# Patient Record
Sex: MALE | Race: WHITE | NOT HISPANIC OR LATINO | Employment: OTHER | URBAN - METROPOLITAN AREA
[De-identification: names, ages, dates, MRNs, and addresses within clinical notes are randomized per-mention and may not be internally consistent; named-entity substitution may affect disease eponyms.]

---

## 2017-01-03 ENCOUNTER — HOSPITAL ENCOUNTER (OUTPATIENT)
Facility: MEDICAL CENTER | Age: 67
End: 2017-01-03
Attending: INTERNAL MEDICINE | Admitting: INTERNAL MEDICINE
Payer: MEDICARE

## 2017-01-03 ENCOUNTER — APPOINTMENT (OUTPATIENT)
Dept: ADMISSIONS | Facility: MEDICAL CENTER | Age: 67
End: 2017-01-03
Attending: PAIN MEDICINE
Payer: MEDICARE

## 2017-01-06 ENCOUNTER — OFFICE VISIT (OUTPATIENT)
Dept: MEDICAL GROUP | Facility: PHYSICIAN GROUP | Age: 67
End: 2017-01-06
Payer: MEDICARE

## 2017-01-06 ENCOUNTER — TELEPHONE (OUTPATIENT)
Dept: OTHER | Facility: MEDICAL CENTER | Age: 67
End: 2017-01-06

## 2017-01-06 VITALS
RESPIRATION RATE: 16 BRPM | TEMPERATURE: 97.9 F | SYSTOLIC BLOOD PRESSURE: 132 MMHG | OXYGEN SATURATION: 96 % | HEART RATE: 72 BPM | BODY MASS INDEX: 28.47 KG/M2 | WEIGHT: 192.2 LBS | DIASTOLIC BLOOD PRESSURE: 72 MMHG | HEIGHT: 69 IN

## 2017-01-06 DIAGNOSIS — C34.90 MALIGNANT NEOPLASM OF LUNG, UNSPECIFIED LATERALITY, UNSPECIFIED PART OF LUNG (HCC): ICD-10-CM

## 2017-01-06 PROCEDURE — 99213 OFFICE O/P EST LOW 20 MIN: CPT | Performed by: NURSE PRACTITIONER

## 2017-01-06 NOTE — TELEPHONE ENCOUNTER
"Pt calling with concerns regarding reaching oncologist, Dr Moses.  Question regarding flu shot.  Reviewed with patient evidence based practise regarding flu and pneumonia vaccinations.  Pt states is not currently on treatment but has enrolled in clinical trial.  Discussed would need to touch base with clinical trials to verify if this would effect trial.  Pt also questioning if he wanted to do clinical trial r/t his experience with current physician office.  Stated he felt that he was being told \"there is nothing left\" for him and to \"not come back\".  Pt was the one who researched current possibility of clinical trial and reached out for enrollment. Encouraged him to talk to clinical trial nurse and what involvement in clinical trial would look like.  Discussed with patient if he had thought about second opinion.  He said he had called [facility] but was told \"she doesn't want any more patients\".  Provided information that navigator was not aware of any time that our oncology office was not taking patients.  Apologized for miscommunication and explained purpose statement for [facility].  Discussed if he felt this a better option he could have his PCP make a referral for a second opinion.  He stated would call his PCP today.      Discussed with clinical trials regarding question on flu vaccine.  Information provided that [facility] trial did have pretty stringent guidelines and it would be better, if appropriate, to receive flu vaccine after biopsy on the 10th.  Pt aware and grateful for help and information.  Available as needed.  "

## 2017-01-06 NOTE — MR AVS SNAPSHOT
"        Gabriele Jaimescinthya   2017 5:20 PM   Office Visit   MRN: [id number]    Department:  [facility]   Dept Phone:  [phone]    Description:  Male : 1950   Provider:  CHARMAINE Hutchins           Reason for Visit     Referral Needed Oncologist       Allergies as of 2017     No Known Allergies      You were diagnosed with     Malignant neoplasm of lung, unspecified laterality, unspecified part of lung (HCC)   [[id number]]         Vital Signs     Blood Pressure Pulse Temperature Respirations Height Weight    132/72 mmHg 72 36.6 °C (97.9 °F) 16 1.753 m (5' 9.02\") 87.181 kg (192 lb 3.2 oz)    Body Mass Index Oxygen Saturation Smoking Status             28.37 kg/m2 96% Former Smoker         Basic Information     Date Of Birth Sex Race Ethnicity Preferred Language    1950 Male White Non- English      Your appointments     Shayan 10, 2017 11:00 AM   CT NEEDLE BX MEDIASTINUM with [facility] 1, NURSE, IR IMAGING(CT/ULTRASOUND), RADIOLOGIST, CT/US INTERVENTIONAL [facility] - CT - [facility] ([facility])    [address] [place] [id number]   [phone]              Problem List              ICD-10-CM Priority Class Noted - Resolved    GERD (gastroesophageal reflux disease) K21.9 Low  2015 - Present    Pulmonary nodule seen on imaging study R91.1   2015 - Present    OA (osteoarthritis) M19.90   2015 - Present    Anxiety F41.9   2015 - Present    Shortness of breath R06.02   2015 - Present    Lung mass R91.8 High  2015 - Present    Hypercalcemia E83.52 Medium  2015 - Present    Lung cancer (HCC) C34.90   2015 - Present    Dependence on supplemental oxygen Z99.81   10/7/2015 - Present    Laryngitis, chronic J37.0   2015 - Present    Chronic low back pain with left-sided sciatica M54.42, G89.29   10/18/2016 - Present    Weakness of left lower extremity M62.81   11/15/2016 - Present    Lumbar spondylosis M47.816   " 12/8/2016 - Present      Health Maintenance        Date Due Completion Dates    IMM DTaP/Tdap/Td Vaccine (1 - Tdap) 8/10/1970 ---    IMM ZOSTER VACCINE 8/10/2011 ---    IMM PNEUMOCOCCAL 65+ (ADULT) LOW/MEDIUM RISK SERIES (1 of 2 - PCV13) 8/10/2016 10/7/2013    IMM INFLUENZA (1) 9/1/2016 10/7/2015            Current Immunizations     Influenza Vaccine Quad Inj (Preserved) 10/7/2015    Pneumococcal polysaccharide vaccine (PPSV-23) 10/7/2013      Below and/or attached are the medications your provider expects you to take. Review all of your home medications and newly ordered medications with your provider and/or pharmacist. Follow medication instructions as directed by your provider and/or pharmacist. Please keep your medication list with you and share with your provider. Update the information when medications are discontinued, doses are changed, or new medications (including over-the-counter products) are added; and carry medication information at all times in the event of emergency situations     Allergies:  No Known Allergies          Medications  Valid as of: January 06, 2017 -  6:03 PM    Generic Name Brand Name Tablet Size Instructions for use    ALPRAZolam (Tab) XANAX 1 MG Take 1 Tab by mouth at bedtime as needed for Sleep or Anxiety.        HYDROmorphone HCl (Tab) DILAUDID 2 MG Take 4 mg by mouth every four hours as needed for Severe Pain.        Meloxicam (Tab) MOBIC 7.5 MG Take 7.5 mg by mouth 2 Times a Day.        Omeprazole (CAPSULE DELAYED RELEASE) PRILOSEC 20 MG Take 20 mg by mouth every day.        Ondansetron HCl (Tab) ZOFRAN 8 MG Take 1 Tab by mouth 1 time daily as needed for Nausea/Vomiting.        .                 Medicines prescribed today were sent to:     [facility][id number] - [place], [place] - [address] [place] [address]    Phone: [phone] Fax: [phone]    Open 24 Hours?: No      Medication refill instructions:       If your  prescription bottle indicates you have medication refills left, it is not necessary to call your provider’s office. Please contact your pharmacy and they will refill your medication.    If your prescription bottle indicates you do not have any refills left, you may request refills at any time through one of the following ways: The online [employer] system (except Urgent Care), by calling your provider’s office, or by asking your pharmacy to contact your provider’s office with a refill request. Medication refills are processed only during regular business hours and may not be available until the next business day. Your provider may request additional information or to have a follow-up visit with you prior to refilling your medication.   *Please Note: Medication refills are assigned a new Rx number when refilled electronically. Your pharmacy may indicate that no refills were authorized even though a new prescription for the same medication is available at the pharmacy. Please request the medicine by name with the pharmacy before contacting your provider for a refill.        Referral     A referral request has been sent to our patient care coordination department. Please allow 3-5 business days for us to process this request and contact you either by phone or mail. If you do not hear from us by the 5th business day, please call us at [phone].           [employer] Status: Patient Declined

## 2017-01-07 NOTE — ASSESSMENT & PLAN NOTE
Patient has adenocarcinoma and has previously been seen by Dr. Moses. He is here today requesting a referral to Dr. Bennett at [facility] for a second opinion. He states that he has enrolled in a clinical trial, and needs to get a second opinion. He denies any chest pain, shortness of breath or dyspnea at this time.

## 2017-01-07 NOTE — PROGRESS NOTES
Subjective:     Chief Complaint   Patient presents with   • Referral Needed     Oncologist        HPI:  Gabriele Torres is a 66 y.o. male here today to discuss the following:    Lung cancer  Patient has adenocarcinoma and has previously been seen by Dr. Moses. He is here today requesting a referral to Dr. Bennett at [facility] for a second opinion. He states that he has enrolled in a clinical trial, and needs to get a second opinion. He denies any chest pain, shortness of breath or dyspnea at this time.         Current medicines (including changes today)  Current Outpatient Prescriptions   Medication Sig Dispense Refill   • omeprazole (PRILOSEC) 20 MG delayed-release capsule Take 20 mg by mouth every day.     • meloxicam (MOBIC) 7.5 MG Tab Take 7.5 mg by mouth 2 Times a Day.     • ondansetron (ZOFRAN) 8 MG Tab Take 1 Tab by mouth 1 time daily as needed for Nausea/Vomiting. 3 Tab 0   • HYDROmorphone (DILAUDID) 2 MG Tab Take 4 mg by mouth every four hours as needed for Severe Pain.     • alprazolam (XANAX) 1 MG TABS Take 1 Tab by mouth at bedtime as needed for Sleep or Anxiety. 30 Tab 0     No current facility-administered medications for this visit.       He  has a past medical history of Pain; Psychiatric problem; Heart burn; Indigestion; Hiatus hernia syndrome; Carcinoma in situ of respiratory system (2015); Arthritis; Cancer (HCC) (5/2015); and Breath shortness (2016).    ROS   Review of Systems   Constitutional: Negative for fever, chills, weight loss and malaise/fatigue.   HENT: Negative for ear pain, nosebleeds, congestion, sore throat and neck pain.    Respiratory: Negative for cough, sputum production, shortness of breath and wheezing.    Cardiovascular: Negative for chest pain, palpitations,  and leg swelling.   Gastrointestinal: Negative for heartburn, nausea, vomiting, diarrhea and abdominal pain.   Neurological: Negative for dizziness, tingling, tremors, sensory change, focal weakness and headaches.  "  Psychiatric/Behavioral: Negative for depression, anxiety, suicidal ideas, insomnia and memory loss.    All other systems reviewed and are negative except as in HPI.     Objective:   Physical Exam:  Blood pressure 132/72, pulse 72, temperature 36.6 °C (97.9 °F), resp. rate 16, height 1.753 m (5' 9.02\"), weight 87.181 kg (192 lb 3.2 oz), SpO2 96 %. Body mass index is 28.37 kg/(m^2).   General:  Well nourished, well developed in NAD  Head is grossly normal.  Neck: Supple without JVD   Pulmonary:  Normal effort.  Cardiovascular: Regular rate   Extremities: no clubbing, cyanosis, or edema.   Assessment and Plan:   The following treatment plan was discussed   1. Malignant neoplasm of lung, unspecified laterality, unspecified part of lung (HCC)  REFERRAL TO HEMATOLOGY ONCOLOGY Referral to?: [facility]       Followup: Return if symptoms worsen or fail to improve.   Please note that this dictation was created using voice recognition software. I have made every reasonable attempt to correct obvious errors, but I expect that there are errors of grammar and possibly content that I did not discover before finalizing the note.             "

## 2017-01-09 ENCOUNTER — HOSPITAL ENCOUNTER (OUTPATIENT)
Facility: MEDICAL CENTER | Age: 67
End: 2017-01-09
Attending: INTERNAL MEDICINE | Admitting: INTERNAL MEDICINE
Payer: MEDICARE

## 2017-01-09 PROBLEM — Z00.6 RESEARCH STUDY PATIENT: Status: ACTIVE | Noted: 2017-01-09

## 2017-01-10 ENCOUNTER — APPOINTMENT (OUTPATIENT)
Dept: RADIOLOGY | Facility: MEDICAL CENTER | Age: 67
End: 2017-01-10
Payer: MEDICARE

## 2017-01-10 ENCOUNTER — OFFICE VISIT (OUTPATIENT)
Dept: HEMATOLOGY ONCOLOGY | Facility: MEDICAL CENTER | Age: 67
End: 2017-01-10
Payer: MEDICARE

## 2017-01-10 ENCOUNTER — HOSPITAL ENCOUNTER (OUTPATIENT)
Dept: RADIOLOGY | Facility: MEDICAL CENTER | Age: 67
End: 2017-01-10
Payer: MEDICARE

## 2017-01-10 VITALS
DIASTOLIC BLOOD PRESSURE: 64 MMHG | BODY MASS INDEX: 28.73 KG/M2 | HEART RATE: 84 BPM | RESPIRATION RATE: 20 BRPM | SYSTOLIC BLOOD PRESSURE: 120 MMHG | HEIGHT: 69 IN | OXYGEN SATURATION: 92 % | TEMPERATURE: 98.6 F | WEIGHT: 194 LBS

## 2017-01-10 DIAGNOSIS — C34.91 METASTATIC LUNG CANCER (METASTASIS FROM LUNG TO OTHER SITE), RIGHT (HCC): ICD-10-CM

## 2017-01-10 PROCEDURE — 99205 OFFICE O/P NEW HI 60 MIN: CPT | Performed by: INTERNAL MEDICINE

## 2017-01-10 NOTE — Clinical Note
[facility]    [address], [address]  [facility] [address]      Date:1/10/2017                     Reference to Gabriele Torres    Consult:  Oncology      Date of Consultation:  1/10/2017  Time:  11 am       Referring Physician: Self referral    Primary Care:  Sasha Isidro M.D.  Oncology: Dr. Moses  Pain management: Dr. Sparks  Spinal surgery: Dr. Lopez       Diagnosis: Metastatic adenocarcinoma of lung      Chief Complaint:  He is here for a second opinion      History of Presenting Illness:  Gabriele Torres is a 66 y.o. male with metastatic adenocarcinoma of lung diagnosed in 5/2015.  He initially noticed decrease appetite and weight while he was living in [place] in 4/2015.  He was seen in the ER and CT imaging showed changes concerning for lung malignancy and was referred to pulmonary.  He was in the process of moving to [place] at the time.  On moving to [place], he presented to the ER. By this time he has developed cough and shortness of breath.  5/9/15 he is s/p of the right upper and lower lobe nodules which were both positive for adenocarcinoma, moderately differentiated with lymphovascular invasion. CT imaging showed multiple right pulmonary nodules in the right lung as well as left lower lobe nodule, left adrenal nodule and trace right pleural effusion.   Bone scan showed uptake in right scapula and right 3rd or 4th rib concerning.  Further testing of the tissue was positive for EGFR 20 mutation.  He was started on tarceva on 6/1/16.  PET shortly after again showed multiple pulmonary nodules with uptake with progression, no uptake in right scapula and ribs, uptake in left acetabular roof.  Repeat imaging in 9/2016 showed progression of disease.  9/24/15 he was started on carboplatin and alimta and only had one cycle.  This was changed as he was unable to tolerate it.  He was then on single agent alimta.  He continues to have issues, mostly fatigue even with dose  reduction.  3/2016 imaging showed response however he was changed as he was not able to tolerate alimta.  3/16/16 he was then started on Opdivo.  He was tested for PDL which was negative. 5/2016 imaging was concerning for progression.    He was continued on treatment and imaging in 7/2016 showed mixed responses.  He has MRI of L spine which showed spondylolysis and foraminal stenosis as well as degenerative disease.  He was continued on opdivo and his last dose was on 10/27/16. Imaging on 11/4/16 showed progression of disease.  11/15/16 CT hip showed destructive metastatic lesion within the superior left acetabulum/adjacent ilium.  11/23/16 he is s/p right middle lobe biopsy and further testing was negative for EGFR and T790M.  There was a discussion about clinical trial and repeat biopsy was ordered however patient cancelled the biopsy and is here for a second opinion.        He has been feeling better since he has been of chemotherapy.  He is having lower back pain and left hip pain.  His biggest issues at present is left hip pain which is affecting his quality of life.  He has noticed intermittent nausea but no vomiting.  He has occasional cough and shortness of breath on exertion.  He has history for anxiety and has anxiety attacks.  He recently started on oxygen at night.  He has good appetite and stable weight.  he is followed by orthopedics without much improvement in pain with intrarticular injection. He denies headaches, dizziness, abdominal pain, fevers, chills and night sweats.            Past Medical History:  1.  Metastatic lung cancer  2. 2005: Chronic lower back pain secondary to injury  3. Arthritis with multiple joint involved  4. GERD  5. Anxiety with intermittent attacks  6. O2 3l/min only at night x 3 weeks           Past Surgical History   Past Surgical History    Procedure  Laterality  Date    •  Athroplasty  Left  2014        left hip    •  Lumbar laminectomy diskectomy    2005        L3-L4       •  Other abdominal surgery    2007        umbilical hernia    •  Thoracoscopy  Right  5/7/2015        Procedure: THORACOSCOPY, with lung biopsy;  Surgeon: Mikki Rivera M.D.;  Location: [facility];  Service:     •  Pr inj dx/ther agnt paravert facet joint, luz maria*  Left  12/8/2016        Procedure: INJ PARA FACET L/S 1 LVL W/IG - L4, 5, S1;  Surgeon: Cricket Sparks M.D.;  Location: [facility];  Service: Pain Management    •  Pr inj dx/ther agnt paravert facet joint, luz maria*    12/8/2016        Procedure: INJ PARA FACET L/S 2D LVL W/IG;  Surgeon: Cricket Sparks M.D.;  Location: [facility];  Service: Pain Management    •  Other orthopedic surgery    2001        bilateral arthroscopy, knees    •  Other orthopedic surgery    2010        rt shoulder surgery    •  Other orthopedic surgery    2014         left hip arthroplasty    •  Pr inj dx/ther agnt paravert facet joint, luz maria*  Left  1/5/2017        Procedure: INJ PARA FACET L/S 1 LVL W/IG - L4, L5, S1;  Surgeon: Cricket Sparks M.D.;  Location: [facility];  Service: Pain Management    •  Pr inj dx/ther agnt paravert facet joint, luz maria*    1/5/2017        Procedure: INJ PARA FACET L/S 2D LVL W/IG;  Surgeon: Cricket Sparks M.D.;  Location: [facility];  Service: Pain Management             Social History:  Smoking: stopped 1/1983              H/o 1 pk/day x 13 years  ETOH: very rarely    Drugs: Denies  He lives in [place]  He lives alone  He is currently not working  He was a   No exposures  He has two sons      Family History:  1. Mother: Thyroid cancer, breat cancer, colon cancer and renal cell  2. Father: prostate cancer  3. Brother: lung cancer, h/o smoking              Allergies as of 01/10/2017    •  (No Known Allergies)          Current outpatient prescriptions:    •  omeprazole (PRILOSEC) 20 MG delayed-release capsule, Take 20 mg by mouth every day., Disp: , Rfl:    •  meloxicam  "(MOBIC) 7.5 MG Tab, Take 7.5 mg by mouth 2 Times a Day., Disp: , Rfl:    •  ondansetron (ZOFRAN) 8 MG Tab, Take 1 Tab by mouth 1 time daily as needed for Nausea/Vomiting., Disp: 3 Tab, Rfl: 0  •  HYDROmorphone (DILAUDID) 2 MG Tab, Take 4 mg by mouth every four hours as needed for Severe Pain., Disp: , Rfl:    •  alprazolam (XANAX) 1 MG TABS, Take 1 Tab by mouth at bedtime as needed for Sleep or Anxiety., Disp: 30 Tab, Rfl: 0      Review of Systems:  All other review of systems are negative except what was mentioned above in the HPI.  Constitutional: Negative for fever, chills, and weight loss. Positive for malaise/fatigue.    HENT: Negative for ear pain and nosebleeds.     Eyes: Negative for blurred vision.    Respiratory: Positive for cough. Positive for shortness of breath on exertion.     Cardiovascular: Negative for chest pain and leg swelling.    Gastrointestinal: Intermittent nausea. Negative for  vomiting and abdominal pain.    Genitourinary: Negative for dysuria.    Musculoskeletal: Negative for myalgias. Positive for back pain and joint pain.    Skin: Negative for rash.    Neurological: Negative for dizziness, sensory change, focal weakness and headaches.    Endo/Heme/Allergies: No bruise/bleed easily.    Psychiatric/Behavioral: Negative for depression and memory loss.  Positive for anxiety.        Physical Exam:   Vitals   Filed Vitals:      01/10/17 1104    BP:  120/64    Pulse:  84    Temp:  37 °C (98.6 °F)    Resp:  20    Height:  1.753 m (5' 9\")    Weight:  87.998 kg (194 lb)    SpO2:  92%         Performance status: I    General: Not in acute distress, alert and oriented x 3  HEENT: normalcephalic, atraumatic, pupils equally reactive to light bilaterally, extra ocular muscles intact, moist oral mucus membranes, and oral cavity without any lesions.  Neck: Supple neck and full range of motion  Lymph nodes: No palpable bilateral cervical and supraclavicular lymphadenopathy.     CVS: regular rate and " rhythm  RESP: decrease breath sounds in right lung    ABD: Soft, non tender, non distended, positive bowel sounds, and no palpable hepatomegaly    EXT: No edema  CNS: Alert and oriented x3, cranial nerves grossly intact, muscle strength grossly intact. Gait affected due to hip pain.       Labs:  No visits with results within 1 Day(s) from this visit.  Latest known visit with results is:      Hospital Outpatient Visit on 12/15/2016    Component  Date  Value  Ref Range  Status    •  Report  12/15/2016       Final                     Value:[facility]       Test Date:  2016-12-15  Pt Name:    ANAID HIGGINS               Department: EKG  MRN:        [id number]                      Room:  Gender:     M                            Technician: TRAVIS  :        1950                   Requested By:CHRISTI HERMAN  Order #:    [id number]                    Reading MD: Malina Fontana MD      Measurements  Intervals                                Axis  Rate:       75                           P:          47  CO:         176                          QRS:        62  QRSD:       96                           T:          59  QT:         376  QTc:        420      Interpretive Statements  SINUS RHYTHM  No previous ECG available for comparison      Electronically Signed On 12- 14:27:36 PST by Malina Fontana MD                Imagin/15/16 CT left extremity: No evidence of mid shaft or distal left femoral metastasis.  No soft tissue mass identified.  11/15/16 CT Hip: Destructive metastatic lesion within the superior left acetabulum/adjacent ilium. Left hip prosthesis appears intact. No prosthetic dislocation.  Bilateral L5 pars defects, grade 1 L5 anterolisthesis, and degenerative changes.  Diverticulosis of the sigmoid colon without evidence diverticulitis. No pelvic free fluid.  16 CT CAP: Overall, size and number of focal pulmonary nodules and irregular shaped soft tissue density lesions throughout the  right lung have increased compared to the prior examination. Volume loss in the right lung is again noted. Progression of metastases   is most likely. Lymphangitic carcinomatosis is a possibility as mentioned on the prior report. Overall, size and number of pulmonary nodules in the left lung is also increased compared to the prior exam. There are still a limited number of these lesions mainly in the lung periphery. Progression of metastases is most likely. No adenopathy or pleural effusion is developed since the prior exam.  No CT evidence of metastatic disease in the abdomen. Right renal cyst again identified.  10/4/16 MR L spine: There is grade 1 spondylolysis of L5 on S1 causing severe bilateral L5 neural foraminal stenosis. Bilateral L5 nerve roots are impinged at the neural foramina. Degenerative disease as described above.  7/14/16 CT CAP:  Mixed response with interval decrease in size of some subpleural pulmonary nodules, increase in size of some bilateral noncancer nodules and increase in size of dominant right middle lobe subpleural and right upper lobe subpleural masses.  Stable to slight interval worsening right bronchial thickening could indicate lymphangitic spread and/or edema.  No CT evidence of abdominal metastasis.  Right renal, left hepatic simple cysts, hiatal hernia.  5/6/16 CT CAP: Significant worsening of right lung multifocal nodular ill-defined airspace process or mass and development of groundglass densities within the left lung as well as a small cavitary left lower lobe nodule.  Primary differential diagnosis is of infection versus malignancy. Also within the differential diagnosis especially in the right lung are radiation therapy changes or drug reaction..  Hiatal hernia.  Simple cyst within the liver, spleen, right kidney.  Aortic and branch vessel atherosclerotic plaque.  3/8/16 CT: There has been some interval decrease in consolidation and alveolar opacification in the right lung  since the prior exam. This could represent a reduction in inflammation or edema.  Interstitial opacifications and volume loss in the right lung are again noted.  Small focal abnormalities including nodules and ill-defined opacifications are again noted in the left lung with no significant change.  Splenic lesions and small lesions in left lobe of liver are unchanged.  Right renal cyst again identified.  Hiatal hernia again noted.  1/27/16 CT:  Residual multiple ill-defined alveolar opacities in the right lung which may represent lymphangitic spread of carcinoma. Improved aeration in the right lung consistent with improving secondary right lung atelectasis or pneumonia.  Stable multiple small nodules in the left lung consistent with left lung pulmonary metastases. Multiple small areas of low attenuation in the spleen which may represent splenic metastases or cysts. No interval change.  No residual left adrenal gland enlargement.  No evidence of abdominal or pelvic metastasis.  11/10/15 MRI brain: Axial postcontrast T1-weighted sequences demonstrates a punctate area of abnormal contrast enhancement in the right cerebellar hemisphere. This lesion is not visualized in any other postcontrast or precontrast sequences. There is no evidence of   abnormal edema in this region. There are no other enhancing lesions in the brain parenchyma. Therefore this lesion likely represents an artifact. However in view of history of adenocarcinoma, followup MRI in 1.5 Janna MRI scanner is recommended in 6   weeks to rule out the remote possibility of metastasis. This finding is not seen on the previous MRI dated 5/11/2015. However the previous MRI is done in 1.5 Janna scanner.  Mild chronic microvascular ischemic disease.  Mild cerebral atrophy.  9/2/15 CT CAP: Interval progression of disease with increased masslike consolidation and innumerable nodules on the right. Progression of disease is also seen in the left lung with multiple small  pulmonary nodules. 2 indeterminate splenic lesions are unchanged. Stable left adrenal nodule. Tiny hypodense hepatic lesions are stable and most likely represent small cysts. Atherosclerotic plaque. Moderate amount of colonic stool. New indeterminate 6 mm calcification anterior to the left scapula.  6/11/15 PET/CT: Innumerable PET avid right pulmonary nodules with areas of masslike consolidation. Findings have increased since the prior CT and are suspicious for malignancy. No evidence of abnormal FDG uptake in the region of the right scapula and right 3rd or 4th ribs.  Abnormal FDG uptake in the region of the left acetabular roof. The patient is status post left bipolar hip arthroplasty. Finding could be seen in the setting of acute degenerative change/inflammation, infection or neoplasm/metastasis. Clinical correlation is recommended.  Diverticulosis.  Atherosclerosis.  5/10/15 bone scan: Solitary focus of abnormal radiotracer accumulation in the region of the RIGHT scapula and RIGHT third or fourth rib which could be metastatic disease.  5/9/16 CT CAP: Innumerable RIGHT pulmonary nodules with some areas of confluent masses. 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  7 x 5 mm LEFT lower lobe pulmonary nodule with additional tiny upper and lower lobe pulmonary nodule, likely metastatic disease.  8 x 11 mm LEFT adrenal nodule. Further evaluation could be performed with unenhanced CT of the abdomen or adrenal mass protocol MRI.  Trace RIGHT pleural effusion.  Colonic diverticulosis      Pathology:  11/23/16: Right middle lobe lung biopsy:         Several needle core biopsy of alveolar lung tissue demonstrating          within one small core biopsy, several foci of non-small cell          carcinoma within lymphactic spaces, consistent with          adenocarcinoma of lung origin.  Block A2 was sent to [facility] for EGFR molecular analysis; a report  was generated.  Results:  EGFR MUTATION NOT DETECTED.  Notes:   No  mutations were detected in EGFR exons 18-21.  There is no evidence of  EGFR T790M mutation using our high sensitivity assay.    5/7/15: A. Right upper lobe:         Adenocarcinoma, moderately differentiated with lymphovascular          invasion identified.  B. Right lower lobe:         Adenocarcinoma, moderately differentiated, with lymphovascular          invasion identified.    EGFR Exon 20 detected   This case is being addended to report the results of PDL-1 IHC testing  by [facility].  Results:  Negative.          Assessment & Plan:  Metastatic adenocarcinoma of lung: Patient with history of smoking diagnosed in 5/2015 due to cough, shortness of breath and weight loss.  He was found to have disease mostly confined to the right lung involving all lobs as well as some concern in regards to bone metastatic disease.  He was EGFR exon 20 mutation and had progression of disease on traceve.  He has some response to single agent alimta however was unable to tolerate it.  He was PDL negative and had progression of disease on Opdivo.  Most recent imaging showed progression of disease and CT hip also showed destructive metastatic lesion within the superior left acetabulum/adjacent ilium. He is currently off treatment and is interested in continuation of treatment.        I had a long discussion with the patient. I went over the all  the imaging as well as pathology in detail.  We had a discussion about metastatic adnocarciona including treatment goals, options and indications for chemotherapy and radiation.  As he has metastatic disease, goal of treatment is quality of life and progression free survival.  He has been on multiple lines of treatment with progression or poor tolerability.  He is fairly healthy and currently has a good performance status.  He is a candidate for MATCH trail for which he will need more tissues.  I recommend biopsy of the left hip mass as this is more accessible as his last lung biopsy caused a  penumothorax.  I will order bone scan prior to biopsy.  If there is not sufficient tissue or if the biopsy is non diagnostic then will need a lung biopsy. If he not a candidate for match trial then can consider further systemic chemotherapy with either single agent docetaxel vs. combination therapy.        Concern for bone metastatic disease: He has changes on CT scan in the past with no uptake on bone scan.  CT imaging in 11/2016 however showed destructive metastatic lesion within the superior left acetabulum/adjacent ilium.   I will order bone scan.  Biopsy of this mass was also ordered.  If positive, I recommend palliative radiation to help with pain and also zometa.    He is advised to follow up with Dr. Moses to discuss further options.  Patient however is stating that he wants to transfer care over.  Orders placed and he is to follow up again in 1-2 week post work to finalize treatment plan.           he agreed and verbalized his agreement and understanding with the current plan. I answered all questions and concerns he has at this time and advised him to call at any time in the interim with questions or concerns in regards to his care. Thank you for allowing me to participate in his care, I will continue to follow.      Please note that this dictation was created using voice recognition software. I have made every reasonable attempt to correct obvious errors, but I expect that there are errors of grammar and possibly content that I did not discover before finalizing the note.      Sincerely,  Shawanda Bennett  [address] [place], [place] [address]e [facility], [address]   [phone]

## 2017-01-10 NOTE — MR AVS SNAPSHOT
"        Gabriele Torres   1/10/2017 11:00 AM   Office Visit   MRN: [id number]    Department:  [facility]   Dept Phone:  [phone]    Description:  Male : 1950   Provider:  Shawanda Benntet M.D.           Reason for Visit     Other Malignant neoplasm of lung      Allergies as of 1/10/2017     No Known Allergies      You were diagnosed with     Metastatic lung cancer (metastasis from lung to other site), right (HCC)   [109115]         Vital Signs     Blood Pressure Pulse Temperature Respirations Height Weight    120/64 mmHg 84 37 °C (98.6 °F) 20 1.753 m (5' 9\") 87.998 kg (194 lb)    Body Mass Index Oxygen Saturation Smoking Status             28.64 kg/m2 92% Former Smoker         Basic Information     Date Of Birth Sex Race Ethnicity Preferred Language    1950 Male White Non- English      Your appointments     2017  1:00 PM   Nuclear Medicine Injection with [facility] NM INJ   [facility] ([facility])    [address] [facility] [address]   [phone]            2017  4:00 PM   NM BONE SCAN WHOLE BODY with [facility] NM FORTE 2   [facility] ([facility])    [address] [facility] [address] [facility] [address] [address]   [phone]            2017  1:20 PM   ONCOLOGY EST PATIENT 30 MIN with Shawanda Bennett M.D.   [facility] (--)    [address], [address]  [facility] [id number] [phone]              Problem List              ICD-10-CM Priority Class Noted - Resolved    GERD (gastroesophageal reflux disease) K21.9 Low  2015 - Present    Pulmonary nodule seen on imaging study R91.1   2015 - Present    OA (osteoarthritis) M19.90   2015 - Present    Anxiety F41.9   2015 - Present    Shortness of breath R06.02   2015 - Present    Lung mass R91.8 High  2015 - Present    Hypercalcemia E83.52 Medium  2015 - Present    Lung cancer (HCC) C34.90   2015 - Present   " Dependence on supplemental oxygen Z99.81   10/7/2015 - Present    Laryngitis, chronic J37.0   12/23/2015 - Present    Chronic low back pain with left-sided sciatica M54.42, G89.29   10/18/2016 - Present    Weakness of left lower extremity M62.81   11/15/2016 - Present    Lumbar spondylosis M47.816   12/8/2016 - Present    Research study patient Z00.6   1/9/2017 - Present    [facility]    1/9/2017 - Present      Health Maintenance        Date Due Completion Dates    IMM DTaP/Tdap/Td Vaccine (1 - Tdap) 8/10/1970 ---    IMM ZOSTER VACCINE 8/10/2011 ---    IMM PNEUMOCOCCAL 65+ (ADULT) LOW/MEDIUM RISK SERIES (1 of 2 - PCV13) 8/10/2016 10/7/2013    IMM INFLUENZA (1) 9/1/2016 10/7/2015            Current Immunizations     Influenza Vaccine Quad Inj (Preserved) 10/7/2015    Pneumococcal polysaccharide vaccine (PPSV-23) 10/7/2013      Below and/or attached are the medications your provider expects you to take. Review all of your home medications and newly ordered medications with your provider and/or pharmacist. Follow medication instructions as directed by your provider and/or pharmacist. Please keep your medication list with you and share with your provider. Update the information when medications are discontinued, doses are changed, or new medications (including over-the-counter products) are added; and carry medication information at all times in the event of emergency situations     Allergies:  No Known Allergies          Medications  Valid as of: January 10, 2017 - 12:58 PM    Generic Name Brand Name Tablet Size Instructions for use    ALPRAZolam (Tab) XANAX 1 MG Take 1 Tab by mouth at bedtime as needed for Sleep or Anxiety.        HYDROmorphone HCl (Tab) DILAUDID 2 MG Take 4 mg by mouth every four hours as needed for Severe Pain.        Meloxicam (Tab) MOBIC 7.5 MG Take 7.5 mg by mouth 2 Times a Day.        Omeprazole (CAPSULE DELAYED RELEASE) PRILOSEC 20 MG Take 20 mg by mouth every day.         Ondansetron HCl (Tab) ZOFRAN 8 MG Take 1 Tab by mouth 1 time daily as needed for Nausea/Vomiting.        .                 Medicines prescribed today were sent to:     [id number] - [place], [place] - [address] [facility] AT [address] [place] [address]    Phone: [phone] Fax: [phone]    Open 24 Hours?: No      Medication refill instructions:       If your prescription bottle indicates you have medication refills left, it is not necessary to call your provider’s office. Please contact your pharmacy and they will refill your medication.    If your prescription bottle indicates you do not have any refills left, you may request refills at any time through one of the following ways: The online [employer] system (except Urgent Care), by calling your provider’s office, or by asking your pharmacy to contact your provider’s office with a refill request. Medication refills are processed only during regular business hours and may not be available until the next business day. Your provider may request additional information or to have a follow-up visit with you prior to refilling your medication.   *Please Note: Medication refills are assigned a new Rx number when refilled electronically. Your pharmacy may indicate that no refills were authorized even though a new prescription for the same medication is available at the pharmacy. Please request the medicine by name with the pharmacy before contacting your provider for a refill.        Your To Do List     Future Labs/Procedures Complete By Expires    IR-BIOPSY-GENERIC  As directed 1/10/2018    NM-BONE SCAN WHOLE BODY  As directed 1/10/2018         [facility] Status: Patient Declined

## 2017-01-10 NOTE — PROGRESS NOTES
Consult:  Oncology    Date of Consultation:  1/10/2017  Time:  11 am     Referring Physician: Self referral   Primary Care:  Sasha Isidro M.D.  Oncology: Dr. Moses  Pain management: Dr. Sparks  Spinal surgery: Dr. Lopez     Diagnosis: Metastatic adenocarcinoma of lung    Chief Complaint:  He is here for a second opinion    History of Presenting Illness:  Gabriele Torres is a 66 y.o. male with metastatic adenocarcinoma of lung diagnosed in 5/2015.  He initially noticed decrease appetite and weight while he was living in [place] in 4/2015.  He was seen in the ER and CT imaging showed changes concerning for lung malignancy and was referred to pulmonary.  He was in the process of moving to [place] at the time.  On moving to [place], he presented to the ER. By this time he has developed cough and shortness of breath.  5/9/15 he is s/p of the right upper and lower lobe nodules which were both positive for adenocarcinoma, moderately differentiated with lymphovascular invasion. CT imaging showed multiple right pulmonary nodules in the right lung as well as left lower lobe nodule, left adrenal nodule and trace right pleural effusion.   Bone scan showed uptake in right scapula and right 3rd or 4th rib concerning.  Further testing of the tissue was positive for EGFR 20 mutation.  He was started on tarceva on 6/1/16.  PET shortly after again showed multiple pulmonary nodules with uptake with progression, no uptake in right scapula and ribs, uptake in left acetabular roof.  Repeat imaging in 9/2016 showed progression of disease.  9/24/15 he was started on carboplatin and alimta and only had one cycle.  This was changed as he was unable to tolerate it.  He was then on single agent alimta.  He continues to have issues, mostly fatigue even with dose reduction.  3/2016 imaging showed response however he was changed as he was not able to tolerate alimta.  3/16/16 he was then started on Opdivo.  He was tested for PDL which was  negative. 5/2016 imaging was concerning for progression.    He was continued on treatment and imaging in 7/2016 showed mixed responses.  He has MRI of L spine which showed spondylolysis and foraminal stenosis as well as degenerative disease.  He was continued on opdivo and his last dose was on 10/27/16. Imaging on 11/4/16 showed progression of disease.  11/15/16 CT hip showed destructive metastatic lesion within the superior left acetabulum/adjacent ilium.  11/23/16 he is s/p right middle lobe biopsy and further testing was negative for EGFR and T790M.  There was a discussion about clinical trial and repeat biopsy was ordered however patient cancelled the biopsy and is here for a second opinion.      He has been feeling better since he has been of chemotherapy.  He is having lower back pain and left hip pain.  His biggest issues at present is left hip pain which is affecting his quality of life.  He has noticed intermittent nausea but no vomiting.  He has occasional cough and shortness of breath on exertion.  He has history for anxiety and has anxiety attacks.  He recently started on oxygen at night.  He has good appetite and stable weight.  he is followed by orthopedics without much improvement in pain with intrarticular injection. He denies headaches, dizziness, abdominal pain, fevers, chills and night sweats.       Past Medical History:  1.  Metastatic lung cancer  2. 2005: Chronic lower back pain secondary to injury  3. Arthritis with multiple joint involved  4. GERD  5. Anxiety with intermittent attacks  6. O2 3l/min only at night x 3 weeks      Past Surgical History   Procedure Laterality Date   • Athroplasty Left 2014     left hip   • Lumbar laminectomy diskectomy  2005     L3-L4   • Other abdominal surgery  2007     umbilical hernia   • Thoracoscopy Right 5/7/2015     Procedure: THORACOSCOPY, with lung biopsy;  Surgeon: Mikki Rivera M.D.;  Location: SURGERY [facility];  Service:    • Pr inj dx/ther  agnt paravert facet joint, luz maria* Left 12/8/2016     Procedure: INJ PARA FACET L/S 1 LVL W/IG - L4, 5, S1;  Surgeon: Cricket Sparks M.D.;  Location: SURGERY [facility];  Service: Pain Management   • Pr inj dx/ther agnt paravert facet joint, luz maria*  12/8/2016     Procedure: INJ PARA FACET L/S 2D LVL W/IG;  Surgeon: Cricket Sparks M.D.;  Location: SURGERY [facility];  Service: Pain Management   • Other orthopedic surgery  2001     bilateral arthroscopy, knees   • Other orthopedic surgery  2010     rt shoulder surgery   • Other orthopedic surgery  2014      left hip arthroplasty   • Pr inj dx/ther agnt paravert facet joint, luz maria* Left 1/5/2017     Procedure: INJ PARA FACET L/S 1 LVL W/IG - L4, L5, S1;  Surgeon: Cricket Sparks M.D.;  Location: SURGERY [facility];  Service: Pain Management   • Pr inj dx/ther agnt paravert facet joint, luz maria*  1/5/2017     Procedure: INJ PARA FACET L/S 2D LVL W/IG;  Surgeon: Cricket Sparks M.D.;  Location: SURGERY [facility];  Service: Pain Management       Social History:  Smoking: stopped 1/1983   H/o 1 pk/day x 13 years  ETOH: very rarely   Drugs: Denies  He lives in [place]  He lives alone  He is currently not working  He was a   No exposures  He has two sons    Family History:  1. Mother: Thyroid cancer, breat cancer, colon cancer and renal cell  2. Father: prostate cancer  3. Brother: lung cancer, h/o smoking        Allergies as of 01/10/2017   • (No Known Allergies)         Current outpatient prescriptions:   •  omeprazole (PRILOSEC) 20 MG delayed-release capsule, Take 20 mg by mouth every day., Disp: , Rfl:   •  meloxicam (MOBIC) 7.5 MG Tab, Take 7.5 mg by mouth 2 Times a Day., Disp: , Rfl:   •  ondansetron (ZOFRAN) 8 MG Tab, Take 1 Tab by mouth 1 time daily as needed for Nausea/Vomiting., Disp: 3 Tab, Rfl: 0  •  HYDROmorphone (DILAUDID) 2 MG Tab, Take 4 mg by mouth every four hours as needed for Severe Pain., Disp: , Rfl:   •  alprazolam  "(XANAX) 1 MG TABS, Take 1 Tab by mouth at bedtime as needed for Sleep or Anxiety., Disp: 30 Tab, Rfl: 0    Review of Systems:  All other review of systems are negative except what was mentioned above in the HPI.  Constitutional: Negative for fever, chills, and weight loss. Positive for malaise/fatigue.    HENT: Negative for ear pain and nosebleeds.     Eyes: Negative for blurred vision.    Respiratory: Positive for cough. Positive for shortness of breath on exertion.    Cardiovascular: Negative for chest pain and leg swelling.    Gastrointestinal: Intermittent nausea. Negative for  vomiting and abdominal pain.    Genitourinary: Negative for dysuria.    Musculoskeletal: Negative for myalgias. Positive for back pain and joint pain.    Skin: Negative for rash.    Neurological: Negative for dizziness, sensory change, focal weakness and headaches.    Endo/Heme/Allergies: No bruise/bleed easily.    Psychiatric/Behavioral: Negative for depression and memory loss.  Positive for anxiety.      Physical Exam:  Filed Vitals:    01/10/17 1104   BP: 120/64   Pulse: 84   Temp: 37 °C (98.6 °F)   Resp: 20   Height: 1.753 m (5' 9\")   Weight: 87.998 kg (194 lb)   SpO2: 92%     Performance status: I   General: Not in acute distress, alert and oriented x 3  HEENT: normalcephalic, atraumatic, pupils equally reactive to light bilaterally, extra ocular muscles intact, moist oral mucus membranes, and oral cavity without any lesions.  Neck: Supple neck and full range of motion  Lymph nodes: No palpable bilateral cervical and supraclavicular lymphadenopathy.    CVS: regular rate and rhythm  RESP: decrease breath sounds in right lung   ABD: Soft, non tender, non distended, positive bowel sounds, and no palpable hepatomegaly   EXT: No edema  CNS: Alert and oriented x3, cranial nerves grossly intact, muscle strength grossly intact. Gait affected due to hip pain.     Labs:  No visits with results within 1 Day(s) from this visit.  Latest known " visit with results is:    Hospital Outpatient Visit on 12/15/2016   Component Date Value Ref Range Status   • Report 12/15/2016    Final                    Value:[facility]    Test Date:  2016-12-15  Pt Name:    ANAID HIGGINS               Department: EKG  MRN:        [id number]                      Room:  Gender:     M                            Technician: TRAVIS  :        1950                   Requested By:CHRISTI HERMAN  Order #:    [id number]                    Reading MD: Malina Fontana MD    Measurements  Intervals                                Axis  Rate:       75                           P:          47  CA:         176                          QRS:        62  QRSD:       96                           T:          59  QT:         376  QTc:        420    Interpretive Statements  SINUS RHYTHM  No previous ECG available for comparison    Electronically Signed On 12- 14:27:36 PST by Malina Fontana MD         Imagin. 11/15/16 CT left extremity: No evidence of mid shaft or distal left femoral metastasis.  No soft tissue mass identified.  2. 11/15/16 CT Hip: Destructive metastatic lesion within the superior left acetabulum/adjacent ilium. Left hip prosthesis appears intact. No prosthetic dislocation.  Bilateral L5 pars defects, grade 1 L5 anterolisthesis, and degenerative changes.  Diverticulosis of the sigmoid colon without evidence diverticulitis. No pelvic free fluid.  3. 16 CT CAP: Overall, size and number of focal pulmonary nodules and irregular shaped soft tissue density lesions throughout the right lung have increased compared to the prior examination. Volume loss in the right lung is again noted. Progression of metastases   is most likely. Lymphangitic carcinomatosis is a possibility as mentioned on the prior report. Overall, size and number of pulmonary nodules in the left lung is also increased compared to the prior exam. There are still a limited number of these lesions  mainly in the lung periphery. Progression of metastases is most likely. No adenopathy or pleural effusion is developed since the prior exam.  No CT evidence of metastatic disease in the abdomen. Right renal cyst again identified.  4. 10/4/16 MR L spine: There is grade 1 spondylolysis of L5 on S1 causing severe bilateral L5 neural foraminal stenosis. Bilateral L5 nerve roots are impinged at the neural foramina. Degenerative disease as described above.  5. 7/14/16 CT CAP:  Mixed response with interval decrease in size of some subpleural pulmonary nodules, increase in size of some bilateral noncancer nodules and increase in size of dominant right middle lobe subpleural and right upper lobe subpleural masses.  Stable to slight interval worsening right bronchial thickening could indicate lymphangitic spread and/or edema.  No CT evidence of abdominal metastasis.  Right renal, left hepatic simple cysts, hiatal hernia.  6. 5/6/16 CT CAP: Significant worsening of right lung multifocal nodular ill-defined airspace process or mass and development of groundglass densities within the left lung as well as a small cavitary left lower lobe nodule.  Primary differential diagnosis is of infection versus malignancy. Also within the differential diagnosis especially in the right lung are radiation therapy changes or drug reaction..  Hiatal hernia.  Simple cyst within the liver, spleen, right kidney.  Aortic and branch vessel atherosclerotic plaque.  7. 3/8/16 CT: There has been some interval decrease in consolidation and alveolar opacification in the right lung since the prior exam. This could represent a reduction in inflammation or edema.  Interstitial opacifications and volume loss in the right lung are again noted.  Small focal abnormalities including nodules and ill-defined opacifications are again noted in the left lung with no significant change.  Splenic lesions and small lesions in left lobe of liver are unchanged.  Right  renal cyst again identified.  Hiatal hernia again noted.  8. 1/27/16 CT:  Residual multiple ill-defined alveolar opacities in the right lung which may represent lymphangitic spread of carcinoma. Improved aeration in the right lung consistent with improving secondary right lung atelectasis or pneumonia.  Stable multiple small nodules in the left lung consistent with left lung pulmonary metastases. Multiple small areas of low attenuation in the spleen which may represent splenic metastases or cysts. No interval change.  No residual left adrenal gland enlargement.  No evidence of abdominal or pelvic metastasis.  9. 11/10/15 MRI brain: Axial postcontrast T1-weighted sequences demonstrates a punctate area of abnormal contrast enhancement in the right cerebellar hemisphere. This lesion is not visualized in any other postcontrast or precontrast sequences. There is no evidence of   abnormal edema in this region. There are no other enhancing lesions in the brain parenchyma. Therefore this lesion likely represents an artifact. However in view of history of adenocarcinoma, followup MRI in 1.5 Janna MRI scanner is recommended in 6   weeks to rule out the remote possibility of metastasis. This finding is not seen on the previous MRI dated 5/11/2015. However the previous MRI is done in 1.5 Janna scanner.  Mild chronic microvascular ischemic disease.  Mild cerebral atrophy.  10. 9/2/15 CT CAP: Interval progression of disease with increased masslike consolidation and innumerable nodules on the right. Progression of disease is also seen in the left lung with multiple small pulmonary nodules. 2 indeterminate splenic lesions are unchanged. Stable left adrenal nodule. Tiny hypodense hepatic lesions are stable and most likely represent small cysts. Atherosclerotic plaque. Moderate amount of colonic stool. New indeterminate 6 mm calcification anterior to the left scapula.  11. 6/11/15 PET/CT: Innumerable PET avid right pulmonary nodules  with areas of masslike consolidation. Findings have increased since the prior CT and are suspicious for malignancy. No evidence of abnormal FDG uptake in the region of the right scapula and right 3rd or 4th ribs.  Abnormal FDG uptake in the region of the left acetabular roof. The patient is status post left bipolar hip arthroplasty. Finding could be seen in the setting of acute degenerative change/inflammation, infection or neoplasm/metastasis. Clinical correlation is recommended.  Diverticulosis.  Atherosclerosis.  12. 5/10/15 bone scan: Solitary focus of abnormal radiotracer accumulation in the region of the RIGHT scapula and RIGHT third or fourth rib which could be metastatic disease.  13. 5/9/16 CT CAP: Innumerable RIGHT pulmonary nodules with some areas of confluent masses. 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  7 x 5 mm LEFT lower lobe pulmonary nodule with additional tiny upper and lower lobe pulmonary nodule, likely metastatic disease.  8 x 11 mm LEFT adrenal nodule. Further evaluation could be performed with unenhanced CT of the abdomen or adrenal mass protocol MRI.  Trace RIGHT pleural effusion.  Colonic diverticulosis    Pathology:  1. 11/23/16: Right middle lobe lung biopsy:         Several needle core biopsy of alveolar lung tissue demonstrating          within one small core biopsy, several foci of non-small cell          carcinoma within lymphactic spaces, consistent with          adenocarcinoma of lung origin.  Block A2 was sent to [facility] for EGFR molecular analysis; a report  was generated.  Results:  EGFR MUTATION NOT DETECTED.  Notes:  No  mutations were detected in EGFR exons 18-21.  There is no evidence of  EGFR T790M mutation using our high sensitivity assay.    2. 5/7/15: A. Right upper lobe:         Adenocarcinoma, moderately differentiated with lymphovascular          invasion identified.  B. Right lower lobe:         Adenocarcinoma, moderately differentiated, with lymphovascular           invasion identified.    EGFR Exon 20 detected   This case is being addended to report the results of PDL-1 IHC testing  by [facility].  Results:  Negative.      Assessment & Plan:  1. Metastatic adenocarcinoma of lung: Patient with history of smoking diagnosed in 5/2015 due to cough, shortness of breath and weight loss.  He was found to have disease mostly confined to the right lung involving all lobs as well as some concern in regards to bone metastatic disease.  He was EGFR exon 20 mutation and had progression of disease on traceve.  He has some response to single agent alimta however was unable to tolerate it.  He was PDL negative and had progression of disease on Opdivo.  Most recent imaging showed progression of disease and CT hip also showed destructive metastatic lesion within the superior left acetabulum/adjacent ilium. He is currently off treatment and is interested in continuation of treatment.      I had a long discussion with the patient. I went over the all  the imaging as well as pathology in detail.  We had a discussion about metastatic adnocarciona including treatment goals, options and indications for chemotherapy and radiation.  As he has metastatic disease, goal of treatment is quality of life and progression free survival.  He has been on multiple lines of treatment with progression or poor tolerability.  He is fairly healthy and currently has a good performance status.  He is a candidate for MATCH trail for which he will need more tissues.  I recommend biopsy of the left hip mass as this is more accessible as his last lung biopsy caused a penumothorax.  I will order bone scan prior to biopsy.  If there is not sufficient tissue or if the biopsy is non diagnostic then will need a lung biopsy. If he not a candidate for match trial then can consider further systemic chemotherapy with either single agent docetaxel vs. combination therapy.     2.  Concern for bone metastatic disease: He has changes on  CT scan in the past with no uptake on bone scan.  CT imaging in 11/2016 however showed destructive metastatic lesion within the superior left acetabulum/adjacent ilium.   I will order bone scan.  Biopsy of this mass was also ordered.  If positive, I recommend palliative radiation to help with pain and also zometa.   3. He is advised to follow up with Dr. Moses to discuss further options.  Patient however is stating that he wants to transfer care over.  Orders placed and he is to follow up again in 1-2 week post work to finalize treatment plan.       he agreed and verbalized his agreement and understanding with the current plan. I answered all questions and concerns he has at this time and advised him to call at any time in the interim with questions or concerns in regards to his care. Thank you for allowing me to participate in his care, I will continue to follow.    Please note that this dictation was created using voice recognition software. I have made every reasonable attempt to correct obvious errors, but I expect that there are errors of grammar and possibly content that I did not discover before finalizing the note.

## 2017-01-12 ENCOUNTER — HOSPITAL ENCOUNTER (OUTPATIENT)
Dept: RADIOLOGY | Facility: MEDICAL CENTER | Age: 67
End: 2017-01-12
Attending: INTERNAL MEDICINE
Payer: MEDICARE

## 2017-01-12 ENCOUNTER — HOSPITAL ENCOUNTER (OUTPATIENT)
Dept: RADIOLOGY | Facility: MEDICAL CENTER | Age: 67
End: 2017-01-12
Payer: MEDICARE

## 2017-01-12 DIAGNOSIS — C34.91 METASTATIC LUNG CANCER (METASTASIS FROM LUNG TO OTHER SITE), RIGHT (HCC): ICD-10-CM

## 2017-01-12 PROCEDURE — A9503 TC99M MEDRONATE: HCPCS

## 2017-01-16 ENCOUNTER — TELEPHONE (OUTPATIENT)
Dept: HEMATOLOGY ONCOLOGY | Facility: MEDICAL CENTER | Age: 67
End: 2017-01-16

## 2017-01-17 ENCOUNTER — APPOINTMENT (OUTPATIENT)
Dept: RADIOLOGY | Facility: MEDICAL CENTER | Age: 67
End: 2017-01-17
Attending: INTERNAL MEDICINE
Payer: MEDICARE

## 2017-01-17 ENCOUNTER — TELEPHONE (OUTPATIENT)
Dept: HEMATOLOGY ONCOLOGY | Facility: MEDICAL CENTER | Age: 67
End: 2017-01-17

## 2017-01-17 ENCOUNTER — HOSPITAL ENCOUNTER (OUTPATIENT)
Facility: MEDICAL CENTER | Age: 67
End: 2017-01-17
Attending: INTERNAL MEDICINE | Admitting: INTERNAL MEDICINE
Payer: MEDICARE

## 2017-01-17 VITALS
DIASTOLIC BLOOD PRESSURE: 92 MMHG | BODY MASS INDEX: 28.88 KG/M2 | HEART RATE: 71 BPM | OXYGEN SATURATION: 91 % | TEMPERATURE: 97.5 F | HEIGHT: 69 IN | WEIGHT: 195 LBS | SYSTOLIC BLOOD PRESSURE: 141 MMHG | RESPIRATION RATE: 16 BRPM

## 2017-01-17 DIAGNOSIS — C34.91 METASTATIC LUNG CANCER (METASTASIS FROM LUNG TO OTHER SITE), RIGHT (HCC): ICD-10-CM

## 2017-01-17 LAB
INR PPP: 0.87 (ref 0.87–1.13)
PLATELET # BLD AUTO: 207 K/UL (ref 164–446)
PROTHROMBIN TIME: 12.1 SEC (ref 12–14.6)

## 2017-01-17 PROCEDURE — 20225 BONE BIOPSY TROCAR/NDL DEEP: CPT

## 2017-01-17 PROCEDURE — 88305 TISSUE EXAM BY PATHOLOGIST: CPT

## 2017-01-17 PROCEDURE — 85610 PROTHROMBIN TIME: CPT

## 2017-01-17 PROCEDURE — 88112 CYTOPATH CELL ENHANCE TECH: CPT

## 2017-01-17 PROCEDURE — 85049 AUTOMATED PLATELET COUNT: CPT

## 2017-01-17 PROCEDURE — 700111 HCHG RX REV CODE 636 W/ 250 OVERRIDE (IP): Performed by: RADIOLOGY

## 2017-01-17 PROCEDURE — 88311 DECALCIFY TISSUE: CPT

## 2017-01-17 PROCEDURE — 88307 TISSUE EXAM BY PATHOLOGIST: CPT

## 2017-01-17 PROCEDURE — 700101 HCHG RX REV CODE 250

## 2017-01-17 PROCEDURE — 700111 HCHG RX REV CODE 636 W/ 250 OVERRIDE (IP)

## 2017-01-17 RX ORDER — FLUMAZENIL 0.1 MG/ML
INJECTION INTRAVENOUS
Status: COMPLETED
Start: 2017-01-17 | End: 2017-01-17

## 2017-01-17 RX ORDER — CALCIUM CARBONATE 500 MG/1
1000 TABLET, CHEWABLE ORAL DAILY
Status: DISCONTINUED | OUTPATIENT
Start: 2017-01-17 | End: 2017-01-17 | Stop reason: HOSPADM

## 2017-01-17 RX ORDER — ONDANSETRON 2 MG/ML
4 INJECTION INTRAMUSCULAR; INTRAVENOUS EVERY 8 HOURS PRN
Status: DISCONTINUED | OUTPATIENT
Start: 2017-01-17 | End: 2017-01-17 | Stop reason: HOSPADM

## 2017-01-17 RX ORDER — SODIUM CHLORIDE 9 MG/ML
500 INJECTION, SOLUTION INTRAVENOUS PRN
Status: DISCONTINUED | OUTPATIENT
Start: 2017-01-17 | End: 2017-01-17 | Stop reason: HOSPADM

## 2017-01-17 RX ORDER — OXYCODONE HYDROCHLORIDE 5 MG/1
10 TABLET ORAL
Status: DISCONTINUED | OUTPATIENT
Start: 2017-01-17 | End: 2017-01-17 | Stop reason: HOSPADM

## 2017-01-17 RX ORDER — NALOXONE HYDROCHLORIDE 0.4 MG/ML
INJECTION, SOLUTION INTRAMUSCULAR; INTRAVENOUS; SUBCUTANEOUS
Status: COMPLETED
Start: 2017-01-17 | End: 2017-01-17

## 2017-01-17 RX ORDER — MIDAZOLAM HYDROCHLORIDE 1 MG/ML
.5-2 INJECTION INTRAMUSCULAR; INTRAVENOUS PRN
Status: ACTIVE | OUTPATIENT
Start: 2017-01-17 | End: 2017-01-17

## 2017-01-17 RX ORDER — ONDANSETRON 2 MG/ML
4 INJECTION INTRAMUSCULAR; INTRAVENOUS PRN
Status: ACTIVE | OUTPATIENT
Start: 2017-01-17 | End: 2017-01-17

## 2017-01-17 RX ORDER — MIDAZOLAM HYDROCHLORIDE 1 MG/ML
INJECTION INTRAMUSCULAR; INTRAVENOUS
Status: COMPLETED
Start: 2017-01-17 | End: 2017-01-17

## 2017-01-17 RX ORDER — ONDANSETRON 2 MG/ML
INJECTION INTRAMUSCULAR; INTRAVENOUS
Status: COMPLETED
Start: 2017-01-17 | End: 2017-01-17

## 2017-01-17 RX ADMIN — FENTANYL CITRATE 50 MCG: 50 INJECTION, SOLUTION INTRAMUSCULAR; INTRAVENOUS at 08:25

## 2017-01-17 RX ADMIN — MIDAZOLAM HYDROCHLORIDE 1 MG: 1 INJECTION, SOLUTION INTRAMUSCULAR; INTRAVENOUS at 08:42

## 2017-01-17 RX ADMIN — MIDAZOLAM HYDROCHLORIDE 2 MG: 1 INJECTION, SOLUTION INTRAMUSCULAR; INTRAVENOUS at 08:25

## 2017-01-17 RX ADMIN — MIDAZOLAM 2 MG: 1 INJECTION INTRAMUSCULAR; INTRAVENOUS at 08:25

## 2017-01-17 RX ADMIN — MIDAZOLAM HYDROCHLORIDE 1 MG: 1 INJECTION, SOLUTION INTRAMUSCULAR; INTRAVENOUS at 08:45

## 2017-01-17 RX ADMIN — ONDANSETRON 4 MG: 2 INJECTION, SOLUTION INTRAMUSCULAR; INTRAVENOUS at 08:20

## 2017-01-17 RX ADMIN — MIDAZOLAM HYDROCHLORIDE 2 MG: 1 INJECTION, SOLUTION INTRAMUSCULAR; INTRAVENOUS at 08:37

## 2017-01-17 RX ADMIN — FENTANYL CITRATE 50 MCG: 50 INJECTION, SOLUTION INTRAMUSCULAR; INTRAVENOUS at 08:41

## 2017-01-17 RX ADMIN — FENTANYL CITRATE 50 MCG: 50 INJECTION, SOLUTION INTRAMUSCULAR; INTRAVENOUS at 08:37

## 2017-01-17 RX ADMIN — ONDANSETRON 4 MG: 2 INJECTION INTRAMUSCULAR; INTRAVENOUS at 08:20

## 2017-01-17 ASSESSMENT — PAIN SCALES - GENERAL
PAINLEVEL_OUTOF10: 0
PAINLEVEL_OUTOF10: 2
PAINLEVEL_OUTOF10: 0

## 2017-01-17 NOTE — DISCHARGE INSTRUCTIONS
ACTIVITY: Rest and take it easy for the first 24 hours.  A responsible adult is recommended to remain with you during that time.  It is normal to feel sleepy.  We encourage you to not do anything that requires balance, judgment or coordination.    MILD FLU-LIKE SYMPTOMS ARE NORMAL. YOU MAY EXPERIENCE GENERALIZED MUSCLE ACHES, THROAT IRRITATION, HEADACHE AND/OR SOME NAUSEA.    FOR 24 HOURS DO NOT:  Drive, operate machinery or run household appliances.  Drink beer or alcoholic beverages.   Make important decisions or sign legal documents.    SPECIAL INSTRUCTIONS: *KEEP INCISION DRY FOR 24 HRS**    DIET: To avoid nausea, slowly advance diet as tolerated, avoiding spicy or greasy foods for the first day.  Add more substantial food to your diet according to your physician's instructions.  Babies can be fed formula or breast milk as soon as they are hungry.  INCREASE FLUIDS AND FIBER TO AVOID CONSTIPATION.    SURGICAL DRESSING/BATHING: *MAY SHOWER TOMORROW AFTER 11AM THEN REMOVE BAND-AID.**    FOLLOW-UP APPOINTMENT:  A follow-up appointment should be arranged with your doctor in *DR EDUARDO  [phone] **; call to schedule.    You should CALL YOUR PHYSICIAN if you develop:  Fever greater than 101 degrees F.  Pain not relieved by medication, or persistent nausea or vomiting.  Excessive bleeding (blood soaking through dressing) or unexpected drainage from the wound.  Extreme redness or swelling around the incision site, drainage of pus or foul smelling drainage.  Inability to urinate or empty your bladder within 8 hours.  Problems with breathing or chest pain.    You should call 911 if you develop problems with breathing or chest pain.  If you are unable to contact your doctor or surgical center, you should go to the nearest emergency room or urgent care center.  Physician's telephone #: *[phone]**    If any questions arise, call your doctor.  If your doctor is not available, please feel free to call the Surgical Center  at [phone].  The Center is open Monday through Friday from 7AM to 7PM.  You can also call the HEALTH HOTLINE open 24 hours/day, 7 days/week and speak to a nurse at [phone], or toll free at [phone].    A registered nurse may call you a few days after your surgery to see how you are doing after your procedure.    MEDICATIONS: Resume taking daily medication.  Take prescribed pain medication with food.  If no medication is prescribed, you may take non-aspirin pain medication if needed.  PAIN MEDICATION CAN BE VERY CONSTIPATING.  Take a stool softener or laxative such as senokot, pericolace, or milk of magnesia if needed.      If your physician has prescribed pain medication that includes Acetaminophen (Tylenol), do not take additional Acetaminophen (Tylenol) while taking the prescribed medication.    Depression / Suicide Risk    As you are discharged from this [facility] facility, it is important to learn how to keep safe from harming yourself.    Recognize the warning signs:  · Abrupt changes in personality, positive or negative- including increase in energy   · Giving away possessions  · Change in eating patterns- significant weight changes-  positive or negative  · Change in sleeping patterns- unable to sleep or sleeping all the time   · Unwillingness or inability to communicate  · Depression  · Unusual sadness, discouragement and loneliness  · Talk of wanting to die  · Neglect of personal appearance   · Rebelliousness- reckless behavior  · Withdrawal from people/activities they love  · Confusion- inability to concentrate     If you or a loved one observes any of these behaviors or has concerns about self-harm, here's what you can do:  · Talk about it- your feelings and reasons for harming yourself  · Remove any means that you might use to hurt yourself (examples: pills, rope, extension cords, firearm)  · Get professional help from the community (Mental Health, Substance Abuse, psychological  counseling)  · Do not be alone:Call your Safe Contact- someone whom you trust who will be there for you.  · Call your local CRISIS HOTLINE 195-8297 or 964-247-6879  · Call your local Children's Mobile Crisis Response Team [place] (089) 986-2652 or www.[employer]  · Call the toll free National Suicide Prevention Hotlines   · National Suicide Prevention Lifeline 313-373-NIPR (5014)  · National Hope Line Network 800-SUICIDE (318-8292)

## 2017-01-17 NOTE — IP AVS SNAPSHOT
" After Visit Summary                                                                                                                Name:Gabriele Torres  Medical Record Number:[id number]  CSN: [id number]    YOB: 1950   Age: 66 y.o.  Sex: male  HT:1.753 m (5' 9.02\") WT: 88.451 kg (195 lb)          Admit Date: 1/17/2017     Discharge Date:   Today's Date: 1/17/2017  Attending Doctor:  Shawanda Eduardo M.D.                  Allergies:  Review of patient's allergies indicates no known allergies.                Discharge Instructions         ACTIVITY: Rest and take it easy for the first 24 hours.  A responsible adult is recommended to remain with you during that time.  It is normal to feel sleepy.  We encourage you to not do anything that requires balance, judgment or coordination.    MILD FLU-LIKE SYMPTOMS ARE NORMAL. YOU MAY EXPERIENCE GENERALIZED MUSCLE ACHES, THROAT IRRITATION, HEADACHE AND/OR SOME NAUSEA.    FOR 24 HOURS DO NOT:  Drive, operate machinery or run household appliances.  Drink beer or alcoholic beverages.   Make important decisions or sign legal documents.    SPECIAL INSTRUCTIONS: *KEEP INCISION DRY FOR 24 HRS**    DIET: To avoid nausea, slowly advance diet as tolerated, avoiding spicy or greasy foods for the first day.  Add more substantial food to your diet according to your physician's instructions.  Babies can be fed formula or breast milk as soon as they are hungry.  INCREASE FLUIDS AND FIBER TO AVOID CONSTIPATION.    SURGICAL DRESSING/BATHING: *MAY SHOWER TOMORROW AFTER 11AM THEN REMOVE BAND-AID.**    FOLLOW-UP APPOINTMENT:  A follow-up appointment should be arranged with your doctor in *DR EDUARDO  [phone] **; call to schedule.    You should CALL YOUR PHYSICIAN if you develop:  Fever greater than 101 degrees F.  Pain not relieved by medication, or persistent nausea or vomiting.  Excessive bleeding (blood soaking through dressing) or unexpected drainage from the wound.  Extreme " redness or swelling around the incision site, drainage of pus or foul smelling drainage.  Inability to urinate or empty your bladder within 8 hours.  Problems with breathing or chest pain.    You should call 911 if you develop problems with breathing or chest pain.  If you are unable to contact your doctor or surgical center, you should go to the nearest emergency room or urgent care center.  Physician's telephone #: *[phone]**    If any questions arise, call your doctor.  If your doctor is not available, please feel free to call the Surgical Center at [phone].  The Center is open Monday through Friday from 7AM to 7PM.  You can also call the HEALTH HOTLINE open 24 hours/day, 7 days/week and speak to a nurse at [phone], or toll free at [phone].    A registered nurse may call you a few days after your surgery to see how you are doing after your procedure.    MEDICATIONS: Resume taking daily medication.  Take prescribed pain medication with food.  If no medication is prescribed, you may take non-aspirin pain medication if needed.  PAIN MEDICATION CAN BE VERY CONSTIPATING.  Take a stool softener or laxative such as senokot, pericolace, or milk of magnesia if needed.      If your physician has prescribed pain medication that includes Acetaminophen (Tylenol), do not take additional Acetaminophen (Tylenol) while taking the prescribed medication.    Depression / Suicide Risk    As you are discharged from this [facility] facility, it is important to learn how to keep safe from harming yourself.    Recognize the warning signs:  · Abrupt changes in personality, positive or negative- including increase in energy   · Giving away possessions  · Change in eating patterns- significant weight changes-  positive or negative  · Change in sleeping patterns- unable to sleep or sleeping all the time   · Unwillingness or inability to communicate  · Depression  · Unusual sadness, discouragement and  loneliness  · Talk of wanting to die  · Neglect of personal appearance   · Rebelliousness- reckless behavior  · Withdrawal from people/activities they love  · Confusion- inability to concentrate     If you or a loved one observes any of these behaviors or has concerns about self-harm, here's what you can do:  · Talk about it- your feelings and reasons for harming yourself  · Remove any means that you might use to hurt yourself (examples: pills, rope, extension cords, firearm)  · Get professional help from the community (Mental Health, Substance Abuse, psychological counseling)  · Do not be alone:Call your Safe Contact- someone whom you trust who will be there for you.  · Call your local CRISIS HOTLINE 621-5451 or 065-361-9634  · Call your local Children's Mobile Crisis Response Team [place] (039) 155-2227 or www.Revegy  · Call the toll free National Suicide Prevention Hotlines   · National Suicide Prevention Lifeline 726-940-ZXOI (6202)  · [facility] Line Network 800-SUICIDE (359-0726)       Medication List      CONTINUE taking these medications        Instructions    alprazolam 1 MG Tabs   Commonly known as:  XANAX    Take 1 Tab by mouth at bedtime as needed for Sleep or Anxiety.   Dose:  1 mg       HYDROmorphone 2 MG Tabs   Commonly known as:  DILAUDID    Take 4 mg by mouth every four hours as needed for Severe Pain.   Dose:  4 mg       meloxicam 7.5 MG Tabs   Commonly known as:  MOBIC    Take 7.5 mg by mouth 2 Times a Day.   Dose:  7.5 mg       omeprazole 20 MG delayed-release capsule   Commonly known as:  PRILOSEC    Take 20 mg by mouth every day.   Dose:  20 mg       ondansetron 8 MG Tabs   Commonly known as:  ZOFRAN    Take 1 Tab by mouth 1 time daily as needed for Nausea/Vomiting.   Dose:  8 mg               Medication Information     Above and/or attached are the medications your physician expects you to take upon discharge. Review all of your home medications and newly ordered medications  with your doctor and/or pharmacist. Follow medication instructions as directed by your doctor and/or pharmacist. Please keep your medication list with you and share with your physician. Update the information when medications are discontinued, doses are changed, or new medications (including over-the-counter products) are added; and carry medication information at all times in the event of emergency situations.        Resources     Quit Smoking / Tobacco Use:    I understand the use of any tobacco products increases my chance of suffering from future heart disease or stroke and could cause other illnesses which may shorten my life. Quitting the use of tobacco products is the single most important thing I can do to improve my health. For further information on smoking / tobacco cessation call a Toll Free Quit Line at 1-591.164.2881 (*National [employer] [place]) or 1-767.645.7418 (American Lung Association) or you can access the web based program at www.lungusa.org.    [place] Tobacco Users Help Line:  (601) 300-5657       Toll Free: 1-703.209.4890  Quit Tobacco Program [facility] (924)037-7396    Crisis Hotline:    [place] Crisis Hotline:  7-806-NZHNOZP or 1-850.117.8755    [place] Crisis Hotline:    1-432.483.4852 or 548-416-1509    Discharge Survey:   Thank you for choosing [facility]. We hope we did everything we could to make your hospital stay a pleasant one. You may be receiving a survey and we would appreciate your time and participation in answering the questions. Your input is very valuable to us in our efforts to improve our service to our patients and their families.            Signatures     My signature on this form indicates that:    1. I acknowledge receipt and understanding of these Home Care Instruction.  2. My questions regarding this information have been answered to my satisfaction.  3. I have formulated a plan with my discharge nurse to obtain my prescribed medications for  home.    __________________________________      __________________________________                   Patient Signature                                 Guardian/Responsible Adult Signature      __________________________________                 __________       ________                       Nurse Signature                                               Date                 Time

## 2017-01-17 NOTE — TELEPHONE ENCOUNTER
RD attempted to call patient via telephone to set up nutrition consult.  Patient is currently unavailable.  RD left voicemail with contact information.  Please have patient call dietitian at [phone].    -noted patient's appointment on 1/25 at 1:20  PM in infusion - RD will follow-up at that time.

## 2017-01-17 NOTE — OR NURSING
0968  PATIENT RECEIVED FROM IR,  S/P L HIP ACETABULUM BX.  BX SITE WITH BAND-AID DRY CLEAN AND INTACT.  DENIES ANY PAIN.  NO FAMILY IS AT BEDSIDE.      1000   PATIENT TAKING PO FLUID WITHOUT DIFFICULTY.  BX SITE IS CLEAR.    1110   PATIENT OFF BEDREST.  D/C INSTRUCTIONS GIVEN TO PATIENT.  VERBALIZED UNDERSTANDING OF ALL.   NO FAMILY IS HERE.  HIS RIDE WILL  PATIENT AT CURB SITE.   DC.  PATIENT D/C TO HOME,  VIA WHEELCHAIR,  ESCORTED OUT BY CNA.

## 2017-01-17 NOTE — PROGRESS NOTES
IR Progress Note:    CT Guided needle biopsy of Left acetabular mass by Dr. Miller with 6 cores in formalin and 1 core with aspirate in thin prep taken. Pt tolerated procedure well and remained hemodynamically stable throughout. Report called to GENE Sharif. Pt transported to PPU.

## 2017-01-17 NOTE — OR SURGEON
Immediate Post- Operative Note        PostOp Diagnosis: LUNG CA, LYTIC LESIONS LEFT ACETABULUM, SUSPECTED METS      Procedure(s): CT GUIDED LEFT ACETABULAR DEEP BONE BX    14G CORE X1, 18G CORES X5 IN FORMALIN, 13G ASPIRATE X 1 IN CYTOLYTE      Estimated Blood Loss: Less than 5 ml        Complications: None            1/17/2017     9:06 AM     Pk Miller

## 2017-01-17 NOTE — IP AVS SNAPSHOT
1/17/2017          Gabriele Meyersn Theodorecinthya  [facility] [place] [id number]    Dear Gabriele:    [facility] wants to ensure your discharge home is safe and you or your loved ones have had all your questions answered regarding your care after you leave the hospital.    You may receive a telephone call within two days of your discharge.  This call is to make certain you understand your discharge instructions as well as ensure we provided you with the best care possible during your stay with us.     The call will only last approximately 3-5 minutes and will be done by a nurse.    Once again, we want to ensure your discharge home is safe and that you have a clear understanding of any next steps in your care.  If you have any questions or concerns, please do not hesitate to contact us, we are here for you.  Thank you for choosing [facility] for your healthcare needs.    Sincerely,    Viktor Dougherty    [facility]

## 2017-01-25 ENCOUNTER — OFFICE VISIT (OUTPATIENT)
Dept: HEMATOLOGY ONCOLOGY | Facility: MEDICAL CENTER | Age: 67
End: 2017-01-25
Payer: MEDICARE

## 2017-01-25 ENCOUNTER — TELEPHONE (OUTPATIENT)
Dept: HEMATOLOGY ONCOLOGY | Facility: MEDICAL CENTER | Age: 67
End: 2017-01-25

## 2017-01-25 VITALS
WEIGHT: 193.56 LBS | HEART RATE: 89 BPM | RESPIRATION RATE: 18 BRPM | OXYGEN SATURATION: 94 % | BODY MASS INDEX: 28.67 KG/M2 | TEMPERATURE: 98.6 F | SYSTOLIC BLOOD PRESSURE: 114 MMHG | DIASTOLIC BLOOD PRESSURE: 86 MMHG | HEIGHT: 69 IN

## 2017-01-25 DIAGNOSIS — C34.2 MALIGNANT NEOPLASM OF MIDDLE LOBE OF RIGHT LUNG (HCC): ICD-10-CM

## 2017-01-25 PROCEDURE — 4040F PNEUMOC VAC/ADMIN/RCVD: CPT | Performed by: INTERNAL MEDICINE

## 2017-01-25 PROCEDURE — 1036F TOBACCO NON-USER: CPT | Performed by: INTERNAL MEDICINE

## 2017-01-25 PROCEDURE — G8484 FLU IMMUNIZE NO ADMIN: HCPCS | Performed by: INTERNAL MEDICINE

## 2017-01-25 PROCEDURE — 3017F COLORECTAL CA SCREEN DOC REV: CPT | Mod: 8P | Performed by: INTERNAL MEDICINE

## 2017-01-25 PROCEDURE — 99214 OFFICE O/P EST MOD 30 MIN: CPT | Performed by: INTERNAL MEDICINE

## 2017-01-25 PROCEDURE — G8432 DEP SCR NOT DOC, RNG: HCPCS | Performed by: INTERNAL MEDICINE

## 2017-01-25 PROCEDURE — 1101F PT FALLS ASSESS-DOCD LE1/YR: CPT | Performed by: INTERNAL MEDICINE

## 2017-01-25 PROCEDURE — G8420 CALC BMI NORM PARAMETERS: HCPCS | Performed by: INTERNAL MEDICINE

## 2017-01-25 NOTE — MR AVS SNAPSHOT
"        Gabriele Torres   2017 1:20 PM   Office Visit   MRN: [id number]    Department:  [facility]   Dept Phone:  [phone]    Description:  Male : 1950   Provider:  Shawanda Bennett M.D.           Reason for Visit     Follow-Up           Allergies as of 2017     No Known Allergies      You were diagnosed with     Malignant neoplasm of middle lobe of right lung (CMS-HCC)   [7904630]         Vital Signs     Blood Pressure Pulse Temperature Respirations Height Weight    114/86 mmHg 89 37 °C (98.6 °F) 18 1.753 m (5' 9\") 87.8 kg (193 lb 9 oz)    Body Mass Index Oxygen Saturation Smoking Status             28.57 kg/m2 94% Former Smoker         Basic Information     Date Of Birth Sex Race Ethnicity Preferred Language    1950 Male White Non- English      Your appointments     2017  3:15 PM   Telephone Appointment with [facility]   [facility] (--)    [address]  [place] [address] [phone]            2017  9:20 AM   ONCOLOGY EST PATIENT 30 MIN with Shawanda Bennett M.D.   [facility] (--)    [address], [address]  [place] [address] [phone]              Problem List              ICD-10-CM Priority Class Noted - Resolved    GERD (gastroesophageal reflux disease) K21.9 Low  2015 - Present    Pulmonary nodule seen on imaging study R91.1   2015 - Present    OA (osteoarthritis) M19.90   2015 - Present    Anxiety F41.9   2015 - Present    Shortness of breath R06.02   2015 - Present    Lung mass R91.8 High  2015 - Present    Hypercalcemia E83.52 Medium  2015 - Present    Lung cancer (CMS-HCC) C34.90   2015 - Present    Dependence on supplemental oxygen Z99.81   10/7/2015 - Present    Laryngitis, chronic J37.0   2015 - Present    Chronic low back pain with left-sided sciatica M54.42, G89.29   10/18/2016 - Present    Weakness of left lower extremity M62.81   11/15/2016 - Present    Lumbar " spondylosis M47.816   12/8/2016 - Present    Research study patient Z00.6   1/9/2017 - Present    [facility]    1/9/2017 - Present      Health Maintenance        Date Due Completion Dates    IMM DTaP/Tdap/Td Vaccine (1 - Tdap) 8/10/1970 ---    IMM ZOSTER VACCINE 8/10/2011 ---    IMM PNEUMOCOCCAL 65+ (ADULT) LOW/MEDIUM RISK SERIES (1 of 2 - PCV13) 8/10/2016 10/7/2013    IMM INFLUENZA (1) 9/1/2016 10/7/2015            Current Immunizations     Influenza Vaccine Quad Inj (Preserved) 10/7/2015    Pneumococcal polysaccharide vaccine (PPSV-23) 10/7/2013      Below and/or attached are the medications your provider expects you to take. Review all of your home medications and newly ordered medications with your provider and/or pharmacist. Follow medication instructions as directed by your provider and/or pharmacist. Please keep your medication list with you and share with your provider. Update the information when medications are discontinued, doses are changed, or new medications (including over-the-counter products) are added; and carry medication information at all times in the event of emergency situations     Allergies:  No Known Allergies          Medications  Valid as of: January 25, 2017 -  1:47 PM    Generic Name Brand Name Tablet Size Instructions for use    ALPRAZolam (Tab) XANAX 1 MG Take 1 Tab by mouth at bedtime as needed for Sleep or Anxiety.        HYDROmorphone HCl (Tab) DILAUDID 2 MG Take 4 mg by mouth every four hours as needed for Severe Pain.        Meloxicam (Tab) MOBIC 7.5 MG Take 7.5 mg by mouth 2 Times a Day.        Omeprazole (CAPSULE DELAYED RELEASE) PRILOSEC 20 MG Take 20 mg by mouth every day.        Ondansetron HCl (Tab) ZOFRAN 8 MG Take 1 Tab by mouth 1 time daily as needed for Nausea/Vomiting.        .                 Medicines prescribed today were sent to:     [id number] - [place], [place] - [address] [place] [address] AT [facility] [address]    [address] [place], [place] [address]  [place] [address]    Phone: [phone] Fax: [phone]    Open 24 Hours?: No      Medication refill instructions:       If your prescription bottle indicates you have medication refills left, it is not necessary to call your provider’s office. Please contact your pharmacy and they will refill your medication.    If your prescription bottle indicates you do not have any refills left, you may request refills at any time through one of the following ways: The online Euclid system (except Urgent Care), by calling your provider’s office, or by asking your pharmacy to contact your provider’s office with a refill request. Medication refills are processed only during regular business hours and may not be available until the next business day. Your provider may request additional information or to have a follow-up visit with you prior to refilling your medication.   *Please Note: Medication refills are assigned a new Rx number when refilled electronically. Your pharmacy may indicate that no refills were authorized even though a new prescription for the same medication is available at the pharmacy. Please request the medicine by name with the pharmacy before contacting your provider for a refill.        Referral     A referral request has been sent to our patient care coordination department. Please allow 3-5 business days for us to process this request and contact you either by phone or mail. If you do not hear from us by the 5th business day, please call us at [phone].           Euclid Access Code: [email or web address]  Expires: 2/24/2017  1:47 PM    Euclid  A secure, online tool to manage your health information     [employer] Euclid® is a secure, online tool that connects you to your personalized health information from the privacy of your home -- day or night - making it very easy for you to manage your healthcare. Once the activation process is completed, you can even access your medical information using the  [employer]ics balaji, which is available for free in the Apple Balaji store or Google Play store.     Bin[employer]ics provides the following levels of access (as shown below):   My Chart Features   [facility] Doctor [facility]  Specialists [facility]  Urgent  Care Non-[facility]  Primary Care  Doctor   Email your healthcare team securely and privately 24/7 X X X    Manage appointments: schedule your next appointment; view details of past/upcoming appointments X      Request prescription refills. X      View recent personal medical records, including lab and immunizations X X X X   View health record, including health history, allergies, medications X X X X   Read reports about your outpatient visits, procedures, consult and ER notes X X X X   See your discharge summary, which is a recap of your hospital and/or ER visit that includes your diagnosis, lab results, and care plan. X X       How to register for BinOptics:  1. Go to  [email or web address].  2. Click on the Sign Up Now box, which takes you to the New Member Sign Up page. You will need to provide the following information:  a. Enter your BinOptics Access Code exactly as it appears at the top of this page. (You will not need to use this code after you’ve completed the sign-up process. If you do not sign up before the expiration date, you must request a new code.)   b. Enter your date of birth.   c. Enter your home email address.   d. Click Submit, and follow the next screen’s instructions.  3. Create a BinOptics ID. This will be your BinOptics login ID and cannot be changed, so think of one that is secure and easy to remember.  4. Create a BinOptics password. You can change your password at any time.  5. Enter your Password Reset Question and Answer. This can be used at a later time if you forget your password.   6. Enter your e-mail address. This allows you to receive e-mail notifications when new information is available in Bin[employer]ics.  7. Click Sign Up. You can now view your health  information.    For assistance activating your [email or web address] account, call [phone]

## 2017-01-25 NOTE — PROGRESS NOTES
Follow Up Note:  Oncology    Date: 1/25/17  Time:  1:20 pm      Referring Physician: Self referral   Primary Care:  Sasha Isidro M.D.  Oncology: Dr. Moses  Pain management: Dr. Sparks  Spinal surgery: Dr. Lopez     Diagnosis: Metastatic adenocarcinoma of lung    Chief Complaint:  He is here for a second opinion    History of Presenting Illness:  Gabriele Torres is a 66 y.o. Male diagnosed in 5/2015 with metastatic adenocarcinoma. He was diagnosed secondary to fatigue and weight loss. On imaging he was found to have multiple pulmonary nodules. Biopsy of the right upper lobe and lower lobe nodules were positive for moderately differentiated adenocarcinoma with lymphovascular invasion. Further imaging showed bilateral pulmonary nodules as well as left adrenal nodule and trace pleural effusion. Bone scan also showed uptake in the right scapula and third/fourth rib. Other testing you was found to EGFR 20 mutation. He was initially treated with Tarceva and was on it from 6/2015-9/2015. Repeat imaging showed progression of disease. He was then treated with carboplatinum and Alimta and after one cycle of chemotherapy had significant toxicity hence chemotherapy was held. He was then started on single agent Alimta which she was unable to tolerate. 3/2016 he was started on Opdivo.  He was tested for PDL and was negative. Interval imaging showed concerns for possible progression. He was continued on treatment and on imaging on 7/2016 showed mixed responses. He had MRI of his spine done which was consistent with formal stenosis and degenerative changes. Repeat imaging in 11/2016 showed progression of disease hence his chemotherapy was discontinued. Secondary to pain he also had a CT which showed destructive metastatic lesion in the superior left acetabulum, adjacent ilium. 11/2016 he underwent biopsy of the right middle lobe for further testing and patient was found to be negative for T790.  Patient presented for a second  opinion and opted to transition his care over.  Scan showed increased uptake in the right scapula as well as extensive uptake in the left ileum/acetabular area. He was also found to have uptake in the right knee which was felt to be degenerative. On 1/17/17 he underwent biopsy of the bony lesion more tissue as he was a possible candidate for match trial.  Biopsy was positive for metastatic adenocarcinoma.    Interval history:  History of her follow-up visit and has been fairly stable since his last visit. His main issue is pain which has been worsening. He has significant pain in the right scapular area as well as the left hip area. He was seen by pain management and is due to get testing for a possible pump for his pain. He is continued on oxygen 3 L/m. His shortness of breath at this fairly stable. He had noticed improvement in his intermittent nausea since his been off chemotherapy. He also has noticed improvement in his cough. He has stable appetite and weight. He denies any fevers, chills or night sweats.      Past Medical History:  1.  Metastatic lung cancer  2. 2005: Chronic lower back pain secondary to injury  3. Arthritis with multiple joint involved  4. GERD  5. Anxiety with intermittent attacks  6. O2 3l/min only at night x 3 weeks      Allergies as of 01/25/2017   • (No Known Allergies)         Current outpatient prescriptions:   •  omeprazole (PRILOSEC) 20 MG delayed-release capsule, Take 20 mg by mouth every day., Disp: , Rfl:   •  HYDROmorphone (DILAUDID) 2 MG Tab, Take 4 mg by mouth every four hours as needed for Severe Pain., Disp: , Rfl:   •  alprazolam (XANAX) 1 MG TABS, Take 1 Tab by mouth at bedtime as needed for Sleep or Anxiety., Disp: 30 Tab, Rfl: 0  •  meloxicam (MOBIC) 7.5 MG Tab, Take 7.5 mg by mouth 2 Times a Day., Disp: , Rfl:   •  ondansetron (ZOFRAN) 8 MG Tab, Take 1 Tab by mouth 1 time daily as needed for Nausea/Vomiting., Disp: 3 Tab, Rfl: 0    Review of Systems:  All other review of  "systems are negative except what was mentioned above in the HPI.  Constitutional: Negative for fever, chills, and weight loss. Positive for malaise/fatigue.    HENT: Negative for ear pain and nosebleeds.     Eyes: Negative for blurred vision.    Respiratory: Positive for cough improivng. Positive for shortness of breath on exertion stable.    Cardiovascular: Negative for chest pain and leg swelling.    Gastrointestinal: Intermittent nausea improivng. Negative for  vomiting and abdominal pain.    Genitourinary: Negative for dysuria.    Musculoskeletal: Negative for myalgias. Positive for right shoulder pain and left hip pain increasing.   Skin: Negative for rash.    Neurological: Negative for dizziness, sensory change, focal weakness and headaches.    Endo/Heme/Allergies: No bruise/bleed easily.    Psychiatric/Behavioral: Negative for depression and memory loss.      Physical Exam:  Filed Vitals:    01/25/17 1317   BP: 114/86   Pulse: 89   Temp: 37 °C (98.6 °F)   Resp: 18   Height: 1.753 m (5' 9\")   Weight: 87.8 kg (193 lb 9 oz)   SpO2: 94%     Performance status: I   General: Not in acute distress, alert and oriented x 3  HEENT: normalcephalic, atraumatic, extra ocular muscles intact, moist oral mucus membranes, and oral cavity without any lesions.  Neck: Supple neck and full range of motion  Lymph nodes: No palpable bilateral cervical and supraclavicular lymphadenopathy.    CVS: regular rate and rhythm  RESP: decrease breath sounds in right lung   ABD: Soft, non tender, non distended, positive bowel sounds, and no palpable hepatomegaly   EXT: No edema  CNS: Alert and oriented x3, cranial nerves grossly intact, muscle strength grossly intact. Altered gait due to hip pain.     Labs:  No visits with results within 1 Day(s) from this visit.  Latest known visit with results is:    Admission on 01/17/2017, Discharged on 01/17/2017   Component Date Value Ref Range Status   • PT 01/17/2017 12.1  12.0 - 14.6 sec Final   • " INR 2017 0.87  0.87 - 1.13 Final    Comment: INR - Non-therapeutic Reference Range: 0.87-1.13  INR - Therapeutic Reference Range: 2.0-4.0     • Platelet Count 2017 207  164 - 446 K/uL Final   ]    Imagin. 17 bone scan: There is increase in the amount of uptake in the medial border of the left scapula consistent with progression of metastases. There is extensive uptake in the left ilium and acetabular region consistent with progression of metastases.  New increased uptake in the medial compartment right knee which is probably degenerative. This could be metastatic but would be considered less likely at this location.    Pathology:  1. 17:   A. Left acetabulum cores:         Metastatic adenocarcinoma.  B. Left acetabulum aspiration in CytoLyt:         Metastatic adenocarcinoma.      Assessment & Plan:  1. Metastatic adenocarcinoma of lung, EGFR exon 20: He was diagnosed in  and has undergone a lines of treatment. He had progression on Tarceva. He was then on carboplatinum and Alimta followed by single agent Alimta which she was unable to tolerate. He was on Opdivo recently and had progression of disease. Patient repeat bone scan showed uptake in the scapula in the hip area in the areas of his pain. Patient is a potential candidate for MATCH trial. Tissue will be sent out for further testing to see if he is eligible to get targeted therapy. He is unable to enroll in trial then he will need further systemic therapy can consider single agent docetaxel versus combination therapy.  2. Plan: Patient is currently having significant pain in the right scapula and the left hip area. These are the areas of uptake on bone scan. Patient is seen by pain management and there has been discussion about a pain pump. He is a good candidate for palliative radiation for improvement in pain. I will discuss the case further with Dr. Sparks.  I have discussed the case with Dr. Sims and patient has been  referred in an expedited manner.  3. Bony metastatic disease: Patient has bone lesions hence post completion of radiation he is a candidate for Zometa are extremely but to be given every 4 weeks.  4. He is to follow-up again in 2 weeks or sooner as needed.    he agreed and verbalized his agreement and understanding with the current plan. I answered all questions and concerns he has at this time and advised him to call at any time in the interim with questions or concerns in regards to his care. Thank you for allowing me to participate in his care, I will continue to follow.    Please note that this dictation was created using voice recognition software. I have made every reasonable attempt to correct obvious errors, but I expect that there are errors of grammar and possibly content that I did not discover before finalizing the note.

## 2017-01-25 NOTE — TELEPHONE ENCOUNTER
Nutrition Services: Update  Patient was in and out of appointment before RD could visit with him this afternoon.  Discussed patient with RN who reports that patient was in a lot of pain today related to biopsy.  No treatment plan is scheduled yet.   RD called patient to discussed nutrition appointment however patient did not answer - left a voice message with RD contact information.    Please have patient contact RD: [phone].  Will attempt to follow-up for nutrition appointment.

## 2017-01-25 NOTE — Clinical Note
[facility]    [address], [address]  [place] [address]      Date:1/25/2017                     Reference to Gabriele Torres    Follow Up Note:  Oncology      Date: 1/25/17  Time:  1:20 pm        Primary Care:  Sasha Isidro M.D.  Oncology: Dr. Moses  Pain management: Dr. Sparks  Spinal surgery: Dr. Lopez       Diagnosis: Metastatic adenocarcinoma of lung      Chief Complaint:  He is here for a second opinion      History of Presenting Illness:  Gabriele Torres is a 66 y.o. Male diagnosed in 5/2015 with metastatic adenocarcinoma. He was diagnosed secondary to fatigue and weight loss. On imaging he was found to have multiple pulmonary nodules. Biopsy of the right upper lobe and lower lobe nodules were positive for moderately differentiated adenocarcinoma with lymphovascular invasion. Further imaging showed bilateral pulmonary nodules as well as left adrenal nodule and trace pleural effusion. Bone scan also showed uptake in the right scapula and third/fourth rib. Other testing you was found to EGFR 20 mutation. He was initially treated with Tarceva and was on it from 6/2015-9/2015. Repeat imaging showed progression of disease. He was then treated with carboplatinum and Alimta and after one cycle of chemotherapy had significant toxicity hence chemotherapy was held. He was then started on single agent Alimta which she was unable to tolerate. 3/2016 he was started on Opdivo.  He was tested for PDL and was negative. Interval imaging showed concerns for possible progression. He was continued on treatment and on imaging on 7/2016 showed mixed responses. He had MRI of his spine done which was consistent with formal stenosis and degenerative changes. Repeat imaging in 11/2016 showed progression of disease hence his chemotherapy was discontinued. Secondary to pain he also had a CT which showed destructive metastatic lesion in the superior left acetabulum, adjacent ilium. 11/2016 he  underwent biopsy of the right middle lobe for further testing and patient was found to be negative for T790.  Patient presented for a second opinion and opted to transition his care over.  Scan showed increased uptake in the right scapula as well as extensive uptake in the left ileum/acetabular area. He was also found to have uptake in the right knee which was felt to be degenerative. On 1/17/17 he underwent biopsy of the bony lesion more tissue as he was a possible candidate for match trial.  Biopsy was positive for metastatic adenocarcinoma.      Interval history:  History of her follow-up visit and has been fairly stable since his last visit. His main issue is pain which has been worsening. He has significant pain in the right scapular area as well as the left hip area. He was seen by pain management and is due to get testing for a possible pump for his pain. He is continued on oxygen 3 L/m. His shortness of breath at this fairly stable. He had noticed improvement in his intermittent nausea since his been off chemotherapy. He also has noticed improvement in his cough. He has stable appetite and weight. He denies any fevers, chills or night sweats.          Past Medical History:  1.  Metastatic lung cancer  2. 2005: Chronic lower back pain secondary to injury  3. Arthritis with multiple joint involved  4. GERD  5. Anxiety with intermittent attacks  6. O2 3l/min only at night x 3 weeks          Allergies as of 01/25/2017    •  (No Known Allergies)          Current outpatient prescriptions:    •  omeprazole (PRILOSEC) 20 MG delayed-release capsule, Take 20 mg by mouth every day., Disp: , Rfl:    •  HYDROmorphone (DILAUDID) 2 MG Tab, Take 4 mg by mouth every four hours as needed for Severe Pain., Disp: , Rfl:    •  alprazolam (XANAX) 1 MG TABS, Take 1 Tab by mouth at bedtime as needed for Sleep or Anxiety., Disp: 30 Tab, Rfl: 0  •  meloxicam (MOBIC) 7.5 MG Tab, Take 7.5 mg by mouth 2 Times a Day., Disp: , Rfl:    •   "ondansetron (ZOFRAN) 8 MG Tab, Take 1 Tab by mouth 1 time daily as needed for Nausea/Vomiting., Disp: 3 Tab, Rfl: 0      Review of Systems:  All other review of systems are negative except what was mentioned above in the HPI.  Constitutional: Negative for fever, chills, and weight loss. Positive for malaise/fatigue.    HENT: Negative for ear pain and nosebleeds.     Eyes: Negative for blurred vision.    Respiratory: Positive for cough improivng. Positive for shortness of breath on exertion stable.     Cardiovascular: Negative for chest pain and leg swelling.    Gastrointestinal: Intermittent nausea improivng. Negative for  vomiting and abdominal pain.    Genitourinary: Negative for dysuria.    Musculoskeletal: Negative for myalgias. Positive for right shoulder pain and left hip pain increasing.   Skin: Negative for rash.    Neurological: Negative for dizziness, sensory change, focal weakness and headaches.    Endo/Heme/Allergies: No bruise/bleed easily.    Psychiatric/Behavioral: Negative for depression and memory loss.        Physical Exam:   Vitals   Filed Vitals:      01/25/17 1317    BP:  114/86    Pulse:  89    Temp:  37 °C (98.6 °F)    Resp:  18    Height:  1.753 m (5' 9\")    Weight:  87.8 kg (193 lb 9 oz)    SpO2:  94%         Performance status: I    General: Not in acute distress, alert and oriented x 3  HEENT: normalcephalic, atraumatic, extra ocular muscles intact, moist oral mucus membranes, and oral cavity without any lesions.  Neck: Supple neck and full range of motion  Lymph nodes: No palpable bilateral cervical and supraclavicular lymphadenopathy.     CVS: regular rate and rhythm  RESP: decrease breath sounds in right lung    ABD: Soft, non tender, non distended, positive bowel sounds, and no palpable hepatomegaly    EXT: No edema  CNS: Alert and oriented x3, cranial nerves grossly intact, muscle strength grossly intact. Altered gait due to hip pain.       Labs:  No visits with results within 1 " Day(s) from this visit.  Latest known visit with results is:      Admission on 2017, Discharged on 2017    Component  Date  Value  Ref Range  Status    •  PT  2017  12.1   12.0 - 14.6 sec  Final    •  INR  2017  0.87   0.87 - 1.13  Final      Comment: INR - Non-therapeutic Reference Range: 0.87-1.13   INR - Therapeutic Reference Range: 2.0-4.0      •  Platelet Count  2017  207   164 - 446 K/uL  Final      ]      Imagin. 17 bone scan: There is increase in the amount of uptake in the medial border of the left scapula consistent with progression of metastases. There is extensive uptake in the left ilium and acetabular region consistent with progression of metastases.  New increased uptake in the medial compartment right knee which is probably degenerative. This could be metastatic but would be considered less likely at this location.      Pathology:  1. 17:    A. Left acetabulum cores:         Metastatic adenocarcinoma.  B. Left acetabulum aspiration in CytoLyt:         Metastatic adenocarcinoma.          Assessment & Plan:  Metastatic adenocarcinoma of lung, EGFR exon 20: He was diagnosed in  and has undergone a lines of treatment. He had progression on Tarceva. He was then on carboplatinum and Alimta followed by single agent Alimta which she was unable to tolerate. He was on Opdivo recently and had progression of disease. Patient repeat bone scan showed uptake in the scapula in the hip area in the areas of his pain. Patient is a potential candidate for MATCH trial. Tissue will be sent out for further testing to see if he is eligible to get targeted therapy. He is unable to enroll in trial then he will need further systemic therapy can consider single agent docetaxel versus combination therapy.  Plan: Patient is currently having significant pain in the right scapula and the left hip area. These are the areas of uptake on bone scan. Patient is seen by pain management  and there has been discussion about a pain pump. He is a good candidate for palliative radiation for improvement in pain. I will discuss the case further with Dr. Sparks.  I have discussed the case with Dr. Sims and patient has been referred in an expedited manner.  Bony metastatic disease: Patient has bone lesions hence post completion of radiation he is a candidate for Zometa are extremely but to be given every 4 weeks.  He is to follow-up again in 2 weeks or sooner as needed.      he agreed and verbalized his agreement and understanding with the current plan. I answered all questions and concerns he has at this time and advised him to call at any time in the interim with questions or concerns in regards to his care. Thank you for allowing me to participate in his care, I will continue to follow.      Please note that this dictation was created using voice recognition software. I have made every reasonable attempt to correct obvious errors, but I expect that there are errors of grammar and possibly content that I did not discover before finalizing the note.      Sincerely,  Shawanda Bennett  [address] [place] [address], [address]   [phone]

## 2017-01-26 ENCOUNTER — HOSPITAL ENCOUNTER (OUTPATIENT)
Facility: MEDICAL CENTER | Age: 67
End: 2017-01-26
Attending: PAIN MEDICINE | Admitting: PAIN MEDICINE
Payer: SUBSIDIZED

## 2017-01-26 ENCOUNTER — APPOINTMENT (OUTPATIENT)
Dept: RADIOLOGY | Facility: MEDICAL CENTER | Age: 67
End: 2017-01-26
Attending: PAIN MEDICINE
Payer: SUBSIDIZED

## 2017-01-26 VITALS
OXYGEN SATURATION: 94 % | RESPIRATION RATE: 16 BRPM | BODY MASS INDEX: 27.49 KG/M2 | HEART RATE: 77 BPM | DIASTOLIC BLOOD PRESSURE: 69 MMHG | SYSTOLIC BLOOD PRESSURE: 118 MMHG | TEMPERATURE: 98.2 F | HEIGHT: 69 IN | WEIGHT: 185.63 LBS

## 2017-01-26 PROBLEM — C7B.03: Status: ACTIVE | Noted: 2017-01-26

## 2017-01-26 PROCEDURE — 160048 HCHG OR STATISTICAL LEVEL 1-5: Performed by: PAIN MEDICINE

## 2017-01-26 PROCEDURE — 160025 RECOVERY II MINUTES (STATS): Performed by: PAIN MEDICINE

## 2017-01-26 PROCEDURE — 700117 HCHG RX CONTRAST REV CODE 255

## 2017-01-26 PROCEDURE — 160047 HCHG PACU  - EA ADDL 30 MINS PHASE II: Performed by: PAIN MEDICINE

## 2017-01-26 PROCEDURE — 62323 NJX INTERLAMINAR LMBR/SAC: CPT

## 2017-01-26 PROCEDURE — 700111 HCHG RX REV CODE 636 W/ 250 OVERRIDE (IP)

## 2017-01-26 PROCEDURE — 160002 HCHG RECOVERY MINUTES (STAT): Performed by: PAIN MEDICINE

## 2017-01-26 PROCEDURE — 160046 HCHG PACU - 1ST 60 MINS PHASE II: Performed by: PAIN MEDICINE

## 2017-01-26 PROCEDURE — A4606 OXYGEN PROBE USED W OXIMETER: HCPCS | Performed by: PAIN MEDICINE

## 2017-01-26 PROCEDURE — 160035 HCHG PACU - 1ST 60 MINS PHASE I: Performed by: PAIN MEDICINE

## 2017-01-26 RX ORDER — SODIUM CHLORIDE, SODIUM LACTATE, POTASSIUM CHLORIDE, CALCIUM CHLORIDE 600; 310; 30; 20 MG/100ML; MG/100ML; MG/100ML; MG/100ML
1000 INJECTION, SOLUTION INTRAVENOUS
Status: COMPLETED | OUTPATIENT
Start: 2017-01-26 | End: 2017-01-26

## 2017-01-26 RX ADMIN — SODIUM CHLORIDE, SODIUM LACTATE, POTASSIUM CHLORIDE, CALCIUM CHLORIDE 1000 ML: 600; 310; 30; 20 INJECTION, SOLUTION INTRAVENOUS at 07:45

## 2017-01-26 ASSESSMENT — PAIN SCALES - GENERAL
PAINLEVEL_OUTOF10: 0
PAINLEVEL_OUTOF10: 0
PAINLEVEL_OUTOF10: 7
PAINLEVEL_OUTOF10: 0
PAINLEVEL_OUTOF10: 0

## 2017-01-26 NOTE — OR NURSING
"0905: Report rec'd from fernando Guevara. Pt resting flat in [facility], reports \"very comfortable\". Denies needs  1000: Cont resting. Cont to deny pain.  1046: Roused to get dressed  1055: Up to chair, VSS  1125: Meets criteria for d/c. Wheeled out to home with family.  "

## 2017-01-26 NOTE — IP AVS SNAPSHOT
" After Visit Summary                                                                                                                Name:Gabriele Torres  Medical Record Number:[id number]  CSN: [id number]    YOB: 1950   Age: 66 y.o.  Sex: male  HT:1.753 m (5' 9\") WT: 84.2 kg (185 lb 10 oz)          Admit Date: 1/26/2017     Discharge Date:   Today's Date: 1/26/2017  Attending Doctor:  Cricket Sparks M.D.                  Allergies:  Review of patient's allergies indicates no known allergies.                Discharge Instructions         ACTIVITY: Rest and take it easy for the first 24 hours.  A responsible adult is recommended to remain with you during that time.  It is normal to feel sleepy.  We encourage you to not do anything that requires balance, judgment or coordination.    MILD FLU-LIKE SYMPTOMS ARE NORMAL. YOU MAY EXPERIENCE GENERALIZED MUSCLE ACHES, THROAT IRRITATION, HEADACHE AND/OR SOME NAUSEA.    FOR 24 HOURS DO NOT:  Drive, operate machinery or run household appliances.  Drink beer or alcoholic beverages.   Make important decisions or sign legal documents.    DIET: To avoid nausea, slowly advance diet as tolerated, avoiding spicy or greasy foods for the first day.  Add more substantial food to your diet according to your physician's instructions.  INCREASE FLUIDS AND FIBER TO AVOID CONSTIPATION.    FOLLOW-UP APPOINTMENT:  A follow-up appointment should be arranged with your doctor; call to schedule.    You should CALL YOUR PHYSICIAN if you develop:  Fever greater than 101 degrees F.  Pain not relieved by medication, or persistent nausea or vomiting.  Excessive bleeding (blood soaking through dressing) or unexpected drainage from the wound.  Extreme redness or swelling around the incision site, drainage of pus or foul smelling drainage.  Inability to urinate or empty your bladder within 8 hours.      You should call 911 if you develop problems with breathing or chest pain.  If you are unable " to contact your doctor or surgical center, you should go to the nearest emergency room or urgent care center.  Physician's telephone #: [phone]    If any questions arise, call your doctor.  If your doctor is not available, please feel free to call the Surgical Center at [phone].  The Center is open Monday through Friday from 7AM to 7PM.  You can also call the HEALTH HOTLINE open 24 hours/day, 7 days/week and speak to a nurse at [phone], or toll free at [phone].    A registered nurse may call you a few days after your surgery to see how you are doing after your procedure.    MEDICATIONS: Resume taking daily medication.  Take prescribed pain medication with food.  If no medication is prescribed, you may take non-aspirin pain medication if needed.  PAIN MEDICATION CAN BE VERY CONSTIPATING.  Take a stool softener or laxative such as senokot, pericolace, or milk of magnesia if needed.    Prescription given for N/A.  Last pain medication given at n/a.    If your physician has prescribed pain medication that includes Acetaminophen (Tylenol), do not take additional Acetaminophen (Tylenol) while taking the prescribed medication.    Depression / Suicide Risk    As you are discharged from this [facility] facility, it is important to learn how to keep safe from harming yourself.    Recognize the warning signs:  · Abrupt changes in personality, positive or negative- including increase in energy   · Giving away possessions  · Change in eating patterns- significant weight changes-  positive or negative  · Change in sleeping patterns- unable to sleep or sleeping all the time   · Unwillingness or inability to communicate  · Depression  · Unusual sadness, discouragement and loneliness  · Talk of wanting to die  · Neglect of personal appearance   · Rebelliousness- reckless behavior  · Withdrawal from people/activities they love  · Confusion- inability to concentrate     If you or a loved one observes any of  these behaviors or has concerns about self-harm, here's what you can do:  · Talk about it- your feelings and reasons for harming yourself  · Remove any means that you might use to hurt yourself (examples: pills, rope, extension cords, firearm)  · Get professional help from the community (Mental Health, Substance Abuse, psychological counseling)  · Do not be alone:Call your Safe Contact- someone whom you trust who will be there for you.  · Call your local CRISIS HOTLINE 663-0484 or 131-075-2810  · Call your local Children's Mobile Crisis Response Team [place] (633) 626-3567 or www.Panna  · Call the toll free National Suicide Prevention Hotlines   · National Suicide Prevention Lifeline 690-580-JDLM (9997)  · [facility] Line Network 800-SUICIDE (125-7372)       Medication List      ASK your doctor about these medications        Instructions    alprazolam 1 MG Tabs   Commonly known as:  XANAX    Take 1 Tab by mouth at bedtime as needed for Sleep or Anxiety.   Dose:  1 mg       HYDROmorphone 2 MG Tabs   Commonly known as:  DILAUDID    Take 4 mg by mouth every four hours as needed for Severe Pain.   Dose:  4 mg       meloxicam 7.5 MG Tabs   Commonly known as:  MOBIC    Take 7.5 mg by mouth 2 Times a Day.   Dose:  7.5 mg       omeprazole 20 MG delayed-release capsule   Commonly known as:  PRILOSEC    Take 20 mg by mouth every day.   Dose:  20 mg       ondansetron 8 MG Tabs   Commonly known as:  ZOFRAN    Take 1 Tab by mouth 1 time daily as needed for Nausea/Vomiting.   Dose:  8 mg               Medication Information     Above and/or attached are the medications your physician expects you to take upon discharge. Review all of your home medications and newly ordered medications with your doctor and/or pharmacist. Follow medication instructions as directed by your doctor and/or pharmacist. Please keep your medication list with you and share with your physician. Update the information when medications are  discontinued, doses are changed, or new medications (including over-the-counter products) are added; and carry medication information at all times in the event of emergency situations.        Resources     Quit Smoking / Tobacco Use:    I understand the use of any tobacco products increases my chance of suffering from future heart disease or stroke and could cause other illnesses which may shorten my life. Quitting the use of tobacco products is the single most important thing I can do to improve my health. For further information on smoking / tobacco cessation call a Toll Free Quit Line at 1-944.139.5981 (*National [employer] [place]) or 1-807.415.8950 (American Lung Association) or you can access the web based program at www.lungusa.org.    [place] Tobacco Users Help Line:  (820) 553-8416       Toll Free: 1-404.916.1061  Quit Tobacco Program [facility] (417)047-3969    Crisis Hotline:    [place] Crisis Hotline:  1-886-BLKXAFD or 1-375.663.4071    [place] Crisis Hotline:    1-309.193.1783 or 772-188-1137    Discharge Survey:   Thank you for choosing [facility]. We hope we did everything we could to make your hospital stay a pleasant one. You may be receiving a survey and we would appreciate your time and participation in answering the questions. Your input is very valuable to us in our efforts to improve our service to our patients and their families.            Signatures     My signature on this form indicates that:    1. I acknowledge receipt and understanding of these Home Care Instruction.  2. My questions regarding this information have been answered to my satisfaction.  3. I have formulated a plan with my discharge nurse to obtain my prescribed medications for home.    __________________________________      __________________________________                   Patient Signature                                 Guardian/Responsible Adult Signature      __________________________________                  __________       ________                       Nurse Signature                                               Date                 Time

## 2017-01-26 NOTE — OR NURSING
0811: To PACU from OR via gurney, awake/alert, PACU 2 level of care provided in PACU 1, respirations spontaneous and non-labored, pt denies pain, c/o some decreased sensation to bilat legs. Lumbar bandaid c/d/i.  0825: Dozing intermittently, states he is comfortable lying flat on guerney.   0832: To Stage 2 for continuation of care

## 2017-01-26 NOTE — OR NURSING
0832: Patient arrives to stage 2, remains in recliner for comfort per patient's request. Bandaid to back CDI. Denies pain/nausea. Denies numbness or tingling in extremities. Left message for cafeteria for breakfast tray.  0905: Report to GENE Howard.

## 2017-01-26 NOTE — OR SURGEON
Immediate Post-Operative Note      PreOp Diagnosis: metastatic lung cancer    PostOp Diagnosis: as above    Procedure(s) (LRB):  BLOCK EPIDURAL STEROID INJECTION - SINGLE SHOT INJECTION PAIN PUMP (N/A)    Surgeon(s):  Cricket Sparks M.D.    Anesthesiologist/Type of Anesthesia:  No anesthesia staff entered./None    Surgical Staff:  Circulator: Quiana Hogan R.N.  Monitoring Nurse: Luz Grijalva R.N.    Specimen: no    Estimated Blood Loss: none    Findings: none    Complications: none        1/26/2017 8:17 AM Cricket Sparks

## 2017-01-26 NOTE — IP AVS SNAPSHOT
1/26/2017          Gabriele Meyersn Theodorecinthya  [facility] [place] [id number]    Dear Gabriele:    [facility] wants to ensure your discharge home is safe and you or your loved ones have had all your questions answered regarding your care after you leave the hospital.    You may receive a telephone call within two days of your discharge.  This call is to make certain you understand your discharge instructions as well as ensure we provided you with the best care possible during your stay with us.     The call will only last approximately 3-5 minutes and will be done by a nurse.    Once again, we want to ensure your discharge home is safe and that you have a clear understanding of any next steps in your care.  If you have any questions or concerns, please do not hesitate to contact us, we are here for you.  Thank you for choosing [facility] [place] [facility] for your healthcare needs.    Sincerely,    Viktor Dougherty    [facility]

## 2017-01-26 NOTE — PROCEDURES
DATE OF SERVICE:  01/26/2017    NAME OF PROCEDURE:  Spinal narcotic trial.    PREPROCEDURAL DIAGNOSES:  Patient with metastatic lung cancer to the shoulder   and hip with intractable pain, currently uncontrolled on increasing doses of   opiates for consideration of Medtronic intrathecal narcotic infusion trial.    POSTPROCEDURAL DIAGNOSES:  Patient with metastatic lung cancer to the shoulder   and hip with intractable pain, currently uncontrolled on increasing doses of   opiates for consideration of Medtronic intrathecal narcotic infusion trial.    PROCEDURE PERFORMED BY:  Cricket Sparks MD.    ANESTHESIA:  Local.    PROCEDURE NOTE:  The patient was brought into the procedure suite, placed in   prone position.  The back was sterilely prepped using a surgical prep set.    Lidocaine skin wheal was raised over the L1-L2 interspace.  Using a needle   through a needle technique, a 25-gauge Jarek type needle was then advanced   into the intrathecal space.  The stylet was removed.  CSF was noted to be   coming to the top of the needle.  An 8 mL of radiographic contrast instilled   confirmed intrathecal spread with no evidence of obstruction or scarring.    This was followed by 5 mL containing 75 mcg of fentanyl and preservative-free   normal saline.  Prior to removal of the needle, another mL of radiograph   contrast was injected under fluoroscopic imaging again confirming that there   has been no migration of the needle during the injection.  The needles were   removed.  The patient tolerated the procedure quite well.  He will be observed   for the next 3 hours to determine his response to this and will be considered   for intrathecal infusion pump if dramatic improvement in his pain is noted.       ____________________________________     MD JOSEPH MEDRANO / CELSO    DD:  01/26/2017 08:14:16  DT:  01/26/2017 08:46:14    D#:  [id number]  Job#:  [id number]    cc: MANJEET MARIA MD

## 2017-01-26 NOTE — OR NURSING
0730  PT TO PRE OP TO ASSUME CARE. 0800 Patient allergies and NPO status verified, home medication reconciliation completed and belongings secured. Patient verbalizes understanding of pain scale, expected course of stay and plan of care. Surgical site verified with patient. IV access established.

## 2017-01-26 NOTE — DISCHARGE INSTRUCTIONS
ACTIVITY: Rest and take it easy for the first 24 hours.  A responsible adult is recommended to remain with you during that time.  It is normal to feel sleepy.  We encourage you to not do anything that requires balance, judgment or coordination.    MILD FLU-LIKE SYMPTOMS ARE NORMAL. YOU MAY EXPERIENCE GENERALIZED MUSCLE ACHES, THROAT IRRITATION, HEADACHE AND/OR SOME NAUSEA.    FOR 24 HOURS DO NOT:  Drive, operate machinery or run household appliances.  Drink beer or alcoholic beverages.   Make important decisions or sign legal documents.    DIET: To avoid nausea, slowly advance diet as tolerated, avoiding spicy or greasy foods for the first day.  Add more substantial food to your diet according to your physician's instructions.  INCREASE FLUIDS AND FIBER TO AVOID CONSTIPATION.    FOLLOW-UP APPOINTMENT:  A follow-up appointment should be arranged with your doctor; call to schedule.    You should CALL YOUR PHYSICIAN if you develop:  Fever greater than 101 degrees F.  Pain not relieved by medication, or persistent nausea or vomiting.  Excessive bleeding (blood soaking through dressing) or unexpected drainage from the wound.  Extreme redness or swelling around the incision site, drainage of pus or foul smelling drainage.  Inability to urinate or empty your bladder within 8 hours.      You should call 911 if you develop problems with breathing or chest pain.  If you are unable to contact your doctor or surgical center, you should go to the nearest emergency room or urgent care center.  Physician's telephone #: [phone]    If any questions arise, call your doctor.  If your doctor is not available, please feel free to call the Surgical Center at [phone].  The Center is open Monday through Friday from 7AM to 7PM.  You can also call the [employer] HOTLINE open 24 hours/day, 7 days/week and speak to a nurse at [phone], or toll free at [phone].    A registered nurse may call you a few days after your surgery  to see how you are doing after your procedure.    MEDICATIONS: Resume taking daily medication.  Take prescribed pain medication with food.  If no medication is prescribed, you may take non-aspirin pain medication if needed.  PAIN MEDICATION CAN BE VERY CONSTIPATING.  Take a stool softener or laxative such as senokot, pericolace, or milk of magnesia if needed.    Prescription given for N/A.  Last pain medication given at n/a.    If your physician has prescribed pain medication that includes Acetaminophen (Tylenol), do not take additional Acetaminophen (Tylenol) while taking the prescribed medication.    Depression / Suicide Risk    As you are discharged from this [facility] facility, it is important to learn how to keep safe from harming yourself.    Recognize the warning signs:  · Abrupt changes in personality, positive or negative- including increase in energy   · Giving away possessions  · Change in eating patterns- significant weight changes-  positive or negative  · Change in sleeping patterns- unable to sleep or sleeping all the time   · Unwillingness or inability to communicate  · Depression  · Unusual sadness, discouragement and loneliness  · Talk of wanting to die  · Neglect of personal appearance   · Rebelliousness- reckless behavior  · Withdrawal from people/activities they love  · Confusion- inability to concentrate     If you or a loved one observes any of these behaviors or has concerns about self-harm, here's what you can do:  · Talk about it- your feelings and reasons for harming yourself  · Remove any means that you might use to hurt yourself (examples: pills, rope, extension cords, firearm)  · Get professional help from the community (Mental Health, Substance Abuse, psychological counseling)  · Do not be alone:Call your Safe Contact- someone whom you trust who will be there for you.  · Call your local CRISIS HOTLINE 099-9186 or 652-914-7502  · Call your local Children's Mobile Crisis Response  Team [facility] (403) 318-2058 or www.nScaled.Big Super Search  · Call the toll free National Suicide Prevention Hotlines   · National Suicide Prevention Lifeline 838-748-ULBH (4294)  · National Hope Line Network 800-SUICIDE (711-2914)

## 2017-01-27 ENCOUNTER — TELEPHONE (OUTPATIENT)
Dept: HEMATOLOGY ONCOLOGY | Facility: MEDICAL CENTER | Age: 67
End: 2017-01-27

## 2017-01-27 NOTE — TELEPHONE ENCOUNTER
"Nutrition Services  Received call back from patient regarding nutrition.  Pt states that he is \"not doing anything to prolong life\".  He reports he is in a lot of pain, MD aware, he lives alone and doesn't cook.  He eats what he can but does not want to receive nutrition education.  Tried to encouraged patient with benefits of nutrition however his outlook is very poor and he doesn't think he is going to live much longer.    Provided RD contact information and asked patient to call us should he change his mind.     RD is available PRN.  "

## 2017-01-27 NOTE — TELEPHONE ENCOUNTER
Nutrition Services: Follow-up attempt  Called patient to discuss nutrition and/or set up appointment.  He answered at first but then our connection was lost.  He did not answer on second attempted call so I left a voice message with RD contact information.     RD will continue to try to contact patient.   Please have him contact RD: [phone] to mac

## 2017-02-03 ENCOUNTER — HOSPITAL ENCOUNTER (OUTPATIENT)
Dept: RADIATION ONCOLOGY | Facility: MEDICAL CENTER | Age: 67
End: 2017-02-28
Attending: RADIOLOGY
Payer: MEDICARE

## 2017-02-03 VITALS
SYSTOLIC BLOOD PRESSURE: 119 MMHG | WEIGHT: 193 LBS | OXYGEN SATURATION: 94 % | HEART RATE: 94 BPM | DIASTOLIC BLOOD PRESSURE: 66 MMHG | HEIGHT: 69 IN | BODY MASS INDEX: 28.58 KG/M2 | RESPIRATION RATE: 16 BRPM | TEMPERATURE: 97.5 F

## 2017-02-03 DIAGNOSIS — C34.2 MALIGNANT NEOPLASM OF MIDDLE LOBE OF RIGHT LUNG (HCC): ICD-10-CM

## 2017-02-03 PROCEDURE — 99205 OFFICE O/P NEW HI 60 MIN: CPT | Performed by: RADIOLOGY

## 2017-02-03 PROCEDURE — 99214 OFFICE O/P EST MOD 30 MIN: CPT | Performed by: RADIOLOGY

## 2017-02-03 RX ORDER — MORPHINE SULFATE 30 MG/1
30 TABLET, FILM COATED, EXTENDED RELEASE ORAL EVERY 12 HOURS
Qty: 60 TAB | Refills: 0 | Status: ON HOLD | OUTPATIENT
Start: 2017-02-03 | End: 2017-02-15

## 2017-02-03 NOTE — PROGRESS NOTES
"RADIATION ONCOLOGY CONSULT    DATE OF SERVICE: 2/3/2017    IDENTIFICATION: A 66 y.o. male with stage IV adenocarcinoma of the lung with EGFR mutation exon 20 he's failed Tarceva, carboplatin and Alimta, Opdivo. He is presently off all systemic therapy. He is here at the kind request of  Dr. Bennett for palliative radiotherapy for painful metastasis involving the right scapula and left hip.  .      HISTORY OF PRESENT ILLNESS: Patient gives a 4-6 month history of pain involving the left hip. Pains been progressively getting worse. He is having difficulty ambulating secondary to pain and uses  a cane. He states he spends most of his time in bed lying flat which does help relieve some discomfort. Pain is described as being sharp with radiation to the left groin and left buttock. He is currently on hydromorphone which decreases the pain to 7/10. Bone scan January 12, 2017 confirms uptake in the left hip region as well as right scapula. Bone biopsy of the left acetabulum was positive for adenocarcinoma.    PAST MEDICAL HISTORY:   Past Medical History   Diagnosis Date   • Pain      chronic back pain   • Psychiatric problem      anxiety   • Heart burn    • Indigestion    • Arthritis      osteoarthritis in all joints   • Breath shortness 2016     \"Needed oxygen in the past, because of my lung ca, haven't used it for 6 monthe\"   • Snoring    • Carcinoma in situ of respiratory system 2015     adenocarcinoma of lungs, 5/2015 dx   • Hiatus hernia syndrome    • Cancer (CMS-HCC) 5/2015     Lung cancer, chemo only, last dose Oct 27, 2016; 1/17 bone cancer   • Cancer, metastatic to bone (CMS-HCC)    • GERD (gastroesophageal reflux disease)    • Anxiety      on xanax   • Chronic low back pain        PAST SURGICAL HISTORY:  Past Surgical History   Procedure Laterality Date   • Other abdominal surgery  2007     umbilical hernia   • Thoracoscopy Right 5/7/2015     Procedure: THORACOSCOPY, with lung biopsy;  Surgeon: Mikki Rivera, " M.D.;  Location: [facility];  Service:    • Pr inj dx/ther agnt paravert facet joint, luz maria* Left 12/8/2016     Procedure: INJ PARA FACET L/S 1 LVL W/IG - L4, 5, S1;  Surgeon: Cricket Sparks M.D.;  Location: [facility];  Service: Pain Management   • Pr inj dx/ther agnt paravert facet joint, luz maria*  12/8/2016     Procedure: INJ PARA FACET L/S 2D LVL W/IG;  Surgeon: Cricket Sparks M.D.;  Location: [facility];  Service: Pain Management   • Other orthopedic surgery  2001     bilateral arthroscopy, knees   • Other orthopedic surgery  2010     rt shoulder surgery   • Other orthopedic surgery  2014      left hip arthroplasty   • Pr inj dx/ther agnt paravert facet joint, luz maria* Left 1/5/2017     Procedure: INJ PARA FACET L/S 1 LVL W/IG - L4, L5, S1;  Surgeon: Cricket Sparks M.D.;  Location: [facility];  Service: Pain Management   • Pr inj dx/ther agnt paravert facet joint, luz maria*  1/5/2017     Procedure: INJ PARA FACET L/S 2D LVL W/IG;  Surgeon: Cricket Sparks M.D.;  Location: [facility];  Service: Pain Management   • Block epidural steroid injection N/A 1/26/2017     Procedure: BLOCK EPIDURAL STEROID INJECTION - SINGLE SHOT INJECTION PAIN PUMP;  Surgeon: Cricket Sparks M.D.;  Location: [facility];  Service:    • Athroplasty Left 2014     left hip   • Lumbar laminectomy diskectomy  2005     L3-L4   • Bone biopsy  1/17/17     left acetabulum   • Lung needle biopsy  Nov 2016       CURRENT MEDICATIONS:  Current Outpatient Prescriptions   Medication Sig Dispense Refill   • omeprazole (PRILOSEC) 20 MG delayed-release capsule Take 20 mg by mouth every day.     • meloxicam (MOBIC) 7.5 MG Tab Take 7.5 mg by mouth 2 Times a Day.     • ondansetron (ZOFRAN) 8 MG Tab Take 1 Tab by mouth 1 time daily as needed for Nausea/Vomiting. 3 Tab 0   • HYDROmorphone (DILAUDID) 2 MG Tab Take 4 mg by mouth every four hours as needed for Severe Pain.     •  "alprazolam (XANAX) 1 MG TABS Take 1 Tab by mouth at bedtime as needed for Sleep or Anxiety. 30 Tab 0     No current facility-administered medications for this encounter.       ALLERGIES:    Review of patient's allergies indicates no known allergies.    FAMILY HISTORY:    family history includes Cancer in his brother, father, and mother; Stroke in his mother.    SOCIAL HISTORY:     reports that he quit smoking about 34 years ago. He has never used smokeless tobacco. He reports that he drinks alcohol. He reports that he does not use illicit drugs.    PAIN SCALE: 0-10  Pain Assessement: Initial  Pain Location, Orientation and Scale: Back: Lower : Chronic  : 4 and Hip: Left : Acute : 7  What makes the pain better: sitting/lying  What makes the pain worse: ambulation      REVIEW OF SYSTEMS:  Review of systems for today's date of service was reviewed in the electronic medical record.  Positives as follow:   Constitutional ROS: Positive for fatigue , weight loss   Ear ROS: Positive for vertigo   Pulmonary ROS: Positive for dry cough , dyspnea on exertion   Psychiatric ROS: Positive for anxiety , depression    PHYSICAL EXAM:    ECOG PERFORMANCE STATUS:  3 = Capable of only limited self care, confined to bed or chair more than 50% of waking hours  /66 mmHg  Pulse 94  Temp(Src) 36.4 °C (97.5 °F)  Resp 16  Ht 1.753 m (5' 9\")  Wt 87.544 kg (193 lb)  BMI 28.49 kg/m2  SpO2 94%  GENERAL: Alert oriented male in no acute distress.  HEENT:  Pupils are equal, round, and reactive to light.  Extraocular muscles   are intact. Sclerae nonicteric.  Conjunctivae pink.  Oral cavity, tongue   protrudes midline.   NECK:  Supple without evidence of thyromegaly.  NODES:  No peripheral adenopathy of the neck, supraclavicular fossa or axillae   bilaterally.  LUNGS:  Clear to ascultation and resonant to percussion.  HEART:  Regular rate and rhythm.  No murmur appreciated  ABDOMEN:  Soft. No evidence of hepatosplenomegaly.  Positive " bowel sounds.  MUSCULOSKELETAL: Tenderness to palpation of the medial border right scapula. Pain with internal and external rotation of the left hip.  NEUROLOGIC:  Cranial nerves II through XII were intact.   There was no focal   sensory deficit appreciated.    LABORATORY DATA:   Lab Results   Component Value Date/Time    SODIUM 138 12/14/2016 11:18 AM    POTASSIUM 3.7 12/14/2016 11:18 AM    CHLORIDE 105 12/14/2016 11:18 AM    CO2 25 12/14/2016 11:18 AM    GLUCOSE 82 12/14/2016 11:18 AM    BUN 17 12/14/2016 11:18 AM    CREATININE 0.92 12/14/2016 11:18 AM     Lab Results   Component Value Date/Time    ALKALINE PHOSPHATASE 125* 12/14/2016 11:18 AM    AST(SGOT) 16 12/14/2016 11:18 AM    ALT(SGPT) 19 12/14/2016 11:18 AM    TOTAL BILIRUBIN 0.7 12/14/2016 11:18 AM      Lab Results   Component Value Date/Time    WBC 7.4 12/14/2016 11:18 AM    RBC 4.63* 12/14/2016 11:18 AM    HEMOGLOBIN 14.6 12/14/2016 11:18 AM    HEMATOCRIT 45.5 12/14/2016 11:18 AM    MCV 98.3* 12/14/2016 11:18 AM    MCH 31.5 12/14/2016 11:18 AM    MCHC 32.1* 12/14/2016 11:18 AM    MPV 9.7 12/14/2016 11:18 AM    NEUTROPHILS-POLYS 61.80 12/14/2016 11:18 AM    LYMPHOCYTES 24.30 12/14/2016 11:18 AM    MONOCYTES 10.40 12/14/2016 11:18 AM    EOSINOPHILS 1.90 12/14/2016 11:18 AM    BASOPHILS 1.30 12/14/2016 11:18 AM    ANISOCYTOSIS 2+ 12/21/2015 12:05 AM        RADIOLOGY DATA:  CT-NEEDLE BX DEEP BONE  1/17/2017  1.  CT GUIDED LEFT ACETABULUM (PELVIS) DEEP BONE BIOPSY.    NM BONE SCAN WHOLE BODY  1/12/2017  1.  There is increase in the amount of uptake in the medial border of the left scapula consistent with progression of metastases. 2.  There is extensive uptake in the left ilium and acetabular region consistent with progression of metastases. 3.  New increased uptake in the medial compartment right knee which is probably degenerative. This could be metastatic but would be considered less likely at this location.    IMPRESSION:    A 66 y.o. with adenocarcinoma  of the lung EGFR mutated with exon 20 alteration. Failed systemic therapy. Now with significant left hip pain.    RECOMMENDATIONS:   Reviewed bone scan imaging with patient. Did recommend radiotherapy both to the left hip and right scapula region. Radiotherapy would involve delivering 35 sandrine in 5 fractions. The technical aspects benefits risks associated with 3-D conformal radiotherapy were reviewed with patient. In these locations, I don't expect any significant adverse effects. After our discussion patient would like to proceed. In addition, I've given a prescription for MS Contin 30 mg every 12 hours to improve his pain control. He'll return for simulation on February 8 with treatment anticipate to start by February 15.    One hour was spent face-to-face with patient in the office and more than half of that time was spent counseling patient or coordinating care as described above.    Thank you for the opportunity to participate in his care.  If any questions or comments, please do not hesitate in calling.    Laure DOMINGO M.D.  Electronically signed by: Laure Sims V, 2/3/2017 11:12 AM  [phone]

## 2017-02-06 ENCOUNTER — TELEPHONE (OUTPATIENT)
Dept: HEMATOLOGY ONCOLOGY | Facility: MEDICAL CENTER | Age: 67
End: 2017-02-06

## 2017-02-06 ENCOUNTER — HOSPITAL ENCOUNTER (EMERGENCY)
Facility: MEDICAL CENTER | Age: 67
DRG: 560 | End: 2017-02-06
Attending: EMERGENCY MEDICINE
Payer: MEDICARE

## 2017-02-06 VITALS
TEMPERATURE: 97.9 F | BODY MASS INDEX: 28.88 KG/M2 | WEIGHT: 195 LBS | RESPIRATION RATE: 16 BRPM | HEART RATE: 86 BPM | OXYGEN SATURATION: 96 % | DIASTOLIC BLOOD PRESSURE: 72 MMHG | SYSTOLIC BLOOD PRESSURE: 101 MMHG | HEIGHT: 69 IN

## 2017-02-06 DIAGNOSIS — C79.51 BONY METASTASIS: ICD-10-CM

## 2017-02-06 DIAGNOSIS — G89.3 CHRONIC PAIN DUE TO NEOPLASM: ICD-10-CM

## 2017-02-06 RX ORDER — FAMOTIDINE 20 MG/1
20 TABLET, FILM COATED ORAL ONCE
Status: COMPLETED | OUTPATIENT
Start: 2017-02-06 | End: 2017-02-06

## 2017-02-06 RX ORDER — KETOROLAC TROMETHAMINE 30 MG/ML
30 INJECTION, SOLUTION INTRAMUSCULAR; INTRAVENOUS ONCE
Status: COMPLETED | OUTPATIENT
Start: 2017-02-06 | End: 2017-02-06

## 2017-02-06 RX ORDER — ONDANSETRON 2 MG/ML
4 INJECTION INTRAMUSCULAR; INTRAVENOUS
Status: DISCONTINUED | OUTPATIENT
Start: 2017-02-06 | End: 2017-02-06 | Stop reason: HOSPADM

## 2017-02-06 RX ADMIN — KETOROLAC TROMETHAMINE 30 MG: 30 INJECTION, SOLUTION INTRAMUSCULAR; INTRAVENOUS at 17:35

## 2017-02-06 RX ADMIN — FAMOTIDINE 20 MG: 20 TABLET, FILM COATED ORAL at 17:34

## 2017-02-06 RX ADMIN — LIDOCAINE HYDROCHLORIDE 30 ML: 20 SOLUTION OROPHARYNGEAL at 17:34

## 2017-02-06 ASSESSMENT — PAIN SCALES - GENERAL: PAINLEVEL_OUTOF10: 5

## 2017-02-06 NOTE — TELEPHONE ENCOUNTER
Received a phone call from patient this afternoon. Patient recently established with Dr. Bennett for metastatic adenocarcinoma of the lung. Patient with extensive uptake in the left ileum/acetabular area suggestive metastatic disease. Patient has been established with Dr. Sparks, pain management since October. He was recently seen last Friday, February 3 by Dr. Sims for radiation therapy to the left hip. Patient is scheduled to have his simulation this coming Wednesday the 8th and likely to start treatment one week after. Patient was out doing his laundry this morning and the pain in his left hip became so unbearable he could not walk anymore. It does not sound like the patient fell but he had to crawl himself into his house. Patient stated once he got into the house he was unable to move and called our office. Patient was in audible significant pain with shortness of breath as the pain was extremely severe. He stated that he does not have any friends that are available to come help him and bring him into the emergency department and he is extremely scared that he cannot cover an ambulance ride. I will office did contact his insurance company and confirmed that if this is an emergency then they would cover his ride. I did assure the patient that he was in significant pain crisis and he was not able to get up and move anymore that he needed to get someone to come help him so we can get his pain better controlled. Patient stated that he was recently started on MS Contin 30 mg twice a day and he has been taking Dilaudid for breakthrough pain medicine. He stated the Dilaudid works for a little while but it has not been very beneficial for his pain at all. He is not able to move at this time and needs to seek emergency care for significant pain crisis due to extensive metastatic disease in the left hip/acetabulum. I did recommend that patient call 911, and he has agreed to do so. I did contact the patient  approximately 5 minutes after we hung up the phone and confirmed that he did make the phone call and the ambulance was coming to pick him up shortly. Patient stated he was still just on the inside of his home laying on the ground as he could not move anymore. He did state that his front door was open so that the paramedics would be able to get in to get him. He also had his cell phone on him and would be able to call if need be until help had arrived.

## 2017-02-06 NOTE — ED NOTES
"Pt presented via [facility] c/o increased weakness and pain secondary to lung cancer. Pt reports \"trying to do laundry today, and had difficulty getting up\" Pt has hx of lung cancer with mets to his left shoulder and L hip, has MD appt on Wed. Pt changed into gown and placed on monitor.   "

## 2017-02-06 NOTE — ED AVS SNAPSHOT
2/6/2017          Gabriele Meyersn Theodorecinthya  [facility] [place] [id number]    Dear Gabriele:    [facility] wants to ensure your discharge home is safe and you or your loved ones have had all your questions answered regarding your care after you leave the hospital.    You may receive a telephone call within two days of your discharge.  This call is to make certain you understand your discharge instructions as well as ensure we provided you with the best care possible during your stay with us.     The call will only last approximately 3-5 minutes and will be done by a nurse.    Once again, we want to ensure your discharge home is safe and that you have a clear understanding of any next steps in your care.  If you have any questions or concerns, please do not hesitate to contact us, we are here for you.  Thank you for choosing [facility] for your healthcare needs.    Sincerely,    Viktor Dougherty    [facility]

## 2017-02-06 NOTE — ED AVS SNAPSHOT
Home Care Instructions                                                                                                                Gabriele Torres   MRN: [id number]    Department:  [facility], Emergency Dept   Date of Visit:  2/6/2017            [facility], Emergency Dept    [address]    [facility] [address]    Phone:  [phone]      You were seen by     Owen Bhakta M.D.      Your Diagnosis Was     Bony metastasis (CMS-HCC)     C79.51       These are the medications you received during your hospitalization from 02/06/2017 1449 to 02/06/2017 1827     Date/Time Order Dose Route Action    02/06/2017 1734 hyoscyamine-maalox plus-lidocaine viscous (GI COCKTAIL) oral susp 30 mL 30 mL Oral Given    02/06/2017 1734 famotidine (PEPCID) tablet 20 mg 20 mg Oral Given    02/06/2017 1735 ketorolac (TORADOL) injection 30 mg 30 mg Intravenous Given      Follow-up Information     1. Follow up with Cricket Sparks M.D..    Specialty:  Anesthesia    Contact information    [address]  [facility] [address] [phone]        Medication Information     Review all of your home medications and newly ordered medications with your primary doctor and/or pharmacist as soon as possible. Follow medication instructions as directed by your doctor and/or pharmacist.     Please keep your complete medication list with you and share with your physician. Update the information when medications are discontinued, doses are changed, or new medications (including over-the-counter products) are added; and carry medication information at all times in the event of emergency situations.               Medication List      ASK your doctor about these medications        Instructions    alprazolam 1 MG Tabs   Commonly known as:  XANAX    Take 1 Tab by mouth at bedtime as needed for Sleep or Anxiety.   Dose:  1 mg       HYDROmorphone 2 MG Tabs   Commonly known as:  DILAUDID    Take 4 mg by  mouth every four hours as needed for Severe Pain.   Dose:  4 mg       meloxicam 7.5 MG Tabs   Commonly known as:  MOBIC    Take 7.5 mg by mouth 2 Times a Day.   Dose:  7.5 mg       morphine ER 30 MG Tbcr tablet   Commonly known as:  MS CONTIN    Take 1 Tab by mouth every 12 hours for 30 days.   Dose:  30 mg       omeprazole 20 MG delayed-release capsule   Commonly known as:  PRILOSEC    Take 20 mg by mouth every day.   Dose:  20 mg       ondansetron 8 MG Tabs   Commonly known as:  ZOFRAN    Take 1 Tab by mouth 1 time daily as needed for Nausea/Vomiting.   Dose:  8 mg                 Discharge Instructions       Bone Metastasis  Bone metastasis is cancer that spreads to the bones from another part of the body. A person may have bone metastasis in one bone or in more than one bone. Cancer that spreads to the bones is different from cancer that starts in the bones (primary bone cancer). Bone metastasis is more common than primary bone cancer.  The spine is the most common area for bone metastasis. Other common areas include:  · Hip bone (pelvis).  · Ribs.  · Skull.  · Long bones of the arm or leg.  Bone metastasis is painful, and it damages the bones. Bone metastasis damages and weakens bones in two ways. A person may have bone destruction (osteolytic damage) or abnormal bone growth (osteoblastic destruction). Both of these conditions can make bones so weak that they break (pathologic fracture) even from a minor injury.  CAUSES  This condition is caused by cancer cells that spread to bone. These cells can get into your bloodstream and spread through your body. They can also get into the vessels that are part of your lymphatic system (lymph vessels) and spread that way.  RISK FACTORS  This condition is more likely to develop in people who have an advanced type of cancer that is known to spread to bone. Cancers that often spread to bone include:  · Breast cancer.  · Prostate cancer.  · Lung cancer.  · Thyroid  cancer.  · Kidney cancer.  SYMPTOMS  The most common symptom of this condition is bone pain, especially while you are resting. Other symptoms include:  · A broken bone (fracture) that happens with little or no trauma.  · Low number of red blood cells (anemia). Bone destruction may damage the spongy tissue (bone marrow) in the center of some bones where red blood cells are produced. Anemia can cause:  ¨ Weakness.  ¨ Shortness of breath.  ¨ Headache.  ¨ Dizziness.  · Back or neck pain with numbness or weakness, especially if you have bone metastasis in your spine.  · High levels of calcium in your blood (hypercalcemia). When bone is destroyed, calcium is released into your blood. Symptoms of hypercalcemia include:  ¨ Constipation.  ¨ Thirst.  ¨ Nausea.  ¨ Sleepiness.  DIAGNOSIS  This condition may be diagnosed based on:  · Your symptoms and medical history. Your health care provider may suspect this condition if you are being treated for cancer or have had cancer treatment in the past.  · A physical exam.  · Imaging studies, such as:  ¨ Bone X-rays, especially in the area where you have pain.  ¨ CT scan.  ¨ Bone scan.  ¨ MRI.  · Blood tests to check for anemia or hypercalcemia.  · A procedure to remove a piece of bone so it can be examined under a microscope (biopsy).  TREATMENT  Treatment for this condition depends on your overall health, the type of cancer you have, and how much the cancer has spread. You will work with a team of health care providers to determine which treatment is best for you. Treatment will focus on managing pain, preventing bone weakness, and slowing the spread of the cancer. Treatment may include:  · Radiation therapy. This treatment uses X-rays to kill cancer cells. It is most effective for reducing pain, stopping tumor growth, and lowering the risk of fractures.  · Radioisotope therapy. This treatment uses a radioactive medicine that is injected into your blood. The medicine travels to areas  where cancer cells are active and kills them.  · Chemotherapy. For this treatment, you are given cancer-killing drugs. You may have chemotherapy in cycles, with rest periods in between.  · Medicines to block cells that destroy bone (bisphosphonates and denosumab). These medicines are used to control bone pain. They may help to reduce hypercalcemia.  · Medicines to reduce pain (opiates).  · Endocrine therapies. These therapies slow cancer growth by blocking specific chemical messengers (hormones). Some types of cancer, including breast and prostate cancers, depend on hormones.  · Targeted therapies. These therapies involve the use of drugs that block the growth and spread of cancer. This treatment is different from standard chemotherapy.  · Immunotherapies. These therapies use the body's defense system (immune system) to fight cancer cells.  · Surgery. You may have surgery to remove bone cancer or to prevent or repair a fracture.  HOME CARE INSTRUCTIONS  · Take medicines only as directed by your health care provider.  · Do not drive or operate heavy machinery while taking pain medicine.  · Take the following steps to help prevent constipation, which can result from some pain medicines.  ¨ Include plenty of fruits and whole grains in your diet.  ¨ Drink enough fluid to keep your urine clear or pale yellow.  ¨ Ask your health care provider about taking a laxative if needed.  · Keep all follow-up visits as directed by your health care provider. This is important.  SEEK MEDICAL CARE IF:  · Your pain medicine is not helping.  · You are too weak to care for yourself at home.  SEEK IMMEDIATE MEDICAL CARE IF:  · You fall and have pain or an injury.  · You have trouble walking.  · You have numbness or tingling in your legs.  · Your pain suddenly gets worse.  · You lose control of your bowels or your bladder.  · You are very sleepy or confused.     This information is not intended to replace advice given to you by your health  care provider. Make sure you discuss any questions you have with your health care provider.     Document Released: 12/08/2003 Document Revised: 05/03/2016 Document Reviewed: 10/21/2015  Elsevier Interactive Patient Education ©2016 [employer]            Patient Information     Patient Information    Following emergency treatment: all patient requiring follow-up care must return either to a private physician or a clinic if your condition worsens before you are able to obtain further medical attention, please return to the emergency room.     Billing Information    At [facility], we work to make the billing process streamlined for our patients.  Our Representatives are here to answer any questions you may have regarding your hospital bill.  If you have insurance coverage and have supplied your insurance information to us, we will submit a claim to your insurer on your behalf.  Should you have any questions regarding your bill, we can be reached online or by phone as follows:  Online: You are able pay your bills online or live chat with our representatives about any billing questions you may have. We are here to help Monday - Friday from 8:00am to 7:30pm and 9:00am - 12:00pm on Saturdays.  Please visit [email or web address]  for more information.   Phone:  [phone] or [phone]    Please note that your emergency physician, surgeon, pathologist, radiologist, anesthesiologist, and other specialists are not employed by [facility] and will therefore bill separately for their services.  Please contact them directly for any questions concerning their bills at the numbers below:     Emergency Physician Services:  [phone]  [facility]:  [phone]  [facility]:  [phone]  [facility]:  [phone]    1. Your final bill may vary from the amount quoted upon discharge if all procedures are not complete at that time, or if your  doctor has additional procedures of which we are not aware. You will receive an additional bill if you return to the Emergency Department at [facility] for suture removal regardless of the facility of which the sutures were placed.     2. Please arrange for settlement of this account at the emergency registration.    3. All self-pay accounts are due in full at the time of treatment.  If you are unable to meet this obligation then payment is expected within 4-5 days.     4. If you have had radiology studies (CT, X-ray, Ultrasound, MRI), you have received a preliminary result during your emergency department visit. Please contact the radiology department [phone] to receive a copy of your final result. Please discuss the Final result with your primary physician or with the follow up physician provided.     Crisis Hotline:  [place] Crisis Hotline:  6-068-QCQJWGF or 1-482.567.8210  [place] Crisis Hotline:    1-904.943.2017 or 343-381-6296         ED Discharge Follow Up Questions    1. In order to provide you with very good care, we would like to follow up with a phone call in the next few days.  May we have your permission to contact you?     YES /  NO    2. What is the best phone number to call you? (       )_____-__________    3. What is the best time to call you?      Morning  /  Afternoon  /  Evening                   Patient Signature:  ____________________________________________________________    Date:  ____________________________________________________________      Your appointments     Feb 08, 2017  8:20 AM   ONCOLOGY EST PATIENT 30 MIN with Shawanda Bennett M.D.   [facility] (--)    [address], [address]  [facility] [id number] [phone]

## 2017-02-06 NOTE — ED AVS SNAPSHOT
wywy Access Code: [email or web address]  Expires: 2/24/2017  1:47 PM    wywy  A secure, online tool to manage your health information     [employer] wywy® is a secure, online tool that connects you to your personalized health information from the privacy of your home -- day or night - making it very easy for you to manage your healthcare. Once the activation process is completed, you can even access your medical information using the wywy balaji, which is available for free in the Apple Balaji store or Google Play store.     wywy provides the following levels of access (as shown below):   My Chart Features   [facility] Primary Care Doctor [facility]  Specialists [facility]  Urgent  Care Non-[facility]  Primary Care  Doctor   Email your healthcare team securely and privately 24/7 X X X X   Manage appointments: schedule your next appointment; view details of past/upcoming appointments X      Request prescription refills. X      View recent personal medical records, including lab and immunizations X X X X   View health record, including health history, allergies, medications X X X X   Read reports about your outpatient visits, procedures, consult and ER notes X X X X   See your discharge summary, which is a recap of your hospital and/or ER visit that includes your diagnosis, lab results, and care plan. X X       How to register for wywy:  1. Go to  [email or web address].  2. Click on the Sign Up Now box, which takes you to the New Member Sign Up page. You will need to provide the following information:  a. Enter your wywy Access Code exactly as it appears at the top of this page. (You will not need to use this code after you’ve completed the sign-up process. If you do not sign up before the expiration date, you must request a new code.)   b. Enter your date of birth.   c. Enter your home email address.   d. Click Submit, and follow the next screen’s instructions.  3. Create a wywy ID. This will be your wywy  login ID and cannot be changed, so think of one that is secure and easy to remember.  4. Create a [employer] password. You can change your password at any time.  5. Enter your Password Reset Question and Answer. This can be used at a later time if you forget your password.   6. Enter your e-mail address. This allows you to receive e-mail notifications when new information is available in [employer].  7. Click Sign Up. You can now view your health information.    For assistance activating your [employer] account, call [phone]

## 2017-02-07 ENCOUNTER — PATIENT OUTREACH (OUTPATIENT)
Dept: HEALTH INFORMATION MANAGEMENT | Facility: OTHER | Age: 67
End: 2017-02-07

## 2017-02-07 ENCOUNTER — TELEPHONE (OUTPATIENT)
Dept: HEMATOLOGY ONCOLOGY | Facility: MEDICAL CENTER | Age: 67
End: 2017-02-07

## 2017-02-07 ENCOUNTER — APPOINTMENT (OUTPATIENT)
Dept: RADIOLOGY | Facility: MEDICAL CENTER | Age: 67
DRG: 560 | End: 2017-02-07
Attending: ORTHOPAEDIC SURGERY
Payer: MEDICARE

## 2017-02-07 ENCOUNTER — HOSPITAL ENCOUNTER (INPATIENT)
Facility: MEDICAL CENTER | Age: 67
LOS: 6 days | DRG: 560 | End: 2017-02-15
Attending: EMERGENCY MEDICINE | Admitting: HOSPITALIST
Payer: MEDICARE

## 2017-02-07 ENCOUNTER — APPOINTMENT (OUTPATIENT)
Dept: RADIOLOGY | Facility: MEDICAL CENTER | Age: 67
DRG: 560 | End: 2017-02-07
Attending: EMERGENCY MEDICINE
Payer: MEDICARE

## 2017-02-07 ENCOUNTER — RESOLUTE PROFESSIONAL BILLING HOSPITAL PROF FEE (OUTPATIENT)
Dept: HOSPITALIST | Facility: MEDICAL CENTER | Age: 67
End: 2017-02-07
Payer: MEDICARE

## 2017-02-07 DIAGNOSIS — M25.552 PAIN OF LEFT HIP JOINT: ICD-10-CM

## 2017-02-07 PROBLEM — C79.51 PAIN FROM BONE METASTASES (HCC): Status: ACTIVE | Noted: 2017-02-07

## 2017-02-07 PROBLEM — G89.3 PAIN FROM BONE METASTASES (HCC): Status: ACTIVE | Noted: 2017-02-07

## 2017-02-07 LAB
ERYTHROCYTE [DISTWIDTH] IN BLOOD BY AUTOMATED COUNT: 47.2 FL (ref 35.9–50)
HCT VFR BLD AUTO: 34.8 % (ref 42–52)
HGB BLD-MCNC: 12 G/DL (ref 14–18)
MCH RBC QN AUTO: 33.1 PG (ref 27–33)
MCHC RBC AUTO-ENTMCNC: 34.5 G/DL (ref 33.7–35.3)
MCV RBC AUTO: 95.9 FL (ref 81.4–97.8)
PLATELET # BLD AUTO: 225 K/UL (ref 164–446)
PMV BLD AUTO: 9 FL (ref 9–12.9)
RBC # BLD AUTO: 3.63 M/UL (ref 4.7–6.1)
WBC # BLD AUTO: 9.8 K/UL (ref 4.8–10.8)

## 2017-02-07 PROCEDURE — A9270 NON-COVERED ITEM OR SERVICE: HCPCS | Performed by: INTERNAL MEDICINE

## 2017-02-07 PROCEDURE — 85027 COMPLETE CBC AUTOMATED: CPT

## 2017-02-07 PROCEDURE — 304562 HCHG STAT O2 MASK/CANNULA

## 2017-02-07 PROCEDURE — 99285 EMERGENCY DEPT VISIT HI MDM: CPT

## 2017-02-07 PROCEDURE — 700102 HCHG RX REV CODE 250 W/ 637 OVERRIDE(OP): Performed by: HOSPITALIST

## 2017-02-07 PROCEDURE — 51798 US URINE CAPACITY MEASURE: CPT

## 2017-02-07 PROCEDURE — 99220 PR INITIAL OBSERVATION CARE,LEVL III: CPT | Performed by: HOSPITALIST

## 2017-02-07 PROCEDURE — 96361 HYDRATE IV INFUSION ADD-ON: CPT

## 2017-02-07 PROCEDURE — 700111 HCHG RX REV CODE 636 W/ 250 OVERRIDE (IP): Performed by: EMERGENCY MEDICINE

## 2017-02-07 PROCEDURE — 96374 THER/PROPH/DIAG INJ IV PUSH: CPT

## 2017-02-07 PROCEDURE — 73700 CT LOWER EXTREMITY W/O DYE: CPT | Mod: LT

## 2017-02-07 PROCEDURE — 73502 X-RAY EXAM HIP UNI 2-3 VIEWS: CPT | Mod: LT

## 2017-02-07 PROCEDURE — 96376 TX/PRO/DX INJ SAME DRUG ADON: CPT

## 2017-02-07 PROCEDURE — G0378 HOSPITAL OBSERVATION PER HR: HCPCS

## 2017-02-07 PROCEDURE — 700111 HCHG RX REV CODE 636 W/ 250 OVERRIDE (IP): Performed by: HOSPITALIST

## 2017-02-07 PROCEDURE — 96375 TX/PRO/DX INJ NEW DRUG ADDON: CPT

## 2017-02-07 PROCEDURE — 80048 BASIC METABOLIC PNL TOTAL CA: CPT

## 2017-02-07 PROCEDURE — A9270 NON-COVERED ITEM OR SERVICE: HCPCS | Performed by: HOSPITALIST

## 2017-02-07 PROCEDURE — 304561 HCHG STAT O2

## 2017-02-07 PROCEDURE — 700105 HCHG RX REV CODE 258: Performed by: HOSPITALIST

## 2017-02-07 PROCEDURE — 36415 COLL VENOUS BLD VENIPUNCTURE: CPT

## 2017-02-07 PROCEDURE — 700102 HCHG RX REV CODE 250 W/ 637 OVERRIDE(OP): Performed by: INTERNAL MEDICINE

## 2017-02-07 PROCEDURE — 72190 X-RAY EXAM OF PELVIS: CPT

## 2017-02-07 RX ORDER — ALPRAZOLAM 0.25 MG/1
1 TABLET ORAL 3 TIMES DAILY PRN
Status: DISCONTINUED | OUTPATIENT
Start: 2017-02-07 | End: 2017-02-07

## 2017-02-07 RX ORDER — AMOXICILLIN 250 MG
1 CAPSULE ORAL NIGHTLY
Status: DISCONTINUED | OUTPATIENT
Start: 2017-02-08 | End: 2017-02-15 | Stop reason: HOSPADM

## 2017-02-07 RX ORDER — DEXAMETHASONE 4 MG/1
4 TABLET ORAL EVERY 8 HOURS
Status: DISCONTINUED | OUTPATIENT
Start: 2017-02-07 | End: 2017-02-15

## 2017-02-07 RX ORDER — DEXAMETHASONE SODIUM PHOSPHATE 4 MG/ML
4 INJECTION, SOLUTION INTRA-ARTICULAR; INTRALESIONAL; INTRAMUSCULAR; INTRAVENOUS; SOFT TISSUE EVERY 6 HOURS
Status: DISCONTINUED | OUTPATIENT
Start: 2017-02-07 | End: 2017-02-07

## 2017-02-07 RX ORDER — SODIUM CHLORIDE 9 MG/ML
1000 INJECTION, SOLUTION INTRAVENOUS ONCE
Status: COMPLETED | OUTPATIENT
Start: 2017-02-07 | End: 2017-02-07

## 2017-02-07 RX ORDER — MORPHINE SULFATE 30 MG/1
30 TABLET, FILM COATED, EXTENDED RELEASE ORAL EVERY 12 HOURS
Status: DISCONTINUED | OUTPATIENT
Start: 2017-02-07 | End: 2017-02-09

## 2017-02-07 RX ORDER — DOCUSATE SODIUM 100 MG/1
100 CAPSULE, LIQUID FILLED ORAL EVERY MORNING
Status: DISCONTINUED | OUTPATIENT
Start: 2017-02-08 | End: 2017-02-15 | Stop reason: HOSPADM

## 2017-02-07 RX ORDER — DIAZEPAM 2 MG/1
2 TABLET ORAL EVERY 8 HOURS PRN
Status: DISCONTINUED | OUTPATIENT
Start: 2017-02-07 | End: 2017-02-15 | Stop reason: HOSPADM

## 2017-02-07 RX ORDER — DEXAMETHASONE SODIUM PHOSPHATE 4 MG/ML
4 INJECTION, SOLUTION INTRA-ARTICULAR; INTRALESIONAL; INTRAMUSCULAR; INTRAVENOUS; SOFT TISSUE ONCE
Status: COMPLETED | OUTPATIENT
Start: 2017-02-07 | End: 2017-02-07

## 2017-02-07 RX ORDER — LACTULOSE 20 G/30ML
30 SOLUTION ORAL
Status: DISCONTINUED | OUTPATIENT
Start: 2017-02-08 | End: 2017-02-15 | Stop reason: HOSPADM

## 2017-02-07 RX ORDER — ONDANSETRON 4 MG/1
4 TABLET, ORALLY DISINTEGRATING ORAL EVERY 4 HOURS PRN
Status: DISCONTINUED | OUTPATIENT
Start: 2017-02-07 | End: 2017-02-15 | Stop reason: HOSPADM

## 2017-02-07 RX ORDER — ENEMA 19; 7 G/133ML; G/133ML
1 ENEMA RECTAL
Status: DISCONTINUED | OUTPATIENT
Start: 2017-02-08 | End: 2017-02-15 | Stop reason: HOSPADM

## 2017-02-07 RX ORDER — ONDANSETRON 2 MG/ML
4 INJECTION INTRAMUSCULAR; INTRAVENOUS EVERY 4 HOURS PRN
Status: DISCONTINUED | OUTPATIENT
Start: 2017-02-07 | End: 2017-02-15 | Stop reason: HOSPADM

## 2017-02-07 RX ORDER — AMOXICILLIN 250 MG
1 CAPSULE ORAL
Status: DISCONTINUED | OUTPATIENT
Start: 2017-02-08 | End: 2017-02-15 | Stop reason: HOSPADM

## 2017-02-07 RX ORDER — ALPRAZOLAM 1 MG/1
1 TABLET ORAL 3 TIMES DAILY PRN
Status: ON HOLD | COMMUNITY
End: 2017-06-26

## 2017-02-07 RX ORDER — TAMSULOSIN HYDROCHLORIDE 0.4 MG/1
0.4 CAPSULE ORAL
Status: DISCONTINUED | OUTPATIENT
Start: 2017-02-08 | End: 2017-02-15 | Stop reason: HOSPADM

## 2017-02-07 RX ORDER — BISACODYL 10 MG
10 SUPPOSITORY, RECTAL RECTAL
Status: DISCONTINUED | OUTPATIENT
Start: 2017-02-08 | End: 2017-02-15 | Stop reason: HOSPADM

## 2017-02-07 RX ORDER — OMEPRAZOLE 20 MG/1
20 CAPSULE, DELAYED RELEASE ORAL DAILY
Status: DISCONTINUED | OUTPATIENT
Start: 2017-02-07 | End: 2017-02-15 | Stop reason: HOSPADM

## 2017-02-07 RX ADMIN — SODIUM CHLORIDE 1000 ML: 9 INJECTION, SOLUTION INTRAVENOUS at 10:01

## 2017-02-07 RX ADMIN — HYDROMORPHONE HYDROCHLORIDE 2 MG: 1 INJECTION, SOLUTION INTRAMUSCULAR; INTRAVENOUS; SUBCUTANEOUS at 16:38

## 2017-02-07 RX ADMIN — HYDROMORPHONE HYDROCHLORIDE 2 MG: 1 INJECTION, SOLUTION INTRAMUSCULAR; INTRAVENOUS; SUBCUTANEOUS at 12:56

## 2017-02-07 RX ADMIN — DEXAMETHASONE 4 MG: 4 TABLET ORAL at 16:26

## 2017-02-07 RX ADMIN — OMEPRAZOLE 20 MG: 20 CAPSULE, DELAYED RELEASE ORAL at 16:26

## 2017-02-07 RX ADMIN — DEXAMETHASONE 4 MG: 4 TABLET ORAL at 21:04

## 2017-02-07 RX ADMIN — HYDROMORPHONE HYDROCHLORIDE 1 MG: 1 INJECTION, SOLUTION INTRAMUSCULAR; INTRAVENOUS; SUBCUTANEOUS at 06:15

## 2017-02-07 RX ADMIN — DEXAMETHASONE SODIUM PHOSPHATE 4 MG: 4 INJECTION, SOLUTION INTRAMUSCULAR; INTRAVENOUS at 10:00

## 2017-02-07 RX ADMIN — MORPHINE SULFATE 30 MG: 30 TABLET, EXTENDED RELEASE ORAL at 21:04

## 2017-02-07 RX ADMIN — SODIUM CHLORIDE 1000 ML: 9 INJECTION, SOLUTION INTRAVENOUS at 13:02

## 2017-02-07 RX ADMIN — MORPHINE SULFATE 30 MG: 30 TABLET, EXTENDED RELEASE ORAL at 10:02

## 2017-02-07 RX ADMIN — HYDROMORPHONE HYDROCHLORIDE 1 MG: 1 INJECTION, SOLUTION INTRAMUSCULAR; INTRAVENOUS; SUBCUTANEOUS at 10:02

## 2017-02-07 ASSESSMENT — PAIN SCALES - GENERAL
PAINLEVEL_OUTOF10: 9
PAINLEVEL_OUTOF10: 10
PAINLEVEL_OUTOF10: 9

## 2017-02-07 NOTE — H&P
CHIEF COMPLAINT:  Left hip pain.    PRIMARY MEDICAL PHYSICIAN:  Dr. Sasha Isidro.    ONCOLOGIST CARDIOLOGIST:  Dr. Shawanda Bennett.    HISTORY OF PRESENT ILLNESS:  This is a 66-year-old gentleman who very   unfortunately carries a history of metastatic lung cancer.  The patient is   followed by Dr. Bennett for this and was to start radiation therapy, but has   not yet begun secondary to his pain.  He was initially diagnosed with lung   cancer in 2015 and has failed courses of Tarceva, platinum based chemotherapy,   and Opdivo.  He was then diagnosed with metastatic disease to the bone in   December on a bone scan.  As noted above, he has been followed recently by Dr. Bennett and was to start radiation therapy, but has not yet begun.  He   states at this point that his pain is intractable despite his long acting pain   medications at home including short-acting medications for breakthrough.  He   previously used a front wheel walker, but he can no longer ambulate   independently because of the pain.  Otherwise, he denies any fevers, chills,   nausea, vomiting.  He has had no chest pain or shortness of breath.    REVIEW OF SYSTEMS:  A full review of systems was completed and all pertinent   positives and negatives are included in the HPI above.    PAST MEDICAL HISTORY:  1.  Adenocarcinoma of the lung with metastases to the bone.  2.  Chronic pain associated with malignancy.  3.  Anxiety.    PAST SURGICAL HISTORY:  1.  Umbilical hernia repair.  2.  Fluoroscopy.  3.  Bilateral knee arthroscopy.  4.  Right shoulder surgery.  5.  Left hip arthroplasty.  6.  Lumbar laminectomy and diskectomy.  7.  Fine needle aspiration of the lung.    SOCIAL HISTORY:  Patient quit smoking in 1983, prior to this, he carries a 14   pack year history, he has a very rare alcoholic drink.  He denies any illicit   drugs.    FAMILY HISTORY:  Mother with CVA and renal cell carcinoma.  Father with   prostate cancer.    ALLERGIES:  No known drug  allergies.    HOME MEDICATIONS:  1.  Xanax 1 mg p.o. at bedtime p.r.n. anxiety.  2.  MS Contin 30 mg p.o. q. 12 hours.  3.  Omeprazole 20 mg p.o. daily.  4.  Mobic 7.5 mg p.o. b.i.d.  5.  Zofran 8 mg p.o. daily.  6.  Hydromorphone 4 mg p.o. q. 4 hours p.r.n. severe pain.    PHYSICAL EXAMINATION:  VITAL SIGNS:  Temperature 98.4, heart rate 78, respirations 14, blood pressure   91/59, patient is saturating at 92% on 2 L of oxygen.  GENERAL:  This is an older white male who is in distress secondary to pain.  HEENT:  Normocephalic, atraumatic, dry mucous membranes.  NECK:  Supple, there is no JVD.  There is no supraclavicular adenopathy.  CARDIOVASCULAR:  Positive S1, S2, regular rate and rhythm.  No murmurs, rubs,   or gallops appreciated.  PULMONARY:  Clear to auscultation bilaterally.  No wheezes, rubs, or rhonchi   heard.  GASTROINTESTINAL:  Soft, nontender, nondistended.  Positive bowel sounds.  No   hepatosplenomegaly.  EXTREMITIES:  Warm, well perfused.  The patient has limited range of motion   secondary to exquisite tenderness to his left hip; therefore, a full range of   motion was not completed.  NEUROLOGIC:  A and O x3.  Cranial nerves II-XII grossly intact.    IMAGING:  CT of the hip without contrast:  Interval increase in the size of   expansile lytic lesion in the left ileum, which involves the acetabular roof   and quadrilateral plate with subsequent superior migration of the left hip   prosthesis.  Please see full report for details.    ASSESSMENT AND PLAN:  This is a 66-year-old gentleman who carries a history of   metastatic lung cancer to the bones and comes in with intractable left hip   pain.  1.  Intractable bone pain from malignancy:  CT evidence shows that there has   been migration of his left hip prosthesis secondary to worsening lytic lesions   of the bone.  Dr. Cochran of orthopedic surgery has been consulted.  Unsure if   there is anything that can be done from a surgical standpoint, but I    appreciate the consult.  Dr. Bennett of oncology has also been consulted.  I   will continue the patient on his MS Contin long acting pain medications and I   will transition him to IV Dilaudid for breakthrough pain.  I will start him on   IV Decadron.  Dr. Bennett has indicated that she will arrange for inpatient   radiation therapy starting tomorrow.  If this regimen does not control his   pain, then we will consider a palliative care consultation.  2.  Anxiety:  I will discontinue the patient's home Ativan and I will   transition him to Valium t.i.d. for now as they act as both an anxiolytic and   muscle relaxer.    DISPOSITION:  Oncology.    PROPHYLAXIS:  The patient will be on sequential compression devices for DVT   prophylaxis, home PPI has been continued, stool softeners have been continued   in light of his chronic narcotic use.    CODE:  DNR, this has been confirmed with the patient at bedside.       ____________________________________     MD KRISTAN SIMENTAL / CELSO    DD:  02/07/2017 10:38:54  DT:  02/07/2017 14:17:43    D#:  [id number]  Job#:  [id number]

## 2017-02-07 NOTE — TELEPHONE ENCOUNTER
Gabriele called stating he would like to talk with Dr. Bennett because he wants to get his radiation and hes in a lot of pain and cant handle this anymore. He also states that they put in a catheter in and he cant urinate. Hes told them several times he has to urinate or he will explode and they still havent done anything to help. Please advise thank you

## 2017-02-07 NOTE — PROGRESS NOTES
· Chart reviewed.  Patient was discharged from [facility] ER 2/6/17, and readmitted to ER today 2/7/17.  Will not place discharge outreach phone call today to patient d/t being readmitted to ER.

## 2017-02-07 NOTE — ED PROVIDER NOTES
"ED Provider Note    CHIEF COMPLAINT  Hip pain    HPI  Gabriele Torres is a 66 y.o. male who presents complaining of hip pain. Contrary to the nursing note, he does not have weakness. He has pain. He has had pain in this hip for sometime, and has been on chronic pain medicine for years. The Dilaudid that he has been taking was not really helping; yesterday, his radiation oncologist put him on MS Contin 30 mg b.i.d. He took the morning dose, and was supposed to take Dilaudid for breakthrough pain. However, after going out and doing the laundry, and trying to come home, he had increasingly bad pain in that hip. He points out that it is not new, he did not injure it, it simply feels like the pain that he usually is when it is out of control. He cannot make it to get to his Dilaudid, and called EMS. They gave him medicine prior to arrival, presently is feeling better. He denies any fever or chills. No chest pain or shortness of breath. No abdominal pain. He has no other complaint. He had a bone scan last month which shows uptake in the scapula, as well as: 2.  There is extensive uptake in the left ilium and acetabular region consistent with progression of metastases.    PAST MEDICAL HISTORY  Past Medical History   Diagnosis Date   • Pain      chronic back pain   • Psychiatric problem      anxiety   • Heart burn    • Indigestion    • Arthritis      osteoarthritis in all joints   • Breath shortness 2016     \"Needed oxygen in the past, because of my lung ca, haven't used it for 6 monthe\"   • Snoring    • Carcinoma in situ of respiratory system 2015     adenocarcinoma of lungs, 5/2015 dx   • Hiatus hernia syndrome    • Cancer (CMS-HCC) 5/2015     Lung cancer, chemo only, last dose Oct 27, 2016; 1/17 bone cancer   • Cancer, metastatic to bone (CMS-HCC)    • GERD (gastroesophageal reflux disease)    • Anxiety      on xanax   • Chronic low back pain        FAMILY HISTORY  Family History   Problem Relation Age of Onset   • " Stroke Mother    • Cancer Mother      colon ca x2, thyroid ca, renal ca   • Cancer Brother      lung ca   • Cancer Father      prostate cancer       SOCIAL HISTORY  Social History   Substance Use Topics   • Smoking status: Former Smoker -- 1.00 packs/day for 14 years     Quit date: 01/01/1983   • Smokeless tobacco: Never Used   • Alcohol Use: 0.0 oz/week     0 Standard drinks or equivalent per week      Comment: rare drink         SURGICAL HISTORY  Past Surgical History   Procedure Laterality Date   • Other abdominal surgery  2007     umbilical hernia   • Thoracoscopy Right 5/7/2015     Procedure: THORACOSCOPY, with lung biopsy;  Surgeon: Mikki Rivera M.D.;  Location: [facility];  Service:    • Pr inj dx/ther agnt paravert facet joint, luz maria* Left 12/8/2016     Procedure: INJ PARA FACET L/S 1 LVL W/IG - L4, 5, S1;  Surgeon: Cricket Sparks M.D.;  Location: [facility];  Service: Pain Management   • Pr inj dx/ther agnt paravert facet joint, luz maria*  12/8/2016     Procedure: INJ PARA FACET L/S 2D LVL W/IG;  Surgeon: Cricekt Sparks M.D.;  Location: [facility];  Service: Pain Management   • Other orthopedic surgery  2001     bilateral arthroscopy, knees   • Other orthopedic surgery  2010     rt shoulder surgery   • Other orthopedic surgery  2014      left hip arthroplasty   • Pr inj dx/ther agnt paravert facet joint, luz maria* Left 1/5/2017     Procedure: INJ PARA FACET L/S 1 LVL W/IG - L4, L5, S1;  Surgeon: Cricket Sparks M.D.;  Location: [facility];  Service: Pain Management   • Pr inj dx/ther agnt paravert facet joint, luz maria*  1/5/2017     Procedure: INJ PARA FACET L/S 2D LVL W/IG;  Surgeon: Cricket Sparks M.D.;  Location: [facility];  Service: Pain Management   • Block epidural steroid injection N/A 1/26/2017     Procedure: BLOCK EPIDURAL STEROID INJECTION - SINGLE SHOT INJECTION PAIN PUMP;  Surgeon: Cricket Sparks M.D.;  Location: [facility]  "[facility];  Service:    • Athroplasty Left 2014     left hip   • Lumbar laminectomy diskectomy  2005     L3-L4   • Bone biopsy  1/17/17     left acetabulum   • Lung needle biopsy  Nov 2016       CURRENT MEDICATIONS  Home Medications     Reviewed by Radha Martinez R.N. (Registered Nurse) on 02/06/17 at 1505  Med List Status: Partial    Medication Last Dose Status    alprazolam (XANAX) 1 MG TABS  Active    HYDROmorphone (DILAUDID) 2 MG Tab  Active    meloxicam (MOBIC) 7.5 MG Tab  Active    morphine ER (MS CONTIN) 30 MG Tab CR tablet  Active    omeprazole (PRILOSEC) 20 MG delayed-release capsule  Active    ondansetron (ZOFRAN) 8 MG Tab prn Active                I have reviewed the nurses notes and/or the list brought with the patient.    ALLERGIES  No Known Allergies    REVIEW OF SYSTEMS  See HPI for further details. Review of systems as above, otherwise all other systems are negative.     PHYSICAL EXAM  VITAL SIGNS: /75 mmHg  Pulse 101  Temp(Src) 36.6 °C (97.9 °F)  Resp 18  Ht 1.753 m (5' 9\")  Wt 88.451 kg (195 lb)  BMI 28.78 kg/m2  SpO2 96%  Constitutional: Well appearing patient in no acute distress.  Not toxic, nor ill in appearance.  HENT: Mucus membranes moist.  Oropharynx is clear.  Eyes: Pupils equally round.  No scleral icterus.   Neck: Full nontender range of motion.  Lymphatic: No cervical lymphadenopathy noted.   Cardiovascular: Regular heart rate and rhythm.  No murmurs, rubs, nor gallop appreciated.   Thorax & Lungs: Chest is nontender.  Lungs are clear to auscultation with good air movement bilaterally.  No wheeze, rhonchi, nor rales.   Abdomen: Soft, with no tenderness, rebound nor guarding.  No mass, pulsatile mass, nor hepatosplenomegaly appreciated.  Skin: No purpura nor petechia noted.  Extremities/Musculoskeletal: No sign of trauma.  However tenderness range of motion of that left hip.  Neurologic: Alert & oriented.  Strength and sensation is intact all around.  Psychiatric: " Normal affect appropriate for the clinical situation.    MEDICAL RECORD  I have reviewed patient's medical record and pertinent results are listed above.    COURSE & MEDICAL DECISION MAKING  I have reviewed any medical record information, laboratory studies and radiographic results as noted above.  Patient presents with acute exacerbation of chronic pain. He has known bony metastases to his left hip and pelvis. He has a new change in his medications. I suspect that MS Contin will serve him better for longer acting, with the lightest breakthrough, he does have this at home but was unable to get to it therefore he called EMS. There is no new trauma at all, he does not do that he has injured anything. Therefore, further diagnostic workup was not taken today. I did discuss the patient's case with Dr De La Rosa, on call for Dr. Sparks, who agrees with MS Contin with Dilaudid for breakthrough. Here, we'll give the patient dose of Dilaudid, Toradol, Pepcid and a GI cocktail. Zofran as well. Close follow-up with pain medicine. Anticipate discharge home pending response to medications. Instructions of bony metastases.    FINAL IMPRESSION  1. Bony metastasis (CMS-HCC)    2. Chronic pain due to neoplasm           This dictation was created using voice recognition software.    Electronically signed by: Owen Bhakta, 2/6/2017 5:18 PM

## 2017-02-07 NOTE — ED NOTES
Pt BIB EMS from home, pt called stating he could not walk  Pt states he has cancer in his L hip bone, rib 3 & 4, and scapula  And his pain medications were changed Friday to 30 mg MS Contin Q12, and Dilaudid 4 mg PRN  EMS medication pt with 100 Fentanyl IV PTA  Pt was able to walk upon arrival to ER from [facility] to sink to drink water  Pt states his pain medication are not effective, pt reports he is not receiving treatment for his CA

## 2017-02-07 NOTE — IP AVS SNAPSHOT
" Home Care Instructions                                                                                                                  Name:Gabriele Torres  Medical Record Number:[id number]  CSN: [id number]    YOB: 1950   Age: 66 y.o.  Sex: male  HT:1.753 m (5' 9\") WT: 86.6 kg (190 lb 14.7 oz)          Admit Date: 2/7/2017     Discharge Date:   Today's Date: 2/15/2017  Attending Doctor:  Chante Paiz D.O.                  Allergies:  Review of patient's allergies indicates no known allergies.            Discharge Instructions       Discharge Instructions    Discharged to group home by medical transportation with self. Discharged via wheelchair, hospital escort: Refused.  Special equipment needed: Walker    Be sure to schedule a follow-up appointment with your primary care doctor or any specialists as instructed.     Discharge Plan:   Diet Plan: Discussed  Activity Level: Discussed  Confirmed Follow up Appointment: Appointment Scheduled  Confirmed Symptoms Management: Discussed  Medication Reconciliation Updated: Yes  Influenza Vaccine Indication:  (Not indicated due to condition)    I understand that a diet low in cholesterol, fat, and sodium is recommended for good health. Unless I have been given specific instructions below for another diet, I accept this instruction as my diet prescription.   Other diet: Regular     Special Instructions: None    · Is patient discharged on Warfarin / Coumadin?   No     · Is patient Post Blood Transfusion?  No    Depression / Suicide Risk    As you are discharged from this [facility] facility, it is important to learn how to keep safe from harming yourself.    Recognize the warning signs:  · Abrupt changes in personality, positive or negative- including increase in energy   · Giving away possessions  · Change in eating patterns- significant weight changes-  positive or negative  · Change in sleeping patterns- unable to sleep or sleeping all the " time   · Unwillingness or inability to communicate  · Depression  · Unusual sadness, discouragement and loneliness  · Talk of wanting to die  · Neglect of personal appearance   · Rebelliousness- reckless behavior  · Withdrawal from people/activities they love  · Confusion- inability to concentrate     If you or a loved one observes any of these behaviors or has concerns about self-harm, here's what you can do:  · Talk about it- your feelings and reasons for harming yourself  · Remove any means that you might use to hurt yourself (examples: pills, rope, extension cords, firearm)  · Get professional help from the community (Mental Health, Substance Abuse, psychological counseling)  · Do not be alone:Call your Safe Contact- someone whom you trust who will be there for you.  · Call your local CRISIS HOTLINE 010-0576 or 690-698-4720  · Call your local Children's Mobile Crisis Response Team [place] (230) 345-8902 or www.[facility]  · Call the toll free National Suicide Prevention Hotlines   · National Suicide Prevention Lifeline 728-612-FJGG (5066)  · [place] [place] Hope Line Network 800-SUICIDE (144-0263)        Your appointments     Feb 22, 2017  9:00 AM   ONCOLOGY EST PATIENT 30 MIN with Shawanda Bennett M.D.   [facility] (--)    [address], [address]  [facility] [address] [phone]            May 02, 2017  2:30 PM   Follow Up with Laure DOMINGO M.D.   [facility] (--)    [address] [facility] [address] [phone]              Follow-up Information     1. Follow up with Sasha Isidro M.D..    Specialty:  Family Medicine    Contact information    [address]  [facility] [address] [phone]          2. Follow up with Shawanda Bennett M.D. In 2 weeks.    Specialty:  Oncology    Contact information    [address]  [facility] [address] [phone]          3. Follow up with [facility] ([facility]) .    Specialty:  Skilled Nursing  Facility    Contact information    [address] [facility]  [place] [address]  [phone]         Discharge Medication Instructions:    Below are the medications your physician expects you to take upon discharge:    Review all your home medications and newly ordered medications with your doctor and/or pharmacist. Follow medication instructions as directed by your doctor and/or pharmacist.    Please keep your medication list with you and share with your physician.               Medication List      START taking these medications        Instructions    Cholecalciferol 2000 UNIT Tabs   Last time this was given:  2,000 Units on 2/15/2017  8:23 AM    Take 1 Tab by mouth every day.   Dose:  2000 Units       dexamethasone 2 MG tablet   Last time this was given:  4 mg on 2/15/2017  5:30 AM   Commonly known as:  DECADRON    Take 1 Tab by mouth every 8 hours.   Dose:  2 mg       diazepam 2 MG Tabs   Last time this was given:  2 mg on 2/13/2017  4:49 AM   Commonly known as:  VALIUM    Take 1 Tab by mouth every 8 hours as needed.   Dose:  2 mg        MG Caps   Last time this was given:  100 mg on 2/15/2017  8:23 AM    Take 100 mg by mouth every morning.   Dose:  100 mg       gabapentin 100 MG Caps   Last time this was given:  100 mg on 2/15/2017  8:23 AM   Commonly known as:  NEURONTIN    Take 2 Caps by mouth 3 times a day.   Dose:  200 mg       tamsulosin 0.4 MG capsule   Last time this was given:  0.4 mg on 2/15/2017  8:23 AM   Commonly known as:  FLOMAX    Take 1 Cap by mouth ONE-HALF HOUR AFTER BREAKFAST.   Dose:  0.4 mg         CONTINUE taking these medications        Instructions    alprazolam 1 MG Tabs   Commonly known as:  XANAX    Take 1 mg by mouth every bedtime. Indications: Feeling Anxious   Dose:  1 mg       HYDROmorphone 2 MG Tabs   Last time this was given:  2 mg on 2/15/2017 12:58 PM   Commonly known as:  DILAUDID    Take 4 mg by mouth every four hours as needed for Severe Pain.   Dose:  4 mg       morphine  ER 30 MG Tbcr tablet   Last time this was given:  30 mg on 2/15/2017  8:23 AM   Commonly known as:  MS CONTIN    Take 1 Tab by mouth every 12 hours.   Dose:  30 mg       omeprazole 20 MG delayed-release capsule   Last time this was given:  20 mg on 2/15/2017  8:23 AM   Commonly known as:  PRILOSEC    Take 20 mg by mouth every day.   Dose:  20 mg         ASK your doctor about these medications        Instructions    ondansetron 8 MG Tabs   Commonly known as:  ZOFRAN    Take 1 Tab by mouth 1 time daily as needed for Nausea/Vomiting.   Dose:  8 mg               Instructions           Diet / Nutrition:    Follow any diet instructions given to you by your doctor or the dietician, including how much salt (sodium) you are allowed each day.    If you are overweight, talk to your doctor about a weight reduction plan.    Activity:    Remain physically active following your doctor's instructions about exercise and activity.    Rest often.     Any time you become even a little tired or short of breath, SIT DOWN and rest.    Worsening Symptoms:    Report any of the following signs and symptoms to the doctor's office immediately:    *Pain of jaw, arm, or neck  *Chest pain not relieved by medication                               *Dizziness or loss of consciousness  *Difficulty breathing even when at rest   *More tired than usual                                       *Bleeding drainage or swelling of surgical site  *Swelling of feet, ankles, legs or stomach                 *Fever (>100ºF)  *Pink or blood tinged sputum  *Weight gain (3lbs/day or 5lbs /week)           *Shock from internal defibrillator (if applicable)  *Palpitations or irregular heartbeats                *Cool and/or numb extremities    Stroke Awareness    Common Risk Factors for Stroke include:    Age  Atrial Fibrillation  Carotid Artery Stenosis  Diabetes Mellitus  Excessive alcohol consumption  High blood pressure  Overweight   Physical  inactivity  Smoking    Warning signs and symptoms of a stroke include:    *Sudden numbness or weakness of the face, arm or leg (especially on one side of the body).  *Sudden confusion, trouble speaking or understanding.  *Sudden trouble seeing in one or both eyes.  *Sudden trouble walking, dizziness, loss of balance or coordination.Sudden severe headache with no known cause.    It is very important to get treatment quickly when a stroke occurs. If you experience any of the above warning signs, call 911 immediately.                   Disclaimer         Quit Smoking / Tobacco Use:    I understand the use of any tobacco products increases my chance of suffering from future heart disease or stroke and could cause other illnesses which may shorten my life. Quitting the use of tobacco products is the single most important thing I can do to improve my health. For further information on smoking / tobacco cessation call a Toll Free Quit Line at 1-520.536.2267 (*[employer]) or 1-203.556.9311 (American Lung Association) or you can access the web based program at www.lungUrvew.org.    [place] Tobacco Users Help Line:  (761) 947-8410       Toll Free: 1-611.289.9895  Quit Tobacco Program [facility] (164)758-3149    Crisis Hotline:    [place] Crisis Hotline:  5-811-MGOZYRQ or 1-427.908.2537    [place] Crisis Hotline:    1-990.816.9886 or 049-473-0064    Discharge Survey:   Thank you for choosing [facility]. We hope we did everything we could to make your hospital stay a pleasant one. You may be receiving a phone survey and we would appreciate your time and participation in answering the questions. Your input is very valuable to us in our efforts to improve our service to our patients and their families.        My signature on this form indicates that:    1. I have reviewed and understand the above information.  2. My questions regarding this information have been answered to my  satisfaction.  3. I have formulated a plan with my discharge nurse to obtain my prescribed medications for home.                  Disclaimer         __________________________________                     __________       ________                       Patient Signature                                                 Date                    Time

## 2017-02-07 NOTE — IP AVS SNAPSHOT
" <p align=\"LEFT\"><IMG SRC=\"[email or web address]\" alt=\"Image\" WIDTH=\"50%\" HEIGHT=\"200\" BORDER=\"\"></p>                   Name:Gabriele Torres  Medical Record Number:[id number]  CSN: [id number]    YOB: 1950   Age: 66 y.o.  Sex: male  HT:1.753 m (5' 9\") WT: 86.6 kg (190 lb 14.7 oz)          Admit Date: 2/7/2017     Discharge Date:   Today's Date: 2/15/2017  Attending Doctor:  Chante Paiz D.O.                  Allergies:  Review of patient's allergies indicates no known allergies.          Your appointments     Feb 22, 2017  9:00 AM   ONCOLOGY EST PATIENT 30 MIN with Shawanda Bennett M.D.   [facility] (--)    [address] [place] [address], [address]  [facility] [address] [phone]            May 02, 2017  2:30 PM   Follow Up with Laure DOMINGO M.D.   [facility] (--)    [address] [facility] [address] [phone]              Follow-up Information     1. Follow up with Sasha Isidro M.D..    Specialty:  Family Medicine    Contact information    [address]  [facility] [address] [phone]          2. Follow up with Shawanda Bennett M.D. In 2 weeks.    Specialty:  Oncology    Contact information    [address]  [facility] [address] [phone]          3. Follow up with [facility] ([facility]) .    Specialty:  Skilled Nursing Facility    Contact information    [address] [facility] [address] [phone]         Medication List      Take these Medications        Instructions    alprazolam 1 MG Tabs   Commonly known as:  XANAX    Take 1 mg by mouth every bedtime. Indications: Feeling Anxious   Dose:  1 mg       Cholecalciferol 2000 UNIT Tabs    Take 1 Tab by mouth every day.   Dose:  2000 Units       dexamethasone 2 MG tablet   Commonly known as:  DECADRON    Take 1 Tab by mouth every 8 hours.   Dose:  2 mg       diazepam 2 MG Tabs   Commonly known as:  VALIUM    Take 1 Tab by mouth every 8 hours as needed.   Dose:  2 mg   "     MG Caps    Take 100 mg by mouth every morning.   Dose:  100 mg       gabapentin 100 MG Caps   Commonly known as:  NEURONTIN    Take 2 Caps by mouth 3 times a day.   Dose:  200 mg       HYDROmorphone 2 MG Tabs   Commonly known as:  DILAUDID    Take 4 mg by mouth every four hours as needed for Severe Pain.   Dose:  4 mg       morphine ER 30 MG Tbcr tablet   Commonly known as:  MS CONTIN    Take 1 Tab by mouth every 12 hours.   Dose:  30 mg       omeprazole 20 MG delayed-release capsule   Commonly known as:  PRILOSEC    Take 20 mg by mouth every day.   Dose:  20 mg       tamsulosin 0.4 MG capsule   Commonly known as:  FLOMAX    Take 1 Cap by mouth ONE-HALF HOUR AFTER BREAKFAST.   Dose:  0.4 mg         Ask your Physician about these medications        Instructions    ondansetron 8 MG Tabs   Commonly known as:  ZOFRAN    Take 1 Tab by mouth 1 time daily as needed for Nausea/Vomiting.   Dose:  8 mg

## 2017-02-07 NOTE — ED NOTES
Patient appropriate for discharge per ERP. Discussed discharge instructions, home care, and follow up with patient. Patient verbalized understanding. No distress noted. Son will be providing ride home.

## 2017-02-07 NOTE — IP AVS SNAPSHOT
2/15/2017          Gabriele Meyesrn Theodorecinthya  [facility] [place] [id number]    Dear Gabriele:    [facility] wants to ensure your discharge home is safe and you or your loved ones have had all your questions answered regarding your care after you leave the hospital.    You may receive a telephone call within two days of your discharge.  This call is to make certain you understand your discharge instructions as well as ensure we provided you with the best care possible during your stay with us.     The call will only last approximately 3-5 minutes and will be done by a nurse.    Once again, we want to ensure your discharge home is safe and that you have a clear understanding of any next steps in your care.  If you have any questions or concerns, please do not hesitate to contact us, we are here for you.  Thank you for choosing [facility] for your healthcare needs.    Sincerely,    Viktor Dougherty    [facility]

## 2017-02-07 NOTE — DISCHARGE INSTRUCTIONS
Bone Metastasis  Bone metastasis is cancer that spreads to the bones from another part of the body. A person may have bone metastasis in one bone or in more than one bone. Cancer that spreads to the bones is different from cancer that starts in the bones (primary bone cancer). Bone metastasis is more common than primary bone cancer.  The spine is the most common area for bone metastasis. Other common areas include:  · Hip bone (pelvis).  · Ribs.  · Skull.  · Long bones of the arm or leg.  Bone metastasis is painful, and it damages the bones. Bone metastasis damages and weakens bones in two ways. A person may have bone destruction (osteolytic damage) or abnormal bone growth (osteoblastic destruction). Both of these conditions can make bones so weak that they break (pathologic fracture) even from a minor injury.  CAUSES  This condition is caused by cancer cells that spread to bone. These cells can get into your bloodstream and spread through your body. They can also get into the vessels that are part of your lymphatic system (lymph vessels) and spread that way.  RISK FACTORS  This condition is more likely to develop in people who have an advanced type of cancer that is known to spread to bone. Cancers that often spread to bone include:  · Breast cancer.  · Prostate cancer.  · Lung cancer.  · Thyroid cancer.  · Kidney cancer.  SYMPTOMS  The most common symptom of this condition is bone pain, especially while you are resting. Other symptoms include:  · A broken bone (fracture) that happens with little or no trauma.  · Low number of red blood cells (anemia). Bone destruction may damage the spongy tissue (bone marrow) in the center of some bones where red blood cells are produced. Anemia can cause:  ¨ Weakness.  ¨ Shortness of breath.  ¨ Headache.  ¨ Dizziness.  · Back or neck pain with numbness or weakness, especially if you have bone metastasis in your spine.  · High levels of calcium in your blood (hypercalcemia). When  bone is destroyed, calcium is released into your blood. Symptoms of hypercalcemia include:  ¨ Constipation.  ¨ Thirst.  ¨ Nausea.  ¨ Sleepiness.  DIAGNOSIS  This condition may be diagnosed based on:  · Your symptoms and medical history. Your health care provider may suspect this condition if you are being treated for cancer or have had cancer treatment in the past.  · A physical exam.  · Imaging studies, such as:  ¨ Bone X-rays, especially in the area where you have pain.  ¨ CT scan.  ¨ Bone scan.  ¨ MRI.  · Blood tests to check for anemia or hypercalcemia.  · A procedure to remove a piece of bone so it can be examined under a microscope (biopsy).  TREATMENT  Treatment for this condition depends on your overall health, the type of cancer you have, and how much the cancer has spread. You will work with a team of health care providers to determine which treatment is best for you. Treatment will focus on managing pain, preventing bone weakness, and slowing the spread of the cancer. Treatment may include:  · Radiation therapy. This treatment uses X-rays to kill cancer cells. It is most effective for reducing pain, stopping tumor growth, and lowering the risk of fractures.  · Radioisotope therapy. This treatment uses a radioactive medicine that is injected into your blood. The medicine travels to areas where cancer cells are active and kills them.  · Chemotherapy. For this treatment, you are given cancer-killing drugs. You may have chemotherapy in cycles, with rest periods in between.  · Medicines to block cells that destroy bone (bisphosphonates and denosumab). These medicines are used to control bone pain. They may help to reduce hypercalcemia.  · Medicines to reduce pain (opiates).  · Endocrine therapies. These therapies slow cancer growth by blocking specific chemical messengers (hormones). Some types of cancer, including breast and prostate cancers, depend on hormones.  · Targeted therapies. These therapies involve  the use of drugs that block the growth and spread of cancer. This treatment is different from standard chemotherapy.  · Immunotherapies. These therapies use the body's defense system (immune system) to fight cancer cells.  · Surgery. You may have surgery to remove bone cancer or to prevent or repair a fracture.  HOME CARE INSTRUCTIONS  · Take medicines only as directed by your health care provider.  · Do not drive or operate heavy machinery while taking pain medicine.  · Take the following steps to help prevent constipation, which can result from some pain medicines.  ¨ Include plenty of fruits and whole grains in your diet.  ¨ Drink enough fluid to keep your urine clear or pale yellow.  ¨ Ask your health care provider about taking a laxative if needed.  · Keep all follow-up visits as directed by your health care provider. This is important.  SEEK MEDICAL CARE IF:  · Your pain medicine is not helping.  · You are too weak to care for yourself at home.  SEEK IMMEDIATE MEDICAL CARE IF:  · You fall and have pain or an injury.  · You have trouble walking.  · You have numbness or tingling in your legs.  · Your pain suddenly gets worse.  · You lose control of your bowels or your bladder.  · You are very sleepy or confused.     This information is not intended to replace advice given to you by your health care provider. Make sure you discuss any questions you have with your health care provider.     Document Released: 12/08/2003 Document Revised: 05/03/2016 Document Reviewed: 10/21/2015  [employer] Patient Education ©2016 [employer]

## 2017-02-07 NOTE — ED NOTES
"Med rec updated and complete  Allergies reviewed  Pt states \"No antibiotics in the last 30 days\".  Pt states \"No vitamins or OTC'S\".    "

## 2017-02-07 NOTE — ED PROVIDER NOTES
"ED Provider Note  CHIEF COMPLAINT  Chief Complaint   Patient presents with   • Pain       HPI  Gabriele Torres is a 66 y.o. male who presents with history of left hip pain, for the last 24 hours. He's had chronic left hip pain for 5 months, however worse the last 24 hours, worse with any motion. No trauma no falls. Evidently has a history of adenocarcinoma lung with metastases to his left hip.. No shortness of breath no nausea no vomiting no diarrhea no fever no chills  Daily he was here last night, sent home with increasing analgesics however is having too much pain to remain at home.  REVIEW OF SYSTEMS  See HPI for further details. History of chronic back pain and depression adenocarcinoma of the lung with metastatic disease to the left hip, GERD,Denies other G.I., G.U.. endrocine, cardiovascular, respriatory or neurological problems.  All other systems are negative.     PAST MEDICAL HISTORY  Past Medical History   Diagnosis Date   • Pain      chronic back pain   • Psychiatric problem      anxiety   • Heart burn    • Indigestion    • Arthritis      osteoarthritis in all joints   • Breath shortness 2016     \"Needed oxygen in the past, because of my lung ca, haven't used it for 6 monthe\"   • Snoring    • Carcinoma in situ of respiratory system 2015     adenocarcinoma of lungs, 5/2015 dx   • Hiatus hernia syndrome    • Cancer (CMS-HCC) 5/2015     Lung cancer, chemo only, last dose Oct 27, 2016; 1/17 bone cancer   • Cancer, metastatic to bone (CMS-HCC)    • GERD (gastroesophageal reflux disease)    • Anxiety      on xanax   • Chronic low back pain        FAMILY HISTORY  Family History   Problem Relation Age of Onset   • Stroke Mother    • Cancer Mother      colon ca x2, thyroid ca, renal ca   • Cancer Brother      lung ca   • Cancer Father      prostate cancer       SOCIAL HISTORY  Social History     Social History   • Marital Status: Single     Spouse Name: N/A   • Number of Children: N/A   • Years of Education: " N/A     Occupational History   • disabled      Social History Main Topics   • Smoking status: Former Smoker -- 1.00 packs/day for 14 years     Quit date: 01/01/1983   • Smokeless tobacco: Never Used   • Alcohol Use: 0.0 oz/week     0 Standard drinks or equivalent per week      Comment: rare drink   • Drug Use: No   • Sexual Activity: No     Other Topics Concern   • Not on file     Social History Narrative       SURGICAL HISTORY  Past Surgical History   Procedure Laterality Date   • Other abdominal surgery  2007     umbilical hernia   • Thoracoscopy Right 5/7/2015     Procedure: THORACOSCOPY, with lung biopsy;  Surgeon: Mikki Rivera M.D.;  Location: [facility];  Service:    • Pr inj dx/ther agnt paravert facet joint, luz maria* Left 12/8/2016     Procedure: INJ PARA FACET L/S 1 LVL W/IG - L4, 5, S1;  Surgeon: Cricket Sparks M.D.;  Location: [facility];  Service: Pain Management   • Pr inj dx/ther agnt paravert facet joint, luz maria*  12/8/2016     Procedure: INJ PARA FACET L/S 2D LVL W/IG;  Surgeon: Cricket Sparks M.D.;  Location: [facility];  Service: Pain Management   • Other orthopedic surgery  2001     bilateral arthroscopy, knees   • Other orthopedic surgery  2010     rt shoulder surgery   • Other orthopedic surgery  2014      left hip arthroplasty   • Pr inj dx/ther agnt paravert facet joint, luz maria* Left 1/5/2017     Procedure: INJ PARA FACET L/S 1 LVL W/IG - L4, L5, S1;  Surgeon: Cricket Sparks M.D.;  Location: [facility];  Service: Pain Management   • Pr inj dx/ther agnt paravert facet joint, luz maria*  1/5/2017     Procedure: INJ PARA FACET L/S 2D LVL W/IG;  Surgeon: Cricket Sparks M.D.;  Location: [facility];  Service: Pain Management   • Block epidural steroid injection N/A 1/26/2017     Procedure: BLOCK EPIDURAL STEROID INJECTION - SINGLE SHOT INJECTION PAIN PUMP;  Surgeon: Cricket Sparks M.D.;  Location: [facility];   "Service:    • Athroplasty Left 2014     left hip   • Lumbar laminectomy diskectomy  2005     L3-L4   • Bone biopsy  1/17/17     left acetabulum   • Lung needle biopsy  Nov 2016       CURRENT MEDICATIONS  Home Medications     **Home medications have not yet been reviewed for this encounter**          ALLERGIES  No Known Allergies    PHYSICAL EXAM  VITAL SIGNS: BP 91/59 mmHg  Pulse 85  Temp(Src) 36.9 °C (98.4 °F)  Resp 14  Ht 1.753 m (5' 9.02\")  Wt 88.451 kg (195 lb)  BMI 28.78 kg/m2  SpO2 95%  Constitutional: Well developed, Well nourished, No acute distress, Non-toxic appearance.   HENT: Normocephalic, Atraumatic, Bilateral external ears normal, Oropharynx moist, No oral exudates, Nose normal.   Eyes: PERRL, EOMI, Conjunctiva normal, No discharge.   Neck: Normal range of motion, No tenderness, Supple, No stridor.   Lymphatic: No lymphadenopathy noted.   Cardiovascular: Normal heart rate, Normal rhythm, No murmurs, No rubs, No gallops.   Thorax & Lungs: Normal breath sounds, No respiratory distress, No wheezing, No chest tenderness.   Abdomen:  No tenderness, no guarding no rigidity and the abdomen is soft.  No masses, No pulsatile masses.  Skin: Warm, Dry, No erythema, No rash.   Back: No tenderness, No CVA tenderness.   Extremities: Intact distal pulses, No edema, No tenderness, No cyanosis, No clubbing. His ambulation of left hip, no tenderness but pain with any range of motion  Musculoskeletal: Good range of motion in all major joints except left hip as above     Neurologic: Alert & oriented x 3, Normal motor function, Normal sensory function, No focal deficits noted.   Psychiatric: Affect normal, Judgment normal, Mood normal. Anxiety about his pain      RADIOLOGY/PROCEDURES  CT-HIP W/O PLUS RECONS LEFT   Final Result      1.  Interval increase in size of expansile lytic lesion in the LEFT ilium which involves the acetabular roof and quadrilateral plate, with subsequent superior migration of LEFT hip " prosthesis.   2.  Chronic L5 pars defects with associated degenerative disc disease and grade 1 anterolisthesis at L5-S1.            COURSE & MEDICAL DECISION MAKING  Pertinent Labs & Imaging studies reviewed. (See chart for details)    . This gentleman has metastatic adenocarcinoma of the lung, metastatic to the left pelvis, area of pelvis, is been weak and around the prosthesis, total hip prosthesis. He'll hip prosthesis is migrated somewhat sick early causing a great deal of pain. I have talked with Dr. Sanchez, oncology recommends dexamethasone 4 mg 3 times a day. Radiation therapy is hopefully schedule for tomorrow. Also talked with the on-call orthopedic surgeon Dr. Cochran who feels there is nothing that he will be doing however he will consult.     FINAL IMPRESSION  1.   1. Pain of left hip joint        2. Metastatic disease, left pelvis,  3.     Disposition patient admitted to hospitalist, radiation therapy consult thing Dr. Marquez , oncologist consulting, orthopedic surgeon, Dr. Cochran consulting, admitted to oncology    Electronically signed by: Tony Workman, 2/7/2017 6:26 AM

## 2017-02-07 NOTE — ED NOTES
Pt medicated for 10/10 left hip pain per MAR.  Ice pack/bag refilled with fresh ice and applied to left hip.  Pt provided with ice water.

## 2017-02-07 NOTE — PROGRESS NOTES
Metastatic lung CA with large left periacetabular lesion and probably loose EBONY  Worsening hip pain since August  Recommend admission for pain control  TTWB LLE  Check baseline xrays - will review to determine whether surgery indicated prior to XRT

## 2017-02-08 ENCOUNTER — APPOINTMENT (OUTPATIENT)
Dept: HEMATOLOGY ONCOLOGY | Facility: MEDICAL CENTER | Age: 67
End: 2017-02-08
Payer: MEDICARE

## 2017-02-08 PROBLEM — C34.90 METASTATIC LUNG CANCER (METASTASIS FROM LUNG TO OTHER SITE) (HCC): Status: ACTIVE | Noted: 2017-02-08

## 2017-02-08 PROBLEM — R52 INTRACTABLE PAIN: Status: ACTIVE | Noted: 2017-02-08

## 2017-02-08 PROBLEM — T84.038A LOOSENING OF PROSTHETIC HIP (HCC): Status: ACTIVE | Noted: 2017-02-08

## 2017-02-08 PROBLEM — R33.9 URINARY RETENTION: Status: ACTIVE | Noted: 2017-02-08

## 2017-02-08 PROBLEM — D64.9 ANEMIA: Status: ACTIVE | Noted: 2017-02-08

## 2017-02-08 PROBLEM — R31.9 HEMATURIA: Status: ACTIVE | Noted: 2017-02-08

## 2017-02-08 PROBLEM — Z96.649 LOOSENING OF PROSTHETIC HIP (HCC): Status: ACTIVE | Noted: 2017-02-08

## 2017-02-08 LAB
ANION GAP SERPL CALC-SCNC: 8 MMOL/L (ref 0–11.9)
BUN SERPL-MCNC: 16 MG/DL (ref 8–22)
CALCIUM SERPL-MCNC: 8.7 MG/DL (ref 8.5–10.5)
CHLORIDE SERPL-SCNC: 108 MMOL/L (ref 96–112)
CO2 SERPL-SCNC: 21 MMOL/L (ref 20–33)
CREAT SERPL-MCNC: 0.89 MG/DL (ref 0.5–1.4)
ERYTHROCYTE [DISTWIDTH] IN BLOOD BY AUTOMATED COUNT: 48.3 FL (ref 35.9–50)
GFR SERPL CREATININE-BSD FRML MDRD: >60 ML/MIN/1.73 M 2
GLUCOSE SERPL-MCNC: 161 MG/DL (ref 65–99)
HCT VFR BLD AUTO: 31.8 % (ref 42–52)
HGB BLD-MCNC: 10.8 G/DL (ref 14–18)
MCH RBC QN AUTO: 32.8 PG (ref 27–33)
MCHC RBC AUTO-ENTMCNC: 34 G/DL (ref 33.7–35.3)
MCV RBC AUTO: 96.7 FL (ref 81.4–97.8)
PLATELET # BLD AUTO: 233 K/UL (ref 164–446)
PMV BLD AUTO: 9.6 FL (ref 9–12.9)
POTASSIUM SERPL-SCNC: 4 MMOL/L (ref 3.6–5.5)
RBC # BLD AUTO: 3.29 M/UL (ref 4.7–6.1)
SODIUM SERPL-SCNC: 137 MMOL/L (ref 135–145)
WBC # BLD AUTO: 11.3 K/UL (ref 4.8–10.8)

## 2017-02-08 PROCEDURE — 51798 US URINE CAPACITY MEASURE: CPT

## 2017-02-08 PROCEDURE — 700102 HCHG RX REV CODE 250 W/ 637 OVERRIDE(OP): Performed by: HOSPITALIST

## 2017-02-08 PROCEDURE — 700112 HCHG RX REV CODE 229: Performed by: HOSPITALIST

## 2017-02-08 PROCEDURE — 80048 BASIC METABOLIC PNL TOTAL CA: CPT

## 2017-02-08 PROCEDURE — 700111 HCHG RX REV CODE 636 W/ 250 OVERRIDE (IP): Performed by: HOSPITALIST

## 2017-02-08 PROCEDURE — G0378 HOSPITAL OBSERVATION PER HR: HCPCS

## 2017-02-08 PROCEDURE — 82306 VITAMIN D 25 HYDROXY: CPT

## 2017-02-08 PROCEDURE — A9270 NON-COVERED ITEM OR SERVICE: HCPCS | Performed by: HOSPITALIST

## 2017-02-08 PROCEDURE — 77334 RADIATION TREATMENT AID(S): CPT | Mod: 26 | Performed by: RADIOLOGY

## 2017-02-08 PROCEDURE — A9270 NON-COVERED ITEM OR SERVICE: HCPCS | Performed by: FAMILY MEDICINE

## 2017-02-08 PROCEDURE — 36415 COLL VENOUS BLD VENIPUNCTURE: CPT

## 2017-02-08 PROCEDURE — 77290 THER RAD SIMULAJ FIELD CPLX: CPT | Mod: 26 | Performed by: RADIOLOGY

## 2017-02-08 PROCEDURE — 700102 HCHG RX REV CODE 250 W/ 637 OVERRIDE(OP): Performed by: INTERNAL MEDICINE

## 2017-02-08 PROCEDURE — 99226 PR SUBSEQUENT OBSERVATION CARE,LEVEL III: CPT | Performed by: FAMILY MEDICINE

## 2017-02-08 PROCEDURE — 83036 HEMOGLOBIN GLYCOSYLATED A1C: CPT

## 2017-02-08 PROCEDURE — 77290 THER RAD SIMULAJ FIELD CPLX: CPT | Performed by: RADIOLOGY

## 2017-02-08 PROCEDURE — 85025 COMPLETE CBC W/AUTO DIFF WBC: CPT

## 2017-02-08 PROCEDURE — 77263 THER RADIOLOGY TX PLNG CPLX: CPT | Performed by: RADIOLOGY

## 2017-02-08 PROCEDURE — 700102 HCHG RX REV CODE 250 W/ 637 OVERRIDE(OP): Performed by: FAMILY MEDICINE

## 2017-02-08 PROCEDURE — A9270 NON-COVERED ITEM OR SERVICE: HCPCS | Performed by: INTERNAL MEDICINE

## 2017-02-08 PROCEDURE — 77334 RADIATION TREATMENT AID(S): CPT | Performed by: RADIOLOGY

## 2017-02-08 RX ADMIN — HYDROMORPHONE HYDROCHLORIDE 1 MG: 1 INJECTION, SOLUTION INTRAMUSCULAR; INTRAVENOUS; SUBCUTANEOUS at 22:42

## 2017-02-08 RX ADMIN — HYDROMORPHONE HYDROCHLORIDE 1 MG: 1 INJECTION, SOLUTION INTRAMUSCULAR; INTRAVENOUS; SUBCUTANEOUS at 08:48

## 2017-02-08 RX ADMIN — MORPHINE SULFATE 30 MG: 30 TABLET, EXTENDED RELEASE ORAL at 20:40

## 2017-02-08 RX ADMIN — DOCUSATE SODIUM 100 MG: 100 CAPSULE ORAL at 08:49

## 2017-02-08 RX ADMIN — STANDARDIZED SENNA CONCENTRATE AND DOCUSATE SODIUM 1 TABLET: 8.6; 5 TABLET, FILM COATED ORAL at 20:40

## 2017-02-08 RX ADMIN — HYDROMORPHONE HYDROCHLORIDE 1 MG: 1 INJECTION, SOLUTION INTRAMUSCULAR; INTRAVENOUS; SUBCUTANEOUS at 13:26

## 2017-02-08 RX ADMIN — TAMSULOSIN HYDROCHLORIDE 0.4 MG: 0.4 CAPSULE ORAL at 08:48

## 2017-02-08 RX ADMIN — HYDROMORPHONE HYDROCHLORIDE 1 MG: 1 INJECTION, SOLUTION INTRAMUSCULAR; INTRAVENOUS; SUBCUTANEOUS at 04:02

## 2017-02-08 RX ADMIN — HYDROMORPHONE HYDROCHLORIDE 1 MG: 1 INJECTION, SOLUTION INTRAMUSCULAR; INTRAVENOUS; SUBCUTANEOUS at 18:29

## 2017-02-08 RX ADMIN — DEXAMETHASONE 4 MG: 4 TABLET ORAL at 20:40

## 2017-02-08 RX ADMIN — DEXAMETHASONE 4 MG: 4 TABLET ORAL at 13:26

## 2017-02-08 RX ADMIN — OMEPRAZOLE 20 MG: 20 CAPSULE, DELAYED RELEASE ORAL at 08:49

## 2017-02-08 RX ADMIN — DEXAMETHASONE 4 MG: 4 TABLET ORAL at 05:26

## 2017-02-08 RX ADMIN — MORPHINE SULFATE 30 MG: 30 TABLET, EXTENDED RELEASE ORAL at 08:48

## 2017-02-08 ASSESSMENT — PAIN SCALES - GENERAL
PAINLEVEL_OUTOF10: 7
PAINLEVEL_OUTOF10: 6
PAINLEVEL_OUTOF10: 5
PAINLEVEL_OUTOF10: 7
PAINLEVEL_OUTOF10: 6

## 2017-02-08 ASSESSMENT — ENCOUNTER SYMPTOMS
FEVER: 0
ABDOMINAL PAIN: 0
BACK PAIN: 1
NAUSEA: 0
BLURRED VISION: 0
COUGH: 0
HEARTBURN: 0
SHORTNESS OF BREATH: 0
WHEEZING: 0
CHILLS: 0
FLANK PAIN: 0
DIZZINESS: 0
VOMITING: 0
SORE THROAT: 0
HEADACHES: 0
DIARRHEA: 0

## 2017-02-08 ASSESSMENT — LIFESTYLE VARIABLES
HOW MANY TIMES IN THE PAST YEAR HAVE YOU HAD 5 OR MORE DRINKS IN A DAY: 0
HAVE PEOPLE ANNOYED YOU BY CRITICIZING YOUR DRINKING: NO
AVERAGE NUMBER OF DAYS PER WEEK YOU HAVE A DRINK CONTAINING ALCOHOL: 0
ALCOHOL_USE: YES
CONSUMPTION TOTAL: NEGATIVE
HAVE YOU EVER FELT YOU SHOULD CUT DOWN ON YOUR DRINKING: NO
EVER_SMOKED: YES
EVER HAD A DRINK FIRST THING IN THE MORNING TO STEADY YOUR NERVES TO GET RID OF A HANGOVER: NO
EVER FELT BAD OR GUILTY ABOUT YOUR DRINKING: NO
TOTAL SCORE: 0
TOTAL SCORE: 0
ON A TYPICAL DAY WHEN YOU DRINK ALCOHOL HOW MANY DRINKS DO YOU HAVE: 1
TOTAL SCORE: 0

## 2017-02-08 NOTE — RESPIRATORY CARE
COPD EDUCATION by COPD CLINICAL EDUCATOR  2/8/2017 at 7:05 AM by Shelley Bermudez     Patient reviewed by COPD education team. Patient does not qualify for COPD program.

## 2017-02-08 NOTE — PROGRESS NOTES
Oncology/Hematology Progress Note               Author: Shawanda Bennett Date & Time created: 2/7/2017  4:49 PM     Diagnosis: Metastatic adenocarcinoma of lung    Chief Complaint:  Increase in left hip pain    History of Presenting Illness:  Gabriele Torres is a 66 y.o. Male with metastatic adenocarcinoma of the lung diagnosed in 5/2015. He was found to have multiple pulmonary nodules and biopsy of the right upper lobe and left lower lobe were positive for moderately differentiated adenocarcinoma with lymphovascular invasion. He was found to have bilateral pulmonary nodules, left adjuvant a general as well as trace pleural effusions. As also found to have uptake in the right scapula, third/fourth rib. He was EGFR 20 mutation. He was initially treated with Tarceva but had progression of disease. He had been on multiple lines of treatment including carboplatinum/Alimta, single agent Alimta, and opdivo. He underwent biopsy of the right middle lobe in 11/2016 for further testing. He was T790M negative. He presented for a second opinion with me and transferred care over. On repeat imaging he was found to have uptake in the left ilium/tablet or area with a concerns for metastatic lesion. He also has been having significant amount of pain. 1/17/17 he underwent biopsy of the bony lesion which was again positive for metastatic adenocarcinoma. He was being evaluated for match trial. In the interim he had significant pain and has been to refer to dictation oncology and is scheduled for simulation tomorrow for palliative radiation.    Interval History:  She presented to the emergency room secondary to intractable pain in the hip area. Patient states that this has been progressively worsening to the point where he was unable to function secondary to pain. On further imaging in the emergency room T scan showed increasing expansile lytic lesion in the left ileum with involvement of the left acetabular roof and quadrilateral  plate. There is migration of the left hip prosthesis. Patient was seen by orthopedic surgery who did not recommend any acute interventions. He is been started on pain regimen as well as given dexamethasone in the emergency room. He states that this has helped with his pain however he continues to have this pain which is starting to come back again. In the hospital he was found to have urinary retention and was attempted a Yañez catheter. He however and up having urethral bleeding and the Yañez catheter has been removed. He is currently on condom catheter without any urine in the bag present. He otherwise states that he has been fairly stable without any significant worsening of cough or shortness of breath.      Past Medical History:  1.  Metastatic lung cancer  2. 2005: Chronic lower back pain secondary to injury  3. Arthritis with multiple joint involved  4. GERD  5. Anxiety with intermittent attacks  6. O2 3l/min only at night x 3 weeks    Review of Systems:  ROS   All other review of systems are negative except what was mentioned above in the HPI.  Constitutional: Negative for fever and chills. Positive for malaise/fatigue.    HENT: Negative for ear pain and nosebleeds.     Eyes: Negative for blurred vision.    Respiratory: Positive for cough. Positive for shortness of breath on exertion stable.     Cardiovascular: Negative for chest pain and leg swelling.    Gastrointestinal: Negative for nausea and vomiting. Negative abdominal pain.    Genitourinary: Negative for dysuria, rectal bleeding secondary to Yañez  Musculoskeletal: Negative for myalgias. Positive for right shoulder pain. Increase in left hip pain.   Skin: Negative for rash.    Neurological: Negative for dizziness and headaches.    Endo/Heme/Allergies: No bruise/bleed easily.    Psychiatric/Behavioral: Negative for depression and memory loss.      Physical Exam:  Physical Exam   General: Patient in mild distress, alert and oriented x 3  HEENT:  normalcephalic, atraumatic, extra ocular muscles intact  Neck: Supple neck and full range of motion  CVS: regular rate and rhythm  RESP: decreased breath sounds bilateral bases   ABD: Soft, non tender, non distended  EXT: No edema  CNS: Alert and oriented x3. Gait affected secondary to pain    Labs:        Invalid input(s): [id number]      No results for input(s): SODIUM, POTASSIUM, CHLORIDE, CO2, BUN, CREATININE, MAGNESIUM, PHOSPHORUS, CALCIUM in the last 72 hours.  No results for input(s): ALTSGPT, ASTSGOT, ALKPHOSPHAT, TBILIRUBIN, DBILIRUBIN, GAMMAGT, AMYLASE, LIPASE, ALB, PREALBUMIN, GLUCOSE in the last 72 hours.  Recent Labs      17   1539   RBC  3.63*   HEMOGLOBIN  12.0*   HEMATOCRIT  34.8*   PLATELETCT  225     Recent Labs      17   1539   WBC  9.8         Hemodynamics:  Temp (24hrs), Av °C (98.6 °F), Min:36.9 °C (98.4 °F), Max:37.1 °C (98.8 °F)  Temperature: 37.1 °C (98.8 °F)  Pulse  Av.4  Min: 57  Max: 90   Blood Pressure : 112/62 mmHg, NIBP: (!) 99/69 mmHg     Respiratory:    Respiration: 18, Pulse Oximetry: 94 %           Fluids:  No intake or output data in the 24 hours ending 17 1649  Weight: 88.451 kg (195 lb)  GI/Nutrition:  Orders Placed This Encounter   Procedures   • Diet Order     Standing Status: Standing      Number of Occurrences: 1      Standing Expiration Date:      Order Specific Question:  Diet:     Answer:  Regular [1]     Medical Decision Making, by Problem:  Active Hospital Problems    Diagnosis   • Pain from bone metastases (CMS-HCC) [G89.3, C79.51]     Imagin. 17 CT hip: Interval increase in size of expansile lytic lesion in the LEFT ilium which involves the acetabular roof and quadrilateral plate, with subsequent superior migration of LEFT hip prosthesis.  Chronic L5 pars defects with associated degenerative disc disease and grade 1 anterolisthesis at L5-S1.  2. 17 X ray pelvis: Lytic lesion involving the left ilium extending to the  acetabular roof, as seen on same-day CT.Post surgical changes status post left hip arthroplasty.Degenerative changes in the visualized lower lumbar spine.  3. 2/7/17 X ray hip: Lytic lesion involving the left ilium extending to the acetabular roof with superior migration of the left hip arthroplasty.    Assessment and Plan:  1. Metastatic adenocarcinoma of lung, EGFR exon 20: Has been on multiple lines of treatment and diagnosed in 2015. Most recently he was on Opdivo with progression of disease. Patient has been evaluated for a matched trial. Involvement of the trial is pending. Patient will need improvement in his performance status which is likely anticipated with improvement in his pain. Plan is to start him on systemic therapy once his pain is better controlled.    2. Intractable pain: Patient has left hip involvement with a large lytic lesion which has progressed since last imaging. Patient admitted with intractable pain and has been started on pain regimen. I also will start him on dexamethasone 4 mg 3 times a day. This will help with bone pain. Continue aggressive pain regiment. Discussed the case with Dr. Foster. He is scheduled for simulation tomorrow. Requested that we start radiation sooner than later to help with the pain. Sitter aggressive bowel regimen to regiment.    3. Urethral bleeding: Patient had bleeding secondary after Yañez removal. On admission he was found to have some urinary retention on bladder scan. Patient currently on a condom catheter. Repeat labs are pending. Recommend strict ins and outs and repeat bladder scan.    4. Bony metastatic disease: After completion of radiation he will need to be started on bisphosphonates for bony metastatic disease.      Thank you for allowing me to participate in his care. Please feel free to contact me with questions or concerns in regards to his care.    Please note that this dictation was created using voice recognition software. I have made every  reasonable attempt to correct obvious errors, but I expect that there are errors of grammar and possibly content that I did not discover before finalizing the note.      Core Measures

## 2017-02-08 NOTE — PROGRESS NOTES
Notified on call MD about patient having 1 liter flank blood urine out in condom catheter. Patient blood pressure 92/66, sinus tach, patient denies dizziness, shortness of breath or weakness. Patient appears pale. Patient told to use call light if getting out of bed. Patient agreeable and understanding. MD stated to check CBC in morning and continue to monitor. No new orders taken.

## 2017-02-08 NOTE — PROGRESS NOTES
Hospital Medicine Progress Note, Adult, Complex               Author: Mir Burleson Date & Time created: 2/8/2017  12:09 PM     Interval History:  Admitted for Intractable pain from Metastatic cancer, Urinary Retention, hematuria after trial of placement of Yañez catheter.    Intractable pain - better controlled  Urinary retention - bladder scans better  Hematuria - resolved    Review of Systems:  Review of Systems   Constitutional: Negative for fever and chills.   HENT: Negative for sore throat.    Eyes: Negative for blurred vision.   Respiratory: Negative for cough, shortness of breath and wheezing.    Cardiovascular: Negative for chest pain and leg swelling.   Gastrointestinal: Negative for heartburn, nausea, vomiting, abdominal pain and diarrhea.   Genitourinary: Negative for dysuria, hematuria and flank pain.   Musculoskeletal: Positive for back pain and joint pain.   Neurological: Negative for dizziness and headaches.       Physical Exam:  Physical Exam   Constitutional: He is oriented to person, place, and time. He appears well-developed and well-nourished.   HENT:   Head: Normocephalic and atraumatic.   Eyes: Conjunctivae are normal. Pupils are equal, round, and reactive to light.   Neck: No tracheal deviation present. No thyromegaly present.   Cardiovascular: Normal rate and regular rhythm.    Pulmonary/Chest: Effort normal and breath sounds normal.   Abdominal: Soft. Bowel sounds are normal. He exhibits no distension. There is no tenderness.   Lymphadenopathy:     He has no cervical adenopathy.   Neurological: He is alert and oriented to person, place, and time.   MMT 5/5 except for LLE 2/5 likely secondary to pain   Skin: Skin is warm and dry.   Nursing note and vitals reviewed.      Labs:        Invalid input(s): [id number]      Recent Labs      02/07/17   2350   SODIUM  137   POTASSIUM  4.0   CHLORIDE  108   CO2  21   BUN  16   CREATININE  0.89   CALCIUM  8.7     Recent Labs      02/07/17    2350   GLUCOSE  161*     Recent Labs      17   1539  17   2350   RBC  3.63*  3.29*   HEMOGLOBIN  12.0*  10.8*   HEMATOCRIT  34.8*  31.8*   PLATELETCT  225  233     Recent Labs      17   1539  17   2350   WBC  9.8  11.3*           Hemodynamics:  Temp (24hrs), Av.9 °C (98.5 °F), Min:36.7 °C (98 °F), Max:37.1 °C (98.8 °F)  Temperature: 36.8 °C (98.2 °F)  Pulse  Av.1  Min: 57  Max: 109   Blood Pressure : 117/75 mmHg     Respiratory:    Respiration: 18, Pulse Oximetry: 92 %           Fluids:    Intake/Output Summary (Last 24 hours) at 17 1209  Last data filed at 17 0800   Gross per 24 hour   Intake      0 ml   Output   3200 ml   Net  -3200 ml     Weight: 86.6 kg (190 lb 14.7 oz)  GI/Nutrition:  Orders Placed This Encounter   Procedures   • Diet Order     Standing Status: Standing      Number of Occurrences: 1      Standing Expiration Date:      Order Specific Question:  Diet:     Answer:  Regular [1]     Medical Decision Making, by Problem:  Active Hospital Problems    Diagnosis   • *Intractable pain [R52]       MS Contin, IV Dilaudid, Decadron, for Radiation treatments   • Metastatic lung cancer (metastasis from lung to other site) (CMS-HCC) [C34.90]     Oncology following   • Loosening of prosthetic hip (CMS-HCC) [T84.038A, Z96.649]    nonsurgical per Ortho   • Urinary retention [R33.9]      Flomax, Bladder scans   • Hematuria [R31.9]      resolved   • Anemia [D64.9]     Follow hgb        Hyperglycemia.    Likely secondary to steroids, check hgba1c      Medications reviewed, Labs reviewed and Radiology images reviewed  Yañez catheter: No Yañez      DVT Prophylaxis: Contraindicated - High bleeding risk  DVT prophylaxis - mechanical: SCDs  Ulcer prophylaxis: Yes

## 2017-02-08 NOTE — PROGRESS NOTES
Oncology/Hematology Progress Note               Author: Shawanda CAMARILLO Sabrina Date & Time created: 2/8/2017  9:38 AM     Diagnosis: Metastatic adenocarcinoma of lung    Chief Complaint:  Increase in left hip pain    Interval History:  He continues to have significant hip pain   He has not noticed as much of a difference with dexamethasone.  He has been getting Dilaudid which has been helping but not sufficiently  Patient had episodes of immature area yesterday after the trauma with Yañez  This morning he has been urinating more clear urine  He is anxious about getting cleaned.  He was seen by Dr. Sims who is planning to do simulation and palliative radiation to be started.  Patient continues to have some bladder retention however is refusing any further catheters at present    Review of Systems:  ROS   All other review of systems are negative except what was mentioned above in the HPI.  Constitutional: Negative for fever and chills. Positive for malaise/fatigue.    HENT: Negative for ear pain and nosebleeds.     Eyes: Negative for blurred vision.    Respiratory: Positive for cough stable. Positive for shortness of breath on exertion     Cardiovascular: Negative for chest pain and leg swelling.    Gastrointestinal: Negative for nausea, vomiting and abdominal pain.    Genitourinary: Negative for dysuria.   Musculoskeletal: Negative for myalgias. Positive for right shoulder pain. Positive for left hip pain.   Skin: Negative for rash.    Neurological: Negative for dizziness and headaches.    Endo/Heme/Allergies: No bruise/bleed easily.    Psychiatric/Behavioral: Negative for depression and memory loss.      Physical Exam:  Physical Exam   General: Patient in mild distress, alert and oriented x 3  HEENT: normalcephalic, atraumatic, and extra ocular muscles intact  Neck: Supple neck and full range of motion  CVS: regular rate and rhythm  RESP: Decreased breath sounds bilateral bases   ABD: Soft, non tender, non  distended  EXT: No edema  CNS: Alert and oriented x3. Muscle strength grossly intact however his gait is affected secondary to pain    Labs:        Invalid input(s): [id number]      Recent Labs      17   2350   SODIUM  137   POTASSIUM  4.0   CHLORIDE  108   CO2  21   BUN  16   CREATININE  0.89   CALCIUM  8.7     Recent Labs      17   2350   GLUCOSE  161*     Recent Labs      17   1539  17   2350   RBC  3.63*  3.29*   HEMOGLOBIN  12.0*  10.8*   HEMATOCRIT  34.8*  31.8*   PLATELETCT  225  233     Recent Labs      17   1539  17   2350   WBC  9.8  11.3*     Recent Labs      17   2350   SODIUM  137   POTASSIUM  4.0   CHLORIDE  108   CO2  21   GLUCOSE  161*   BUN  16   CREATININE  0.89   CALCIUM  8.7     Hemodynamics:  Temp (24hrs), Av.9 °C (98.5 °F), Min:36.7 °C (98 °F), Max:37.1 °C (98.8 °F)  Temperature: 36.8 °C (98.2 °F)  Pulse  Av.1  Min: 57  Max: 109   Blood Pressure : 117/75 mmHg, NIBP: (!) 99/69 mmHg     Respiratory:    Respiration: 18, Pulse Oximetry: 92 %           Fluids:  No intake or output data in the 24 hours ending 17 1649  Weight: 86.6 kg (190 lb 14.7 oz)  GI/Nutrition:  Orders Placed This Encounter   Procedures   • Diet Order     Standing Status: Standing      Number of Occurrences: 1      Standing Expiration Date:      Order Specific Question:  Diet:     Answer:  Regular [1]     Medical Decision Making, by Problem:  Active Hospital Problems    Diagnosis   • Pain from bone metastases (CMS-HCC) [G89.3, C79.51]       Assessment and Plan:  1. Metastatic adenocarcinoma of lung, EGFR exon 20: Patient was diagnosed in  and has been on multiple lines of treatment. More recently he had progression of disease on Opdivo and has been on treatment since 2017. Patient on establishing care with me was felt to be a candidate for a clinical trial. He is undergoing process for Match trial and plan is to start him on clinical trial or further systemic  therapy once his pain is better controlled.    2. Intractable pain: He is found to have lytic lesion in the left hip which has been progressively increasing and causing more significant pain. Age and has been started on dexamethasone 4 mg 3 times a day without a significant improvement. He is also on long-acting morphine and Dilaudid as needed. He has not had a significant improvement in pain. Patient was seen by Dr. Sims today and is due to get involuted and to be started on treatment today for pain management. Recommend escalation of his pain regiment. Continue dexamethasone for now. If he continues to have no improvement then continue per dexamethasone in the next 1-2 days.    3. Urethral bleeding: Decreased secondary to trauma from Yañez: Patient had condom catheter which was removed. He did pass some blood clots overnight. Urine this morning appears to be more clear. Bladder scan shows continued bladder retention. Patient is using any further catheterization at present.    4. Bony metastatic disease: After completion of radiation he will need to be started on bisphosphonates.     5. Normocytic anemia: Patient had a drop in his hemoglobin overnight. He also had episodes of bleeding secondary to likely Yañez trauma. Continue to monitor with periodic CBCs.    Case was discussed with Dr. Burleson     Thank you for allowing me to participate in his care. Please feel free to contact me with questions or concerns in regards to his care.    Please note that this dictation was created using voice recognition software. I have made every reasonable attempt to correct obvious errors, but I expect that there are errors of grammar and possibly content that I did not discover before finalizing the note.      Core Measures

## 2017-02-08 NOTE — PROGRESS NOTES
Pt. Had to urinate pt. Unable to void in urinal call out to Dr. Wilkerson new order for molina placement. Molina placed unable to get urine return inflated balloon. Pt. Has small amount of bleed at catheter site. Still no urine out put bladder scan at 999. Ask charge nurse to check molina and possible place new on. When balloon deflated pt. Had large bloody out put.Call out to Dr. Wilkerson she was off at this time. Report to Dr. Burleson condom catheter place bleeding has stop. New orders received. Bladder scan every 6 hours call MD if greater than 600 ml's. Call light within reach hourly rounding in practice.

## 2017-02-08 NOTE — PROGRESS NOTES
Late Entry:    Patient refused to have CBI molina catheter placed after bladder scan showed greater than 999 ml. Patient was originally agreeable to having catheter placed. Patient explained his fears regarding what happened the day before. Patient requested to speak to doctor before any further procedure be implemented. Purpose of intervention was explained and reiterated, but patient still refused. Will continue to monitor urine output and note any clots.

## 2017-02-08 NOTE — CARE PLAN
Problem: Knowledge Deficit  Goal: Knowledge of disease process/condition, treatment plan, diagnostic tests, and medications will improve  Outcome: PROGRESSING AS EXPECTED  Continued education regarding plan of care.    Problem: Pain Management  Goal: Pain level will decrease to patient’s comfort goal  Outcome: PROGRESSING AS EXPECTED  Pain medication given for pain reported in left hip.

## 2017-02-08 NOTE — PROGRESS NOTES
Assumed care of pt @ 0700.  POC discussed w/ night nurse and pt, pain control, XRT mapping for left hip and right scapula, shower, q6h bladder scans for retention, call for assistance; pt in agreement w/ goals.  Pt AAOx4, VSS, c/o 7/10 pain from left hip, pt given IV dilaudid and ms contin for pain.  Pt's skin assessed CDI.  Pt educated re: increased fall risk, use of call light, hourly rounding, and pain scale; pt verbalizes his understanding.  Personal possessions and call light w/n reach, bed in lowest position.  Bed alarm off, pt up SBA w/ FWW, calls appropriately.

## 2017-02-09 LAB
25(OH)D3 SERPL-MCNC: 27 NG/ML (ref 30–100)
ANION GAP SERPL CALC-SCNC: 4 MMOL/L (ref 0–11.9)
APPEARANCE UR: CLEAR
BASOPHILS # BLD AUTO: 0.1 % (ref 0–1.8)
BASOPHILS # BLD AUTO: 0.2 % (ref 0–1.8)
BASOPHILS # BLD: 0.02 K/UL (ref 0–0.12)
BASOPHILS # BLD: 0.02 K/UL (ref 0–0.12)
BILIRUB UR QL STRIP.AUTO: NEGATIVE
BUN SERPL-MCNC: 13 MG/DL (ref 8–22)
CALCIUM SERPL-MCNC: 9.2 MG/DL (ref 8.5–10.5)
CHLORIDE SERPL-SCNC: 109 MMOL/L (ref 96–112)
CO2 SERPL-SCNC: 25 MMOL/L (ref 20–33)
COLOR UR: YELLOW
CREAT SERPL-MCNC: 0.8 MG/DL (ref 0.5–1.4)
EOSINOPHIL # BLD AUTO: 0 K/UL (ref 0–0.51)
EOSINOPHIL # BLD AUTO: 0 K/UL (ref 0–0.51)
EOSINOPHIL NFR BLD: 0 % (ref 0–6.9)
EOSINOPHIL NFR BLD: 0 % (ref 0–6.9)
EPI CELLS #/AREA URNS HPF: ABNORMAL /HPF
ERYTHROCYTE [DISTWIDTH] IN BLOOD BY AUTOMATED COUNT: 48.6 FL (ref 35.9–50)
ERYTHROCYTE [DISTWIDTH] IN BLOOD BY AUTOMATED COUNT: 48.9 FL (ref 35.9–50)
EST. AVERAGE GLUCOSE BLD GHB EST-MCNC: 97 MG/DL
GFR SERPL CREATININE-BSD FRML MDRD: >60 ML/MIN/1.73 M 2
GLUCOSE SERPL-MCNC: 139 MG/DL (ref 65–99)
GLUCOSE UR STRIP.AUTO-MCNC: NEGATIVE MG/DL
HBA1C MFR BLD: 5 % (ref 0–5.6)
HCT VFR BLD AUTO: 29.5 % (ref 42–52)
HCT VFR BLD AUTO: 30.5 % (ref 42–52)
HGB BLD-MCNC: 10 G/DL (ref 14–18)
HGB BLD-MCNC: 9.8 G/DL (ref 14–18)
IMM GRANULOCYTES # BLD AUTO: 0.07 K/UL (ref 0–0.11)
IMM GRANULOCYTES # BLD AUTO: 0.11 K/UL (ref 0–0.11)
IMM GRANULOCYTES NFR BLD AUTO: 0.5 % (ref 0–0.9)
IMM GRANULOCYTES NFR BLD AUTO: 0.8 % (ref 0–0.9)
KETONES UR STRIP.AUTO-MCNC: NEGATIVE MG/DL
LEUKOCYTE ESTERASE UR QL STRIP.AUTO: NEGATIVE
LYMPHOCYTES # BLD AUTO: 0.73 K/UL (ref 1–4.8)
LYMPHOCYTES # BLD AUTO: 0.76 K/UL (ref 1–4.8)
LYMPHOCYTES NFR BLD: 5.4 % (ref 22–41)
LYMPHOCYTES NFR BLD: 5.6 % (ref 22–41)
MCH RBC QN AUTO: 31.7 PG (ref 27–33)
MCH RBC QN AUTO: 32.1 PG (ref 27–33)
MCHC RBC AUTO-ENTMCNC: 32.8 G/DL (ref 33.7–35.3)
MCHC RBC AUTO-ENTMCNC: 33.2 G/DL (ref 33.7–35.3)
MCV RBC AUTO: 96.7 FL (ref 81.4–97.8)
MCV RBC AUTO: 96.8 FL (ref 81.4–97.8)
MICRO URNS: ABNORMAL
MONOCYTES # BLD AUTO: 0.66 K/UL (ref 0–0.85)
MONOCYTES # BLD AUTO: 0.8 K/UL (ref 0–0.85)
MONOCYTES NFR BLD AUTO: 5 % (ref 0–13.4)
MONOCYTES NFR BLD AUTO: 5.7 % (ref 0–13.4)
NEUTROPHILS # BLD AUTO: 11.6 K/UL (ref 1.82–7.42)
NEUTROPHILS # BLD AUTO: 12.43 K/UL (ref 1.82–7.42)
NEUTROPHILS NFR BLD: 88 % (ref 44–72)
NEUTROPHILS NFR BLD: 88.7 % (ref 44–72)
NITRITE UR QL STRIP.AUTO: NEGATIVE
NRBC # BLD AUTO: 0 K/UL
NRBC # BLD AUTO: 0 K/UL
NRBC BLD AUTO-RTO: 0 /100 WBC
NRBC BLD AUTO-RTO: 0 /100 WBC
PH UR STRIP.AUTO: 6.5 [PH]
PLATELET # BLD AUTO: 236 K/UL (ref 164–446)
PLATELET # BLD AUTO: 255 K/UL (ref 164–446)
PMV BLD AUTO: 9.2 FL (ref 9–12.9)
PMV BLD AUTO: 9.4 FL (ref 9–12.9)
POTASSIUM SERPL-SCNC: 4.4 MMOL/L (ref 3.6–5.5)
PROT UR QL STRIP: NEGATIVE MG/DL
RBC # BLD AUTO: 3.05 M/UL (ref 4.7–6.1)
RBC # BLD AUTO: 3.15 M/UL (ref 4.7–6.1)
RBC # URNS HPF: >150 /HPF
RBC UR QL AUTO: ABNORMAL
SODIUM SERPL-SCNC: 138 MMOL/L (ref 135–145)
SP GR UR STRIP.AUTO: 1.02
WBC # BLD AUTO: 13.1 K/UL (ref 4.8–10.8)
WBC # BLD AUTO: 14.1 K/UL (ref 4.8–10.8)

## 2017-02-09 PROCEDURE — A9270 NON-COVERED ITEM OR SERVICE: HCPCS | Performed by: HOSPITALIST

## 2017-02-09 PROCEDURE — G0378 HOSPITAL OBSERVATION PER HR: HCPCS

## 2017-02-09 PROCEDURE — G8978 MOBILITY CURRENT STATUS: HCPCS | Mod: CJ

## 2017-02-09 PROCEDURE — 80048 BASIC METABOLIC PNL TOTAL CA: CPT

## 2017-02-09 PROCEDURE — 85025 COMPLETE CBC W/AUTO DIFF WBC: CPT

## 2017-02-09 PROCEDURE — G8979 MOBILITY GOAL STATUS: HCPCS | Mod: CI

## 2017-02-09 PROCEDURE — 36415 COLL VENOUS BLD VENIPUNCTURE: CPT

## 2017-02-09 PROCEDURE — 77300 RADIATION THERAPY DOSE PLAN: CPT | Performed by: RADIOLOGY

## 2017-02-09 PROCEDURE — 700102 HCHG RX REV CODE 250 W/ 637 OVERRIDE(OP): Performed by: HOSPITALIST

## 2017-02-09 PROCEDURE — G8987 SELF CARE CURRENT STATUS: HCPCS | Mod: CJ

## 2017-02-09 PROCEDURE — 97165 OT EVAL LOW COMPLEX 30 MIN: CPT

## 2017-02-09 PROCEDURE — 77295 3-D RADIOTHERAPY PLAN: CPT | Performed by: RADIOLOGY

## 2017-02-09 PROCEDURE — 700102 HCHG RX REV CODE 250 W/ 637 OVERRIDE(OP): Performed by: INTERNAL MEDICINE

## 2017-02-09 PROCEDURE — G8988 SELF CARE GOAL STATUS: HCPCS | Mod: CI

## 2017-02-09 PROCEDURE — 77280 THER RAD SIMULAJ FIELD SMPL: CPT | Mod: 26 | Performed by: RADIOLOGY

## 2017-02-09 PROCEDURE — 77280 THER RAD SIMULAJ FIELD SMPL: CPT | Performed by: RADIOLOGY

## 2017-02-09 PROCEDURE — 770009 HCHG ROOM/CARE - ONCOLOGY SEMI PRI*

## 2017-02-09 PROCEDURE — 700112 HCHG RX REV CODE 229: Performed by: HOSPITALIST

## 2017-02-09 PROCEDURE — 81001 URINALYSIS AUTO W/SCOPE: CPT

## 2017-02-09 PROCEDURE — 99223 1ST HOSP IP/OBS HIGH 75: CPT | Performed by: FAMILY MEDICINE

## 2017-02-09 PROCEDURE — 77295 3-D RADIOTHERAPY PLAN: CPT | Mod: 26 | Performed by: RADIOLOGY

## 2017-02-09 PROCEDURE — 77334 RADIATION TREATMENT AID(S): CPT | Mod: 26 | Performed by: RADIOLOGY

## 2017-02-09 PROCEDURE — 77412 RADIATION TX DELIVERY LVL 3: CPT | Performed by: RADIOLOGY

## 2017-02-09 PROCEDURE — A9270 NON-COVERED ITEM OR SERVICE: HCPCS | Performed by: INTERNAL MEDICINE

## 2017-02-09 PROCEDURE — 51798 US URINE CAPACITY MEASURE: CPT

## 2017-02-09 PROCEDURE — 97162 PT EVAL MOD COMPLEX 30 MIN: CPT

## 2017-02-09 PROCEDURE — 77334 RADIATION TREATMENT AID(S): CPT | Performed by: RADIOLOGY

## 2017-02-09 PROCEDURE — 700102 HCHG RX REV CODE 250 W/ 637 OVERRIDE(OP): Performed by: FAMILY MEDICINE

## 2017-02-09 PROCEDURE — 700111 HCHG RX REV CODE 636 W/ 250 OVERRIDE (IP): Performed by: HOSPITALIST

## 2017-02-09 PROCEDURE — A9270 NON-COVERED ITEM OR SERVICE: HCPCS | Performed by: FAMILY MEDICINE

## 2017-02-09 PROCEDURE — 77300 RADIATION THERAPY DOSE PLAN: CPT | Mod: 26 | Performed by: RADIOLOGY

## 2017-02-09 RX ORDER — MORPHINE SULFATE 15 MG/1
15 TABLET, FILM COATED, EXTENDED RELEASE ORAL EVERY 12 HOURS
Status: DISCONTINUED | OUTPATIENT
Start: 2017-02-09 | End: 2017-02-12

## 2017-02-09 RX ADMIN — HYDROMORPHONE HYDROCHLORIDE 1 MG: 1 INJECTION, SOLUTION INTRAMUSCULAR; INTRAVENOUS; SUBCUTANEOUS at 10:04

## 2017-02-09 RX ADMIN — MORPHINE SULFATE 15 MG: 15 TABLET, EXTENDED RELEASE ORAL at 20:34

## 2017-02-09 RX ADMIN — TAMSULOSIN HYDROCHLORIDE 0.4 MG: 0.4 CAPSULE ORAL at 09:55

## 2017-02-09 RX ADMIN — DEXAMETHASONE 4 MG: 4 TABLET ORAL at 15:21

## 2017-02-09 RX ADMIN — OMEPRAZOLE 20 MG: 20 CAPSULE, DELAYED RELEASE ORAL at 09:55

## 2017-02-09 RX ADMIN — HYDROMORPHONE HYDROCHLORIDE 1 MG: 1 INJECTION, SOLUTION INTRAMUSCULAR; INTRAVENOUS; SUBCUTANEOUS at 15:21

## 2017-02-09 RX ADMIN — BISACODYL 10 MG: 10 SUPPOSITORY RECTAL at 20:39

## 2017-02-09 RX ADMIN — DEXAMETHASONE 4 MG: 4 TABLET ORAL at 05:52

## 2017-02-09 RX ADMIN — DEXAMETHASONE 4 MG: 4 TABLET ORAL at 20:34

## 2017-02-09 RX ADMIN — HYDROMORPHONE HYDROCHLORIDE 1 MG: 1 INJECTION, SOLUTION INTRAMUSCULAR; INTRAVENOUS; SUBCUTANEOUS at 03:24

## 2017-02-09 RX ADMIN — CHOLECALCIFEROL TAB 25 MCG (1000 UNIT) 2000 UNITS: 25 TAB at 18:12

## 2017-02-09 RX ADMIN — HYDROMORPHONE HYDROCHLORIDE 1 MG: 1 INJECTION, SOLUTION INTRAMUSCULAR; INTRAVENOUS; SUBCUTANEOUS at 22:05

## 2017-02-09 RX ADMIN — LACTULOSE 30 ML: 10 SOLUTION ORAL at 05:56

## 2017-02-09 RX ADMIN — STANDARDIZED SENNA CONCENTRATE AND DOCUSATE SODIUM 1 TABLET: 8.6; 5 TABLET, FILM COATED ORAL at 20:34

## 2017-02-09 RX ADMIN — DOCUSATE SODIUM 100 MG: 100 CAPSULE ORAL at 09:55

## 2017-02-09 RX ADMIN — MORPHINE SULFATE 30 MG: 30 TABLET, EXTENDED RELEASE ORAL at 09:55

## 2017-02-09 ASSESSMENT — GAIT ASSESSMENTS
DEVIATION: STEP TO;DECREASED BASE OF SUPPORT
ASSISTIVE DEVICE: FRONT WHEEL WALKER
GAIT LEVEL OF ASSIST: STAND BY ASSIST
DISTANCE (FEET): 100

## 2017-02-09 ASSESSMENT — ENCOUNTER SYMPTOMS
BLURRED VISION: 0
FLANK PAIN: 0
HEARTBURN: 0
FEVER: 0
HEADACHES: 0
SORE THROAT: 0
SHORTNESS OF BREATH: 0
NAUSEA: 0
BACK PAIN: 1
CHILLS: 0
DIARRHEA: 0
ABDOMINAL PAIN: 0
VOMITING: 0
COUGH: 0
DIZZINESS: 0
WHEEZING: 0

## 2017-02-09 ASSESSMENT — PAIN SCALES - GENERAL
PAINLEVEL_OUTOF10: 7
PAINLEVEL_OUTOF10: 7
PAINLEVEL_OUTOF10: 6
PAINLEVEL_OUTOF10: 6
PAINLEVEL_OUTOF10: 5
PAINLEVEL_OUTOF10: 8

## 2017-02-09 ASSESSMENT — ACTIVITIES OF DAILY LIVING (ADL): TOILETING: INDEPENDENT

## 2017-02-09 NOTE — PROGRESS NOTES
Hospital Medicine Progress Note, Adult, Complex               Author: Mir Burleson Date & Time created: 2/9/2017  3:46 PM     Interval History:  Admitted for Intractable pain from Metastatic cancer, Urinary Retention, hematuria after trial of placement of Yañez catheter.    Intractable pain - better controlled  Urinary retention - bladder scans better  Hematuria - resolved  Delirium - period on confusion this AM  Low Vit D    Review of Systems:  Review of Systems   Constitutional: Negative for fever and chills.   HENT: Negative for sore throat.    Eyes: Negative for blurred vision.   Respiratory: Negative for cough, shortness of breath and wheezing.    Cardiovascular: Negative for chest pain and leg swelling.   Gastrointestinal: Negative for heartburn, nausea, vomiting, abdominal pain and diarrhea.   Genitourinary: Negative for dysuria, hematuria and flank pain.   Musculoskeletal: Positive for back pain and joint pain.   Neurological: Negative for dizziness and headaches.       Physical Exam:  Physical Exam   Constitutional: He is oriented to person, place, and time. He appears well-developed and well-nourished.   HENT:   Head: Normocephalic and atraumatic.   Eyes: Conjunctivae are normal. Pupils are equal, round, and reactive to light.   Neck: No tracheal deviation present. No thyromegaly present.   Cardiovascular: Normal rate and regular rhythm.    Pulmonary/Chest: Effort normal and breath sounds normal.   Abdominal: Soft. Bowel sounds are normal. He exhibits no distension. There is no tenderness.   Lymphadenopathy:     He has no cervical adenopathy.   Neurological: He is alert and oriented to person, place, and time.   MMT 5/5 except for LLE 2/5 likely secondary to pain   Skin: Skin is warm and dry.   Nursing note and vitals reviewed.      Labs:        Invalid input(s): [id number]      Recent Labs      02/07/17   2350  02/08/17   2357   SODIUM  137  138   POTASSIUM  4.0  4.4   CHLORIDE  108  109   CO2   21  25   BUN  16  13   CREATININE  0.89  0.80   CALCIUM  8.7  9.2     Recent Labs      17   2350  17   2357   GLUCOSE  161*  139*     Recent Labs      17   1539  170  17   2357   RBC  3.63*  3.29*  3.15*   HEMOGLOBIN  12.0*  10.8*  10.0*   HEMATOCRIT  34.8*  31.8*  30.5*   PLATELETCT  225  233  236     Recent Labs      17   15317   2357   WBC  9.8  11.3*  13.1*   NEUTSPOLYS   --    --   88.70*   LYMPHOCYTES   --    --   5.60*   MONOCYTES   --    --   5.00   EOSINOPHILS   --    --   0.00   BASOPHILS   --    --   0.20           Hemodynamics:  Temp (24hrs), Av.8 °C (98.3 °F), Min:36.6 °C (97.9 °F), Max:37.1 °C (98.8 °F)  Temperature: 36.8 °C (98.2 °F)  Pulse  Av.4  Min: 57  Max: 109   Blood Pressure : 132/73 mmHg     Respiratory:    Respiration: 18, Pulse Oximetry: 95 %           Fluids:    Intake/Output Summary (Last 24 hours) at 17 1546  Last data filed at 17 2258   Gross per 24 hour   Intake      0 ml   Output   1700 ml   Net  -1700 ml        GI/Nutrition:  Orders Placed This Encounter   Procedures   • Diet Order     Standing Status: Standing      Number of Occurrences: 1      Standing Expiration Date:      Order Specific Question:  Diet:     Answer:  Regular [1]     Medical Decision Making, by Problem:  Active Hospital Problems    Diagnosis   • *Intractable pain [R52]      decrease MS Contin, continue IV Dilaudid, Decadron, for Radiation treatments   • Metastatic lung cancer (metastasis from lung to other site) (CMS-HCC) [C34.90]     Oncology following   • Loosening of prosthetic hip (CMS-HCC) [T84.038A, Z96.649]    TTWB LLE   • Urinary retention [R33.9]      Flomax, Bladder scans   • Hematuria [R31.9]      resolved   • Anemia [D64.9]     Follow hgb        Hyperglycemia.    Likely secondary to steroids, hgba1c pending       Vitamin D deficiency.     start Vit D      Medications reviewed, Labs reviewed and Radiology images  reviewed  Yañez catheter: No Yañez      DVT Prophylaxis: Contraindicated - High bleeding risk  DVT prophylaxis - mechanical: SCDs  Ulcer prophylaxis: Yes

## 2017-02-09 NOTE — THERAPY
"Physical Therapy Evaluation completed.   Bed Mobility:  Supine to Sit: Stand by Assist (HOB elevated)  Transfers: Sit to Stand: Contact Guard Assist  Gait: Level Of Assist: Stand by Assist with Front-Wheel Walker       Plan of Care: Will benefit from Physical Therapy 2 times per week and Plan to complete next treatment by Monday 2/13  Discharge Recommendations: Equipment: Will Continue to Assess for Equipment Needs. Post-acute therapy recommended before discharged home.    See \"Rehab Therapy-Acute\" Patient Summary Report for complete documentation.     "

## 2017-02-09 NOTE — PROGRESS NOTES
Starting XRT today  No changes  Pain in left hip, also bilateral knees due to DJD  No fevers  + DF/PF  AVSS  XR - loose acetabular component with large periacetabular lytic lesion, femoral stem looks stable    Plan:    TTWB LLE  Cancer treatment per oncologists  May need revision EBONY after oncologic treatment depending on health status and overall prognosis

## 2017-02-09 NOTE — THERAPY
"Occupational Therapy Evaluation completed.   Functional Status:   L LE NWB, necessitating Min A ADLs and Mod A transfers. One LOB in bathroom with therapist assist to recover.   Plan of Care: Will benefit from Occupational Therapy 3 times per week  Discharge Recommendations:  Equipment: Shower Chair, Raised Toilet Seat, Grab Bars and Will Continue to Assess for Equipment Needs. Post-acute therapy recommended after discharged home.    Patient presents with pain from bone mets 2/2 lung CA residing in L hip complicated by reported back pain from prior injury. Patient reports decline in ambulation and restful sleep 2/2 pain and conflict of chair vs bed as one position relieves one (back or hip) and increases pain in the other (back/hip). PTA, patient reports I with ADLs and IADLs with dtr in law nearby with preference to not rely on her assistance. Upon eval, present as L LE NWB, decreased strength, balance, endurance, and ADL participation necessitating Min A ADLs and Mod A transfers. Patient would benefit from skilled OT in this setting and home services upon DC. Patient begins treatment on the 15th and is aware of possible side effects related to poor appetite, energy, and motivation to participate in ADLs. Patient verbalize desire to return home upon dc with home modifications such as grab bars, hand rails, and SC. x3 week Letha    See \"Rehab Therapy-Acute\" Patient Summary Report for complete documentation.    "

## 2017-02-09 NOTE — CONSULTS
DATE OF SERVICE:  02/07/2017    REQUESTING PHYSICIAN:  Dr. Workman.    CHIEF COMPLAINT:  Left hip pain.    HISTORY OF PRESENT ILLNESS:  The patient is a 66-year-old man who had a left   total hip arthroplasty 3 or 4 years ago.  He was doing fine, but started to   have some pain in his left hip in August.  Around November, he was diagnosed   with metastatic lung cancer.  His pain has been worsening.  He presents to the   emergency room today.  He was apparently supposed to see a radiation   oncologist tomorrow for planning for his radiation treatment.  CT scan shows   large periacetabular lesion and orthopedic consultation was requested.    ALLERGIES:  None.    MEDICATIONS:  Xanax, MS Contin, omeprazole, Mobic, Zofran, hydromorphone.    PAST MEDICAL HISTORY:  1.  Metastatic lung cancer, chronic pain syndrome associated with malignancy.  2.  Opioid dependence.  3.  Anxiety.  4.  DJD, bilateral knees.    PAST SURGICAL HISTORY:  Umbilical hernia repair, bilateral knee arthroscopy,   right shoulder surgery, left total hip arthroplasty, lumbar laminectomy and   discectomy.    SOCIAL HISTORY:  The patient quit smoking in 1983.  Drinks alcohol rarely,   lives in [place].    FAMILY HISTORY:  Positive for renal cell carcinoma and stroke in mother and   prostate cancer in father.    REVIEW OF SYSTEMS:  No loss of consciousness, nausea, vomiting, diarrhea,   constipation, polyuria, dysuria, fevers, chills, weight loss, weight gain,   abdominal pain, chest pain or shortness of breath.    PHYSICAL EXAMINATION:  GENERAL:  The patient is in mild distress from pain.  VITAL SIGNS:  His blood pressure is 91/59, heart rate 78, respirations 14,   temperature 98.4.  HEENT:  Normocephalic, atraumatic.  NECK:  Supple, nontender.  CHEST AND ABDOMEN:  Nontender.  No labored breathing.  EXTREMITIES:  The right lower and bilateral upper extremities without   tenderness or deformity.  I did not check range of motion of the left hip.    There are  no wounds on his left hip.  He is able to dorsiflex and plantarflex   his toes.    LABORATORY DATA:  No labs are done.    CT scan of the pelvis shows a large lytic lesion in the left periacetabular   region and the cup looks like it is loose.    ASSESSMENT:  1.  Failed left total hip arthroplasty.  2.  Metastatic lung cancer with large periacetabular lesion.  3.  Chronic pain syndrome associated with malignancy and opioid dependence.    PLAN:  We will get baseline radiographs of the pelvis and hip to evaluate the   prosthesis in its entirety.  At this point, I think his oncologic treatments   needs to take precedence.  We cannot really revise the hip while he has active   tumor and is supporting bone.  He will be admitted to the hospital for pain   control.  We will defer to the radiation oncology doctors for major treatment.    He should be toe touch weightbearing left lower extremity.       ____________________________________     MD JUAN WILLIS / CELSO    DD:  02/09/2017 10:38:21  DT:  02/09/2017 14:59:16    D#:  [id number]  Job#:  [id number]

## 2017-02-09 NOTE — PROGRESS NOTES
Assumed care of pt @ 0700.  POC discussed w/ night nurse and pt, pain control, XRT 1/1 for right scapula and 1/5 for left hip, shower, q6h bladder scans for retention, call for assistance; pt in agreement w/ goals.  Pt AAOx4, VSS, c/o 7/10 pain from left hip, pt given IV dilaudid and ms contin for pain.  Pt's skin assessed CDI.  Pt educated re: increased fall risk, use of call light, hourly rounding, and pain scale; pt verbalizes his understanding.  Personal possessions and call light w/n reach, bed in lowest position.  Bed alarm off, pt up SBA w/ FWW, calls appropriately.

## 2017-02-09 NOTE — PROGRESS NOTES
Oncology/Hematology Progress Note               Author: Shawanda R Sabrina Date & Time created: 2/9/2017  9:50 AM     Diagnosis: Metastatic adenocarcinoma of lung    Chief Complaint:  Increase in left hip pain    Interval History:  He has been stable overnight.  He has been able to urinate through the night  He has been getting frequent bladder scans with some urinary retention  This morning he was able to urinate with clear urine however to us and if urination he had blood  He also had some burning in the tip of his penis during urination.  His pain persistent   He has a good appetite and has been eating well next line patients is anxious to get some physical therapy  Age underwent simulation yesterday  He is scheduled to start radiation next week    Review of Systems:  ROS   All other review of systems are negative except what was mentioned above in the HPI.  Constitutional: Negative for fever and chills. Positive for malaise/fatigue.    HENT: Negative for ear pain and nosebleeds.     Eyes: Negative for blurred vision.    Respiratory: Positive for cough. Positive for mild shortness of breath on exertion     Cardiovascular: Negative for chest pain and leg swelling.    Gastrointestinal: Negative for nausea, vomiting and abdominal pain.    Genitourinary: Negative for dysuria. Intermittent urinary retention. Mild bleeding after urination this morning.  Musculoskeletal: Negative for myalgias. Positive for right shoulder pain. Positive for left hip pain.   Skin: Negative for rash.    Neurological: Negative for dizziness and headaches.    Endo/Heme/Allergies: No bruise/bleed easily.    Psychiatric/Behavioral: Negative for depression and memory loss.      Physical Exam:  Physical Exam   General: Patient in mild distress, alert and oriented x 3  HEENT: normalcephalic, atraumatic, and extra ocular muscles intact  Neck: Supple neck and full range of motion  CVS: regular rate and rhythm  RESP: Clear breath sounds   ABD: Soft,  non tender, non distended  EXT: No edema  CNS: Alert and oriented x3. Altered gait secondary to pain    Labs:        Invalid input(s): [id number]      Recent Labs      17   SODIUM  137  138   POTASSIUM  4.0  4.4   CHLORIDE  108  109   CO2  21  25   BUN  16  13   CREATININE  0.89  0.80   CALCIUM  8.7  9.2     Recent Labs      17   23517   235   GLUCOSE  161*  139*     Recent Labs      17   RBC  3.63*  3.29*  3.15*   HEMOGLOBIN  12.0*  10.8*  10.0*   HEMATOCRIT  34.8*  31.8*  30.5*   PLATELETCT  225  233  236     Recent Labs      17   WBC  9.8  11.3*  13.1*   NEUTSPOLYS   --    --   88.70*   LYMPHOCYTES   --    --   5.60*   MONOCYTES   --    --   5.00   EOSINOPHILS   --    --   0.00   BASOPHILS   --    --   0.20     Recent Labs      17   SODIUM  137  138   POTASSIUM  4.0  4.4   CHLORIDE  108  109   CO2  21  25   GLUCOSE  161*  139*   BUN  16  13   CREATININE  0.89  0.80   CALCIUM  8.7  9.2     Hemodynamics:  Temp (24hrs), Av.8 °C (98.3 °F), Min:36.6 °C (97.9 °F), Max:37.1 °C (98.8 °F)  Temperature: 36.8 °C (98.2 °F)  Pulse  Av.4  Min: 57  Max: 109   Blood Pressure : 132/73 mmHg     Respiratory:    Respiration: 18, Pulse Oximetry: 95 %           Fluids:  No intake or output data in the 24 hours ending 17 1649     GI/Nutrition:  Orders Placed This Encounter   Procedures   • Diet Order     Standing Status: Standing      Number of Occurrences: 1      Standing Expiration Date:      Order Specific Question:  Diet:     Answer:  Regular [1]     Medical Decision Making, by Problem:  Active Hospital Problems    Diagnosis   • Pain from bone metastases (CMS-HCC) [G89.3, C79.51]       Assessment and Plan:  1. Metastatic adenocarcinoma of lung, EGFR exon 20 diagnosed in 2015: He has been on lines of treatment with progression of disease.  Patient's last chemotherapy was in 11/27 which time Opdivo was held secondary to progression. Patient has been evaluated for a clinical trial. Plan to start him back on systemic therapy once his pain improves.     2. Intractable pain: Patient with lytic lesion in the left hip with progression. He has been having significant pain. He has been started on dexamethasone 4 mg 3 times a day. He is also on long-acting and short-acting pain medication. He was seen by Dr. Sims and was simulated yesterday. He is tentatively scheduled to start radiation early next week. We'll try to expedite start of radiation if possible.    3. Urethral bleeding: Echo secondary to trauma with Yañez. Patient did have clear urine yesterday. This morning he did have some burning urination and on completion of urination was found to have some bright red blood. He has been getting periodic bladder scans and has been having and minute retention. He wants to avoid placement of catheter as much as possible.    4. Bony metastatic disease: He will need to be started on bisphosphonates post radiation.    5. Normocytic anemia: His hemoglobin has trended down slightly since admission. He does have minimal bleeding this morning from the urethra. Continue to monitor CBCs.    6. Leukocytosis: Likely secondary to dexamethasone. Continue to monitor.    7. Patient is requesting physical therapy     Thank you for allowing me to participate in his care. Please feel free to contact me with questions or concerns in regards to his care.    Please note that this dictation was created using voice recognition software. I have made every reasonable attempt to correct obvious errors, but I expect that there are errors of grammar and possibly content that I did not discover before finalizing the note.      Core Measures

## 2017-02-09 NOTE — CARE PLAN
Problem: Safety  Goal: Will remain free from falls  Outcome: PROGRESSING AS EXPECTED  Fall precautions in place including pt wearing treaded slipper socks, personal possessions and call light w/n reach, bed in lowest position, ambulation assessed and assistance posted.  Pt educated on increased risk of falls in the hospital, pt states his/her understanding.        Problem: Pain Management  Goal: Pain level will decrease to patient’s comfort goal  Outcome: PROGRESSING AS EXPECTED  Pt pain assessed and meds given as ordered on the pt MAR.  Pt pain reassessed w/n 2 hours of intervention.  If pain meds unavailable, repositioning, emotional support, and rest are used to help manage pain.  Pt started XRT today for tx of left hip and right scapular bone metastasis for pain control.

## 2017-02-09 NOTE — CARE PLAN
Problem: Knowledge Deficit  Goal: Knowledge of disease process/condition, treatment plan, diagnostic tests, and medications will improve  Outcome: PROGRESSING AS EXPECTED  Continued education regarding plan of care.    Problem: Pain Management  Goal: Pain level will decrease to patient’s comfort goal  Outcome: PROGRESSING AS EXPECTED  Patient given PRN medication per MAR for reports of pain in right hip.

## 2017-02-10 LAB
ANION GAP SERPL CALC-SCNC: 4 MMOL/L (ref 0–11.9)
BUN SERPL-MCNC: 17 MG/DL (ref 8–22)
CALCIUM SERPL-MCNC: 9 MG/DL (ref 8.5–10.5)
CHLORIDE SERPL-SCNC: 108 MMOL/L (ref 96–112)
CO2 SERPL-SCNC: 26 MMOL/L (ref 20–33)
CREAT SERPL-MCNC: 0.84 MG/DL (ref 0.5–1.4)
GFR SERPL CREATININE-BSD FRML MDRD: >60 ML/MIN/1.73 M 2
GLUCOSE SERPL-MCNC: 130 MG/DL (ref 65–99)
POTASSIUM SERPL-SCNC: 4.8 MMOL/L (ref 3.6–5.5)
SODIUM SERPL-SCNC: 138 MMOL/L (ref 135–145)

## 2017-02-10 PROCEDURE — A9270 NON-COVERED ITEM OR SERVICE: HCPCS | Performed by: FAMILY MEDICINE

## 2017-02-10 PROCEDURE — A9270 NON-COVERED ITEM OR SERVICE: HCPCS | Performed by: HOSPITALIST

## 2017-02-10 PROCEDURE — 700102 HCHG RX REV CODE 250 W/ 637 OVERRIDE(OP): Performed by: INTERNAL MEDICINE

## 2017-02-10 PROCEDURE — 770009 HCHG ROOM/CARE - ONCOLOGY SEMI PRI*

## 2017-02-10 PROCEDURE — 51798 US URINE CAPACITY MEASURE: CPT

## 2017-02-10 PROCEDURE — 700102 HCHG RX REV CODE 250 W/ 637 OVERRIDE(OP): Performed by: HOSPITALIST

## 2017-02-10 PROCEDURE — 85025 COMPLETE CBC W/AUTO DIFF WBC: CPT

## 2017-02-10 PROCEDURE — 36415 COLL VENOUS BLD VENIPUNCTURE: CPT

## 2017-02-10 PROCEDURE — 80048 BASIC METABOLIC PNL TOTAL CA: CPT

## 2017-02-10 PROCEDURE — 700102 HCHG RX REV CODE 250 W/ 637 OVERRIDE(OP): Performed by: FAMILY MEDICINE

## 2017-02-10 PROCEDURE — 77014 PR CT GUIDANCE PLACEMENT RAD THERAPY FIELDS: CPT | Mod: 26 | Performed by: RADIOLOGY

## 2017-02-10 PROCEDURE — 700111 HCHG RX REV CODE 636 W/ 250 OVERRIDE (IP): Performed by: HOSPITALIST

## 2017-02-10 PROCEDURE — 77412 RADIATION TX DELIVERY LVL 3: CPT | Performed by: RADIOLOGY

## 2017-02-10 PROCEDURE — A9270 NON-COVERED ITEM OR SERVICE: HCPCS | Performed by: INTERNAL MEDICINE

## 2017-02-10 PROCEDURE — 700112 HCHG RX REV CODE 229: Performed by: HOSPITALIST

## 2017-02-10 PROCEDURE — 99232 SBSQ HOSP IP/OBS MODERATE 35: CPT | Performed by: FAMILY MEDICINE

## 2017-02-10 RX ADMIN — MORPHINE SULFATE 15 MG: 15 TABLET, EXTENDED RELEASE ORAL at 08:49

## 2017-02-10 RX ADMIN — HYDROMORPHONE HYDROCHLORIDE 1 MG: 1 INJECTION, SOLUTION INTRAMUSCULAR; INTRAVENOUS; SUBCUTANEOUS at 14:32

## 2017-02-10 RX ADMIN — DEXAMETHASONE 4 MG: 4 TABLET ORAL at 22:33

## 2017-02-10 RX ADMIN — DEXAMETHASONE 4 MG: 4 TABLET ORAL at 05:52

## 2017-02-10 RX ADMIN — CHOLECALCIFEROL TAB 25 MCG (1000 UNIT) 2000 UNITS: 25 TAB at 08:48

## 2017-02-10 RX ADMIN — DOCUSATE SODIUM 100 MG: 100 CAPSULE ORAL at 08:48

## 2017-02-10 RX ADMIN — OMEPRAZOLE 20 MG: 20 CAPSULE, DELAYED RELEASE ORAL at 08:49

## 2017-02-10 RX ADMIN — MORPHINE SULFATE 15 MG: 15 TABLET, EXTENDED RELEASE ORAL at 22:33

## 2017-02-10 RX ADMIN — HYDROMORPHONE HYDROCHLORIDE 1 MG: 1 INJECTION, SOLUTION INTRAMUSCULAR; INTRAVENOUS; SUBCUTANEOUS at 20:53

## 2017-02-10 RX ADMIN — STANDARDIZED SENNA CONCENTRATE AND DOCUSATE SODIUM 1 TABLET: 8.6; 5 TABLET, FILM COATED ORAL at 22:33

## 2017-02-10 RX ADMIN — DEXAMETHASONE 4 MG: 4 TABLET ORAL at 14:32

## 2017-02-10 RX ADMIN — TAMSULOSIN HYDROCHLORIDE 0.4 MG: 0.4 CAPSULE ORAL at 08:48

## 2017-02-10 RX ADMIN — HYDROMORPHONE HYDROCHLORIDE 1 MG: 1 INJECTION, SOLUTION INTRAMUSCULAR; INTRAVENOUS; SUBCUTANEOUS at 03:47

## 2017-02-10 ASSESSMENT — ENCOUNTER SYMPTOMS
SORE THROAT: 0
VOMITING: 0
CHILLS: 0
BACK PAIN: 1
COUGH: 0
HEADACHES: 0
FEVER: 0
BLURRED VISION: 0
DIZZINESS: 0
SHORTNESS OF BREATH: 0
DIARRHEA: 0
HEARTBURN: 0
WHEEZING: 0
FLANK PAIN: 0
NAUSEA: 0
ABDOMINAL PAIN: 0

## 2017-02-10 ASSESSMENT — PAIN SCALES - GENERAL
PAINLEVEL_OUTOF10: 8
PAINLEVEL_OUTOF10: 7
PAINLEVEL_OUTOF10: 4
PAINLEVEL_OUTOF10: 4

## 2017-02-10 NOTE — DISCHARGE PLANNING
Medical Social Work    Discussed in IDT rounds this morning.  PT/OT evals have been ordered for d/c recommendations.  Pt is likely to require SNF placement.  Pt will need a three midnight stay prior to SNF transfer.    Plan:  SS will remain available to provide support and assist with d/c planning as needed.

## 2017-02-10 NOTE — H&P
PRIMARY CARE PHYSICIAN:  Sasha Isidro MD    CHIEF COMPLAINT:  Left hip pain.    HISTORY OF PRESENT ILLNESS:  Patient is a 66-year-old white male with a known   history of metastatic lung cancer, apparently metastatic to the bone or to the   hip.  He has history of a hip hemiarthroplasty.  It appears that he started   having increasing pain.  He has been unable to ambulate or stand or walk.  He   was seen in the emergency room where imaging showed some loosening at the hip   joint.  Orthopedic surgery was consulted.  He is not currently a surgical   candidate, but he has had intractable pain.  Also, started having some urinary   retention likely secondary to pain medication.  A Yañez catheter was being   attempted to be placed, however, he started having hematuria and now has a   condom catheter.  Bladder scans q. shifts have been done.  Patient will need   radiation treatments and is also needing IV Dilaudid for pain medication.    Patient will be admitted for further evaluation and management.    PAST MEDICAL HISTORY:  1.  Metastatic lung cancer to bone.  2.  Urinary retention.  3.  Hematuria.  4.  Anemia.  5.  Hyperglycemia.  6.  Vitamin D deficiency.    PAST SURGICAL HISTORY:  1.  Umbilical hernia repair.  2.  Bilateral knee arthroscopic surgery.  3.  Right shoulder surgery.  4.  Left hip hemiarthroplasty.  5.  _____Laminectomy and diskectomy.  6.  Lung biopsy.    FAMILY HISTORY:  Mother with known history of stroke and renal cell carcinoma.    Father with known history of prostate cancer.    SOCIAL HISTORY:  Patient is a previous smoker, quit in 1983, 14 pack years,   denies any alcohol use, denies illicit drug use.    ALLERGIES:  None known.    HOME MEDICATIONS:  1.  Xanax 1 mg at bedtime as needed.  2.  MS Contin 30 mg every 12 hours.  3.  Omeprazole 20 mg per day.  4.  Mobic 7.5 mg twice a day.  5.  Zofran 8 mg as needed.  6.  Dilaudid 4 mg every 4 hours as needed for severe pain.    REVIEW OF SYSTEMS:   Negative except for those stated above reviewed per AMA   criteria.    PHYSICAL EXAMINATION:  VITAL SIGNS:  Blood pressure of 132/73, pulse rate 90, respiratory rate 18,   temperature 98.2 degrees Fahrenheit, O2 sat 95% on room air.  GENERAL:  Elderly white male lying in bed in no acute distress, alert and   oriented x3.  HEENT:  Normocephalic, atraumatic.  Pupils equally reactive, responds to   light.  EOMs are intact.  Oropharynx moist and clear.  NECK:  Shows no thyromegaly, lymphadenopathy or carotid bruits.  CHEST:  Symmetrical chest expansion.  Clear to auscultation bilaterally.  CARDIOVASCULAR:  Regular rate and rhythm.  S1, S2 distinct.  There is no S3,   no murmurs appreciated.  ABDOMEN:  Remarkable bowel sounds, soft, nontender, nondistended.  No rebound   or rigidity.  EXTREMITIES:  Show no clubbing, cyanosis, edema.  Pulses +2.  NEUROLOGIC:  Cranial nerves II-XII intact.  Bilateral muscle testing is 5/5   except for the left lower extremity which is about 2/5 likely secondary to   difficulty moving secondary to pain.    LABORATORY DATA:  WBC 13.1, hemoglobin 10.0, hematocrit 30.5, platelet count   is 236.  Sodium 138, potassium 4.4, glucose 139.  Urinalysis shows wbc's more   than 130, moderate occult blood.    IMAGING:  Shows CT scan of the hip showing interval increase in size,   thickness, expansile lytic lesion in the left ilium, which involves the   acetabular roof and quadrilateral plate with subsequent superior migration of   left hip prosthesis.    ASSESSMENT AND PLAN:  1.  Intractable pain.  Continue MS Contin.  Continue IV Dilaudid as well as   Decadron for pain control.  Radiation oncology has been consulted for   palliative radiation treatments.  2.  Metastatic lung cancer.  Oncology is following.  3.  Loosening of prosthetic hip.  Orthopedic surgery has recommended toe touch   weightbearing on the left lower extremity or left leg.  4.  Urinary retention.  Continue bladder scans plus we have  started him on   Flomax.  Continue condom catheter for now.  5.  Hematuria, appears to be resolving.  6.  Anemia.  Follow hemoglobin levels.  7.  Hyperglycemia, likely secondary to steroids.  His hemoglobin A1c is   pending.  8.  Vitamin D deficiency.  We have started Vitamin D.  9.  Prophylaxis.  Place sequential compression devices.  10.  Code status is DNR.       ____________________________________     MD OVI ORTIZ / CELSO    DD:  02/09/2017 17:10:27  DT:  02/09/2017 18:40:33    D#:  [id number]  Job#:  [id number]

## 2017-02-10 NOTE — PROGRESS NOTES
Patient came down to Radiation Therapy Department.  Received 1 of 5 XRT.  Will continue as planned.

## 2017-02-10 NOTE — PROGRESS NOTES
Oncology/Hematology Progress Note               Author: Shawanda R Sabrina Date & Time created: 2/10/2017  10:18 AM     Diagnosis: Metastatic adenocarcinoma of lung    Chief Complaint:  Increase in left hip pain    Interval History:  He is resting comfortably in bed  He was started on radiation yesterday.  He has not noticed any significant difference in pain as of yet  He has been urinating fairly well through the night  He denies any further episodes of hemorrhage area or urethral bleeding  He has a good appetite X line he had a good bowel movement yesterday  He denies any nausea or vomiting    Review of Systems:  ROS   All other review of systems are negative except what was mentioned above in the HPI.  Constitutional: Negative for fever and chills. Positive for malaise/fatigue stable.    HENT: Negative for ear pain and nosebleeds.     Eyes: Negative for blurred vision.     Respiratory: Positive for intermittent cough. Positive for mild shortness of breath on exertion     Cardiovascular: Negative for chest pain and leg swelling.    Gastrointestinal: Negative for nausea, vomiting and abdominal pain.    Genitourinary: Negative for dysuria. Intermittent urinary retention. Mild bleeding after urination this morning.  Musculoskeletal: Negative for myalgias. Positive for right shoulder pain. Positive for left hip pain.   Skin: Negative for rash.    Neurological: Negative for dizziness and headaches.     Endo/Heme/Allergies: No bruise/bleed easily.    Psychiatric/Behavioral: Negative for depression and memory loss.      Physical Exam:  Physical Exam   General: Patient in mild distress, alert and oriented x 3  HEENT: normalcephalic, atraumatic, and extra ocular muscles intact  Neck: Supple neck and full range of motion  CVS: regular rate and rhythm  RESP: Clear breath sounds   ABD: Soft, non tender, non distended  EXT: No edema  CNS: Alert and oriented x3. Muscle strength grossly intact. Gait affected secondary to  pain.    Labs:        Invalid input(s): [id number]      Recent Labs      17   2336   SODIUM  137  138  138   POTASSIUM  4.0  4.4  4.8   CHLORIDE  108  109  108   CO2  21  25  26   BUN  16  13  17   CREATININE  0.89  0.80  0.84   CALCIUM  8.7  9.2  9.0     Recent Labs      17   2336   GLUCOSE  161*  139*  130*     Recent Labs      17   2336   RBC  3.29*  3.15*  3.05*   HEMOGLOBIN  10.8*  10.0*  9.8*   HEMATOCRIT  31.8*  30.5*  29.5*   PLATELETCT  233  236  255     Recent Labs      17   2336   WBC  11.3*  13.1*  14.1*   NEUTSPOLYS   --   88.70*  88.00*   LYMPHOCYTES   --   5.60*  5.40*   MONOCYTES   --   5.00  5.70   EOSINOPHILS   --   0.00  0.00   BASOPHILS   --   0.20  0.10     Recent Labs      17   2336   SODIUM  137  138  138   POTASSIUM  4.0  4.4  4.8   CHLORIDE  108  109  108   CO2  21  25  26   GLUCOSE  161*  139*  130*   BUN  16    17   CREATININE  0.89  0.80  0.84   CALCIUM  8.7  9.2  9.0     Hemodynamics:  Temp (24hrs), Av.7 °C (98 °F), Min:36.4 °C (97.5 °F), Max:37.1 °C (98.8 °F)  Temperature: 36.4 °C (97.5 °F)  Pulse  Av.8  Min: 57  Max: 109   Blood Pressure : 111/67 mmHg     Respiratory:    Respiration: 18, Pulse Oximetry: 93 %           Fluids:  No intake or output data in the 24 hours ending 17 1649     GI/Nutrition:  Orders Placed This Encounter   Procedures   • Diet Order     Standing Status: Standing      Number of Occurrences: 1      Standing Expiration Date:      Order Specific Question:  Diet:     Answer:  Regular [1]     Medical Decision Making, by Problem:  Active Hospital Problems    Diagnosis   • Pain from bone metastases (CMS-HCC) [G89.3, C79.51]       Assessment and Plan:  1. Metastatic adenocarcinoma of lung, EGFR exon 20 diagnosed in : He was diagnosed with metastatic  disease and has been on multiple lines of treatment. He has had progression of disease. He last received systemic therapy in 11/2017 which was Opdivo. He was found to have progression of disease. He is being evaluated for MATCH trial.  Plan is to start systemic therapy once he completes palliative radiation.      2. Intractable pain: He is found to have bony metastatic disease with lytic lesions in the left hip which he progressed. He is having significant pain and hence has started palliative radiation yesterday. He is also on dexamethasone 3 times a day which can be slowly tapered once radiation is completed. He is currently on long-acting and short acting pain medications. His pain is most significant on ambulation.    3. Urethral bleeding: Likely secondary to trauma from Yañez. He has not had any urethral bleeding or hematuria overnight. He continues to get bladder scans with intermittent retention. He has been urinating fairly well. Continue to monitor.    4.  Bony metastatic disease: Upon completion of radiation he will need to be started on bisphosphonates every 4 weeks.    5. Normocytic anemia: His hemoglobin has trended down slightly. Initially he did have significant bleeding from the urethra. This has resolved over the past day. Continue to monitor his hemoglobin closely.    6. Leukocytosis: Likely reactive. He has been on dexamethasone and continued on it.    7. Patient lives on the first floor steps. Consider  consultation.       Thank you for allowing me to participate in his care. Please feel free to contact me with questions or concerns in regards to his care.    Please note that this dictation was created using voice recognition software. I have made every reasonable attempt to correct obvious errors, but I expect that there are errors of grammar and possibly content that I did not discover before finalizing the note.      Core Measures

## 2017-02-10 NOTE — PROGRESS NOTES
Patient received treatment in Radiation Therapy. Patient received treatment number 2 of 5 planned.

## 2017-02-11 LAB
ANION GAP SERPL CALC-SCNC: 5 MMOL/L (ref 0–11.9)
BASOPHILS # BLD AUTO: 0.1 % (ref 0–1.8)
BASOPHILS # BLD: 0.01 K/UL (ref 0–0.12)
BUN SERPL-MCNC: 18 MG/DL (ref 8–22)
CALCIUM SERPL-MCNC: 9.2 MG/DL (ref 8.5–10.5)
CHLORIDE SERPL-SCNC: 106 MMOL/L (ref 96–112)
CO2 SERPL-SCNC: 23 MMOL/L (ref 20–33)
CREAT SERPL-MCNC: 0.76 MG/DL (ref 0.5–1.4)
EOSINOPHIL # BLD AUTO: 0 K/UL (ref 0–0.51)
EOSINOPHIL NFR BLD: 0 % (ref 0–6.9)
ERYTHROCYTE [DISTWIDTH] IN BLOOD BY AUTOMATED COUNT: 47.7 FL (ref 35.9–50)
GFR SERPL CREATININE-BSD FRML MDRD: >60 ML/MIN/1.73 M 2
GLUCOSE SERPL-MCNC: 121 MG/DL (ref 65–99)
HCT VFR BLD AUTO: 31.3 % (ref 42–52)
HGB BLD-MCNC: 10.3 G/DL (ref 14–18)
IMM GRANULOCYTES # BLD AUTO: 0.12 K/UL (ref 0–0.11)
IMM GRANULOCYTES NFR BLD AUTO: 0.9 % (ref 0–0.9)
LYMPHOCYTES # BLD AUTO: 1.03 K/UL (ref 1–4.8)
LYMPHOCYTES NFR BLD: 8.1 % (ref 22–41)
MCH RBC QN AUTO: 31.7 PG (ref 27–33)
MCHC RBC AUTO-ENTMCNC: 32.9 G/DL (ref 33.7–35.3)
MCV RBC AUTO: 96.3 FL (ref 81.4–97.8)
MONOCYTES # BLD AUTO: 1.03 K/UL (ref 0–0.85)
MONOCYTES NFR BLD AUTO: 8.1 % (ref 0–13.4)
NEUTROPHILS # BLD AUTO: 10.58 K/UL (ref 1.82–7.42)
NEUTROPHILS NFR BLD: 82.8 % (ref 44–72)
NRBC # BLD AUTO: 0.03 K/UL
NRBC BLD AUTO-RTO: 0.2 /100 WBC
PLATELET # BLD AUTO: 283 K/UL (ref 164–446)
PMV BLD AUTO: 9.6 FL (ref 9–12.9)
POTASSIUM SERPL-SCNC: 4 MMOL/L (ref 3.6–5.5)
RBC # BLD AUTO: 3.25 M/UL (ref 4.7–6.1)
SODIUM SERPL-SCNC: 134 MMOL/L (ref 135–145)
WBC # BLD AUTO: 12.8 K/UL (ref 4.8–10.8)

## 2017-02-11 PROCEDURE — 51798 US URINE CAPACITY MEASURE: CPT

## 2017-02-11 PROCEDURE — 85025 COMPLETE CBC W/AUTO DIFF WBC: CPT

## 2017-02-11 PROCEDURE — 700112 HCHG RX REV CODE 229: Performed by: HOSPITALIST

## 2017-02-11 PROCEDURE — 700102 HCHG RX REV CODE 250 W/ 637 OVERRIDE(OP): Performed by: FAMILY MEDICINE

## 2017-02-11 PROCEDURE — A9270 NON-COVERED ITEM OR SERVICE: HCPCS | Performed by: FAMILY MEDICINE

## 2017-02-11 PROCEDURE — 36415 COLL VENOUS BLD VENIPUNCTURE: CPT

## 2017-02-11 PROCEDURE — 700102 HCHG RX REV CODE 250 W/ 637 OVERRIDE(OP): Performed by: INTERNAL MEDICINE

## 2017-02-11 PROCEDURE — 700102 HCHG RX REV CODE 250 W/ 637 OVERRIDE(OP): Performed by: HOSPITALIST

## 2017-02-11 PROCEDURE — 80048 BASIC METABOLIC PNL TOTAL CA: CPT

## 2017-02-11 PROCEDURE — 99232 SBSQ HOSP IP/OBS MODERATE 35: CPT | Performed by: FAMILY MEDICINE

## 2017-02-11 PROCEDURE — 700111 HCHG RX REV CODE 636 W/ 250 OVERRIDE (IP): Performed by: HOSPITALIST

## 2017-02-11 PROCEDURE — A9270 NON-COVERED ITEM OR SERVICE: HCPCS | Performed by: INTERNAL MEDICINE

## 2017-02-11 PROCEDURE — 770009 HCHG ROOM/CARE - ONCOLOGY SEMI PRI*

## 2017-02-11 PROCEDURE — A9270 NON-COVERED ITEM OR SERVICE: HCPCS | Performed by: HOSPITALIST

## 2017-02-11 RX ADMIN — DEXAMETHASONE 4 MG: 4 TABLET ORAL at 21:18

## 2017-02-11 RX ADMIN — DEXAMETHASONE 4 MG: 4 TABLET ORAL at 13:21

## 2017-02-11 RX ADMIN — TAMSULOSIN HYDROCHLORIDE 0.4 MG: 0.4 CAPSULE ORAL at 08:15

## 2017-02-11 RX ADMIN — HYDROMORPHONE HYDROCHLORIDE 2 MG: 1 INJECTION, SOLUTION INTRAMUSCULAR; INTRAVENOUS; SUBCUTANEOUS at 03:08

## 2017-02-11 RX ADMIN — MORPHINE SULFATE 15 MG: 15 TABLET, EXTENDED RELEASE ORAL at 21:18

## 2017-02-11 RX ADMIN — STANDARDIZED SENNA CONCENTRATE AND DOCUSATE SODIUM 1 TABLET: 8.6; 5 TABLET, FILM COATED ORAL at 21:18

## 2017-02-11 RX ADMIN — HYDROMORPHONE HYDROCHLORIDE 1 MG: 1 INJECTION, SOLUTION INTRAMUSCULAR; INTRAVENOUS; SUBCUTANEOUS at 13:21

## 2017-02-11 RX ADMIN — BISACODYL 10 MG: 10 SUPPOSITORY RECTAL at 16:07

## 2017-02-11 RX ADMIN — CHOLECALCIFEROL TAB 25 MCG (1000 UNIT) 2000 UNITS: 25 TAB at 08:15

## 2017-02-11 RX ADMIN — OMEPRAZOLE 20 MG: 20 CAPSULE, DELAYED RELEASE ORAL at 08:15

## 2017-02-11 RX ADMIN — MORPHINE SULFATE 15 MG: 15 TABLET, EXTENDED RELEASE ORAL at 08:15

## 2017-02-11 RX ADMIN — DEXAMETHASONE 4 MG: 4 TABLET ORAL at 06:00

## 2017-02-11 RX ADMIN — HYDROMORPHONE HYDROCHLORIDE 1 MG: 1 INJECTION, SOLUTION INTRAMUSCULAR; INTRAVENOUS; SUBCUTANEOUS at 20:11

## 2017-02-11 RX ADMIN — DOCUSATE SODIUM 100 MG: 100 CAPSULE ORAL at 08:15

## 2017-02-11 RX ADMIN — HYDROMORPHONE HYDROCHLORIDE 1 MG: 1 INJECTION, SOLUTION INTRAMUSCULAR; INTRAVENOUS; SUBCUTANEOUS at 16:06

## 2017-02-11 ASSESSMENT — ENCOUNTER SYMPTOMS
ABDOMINAL PAIN: 0
HEARTBURN: 0
DIARRHEA: 0
WHEEZING: 0
VOMITING: 0
HEADACHES: 0
SORE THROAT: 0
NAUSEA: 0
BLURRED VISION: 0
DIZZINESS: 0
COUGH: 0
FEVER: 0
CHILLS: 0
SHORTNESS OF BREATH: 0
BACK PAIN: 1
FLANK PAIN: 0

## 2017-02-11 ASSESSMENT — PAIN SCALES - GENERAL
PAINLEVEL_OUTOF10: 8
PAINLEVEL_OUTOF10: 3
PAINLEVEL_OUTOF10: 3
PAINLEVEL_OUTOF10: 5
PAINLEVEL_OUTOF10: 7
PAINLEVEL_OUTOF10: 3
PAINLEVEL_OUTOF10: 6
PAINLEVEL_OUTOF10: 8

## 2017-02-11 NOTE — PROGRESS NOTES
Hospital Medicine Progress Note, Adult, Complex               Author: Mir Burleson Date & Time created: 2/10/2017  5:10 PM     Interval History:  Admitted for Intractable pain from Metastatic cancer, Urinary Retention, hematuria after trial of placement of Yañez catheter.    Intractable pain - better controlled  Urinary retention - bladder scans better  Hematuria - resolved  Delirium - no confusion today    Review of Systems:  Review of Systems   Constitutional: Negative for fever and chills.   HENT: Negative for sore throat.    Eyes: Negative for blurred vision.   Respiratory: Negative for cough, shortness of breath and wheezing.    Cardiovascular: Negative for chest pain and leg swelling.   Gastrointestinal: Negative for heartburn, nausea, vomiting, abdominal pain and diarrhea.   Genitourinary: Negative for dysuria, hematuria and flank pain.   Musculoskeletal: Positive for back pain and joint pain.   Neurological: Negative for dizziness and headaches.       Physical Exam:  Physical Exam   Constitutional: He is oriented to person, place, and time. He appears well-developed and well-nourished.   HENT:   Head: Normocephalic and atraumatic.   Eyes: Conjunctivae are normal. Pupils are equal, round, and reactive to light.   Neck: No tracheal deviation present. No thyromegaly present.   Cardiovascular: Normal rate and regular rhythm.    Pulmonary/Chest: Effort normal and breath sounds normal.   Abdominal: Soft. Bowel sounds are normal. He exhibits no distension. There is no tenderness.   Lymphadenopathy:     He has no cervical adenopathy.   Neurological: He is alert and oriented to person, place, and time.   MMT 5/5 except for LLE 2/5 likely secondary to pain   Skin: Skin is warm and dry.   Nursing note and vitals reviewed.      Labs:        Invalid input(s): [id number]      Recent Labs      02/07/17   2350  02/08/17   2357  02/09/17   2336   SODIUM  137  138  138   POTASSIUM  4.0  4.4  4.8   CHLORIDE  108  109   108   CO2  21  25  26   BUN  16  13  17   CREATININE  0.89  0.80  0.84   CALCIUM  8.7  9.2  9.0     Recent Labs      17   2336   GLUCOSE  161*  139*  130*     Recent Labs      17   2336   RBC  3.29*  3.15*  3.05*   HEMOGLOBIN  10.8*  10.0*  9.8*   HEMATOCRIT  31.8*  30.5*  29.5*   PLATELETCT  233  236  255     Recent Labs      17   2336   WBC  11.3*  13.1*  14.1*   NEUTSPOLYS   --   88.70*  88.00*   LYMPHOCYTES   --   5.60*  5.40*   MONOCYTES   --   5.00  5.70   EOSINOPHILS   --   0.00  0.00   BASOPHILS   --   0.20  0.10           Hemodynamics:  Temp (24hrs), Av.7 °C (98 °F), Min:36.4 °C (97.5 °F), Max:37.1 °C (98.8 °F)  Temperature: 36.4 °C (97.5 °F)  Pulse  Av.8  Min: 57  Max: 109   Blood Pressure : 111/67 mmHg     Respiratory:    Respiration: 18, Pulse Oximetry: 93 %           Fluids:    Intake/Output Summary (Last 24 hours) at 02/10/17 1710  Last data filed at 02/10/17 0900   Gross per 24 hour   Intake      0 ml   Output   1400 ml   Net  -1400 ml        GI/Nutrition:  Orders Placed This Encounter   Procedures   • Diet Order     Standing Status: Standing      Number of Occurrences: 1      Standing Expiration Date:      Order Specific Question:  Diet:     Answer:  Regular [1]     Medical Decision Making, by Problem:  Active Hospital Problems    Diagnosis   • *Intractable pain [R52]      decreased MS Contin to 15mg, continue IV Dilaudid, Decadron, for Radiation treatments   • Metastatic lung cancer (metastasis from lung to other site) (CMS-HCC) [C34.90]     Oncology following   • Loosening of prosthetic hip (CMS-HCC) [T84.038A, Z96.649]    TTWB LLE   • Urinary retention [R33.9]      Flomax, Bladder scans   • Hematuria [R31.9]      resolved   • Anemia [D64.9]     Follow hgb        Hyperglycemia.    Likely secondary to steroids, hgba1c 5.0       Vitamin D deficiency.     Vit D          Leukocytosis.   Reactive?    Medications reviewed, Labs reviewed and Radiology images reviewed  Yañez catheter: No Yañez      DVT Prophylaxis: Contraindicated - High bleeding risk  DVT prophylaxis - mechanical: SCDs  Ulcer prophylaxis: Yes

## 2017-02-11 NOTE — PROGRESS NOTES
Hospital Medicine Progress Note, Adult, Complex               Author: Mir Burleson Date & Time created: 2/11/2017  12:59 PM     Interval History:  Admitted for Intractable pain from Metastatic cancer, Urinary Retention, hematuria after trial of placement of Yañez catheter.    Intractable pain - better controlled, Radiation treatments  Urinary retention - bladder scans better  Hematuria - resolved  Delirium - resolved    Review of Systems:  Review of Systems   Constitutional: Negative for fever and chills.   HENT: Negative for sore throat.    Eyes: Negative for blurred vision.   Respiratory: Negative for cough, shortness of breath and wheezing.    Cardiovascular: Negative for chest pain and leg swelling.   Gastrointestinal: Negative for heartburn, nausea, vomiting, abdominal pain and diarrhea.   Genitourinary: Negative for dysuria, hematuria and flank pain.   Musculoskeletal: Positive for back pain and joint pain.   Neurological: Negative for dizziness and headaches.       Physical Exam:  Physical Exam   Constitutional: He is oriented to person, place, and time. He appears well-developed and well-nourished.   HENT:   Head: Normocephalic and atraumatic.   Eyes: Conjunctivae are normal. Pupils are equal, round, and reactive to light.   Neck: No tracheal deviation present. No thyromegaly present.   Cardiovascular: Normal rate and regular rhythm.    Pulmonary/Chest: Effort normal and breath sounds normal.   Abdominal: Soft. Bowel sounds are normal. He exhibits no distension. There is no tenderness.   Lymphadenopathy:     He has no cervical adenopathy.   Neurological: He is alert and oriented to person, place, and time.   MMT 5/5 except for LLE 2/5 likely secondary to pain   Skin: Skin is warm and dry.   Nursing note and vitals reviewed.      Labs:        Invalid input(s): [id number]      Recent Labs      02/08/17   2357  02/09/17   2336  02/10/17   2339   SODIUM  138  138  134*   POTASSIUM  4.4  4.8  4.0    CHLORIDE  109  108  106   CO2  25  26  23   BUN  13  17  18   CREATININE  0.80  0.84  0.76   CALCIUM  9.2  9.0  9.2     Recent Labs      02/08/17   2357  02/09/17   2336  02/10/17   2339   GLUCOSE  139*  130*  121*     Recent Labs      02/08/17   2357  02/09/17   2336  02/10/17   2339   RBC  3.15*  3.05*  3.25*   HEMOGLOBIN  10.0*  9.8*  10.3*   HEMATOCRIT  30.5*  29.5*  31.3*   PLATELETCT  236  255  283     Recent Labs      02/08/17   2357  02/09/17   2336  02/10/17   2339   WBC  13.1*  14.1*  12.8*   NEUTSPOLYS  88.70*  88.00*  82.80*   LYMPHOCYTES  5.60*  5.40*  8.10*   MONOCYTES  5.00  5.70  8.10   EOSINOPHILS  0.00  0.00  0.00   BASOPHILS  0.20  0.10  0.10           Hemodynamics:  Temp (24hrs), Av.7 °C (98.1 °F), Min:36.6 °C (97.8 °F), Max:37.1 °C (98.7 °F)  Temperature: 36.6 °C (97.8 °F)  Pulse  Av.5  Min: 57  Max: 109   Blood Pressure : 125/70 mmHg     Respiratory:    Respiration: 18, Pulse Oximetry: 94 %           Fluids:    Intake/Output Summary (Last 24 hours) at 17 1259  Last data filed at 17 0300   Gross per 24 hour   Intake    200 ml   Output   1550 ml   Net  -1350 ml        GI/Nutrition:  Orders Placed This Encounter   Procedures   • Diet Order     Standing Status: Standing      Number of Occurrences: 1      Standing Expiration Date:      Order Specific Question:  Diet:     Answer:  Regular [1]     Medical Decision Making, by Problem:  Active Hospital Problems    Diagnosis   • *Intractable pain [R52]     MS Contin, continue IV Dilaudid, Decadron, Radiation treatments   • Metastatic lung cancer (metastasis from lung to other site) (CMS-HCC) [C34.90]     Oncology following   • Loosening of prosthetic hip (CMS-HCC) [T84.038A, Z96.649]    TTWB LLE   • Urinary retention [R33.9]      Flomax, stop Bladder scans   • Hematuria [R31.9]      resolved   • Anemia [D64.9]        follow hgb        Hyperglycemia.    likely secondary to steroids, hgba1c 5.0       Vitamin D deficiency.     Vit D          Leukocytosis.      likely secondary to steroids    Medications reviewed, Labs reviewed and Radiology images reviewed  Yañez catheter: No Yañez      DVT Prophylaxis: Contraindicated - High bleeding risk  DVT prophylaxis - mechanical: SCDs  Ulcer prophylaxis: Yes

## 2017-02-11 NOTE — CARE PLAN
Problem: Safety  Goal: Will remain free from falls  Intervention: Assess risk factors for falls  Pt. Calls for assistance. Pt. Refuses bed alarm      Problem: Pain Management  Goal: Pain level will decrease to patient’s comfort goal  Intervention: Follow pain managment plan developed in collaboration with patient and Interdisciplinary Team  Pain medication given as needed pt. States pain improvement

## 2017-02-11 NOTE — DISCHARGE PLANNING
Care Transition Team Assessment  Met with pt at bedside to complete assessment.  Pt lives in a motor home at hi son's house.  Pt has five steps leading into his motor home which he does not believe he will be able to get up.  Pt reports that his son has small children who have toys all over the place, so pt reports that is not the best place for him to live as it is a tripping hazard.  Pt asked about assisted living facilities.  Pt reports that his monthly income is about $3000/month.  Discussed diffrent assisted living options such as [facility].  Pt reports that he would really prefer to find a way to be able to stay in his motor home at his son's house.  Discussed the [employer] services through the [employer] that can do a home evaluation and make his motor home more assessable; pt was interested in this option.  SW will provided him with contact information for the [employer].  Pt reports that he has a difficult time taking a shower himself as he is not able to bare any weight on his left leg.  Also discussed short term SNF placement vs. home with home health or hospice.  Pt would like to think about his options and have this SW f/u with him on Monday.    SW will f/u with pt on Monday and remain available to provide support and assist with d/c planning as needed.    Information Source  Orientation : Oriented x 4  Information Given By: Patient  Informant's Name:  (Gabriele Torres)  Who is responsible for making decisions for patient? : Patient    Readmission Evaluation  Is this a readmission?: No    Elopement Risk  Legal Hold: No  Ambulatory or Self Mobile in Wheelchair: Yes  Disoriented: No  Psychiatric Symptoms: None  History of Wandering: No  Elopement this Admit: No  Vocalizing Wanting to Leave: No  Displays Behaviors, Body Language Wanting to Leave: No-Not at Risk for Elopement  Elopement Risk: Not at Risk for Elopement    Interdisciplinary Discharge Planning  Primary Care Physician: Dr Baez  Dwaine  Lives with - Patient's Self Care Capacity: Alone and Able to Care For Self  Support Systems: Family Member(s)  Housing / Facility: Motor Home  Do You Take your Prescribed Medications Regularly: Yes  Able to Return to Previous ADL's: Future Time w/Therapy  Mobility Issues: Yes  Prior Services: Home-Independent  Assistance Needed: Yes  Durable Medical Equipment: Home Oxygen    Discharge Preparedness  What is your plan after discharge?: Uncertain - pending medical team collaboration  What are your discharge supports?: Child  Prior Functional Level: Ambulatory, Independent with Activities of Daily Living  Difficulity with ADLs: Bathing, Walking    Functional Assesment  Prior Functional Level: Ambulatory, Independent with Activities of Daily Living    Finances  Financial Barriers to Discharge: No  Prescription Coverage: Yes    Vision / Hearing Impairment  Vision Impairment : No  Hearing Impairment: Both Ears    Values / Beliefs / Concerns  Values / Beliefs Concerns : No    Advance Directive  Advance Directive?: None    Domestic Abuse  Have you ever been the victim of abuse or violence?: No  Physical Abuse or Sexual Abuse: No  Verbal Abuse or Emotional Abuse: No  Possible Abuse Reported to::     Psychological Assessment  History of Substance Abuse: None  History of Psychiatric Problems: No  Non-compliant with Treatment: No  Newly Diagnosed Illness: No    Discharge Risks or Barriers  Discharge risks or barriers?: Post-acute placement / services, Complex medical needs  Patient risk factors: Cognitive / sensory / physical deficit, Complex medical needs    Anticipated Discharge Information  Anticipated discharge disposition: Home

## 2017-02-11 NOTE — PROGRESS NOTES
Pt. Alert and oriented up with 2 person assist. Pt. Did get radiation therapy today pt. Tolerated well. Reviewed POC with pt. Call light within reach hourly rounding in practice.

## 2017-02-11 NOTE — PROGRESS NOTES
Pt calm and relaxed, claims moderate to severe pain at this time, no nausea. Pt is TTWB x1 assist when ambulating. Tolerating appetite and medications well. Bed in low position, treaded socks on, call light in reach and pt calls appropriately.

## 2017-02-11 NOTE — CARE PLAN
Problem: Safety  Goal: Will remain free from falls  Intervention: Assess risk factors for falls  Pt. At high risk for falls pt. Call approprietly      Problem: Pain Management  Goal: Pain level will decrease to patient’s comfort goal  Intervention: Follow pain managment plan developed in collaboration with patient and Interdisciplinary Team  Pain medication given as scheduled this am pt. States some pain improvement

## 2017-02-11 NOTE — PROGRESS NOTES
Pt. Alert and oriented up with one person assist with walker pt. Tolerates well. Scheduled pain medication given this am. It's Saturday no radiation treatment on the weekends. Reviewed POC with pt. Call light within reach hourly rounding in practice.

## 2017-02-12 LAB
ANION GAP SERPL CALC-SCNC: 6 MMOL/L (ref 0–11.9)
BASOPHILS # BLD AUTO: 0.1 % (ref 0–1.8)
BASOPHILS # BLD: 0.01 K/UL (ref 0–0.12)
BUN SERPL-MCNC: 17 MG/DL (ref 8–22)
CALCIUM SERPL-MCNC: 9.2 MG/DL (ref 8.5–10.5)
CHLORIDE SERPL-SCNC: 106 MMOL/L (ref 96–112)
CO2 SERPL-SCNC: 22 MMOL/L (ref 20–33)
CREAT SERPL-MCNC: 0.76 MG/DL (ref 0.5–1.4)
EOSINOPHIL # BLD AUTO: 0 K/UL (ref 0–0.51)
EOSINOPHIL NFR BLD: 0 % (ref 0–6.9)
ERYTHROCYTE [DISTWIDTH] IN BLOOD BY AUTOMATED COUNT: 46.5 FL (ref 35.9–50)
GFR SERPL CREATININE-BSD FRML MDRD: >60 ML/MIN/1.73 M 2
GLUCOSE SERPL-MCNC: 120 MG/DL (ref 65–99)
HCT VFR BLD AUTO: 33.6 % (ref 42–52)
HGB BLD-MCNC: 11.1 G/DL (ref 14–18)
IMM GRANULOCYTES # BLD AUTO: 0.07 K/UL (ref 0–0.11)
IMM GRANULOCYTES NFR BLD AUTO: 0.6 % (ref 0–0.9)
LYMPHOCYTES # BLD AUTO: 0.71 K/UL (ref 1–4.8)
LYMPHOCYTES NFR BLD: 6 % (ref 22–41)
MCH RBC QN AUTO: 31.4 PG (ref 27–33)
MCHC RBC AUTO-ENTMCNC: 33 G/DL (ref 33.7–35.3)
MCV RBC AUTO: 94.9 FL (ref 81.4–97.8)
MONOCYTES # BLD AUTO: 0.75 K/UL (ref 0–0.85)
MONOCYTES NFR BLD AUTO: 6.4 % (ref 0–13.4)
NEUTROPHILS # BLD AUTO: 10.26 K/UL (ref 1.82–7.42)
NEUTROPHILS NFR BLD: 86.9 % (ref 44–72)
NRBC # BLD AUTO: 0 K/UL
NRBC BLD AUTO-RTO: 0 /100 WBC
PLATELET # BLD AUTO: 295 K/UL (ref 164–446)
PMV BLD AUTO: 9.4 FL (ref 9–12.9)
POTASSIUM SERPL-SCNC: 4.1 MMOL/L (ref 3.6–5.5)
RBC # BLD AUTO: 3.54 M/UL (ref 4.7–6.1)
SODIUM SERPL-SCNC: 134 MMOL/L (ref 135–145)
WBC # BLD AUTO: 11.8 K/UL (ref 4.8–10.8)

## 2017-02-12 PROCEDURE — A9270 NON-COVERED ITEM OR SERVICE: HCPCS | Performed by: FAMILY MEDICINE

## 2017-02-12 PROCEDURE — 700102 HCHG RX REV CODE 250 W/ 637 OVERRIDE(OP): Performed by: HOSPITALIST

## 2017-02-12 PROCEDURE — 80048 BASIC METABOLIC PNL TOTAL CA: CPT

## 2017-02-12 PROCEDURE — 36415 COLL VENOUS BLD VENIPUNCTURE: CPT

## 2017-02-12 PROCEDURE — 700112 HCHG RX REV CODE 229: Performed by: HOSPITALIST

## 2017-02-12 PROCEDURE — 770009 HCHG ROOM/CARE - ONCOLOGY SEMI PRI*

## 2017-02-12 PROCEDURE — 700102 HCHG RX REV CODE 250 W/ 637 OVERRIDE(OP): Performed by: FAMILY MEDICINE

## 2017-02-12 PROCEDURE — 700111 HCHG RX REV CODE 636 W/ 250 OVERRIDE (IP): Performed by: HOSPITALIST

## 2017-02-12 PROCEDURE — A9270 NON-COVERED ITEM OR SERVICE: HCPCS | Performed by: HOSPITALIST

## 2017-02-12 PROCEDURE — 99232 SBSQ HOSP IP/OBS MODERATE 35: CPT | Performed by: FAMILY MEDICINE

## 2017-02-12 PROCEDURE — 85025 COMPLETE CBC W/AUTO DIFF WBC: CPT

## 2017-02-12 PROCEDURE — 700102 HCHG RX REV CODE 250 W/ 637 OVERRIDE(OP): Performed by: INTERNAL MEDICINE

## 2017-02-12 PROCEDURE — A9270 NON-COVERED ITEM OR SERVICE: HCPCS | Performed by: INTERNAL MEDICINE

## 2017-02-12 RX ORDER — MORPHINE SULFATE 30 MG/1
30 TABLET, FILM COATED, EXTENDED RELEASE ORAL EVERY 12 HOURS
Status: DISCONTINUED | OUTPATIENT
Start: 2017-02-12 | End: 2017-02-15 | Stop reason: HOSPADM

## 2017-02-12 RX ADMIN — DEXAMETHASONE 4 MG: 4 TABLET ORAL at 13:08

## 2017-02-12 RX ADMIN — HYDROMORPHONE HYDROCHLORIDE 1 MG: 1 INJECTION, SOLUTION INTRAMUSCULAR; INTRAVENOUS; SUBCUTANEOUS at 20:39

## 2017-02-12 RX ADMIN — DEXAMETHASONE 4 MG: 4 TABLET ORAL at 06:43

## 2017-02-12 RX ADMIN — MORPHINE SULFATE 15 MG: 15 TABLET, EXTENDED RELEASE ORAL at 07:58

## 2017-02-12 RX ADMIN — OMEPRAZOLE 20 MG: 20 CAPSULE, DELAYED RELEASE ORAL at 07:58

## 2017-02-12 RX ADMIN — MORPHINE SULFATE 30 MG: 30 TABLET, EXTENDED RELEASE ORAL at 21:20

## 2017-02-12 RX ADMIN — DEXAMETHASONE 4 MG: 4 TABLET ORAL at 21:20

## 2017-02-12 RX ADMIN — HYDROMORPHONE HYDROCHLORIDE 1 MG: 1 INJECTION, SOLUTION INTRAMUSCULAR; INTRAVENOUS; SUBCUTANEOUS at 01:07

## 2017-02-12 RX ADMIN — STANDARDIZED SENNA CONCENTRATE AND DOCUSATE SODIUM 1 TABLET: 8.6; 5 TABLET, FILM COATED ORAL at 21:20

## 2017-02-12 RX ADMIN — CHOLECALCIFEROL TAB 25 MCG (1000 UNIT) 2000 UNITS: 25 TAB at 07:57

## 2017-02-12 RX ADMIN — HYDROMORPHONE HYDROCHLORIDE 1 MG: 1 INJECTION, SOLUTION INTRAMUSCULAR; INTRAVENOUS; SUBCUTANEOUS at 08:03

## 2017-02-12 RX ADMIN — HYDROMORPHONE HYDROCHLORIDE 2 MG: 1 INJECTION, SOLUTION INTRAMUSCULAR; INTRAVENOUS; SUBCUTANEOUS at 23:10

## 2017-02-12 RX ADMIN — HYDROMORPHONE HYDROCHLORIDE 1 MG: 1 INJECTION, SOLUTION INTRAMUSCULAR; INTRAVENOUS; SUBCUTANEOUS at 15:54

## 2017-02-12 RX ADMIN — TAMSULOSIN HYDROCHLORIDE 0.4 MG: 0.4 CAPSULE ORAL at 07:58

## 2017-02-12 RX ADMIN — DOCUSATE SODIUM 100 MG: 100 CAPSULE ORAL at 07:57

## 2017-02-12 ASSESSMENT — PAIN SCALES - GENERAL
PAINLEVEL_OUTOF10: 7
PAINLEVEL_OUTOF10: 8

## 2017-02-12 ASSESSMENT — ENCOUNTER SYMPTOMS
FEVER: 0
CHILLS: 0
HEARTBURN: 0
VOMITING: 0
NAUSEA: 0
HEADACHES: 0
FLANK PAIN: 0
COUGH: 0
WHEEZING: 0
SORE THROAT: 0
DIZZINESS: 0
BACK PAIN: 1
DIARRHEA: 0
SHORTNESS OF BREATH: 0
BLURRED VISION: 0
ABDOMINAL PAIN: 0

## 2017-02-12 NOTE — CARE PLAN
Problem: Pain Management  Goal: Pain level will decrease to patient’s comfort goal  Outcome: PROGRESSING AS EXPECTED  PRN pain meds and non-pharmacological interventions.    Problem: Mobility  Goal: Risk for activity intolerance will decrease  Outcome: PROGRESSING AS EXPECTED  Left hip bone mets/deterioration.  Pt uses front wheel walker and remains NWB LLE for comfort.

## 2017-02-12 NOTE — PROGRESS NOTES
Hospital Medicine Progress Note, Adult, Complex               Author: Mir Burleson Date & Time created: 2/12/2017  1:48 PM     Interval History:  Admitted for Intractable pain from Metastatic cancer, Urinary Retention, hematuria after trial of placement of Yañez catheter.    Intractable pain - more pain otday, Radiation treatments  Urinary retention - bladder scans better  Hematuria - resolved  Delirium - resolved    Review of Systems:  Review of Systems   Constitutional: Negative for fever and chills.   HENT: Negative for sore throat.    Eyes: Negative for blurred vision.   Respiratory: Negative for cough, shortness of breath and wheezing.    Cardiovascular: Negative for chest pain and leg swelling.   Gastrointestinal: Negative for heartburn, nausea, vomiting, abdominal pain and diarrhea.   Genitourinary: Negative for dysuria, hematuria and flank pain.   Musculoskeletal: Positive for back pain and joint pain.   Neurological: Negative for dizziness and headaches.       Physical Exam:  Physical Exam   Constitutional: He is oriented to person, place, and time. He appears well-developed and well-nourished.   HENT:   Head: Normocephalic and atraumatic.   Eyes: Conjunctivae are normal. Pupils are equal, round, and reactive to light.   Neck: No tracheal deviation present. No thyromegaly present.   Cardiovascular: Normal rate and regular rhythm.    Pulmonary/Chest: Effort normal and breath sounds normal.   Abdominal: Soft. Bowel sounds are normal. He exhibits no distension. There is no tenderness.   Lymphadenopathy:     He has no cervical adenopathy.   Neurological: He is alert and oriented to person, place, and time.   MMT 5/5 except for LLE 2/5 likely secondary to pain   Skin: Skin is warm and dry.   Nursing note and vitals reviewed.      Labs:        Invalid input(s): [id number]      Recent Labs      02/09/17   2336  02/10/17   2339  02/11/17   2349   SODIUM  138  134*  134*   POTASSIUM  4.8  4.0  4.1    CHLORIDE  108  106  106   CO2  26  23  22   BUN  17  18  17   CREATININE  0.84  0.76  0.76   CALCIUM  9.0  9.2  9.2     Recent Labs      02/09/17   2336  02/10/17   2339  02/11/17   2349   GLUCOSE  130*  121*  120*     Recent Labs      02/09/17   2336  02/10/17   2339  02/11/17   2349   RBC  3.05*  3.25*  3.54*   HEMOGLOBIN  9.8*  10.3*  11.1*   HEMATOCRIT  29.5*  31.3*  33.6*   PLATELETCT  255  283  295     Recent Labs      02/09/17   2336  02/10/17   2339  02/11/17   2349   WBC  14.1*  12.8*  11.8*   NEUTSPOLYS  88.00*  82.80*  86.90*   LYMPHOCYTES  5.40*  8.10*  6.00*   MONOCYTES  5.70  8.10  6.40   EOSINOPHILS  0.00  0.00  0.00   BASOPHILS  0.10  0.10  0.10           Hemodynamics:  Temp (24hrs), Av.7 °C (98 °F), Min:36 °C (96.8 °F), Max:37.2 °C (98.9 °F)  Temperature: 36 °C (96.8 °F)  Pulse  Av.7  Min: 57  Max: 109   Blood Pressure : 124/73 mmHg     Respiratory:    Respiration: 16, Pulse Oximetry: 94 %           Fluids:    Intake/Output Summary (Last 24 hours) at 17 1348  Last data filed at 17 0500   Gross per 24 hour   Intake      0 ml   Output   2100 ml   Net  -2100 ml        GI/Nutrition:  Orders Placed This Encounter   Procedures   • Diet Order     Standing Status: Standing      Number of Occurrences: 1      Standing Expiration Date:      Order Specific Question:  Diet:     Answer:  Regular [1]     Medical Decision Making, by Problem:  Active Hospital Problems    Diagnosis   • *Intractable pain [R52]    increase MS Contin, continue IV Dilaudid, Decadron, Radiation treatments, PT and OT evals   • Metastatic lung cancer (metastasis from lung to other site) (CMS-HCC) [C34.90]     Oncology following   • 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    likely secondary to steroids, hgba1c 5.0       Vitamin D deficiency.     Vit D          Leukocytosis.      likely secondary to steroids    Medications reviewed, Labs reviewed and Radiology images reviewed  Yañez catheter: No Yañez      DVT Prophylaxis: Contraindicated - High bleeding risk  DVT prophylaxis - mechanical: SCDs  Ulcer prophylaxis: Yes

## 2017-02-13 LAB
ANION GAP SERPL CALC-SCNC: 5 MMOL/L (ref 0–11.9)
BASOPHILS # BLD AUTO: 0.1 % (ref 0–1.8)
BASOPHILS # BLD: 0.01 K/UL (ref 0–0.12)
BUN SERPL-MCNC: 22 MG/DL (ref 8–22)
CALCIUM SERPL-MCNC: 9.3 MG/DL (ref 8.5–10.5)
CHLORIDE SERPL-SCNC: 104 MMOL/L (ref 96–112)
CO2 SERPL-SCNC: 26 MMOL/L (ref 20–33)
CREAT SERPL-MCNC: 0.93 MG/DL (ref 0.5–1.4)
EOSINOPHIL # BLD AUTO: 0 K/UL (ref 0–0.51)
EOSINOPHIL NFR BLD: 0 % (ref 0–6.9)
ERYTHROCYTE [DISTWIDTH] IN BLOOD BY AUTOMATED COUNT: 47.1 FL (ref 35.9–50)
GFR SERPL CREATININE-BSD FRML MDRD: >60 ML/MIN/1.73 M 2
GLUCOSE SERPL-MCNC: 128 MG/DL (ref 65–99)
HCT VFR BLD AUTO: 34.1 % (ref 42–52)
HGB BLD-MCNC: 11.5 G/DL (ref 14–18)
IMM GRANULOCYTES # BLD AUTO: 0.07 K/UL (ref 0–0.11)
IMM GRANULOCYTES NFR BLD AUTO: 0.6 % (ref 0–0.9)
LYMPHOCYTES # BLD AUTO: 0.78 K/UL (ref 1–4.8)
LYMPHOCYTES NFR BLD: 6.4 % (ref 22–41)
MCH RBC QN AUTO: 32.1 PG (ref 27–33)
MCHC RBC AUTO-ENTMCNC: 33.7 G/DL (ref 33.7–35.3)
MCV RBC AUTO: 95.3 FL (ref 81.4–97.8)
MONOCYTES # BLD AUTO: 0.8 K/UL (ref 0–0.85)
MONOCYTES NFR BLD AUTO: 6.6 % (ref 0–13.4)
NEUTROPHILS # BLD AUTO: 10.49 K/UL (ref 1.82–7.42)
NEUTROPHILS NFR BLD: 86.3 % (ref 44–72)
NRBC # BLD AUTO: 0 K/UL
NRBC BLD AUTO-RTO: 0 /100 WBC
PLATELET # BLD AUTO: 316 K/UL (ref 164–446)
PMV BLD AUTO: 9.1 FL (ref 9–12.9)
POTASSIUM SERPL-SCNC: 5.1 MMOL/L (ref 3.6–5.5)
RBC # BLD AUTO: 3.58 M/UL (ref 4.7–6.1)
SODIUM SERPL-SCNC: 135 MMOL/L (ref 135–145)
WBC # BLD AUTO: 12.2 K/UL (ref 4.8–10.8)

## 2017-02-13 PROCEDURE — 77014 PR CT GUIDANCE PLACEMENT RAD THERAPY FIELDS: CPT | Mod: 26 | Performed by: RADIOLOGY

## 2017-02-13 PROCEDURE — A9270 NON-COVERED ITEM OR SERVICE: HCPCS | Performed by: FAMILY MEDICINE

## 2017-02-13 PROCEDURE — 77336 RADIATION PHYSICS CONSULT: CPT | Performed by: RADIOLOGY

## 2017-02-13 PROCEDURE — 700112 HCHG RX REV CODE 229: Performed by: HOSPITALIST

## 2017-02-13 PROCEDURE — 700102 HCHG RX REV CODE 250 W/ 637 OVERRIDE(OP): Performed by: FAMILY MEDICINE

## 2017-02-13 PROCEDURE — 85025 COMPLETE CBC W/AUTO DIFF WBC: CPT

## 2017-02-13 PROCEDURE — 36415 COLL VENOUS BLD VENIPUNCTURE: CPT

## 2017-02-13 PROCEDURE — 99232 SBSQ HOSP IP/OBS MODERATE 35: CPT | Performed by: FAMILY MEDICINE

## 2017-02-13 PROCEDURE — 700102 HCHG RX REV CODE 250 W/ 637 OVERRIDE(OP): Performed by: INTERNAL MEDICINE

## 2017-02-13 PROCEDURE — A9270 NON-COVERED ITEM OR SERVICE: HCPCS | Performed by: INTERNAL MEDICINE

## 2017-02-13 PROCEDURE — 770009 HCHG ROOM/CARE - ONCOLOGY SEMI PRI*

## 2017-02-13 PROCEDURE — 77412 RADIATION TX DELIVERY LVL 3: CPT | Performed by: RADIOLOGY

## 2017-02-13 PROCEDURE — 700102 HCHG RX REV CODE 250 W/ 637 OVERRIDE(OP): Performed by: HOSPITALIST

## 2017-02-13 PROCEDURE — A9270 NON-COVERED ITEM OR SERVICE: HCPCS | Performed by: HOSPITALIST

## 2017-02-13 PROCEDURE — 700111 HCHG RX REV CODE 636 W/ 250 OVERRIDE (IP): Performed by: HOSPITALIST

## 2017-02-13 PROCEDURE — 80048 BASIC METABOLIC PNL TOTAL CA: CPT

## 2017-02-13 RX ORDER — HYDROMORPHONE HYDROCHLORIDE 4 MG/1
4 TABLET ORAL
Status: DISCONTINUED | OUTPATIENT
Start: 2017-02-13 | End: 2017-02-15 | Stop reason: HOSPADM

## 2017-02-13 RX ORDER — HYDROMORPHONE HYDROCHLORIDE 2 MG/1
2 TABLET ORAL
Status: DISCONTINUED | OUTPATIENT
Start: 2017-02-13 | End: 2017-02-15 | Stop reason: HOSPADM

## 2017-02-13 RX ORDER — HYDROMORPHONE HYDROCHLORIDE 2 MG/1
2-4 TABLET ORAL
Status: DISCONTINUED | OUTPATIENT
Start: 2017-02-13 | End: 2017-02-13

## 2017-02-13 RX ADMIN — DEXAMETHASONE 4 MG: 4 TABLET ORAL at 20:40

## 2017-02-13 RX ADMIN — DEXAMETHASONE 4 MG: 4 TABLET ORAL at 15:32

## 2017-02-13 RX ADMIN — CHOLECALCIFEROL TAB 25 MCG (1000 UNIT) 2000 UNITS: 25 TAB at 07:58

## 2017-02-13 RX ADMIN — OMEPRAZOLE 20 MG: 20 CAPSULE, DELAYED RELEASE ORAL at 07:59

## 2017-02-13 RX ADMIN — HYDROMORPHONE HYDROCHLORIDE 2 MG: 2 TABLET ORAL at 12:59

## 2017-02-13 RX ADMIN — TAMSULOSIN HYDROCHLORIDE 0.4 MG: 0.4 CAPSULE ORAL at 07:59

## 2017-02-13 RX ADMIN — HYDROMORPHONE HYDROCHLORIDE 2 MG: 1 INJECTION, SOLUTION INTRAMUSCULAR; INTRAVENOUS; SUBCUTANEOUS at 23:29

## 2017-02-13 RX ADMIN — HYDROMORPHONE HYDROCHLORIDE 2 MG: 1 INJECTION, SOLUTION INTRAMUSCULAR; INTRAVENOUS; SUBCUTANEOUS at 21:19

## 2017-02-13 RX ADMIN — STANDARDIZED SENNA CONCENTRATE AND DOCUSATE SODIUM 1 TABLET: 8.6; 5 TABLET, FILM COATED ORAL at 20:40

## 2017-02-13 RX ADMIN — MORPHINE SULFATE 30 MG: 30 TABLET, EXTENDED RELEASE ORAL at 07:59

## 2017-02-13 RX ADMIN — MORPHINE SULFATE 30 MG: 30 TABLET, EXTENDED RELEASE ORAL at 20:40

## 2017-02-13 RX ADMIN — HYDROMORPHONE HYDROCHLORIDE 2 MG: 1 INJECTION, SOLUTION INTRAMUSCULAR; INTRAVENOUS; SUBCUTANEOUS at 02:42

## 2017-02-13 RX ADMIN — HYDROMORPHONE HYDROCHLORIDE 2 MG: 1 INJECTION, SOLUTION INTRAMUSCULAR; INTRAVENOUS; SUBCUTANEOUS at 05:05

## 2017-02-13 RX ADMIN — DEXAMETHASONE 4 MG: 4 TABLET ORAL at 05:01

## 2017-02-13 RX ADMIN — HYDROMORPHONE HYDROCHLORIDE 2 MG: 2 TABLET ORAL at 17:29

## 2017-02-13 RX ADMIN — HYDROMORPHONE HYDROCHLORIDE 2 MG: 1 INJECTION, SOLUTION INTRAMUSCULAR; INTRAVENOUS; SUBCUTANEOUS at 09:55

## 2017-02-13 RX ADMIN — DOCUSATE SODIUM 100 MG: 100 CAPSULE ORAL at 07:58

## 2017-02-13 RX ADMIN — DIAZEPAM 2 MG: 2 TABLET ORAL at 04:49

## 2017-02-13 ASSESSMENT — ENCOUNTER SYMPTOMS
CHILLS: 0
DIZZINESS: 0
HEARTBURN: 0
BACK PAIN: 1
DIARRHEA: 0
NAUSEA: 0
HEADACHES: 0
WHEEZING: 0
FLANK PAIN: 0
COUGH: 0
SHORTNESS OF BREATH: 0
FEVER: 0
SORE THROAT: 0
BLURRED VISION: 0
ABDOMINAL PAIN: 0
VOMITING: 0

## 2017-02-13 ASSESSMENT — PAIN SCALES - GENERAL
PAINLEVEL_OUTOF10: 5
PAINLEVEL_OUTOF10: 6
PAINLEVEL_OUTOF10: 6
PAINLEVEL_OUTOF10: 7
PAINLEVEL_OUTOF10: 6

## 2017-02-13 NOTE — PROGRESS NOTES
"Pt A&Ox4. VS: /78 mmHg  Pulse 98  Temp(Src) 36 °C (96.8 °F)  Resp 18  Ht 1.753 m (5' 9\")  Wt 86.6 kg (190 lb 14.7 oz)  BMI 28.18 kg/m2  SpO2 95%. Pt denies n/v, numbness, tingling, SOB and chest pain. Pt states pain okay prior to radiation, but up to 7/10 after radiation. PRN pain medication administered per MAR. Pt needs met at this time, call light within reach, hourly rounding in effect, and will continue to monitor.     Discussed with Dr. Burleson during rounds about transitioning to oral pain medications per Dr. Burleson change to PO with same dose and frequency.    "

## 2017-02-13 NOTE — DISCHARGE PLANNING
Medical Social Work    Referral:  Discharge Planning    Intervention:  ERNESTINE met with pt at bedside and provided him with the information for the [facility] to see if they can do modifications on his mobile home to make it more accessible for him.  Discussed the recommendation of SNF placement prior to d/c home.  Pt is agreeable to SNF placement, but would like to research the SNFs prior to making a choice.  ERNESTINE provided him with SNF choice form and brochures.  Pt will notify his RN once he has made a SNF choice.    Pt will complete his last radiation treatment on Wednesday 2/15/16.  Pt will not be ready for SNF transfer until he completes his radiation treatment.    Plan:  Await SNF choice.  SS will remain available to provide support and assist with d/c planning as needed.

## 2017-02-13 NOTE — CARE PLAN
Problem: Safety  Goal: Will remain free from injury  Hourly rounding in effect, pt instructed to call for assistance, bed locked and in lowest position. Bed alarm off, with Kristi as second RN. Pt calls appropriately for assistance        Problem: Knowledge Deficit  Goal: Knowledge of disease process/condition, treatment plan, diagnostic tests, and medications will improve  Pt updated and educated on nursing interventions, medications and POC

## 2017-02-13 NOTE — PROGRESS NOTES
Patient is off the floor adding radiation  He is planned to get 10 fractions of radiation  Plan is to start him on systemic therapy post completion of radiation  I will continue to follow periodically.

## 2017-02-13 NOTE — PROGRESS NOTES
Hospital Medicine Progress Note, Adult, Complex               Author: Mir Burleson Date & Time created: 2/13/2017  2:53 PM     Interval History:  Admitted for Intractable pain from Metastatic cancer, Urinary Retention, hematuria after trial of placement of Yañez catheter.    Intractable pain - pain better controlled, Radiation treatments  Urinary retention - bladder scans better  Hematuria - resolved  Delirium - resolved    Review of Systems:  Review of Systems   Constitutional: Negative for fever and chills.   HENT: Negative for sore throat.    Eyes: Negative for blurred vision.   Respiratory: Negative for cough, shortness of breath and wheezing.    Cardiovascular: Negative for chest pain and leg swelling.   Gastrointestinal: Negative for heartburn, nausea, vomiting, abdominal pain and diarrhea.   Genitourinary: Negative for dysuria, hematuria and flank pain.   Musculoskeletal: Positive for back pain and joint pain.   Neurological: Negative for dizziness and headaches.       Physical Exam:  Physical Exam   Constitutional: He is oriented to person, place, and time. He appears well-developed and well-nourished.   HENT:   Head: Normocephalic and atraumatic.   Eyes: Conjunctivae are normal. Pupils are equal, round, and reactive to light.   Neck: No tracheal deviation present. No thyromegaly present.   Cardiovascular: Normal rate and regular rhythm.    Pulmonary/Chest: Effort normal and breath sounds normal.   Abdominal: Soft. Bowel sounds are normal. He exhibits no distension. There is no tenderness.   Lymphadenopathy:     He has no cervical adenopathy.   Neurological: He is alert and oriented to person, place, and time.   MMT 5/5 except for LLE 2/5 likely secondary to pain   Skin: Skin is warm and dry.   Nursing note and vitals reviewed.      Labs:        Invalid input(s): [id number]      Recent Labs      02/10/17   2339  02/11/17   2349  02/12/17   2351   SODIUM  134*  134*  135   POTASSIUM  4.0  4.1  5.1    CHLORIDE  106  106  104   CO2  23  22  26   BUN  18  17  22   CREATININE  0.76  0.76  0.93   CALCIUM  9.2  9.2  9.3     Recent Labs      02/10/17   2339  02/11/17   2349  02/12/17   2351   GLUCOSE  121*  120*  128*     Recent Labs      02/10/17   2339  02/11/17   2349  02/12/17   2351   RBC  3.25*  3.54*  3.58*   HEMOGLOBIN  10.3*  11.1*  11.5*   HEMATOCRIT  31.3*  33.6*  34.1*   PLATELETCT  283  295  316     Recent Labs      02/10/17   2339  02/11/17   2349  02/12/17   2351   WBC  12.8*  11.8*  12.2*   NEUTSPOLYS  82.80*  86.90*  86.30*   LYMPHOCYTES  8.10*  6.00*  6.40*   MONOCYTES  8.10  6.40  6.60   EOSINOPHILS  0.00  0.00  0.00   BASOPHILS  0.10  0.10  0.10           Hemodynamics:  Temp (24hrs), Av.5 °C (97.7 °F), Min:36 °C (96.8 °F), Max:37.2 °C (99 °F)  Temperature: 36 °C (96.8 °F)  Pulse  Av.4  Min: 57  Max: 109   Blood Pressure : 110/78 mmHg     Respiratory:    Respiration: 18, Pulse Oximetry: 95 %     Work Of Breathing / Effort: Mild     Fluids:    Intake/Output Summary (Last 24 hours) at 17 1453  Last data filed at 17 0450   Gross per 24 hour   Intake      0 ml   Output   2275 ml   Net  -2275 ml        GI/Nutrition:  Orders Placed This Encounter   Procedures   • Diet Order     Standing Status: Standing      Number of Occurrences: 1      Standing Expiration Date:      Order Specific Question:  Diet:     Answer:  Regular [1]     Medical Decision Making, by Problem:  Active Hospital Problems    Diagnosis   • *Intractable pain [R52]    MS Contin, continue IV Dilaudid, Decadron, Radiation treatments until 2/15, PT and OT evals   • Metastatic lung cancer (metastasis from lung to other site) (CMS-HCC) [C34.90]     Oncology following   • 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    likely secondary to steroids, hgba1c 5.0        Vitamin D deficiency.     Vit D         Leukocytosis.      likely secondary to steroids    Medications reviewed, Labs reviewed and Radiology images reviewed  Yañez catheter: No Yañez      DVT Prophylaxis: Contraindicated - High bleeding risk  DVT prophylaxis - mechanical: SCDs  Ulcer prophylaxis: Yes

## 2017-02-13 NOTE — PROGRESS NOTES
Patient received treatment in Radiation Therapy. Patient received treatment number 3 of 5 planned.

## 2017-02-14 LAB
ANION GAP SERPL CALC-SCNC: 8 MMOL/L (ref 0–11.9)
BASOPHILS # BLD AUTO: 0.1 % (ref 0–1.8)
BASOPHILS # BLD: 0.02 K/UL (ref 0–0.12)
BUN SERPL-MCNC: 25 MG/DL (ref 8–22)
CALCIUM SERPL-MCNC: 9.2 MG/DL (ref 8.5–10.5)
CHLORIDE SERPL-SCNC: 102 MMOL/L (ref 96–112)
CO2 SERPL-SCNC: 22 MMOL/L (ref 20–33)
CREAT SERPL-MCNC: 0.75 MG/DL (ref 0.5–1.4)
EOSINOPHIL # BLD AUTO: 0 K/UL (ref 0–0.51)
EOSINOPHIL NFR BLD: 0 % (ref 0–6.9)
ERYTHROCYTE [DISTWIDTH] IN BLOOD BY AUTOMATED COUNT: 46.3 FL (ref 35.9–50)
GFR SERPL CREATININE-BSD FRML MDRD: >60 ML/MIN/1.73 M 2
GLUCOSE SERPL-MCNC: 133 MG/DL (ref 65–99)
HCT VFR BLD AUTO: 35.2 % (ref 42–52)
HGB BLD-MCNC: 12 G/DL (ref 14–18)
IMM GRANULOCYTES # BLD AUTO: 0.11 K/UL (ref 0–0.11)
IMM GRANULOCYTES NFR BLD AUTO: 0.8 % (ref 0–0.9)
LYMPHOCYTES # BLD AUTO: 0.24 K/UL (ref 1–4.8)
LYMPHOCYTES NFR BLD: 1.7 % (ref 22–41)
MCH RBC QN AUTO: 32.3 PG (ref 27–33)
MCHC RBC AUTO-ENTMCNC: 34.1 G/DL (ref 33.7–35.3)
MCV RBC AUTO: 94.9 FL (ref 81.4–97.8)
MONOCYTES # BLD AUTO: 0.6 K/UL (ref 0–0.85)
MONOCYTES NFR BLD AUTO: 4.1 % (ref 0–13.4)
NEUTROPHILS # BLD AUTO: 13.55 K/UL (ref 1.82–7.42)
NEUTROPHILS NFR BLD: 93.3 % (ref 44–72)
NRBC # BLD AUTO: 0 K/UL
NRBC BLD AUTO-RTO: 0 /100 WBC
PLATELET # BLD AUTO: 314 K/UL (ref 164–446)
PMV BLD AUTO: 9.1 FL (ref 9–12.9)
POTASSIUM SERPL-SCNC: 4.3 MMOL/L (ref 3.6–5.5)
RBC # BLD AUTO: 3.71 M/UL (ref 4.7–6.1)
SODIUM SERPL-SCNC: 132 MMOL/L (ref 135–145)
WBC # BLD AUTO: 14.5 K/UL (ref 4.8–10.8)

## 2017-02-14 PROCEDURE — 700102 HCHG RX REV CODE 250 W/ 637 OVERRIDE(OP): Performed by: FAMILY MEDICINE

## 2017-02-14 PROCEDURE — 770009 HCHG ROOM/CARE - ONCOLOGY SEMI PRI*

## 2017-02-14 PROCEDURE — 97535 SELF CARE MNGMENT TRAINING: CPT

## 2017-02-14 PROCEDURE — 700111 HCHG RX REV CODE 636 W/ 250 OVERRIDE (IP): Performed by: HOSPITALIST

## 2017-02-14 PROCEDURE — 80048 BASIC METABOLIC PNL TOTAL CA: CPT

## 2017-02-14 PROCEDURE — 77412 RADIATION TX DELIVERY LVL 3: CPT | Performed by: RADIOLOGY

## 2017-02-14 PROCEDURE — A9270 NON-COVERED ITEM OR SERVICE: HCPCS | Performed by: INTERNAL MEDICINE

## 2017-02-14 PROCEDURE — 77014 PR CT GUIDANCE PLACEMENT RAD THERAPY FIELDS: CPT | Mod: 26 | Performed by: RADIOLOGY

## 2017-02-14 PROCEDURE — 700102 HCHG RX REV CODE 250 W/ 637 OVERRIDE(OP): Performed by: INTERNAL MEDICINE

## 2017-02-14 PROCEDURE — A9270 NON-COVERED ITEM OR SERVICE: HCPCS | Performed by: FAMILY MEDICINE

## 2017-02-14 PROCEDURE — A9270 NON-COVERED ITEM OR SERVICE: HCPCS | Performed by: HOSPITALIST

## 2017-02-14 PROCEDURE — 700112 HCHG RX REV CODE 229: Performed by: HOSPITALIST

## 2017-02-14 PROCEDURE — 97530 THERAPEUTIC ACTIVITIES: CPT

## 2017-02-14 PROCEDURE — 85025 COMPLETE CBC W/AUTO DIFF WBC: CPT

## 2017-02-14 PROCEDURE — 700102 HCHG RX REV CODE 250 W/ 637 OVERRIDE(OP): Performed by: HOSPITALIST

## 2017-02-14 PROCEDURE — 99233 SBSQ HOSP IP/OBS HIGH 50: CPT | Performed by: INTERNAL MEDICINE

## 2017-02-14 PROCEDURE — 36415 COLL VENOUS BLD VENIPUNCTURE: CPT

## 2017-02-14 RX ORDER — GABAPENTIN 100 MG/1
100 CAPSULE ORAL 3 TIMES DAILY
Status: DISCONTINUED | OUTPATIENT
Start: 2017-02-14 | End: 2017-02-15

## 2017-02-14 RX ADMIN — DOCUSATE SODIUM 100 MG: 100 CAPSULE ORAL at 10:11

## 2017-02-14 RX ADMIN — TAMSULOSIN HYDROCHLORIDE 0.4 MG: 0.4 CAPSULE ORAL at 10:11

## 2017-02-14 RX ADMIN — HYDROMORPHONE HYDROCHLORIDE 2 MG: 2 TABLET ORAL at 04:54

## 2017-02-14 RX ADMIN — GABAPENTIN 100 MG: 100 CAPSULE ORAL at 15:09

## 2017-02-14 RX ADMIN — DEXAMETHASONE 4 MG: 4 TABLET ORAL at 20:35

## 2017-02-14 RX ADMIN — DEXAMETHASONE 4 MG: 4 TABLET ORAL at 04:54

## 2017-02-14 RX ADMIN — HYDROMORPHONE HYDROCHLORIDE 2 MG: 1 INJECTION, SOLUTION INTRAMUSCULAR; INTRAVENOUS; SUBCUTANEOUS at 20:35

## 2017-02-14 RX ADMIN — HYDROMORPHONE HYDROCHLORIDE 2 MG: 2 TABLET ORAL at 12:42

## 2017-02-14 RX ADMIN — CHOLECALCIFEROL TAB 25 MCG (1000 UNIT) 2000 UNITS: 25 TAB at 10:11

## 2017-02-14 RX ADMIN — Medication 1 PACKET: at 21:00

## 2017-02-14 RX ADMIN — DEXAMETHASONE 4 MG: 4 TABLET ORAL at 12:43

## 2017-02-14 RX ADMIN — GABAPENTIN 100 MG: 100 CAPSULE ORAL at 20:35

## 2017-02-14 RX ADMIN — MORPHINE SULFATE 30 MG: 30 TABLET, EXTENDED RELEASE ORAL at 20:35

## 2017-02-14 RX ADMIN — STANDARDIZED SENNA CONCENTRATE AND DOCUSATE SODIUM 1 TABLET: 8.6; 5 TABLET, FILM COATED ORAL at 20:35

## 2017-02-14 RX ADMIN — MORPHINE SULFATE 30 MG: 30 TABLET, EXTENDED RELEASE ORAL at 10:11

## 2017-02-14 RX ADMIN — OMEPRAZOLE 20 MG: 20 CAPSULE, DELAYED RELEASE ORAL at 10:11

## 2017-02-14 ASSESSMENT — ENCOUNTER SYMPTOMS
DIZZINESS: 0
HEARTBURN: 0
INSOMNIA: 0
SHORTNESS OF BREATH: 0
CLAUDICATION: 0
FOCAL WEAKNESS: 0
MYALGIAS: 1
EYE DISCHARGE: 0
BACK PAIN: 1
BLOOD IN STOOL: 0
WEAKNESS: 1
DEPRESSION: 0
BLURRED VISION: 0
EYE PAIN: 0
HEADACHES: 0
DIAPHORESIS: 0
FEVER: 0
BRUISES/BLEEDS EASILY: 0
CONSTIPATION: 0
SEIZURES: 0
NERVOUS/ANXIOUS: 0
COUGH: 0

## 2017-02-14 ASSESSMENT — PAIN SCALES - GENERAL
PAINLEVEL_OUTOF10: 7
PAINLEVEL_OUTOF10: 8
PAINLEVEL_OUTOF10: 7

## 2017-02-14 NOTE — DISCHARGE PLANNING
Medical Social Work    SW was informed by [facility] that [facility] is able to accept pt as long as he is not on radiation.  Pt will complete his radiation treatments tomorrow.      ERNESTINE met with pt and informed him that [facility] is able to accept him once medically cleared.    Plan:  Pt is anticipated to transfer to [facility] once medically cleared.  SS will remain available to provide support and assist with d/c planning as needed.

## 2017-02-14 NOTE — DISCHARGE PLANNING
Medical Social Work    Ongoing:  SNF Placement    Pt informed this SW that he would like for a SNF referral to be made to [facility].  SW faxed signed SNF choice form to [facility] to submit referral.    Pt will complete his radiation treatments tomorrow and is anticipated to be medically cleared for SNF transfer once he has finished radiation.    SW submitted PASRR (PASRR#: [id number]).    Plan:  Await responce from [facility].  SS will remain available to provide support and assist with d/c planning as needed.

## 2017-02-14 NOTE — CARE PLAN
Problem: Knowledge Deficit  Goal: Knowledge of disease process/condition, treatment plan, diagnostic tests, and medications will improve  Intervention: Assess knowledge level of disease process/condition, treatment plan, diagnostic tests, and medications  Educated pt on treatment plan and medications available as well as their possible side effects. Pt shows an understanding of the plan and medications at this time.      Problem: Pain Management  Goal: Pain level will decrease to patient’s comfort goal  Intervention: Follow pain managment plan developed in collaboration with patient and Interdisciplinary Team  Managing pain by administering medication to patient per MAR to reduce pain and maintain patient comfort level

## 2017-02-14 NOTE — CARE PLAN
Problem: Discharge Barriers/Planning  Goal: Patient’s continuum of care needs will be met  Intervention: Collaborate with Transitional Care Team and Interdisciplinary Team to meet discharge needs  Discussed DC planning with multidisciplinary team.       Problem: Pain Management  Goal: Pain level will decrease to patient’s comfort goal  Intervention: Follow pain managment plan developed in collaboration with patient and Interdisciplinary Team  Discussed pain medications and pain rating, pt with ice to LLE.

## 2017-02-14 NOTE — DISCHARGE PLANNING
[facility] long as patient doesn't go on radiation. Notified ERNESTINE Chamberlain via voicemail.

## 2017-02-14 NOTE — PROGRESS NOTES
Patient was brought to [facility] and has received 4 / 5 treatments.  Patient will return tomorrow for further treatment.

## 2017-02-14 NOTE — PROGRESS NOTES
Hospital Medicine Interval Note  Date of Service:  2/14/2017    Chief Complaint  66 y.o.-year-old male admitted 2/7/2017 with metastatic lung cancer with skeletal involvement.    Interval Problem Update  Patient states pain okay this am, was a hard night.  He hasn't had a BM in 2 days and last bowel movement provoked with suppository.  Will resume his home metamucil.  Will also start neurontin to help with pain control as he is always rating pain at a high number.  MS contin is at his home dose of 30q12 hours - verified report.  Patient completes radiation tomorrow and then will transfer to SNF for continued therapies and pain control.    Consultants/Specialty  [facility] - oncology      Disposition  SNF wed/thursday.     Review of Systems   Constitutional: Negative for fever, malaise/fatigue and diaphoresis.   HENT: Negative for congestion and nosebleeds.    Eyes: Negative for blurred vision, pain and discharge.   Respiratory: Negative for cough and shortness of breath.    Cardiovascular: Negative for chest pain, claudication and leg swelling.   Gastrointestinal: Negative for heartburn, constipation and blood in stool.   Genitourinary: Negative for dysuria and hematuria.   Musculoskeletal: Positive for myalgias and back pain. Negative for joint pain.   Skin: Negative for itching and rash.   Neurological: Positive for weakness. Negative for dizziness, focal weakness, seizures and headaches.   Endo/Heme/Allergies: Does not bruise/bleed easily.   Psychiatric/Behavioral: Negative for depression. The patient is not nervous/anxious and does not have insomnia.       Physical Exam Laboratory/Imaging   Filed Vitals:    02/13/17 1600 02/13/17 2000 02/14/17 0400 02/14/17 0800   BP: 133/86 129/92 115/74 110/76   Pulse: 118 111 89 83   Temp: 36.4 °C (97.6 °F) 36.8 °C (98.2 °F) 36.2 °C (97.2 °F) 36.7 °C (98 °F)   Resp: 20 20 18 18   Height:       Weight:       SpO2: 90% 95% 92% 91%     Physical Exam   Constitutional: He is  oriented to person, place, and time. He appears well-developed and well-nourished. No distress.   HENT:   Head: Normocephalic and atraumatic.   Eyes: Conjunctivae are normal. No scleral icterus.   Neck: Neck supple. No JVD present.   Cardiovascular: Normal rate, regular rhythm and normal heart sounds.  Exam reveals no gallop and no friction rub.    No murmur heard.  Pulmonary/Chest: Effort normal and breath sounds normal. No respiratory distress. He exhibits no tenderness.   Abdominal: Soft. Bowel sounds are normal. He exhibits no distension and no mass. There is no tenderness.   Musculoskeletal: He exhibits no edema or tenderness.   Neurological: He is alert and oriented to person, place, and time. No cranial nerve deficit.   Skin: Skin is warm and dry. He is not diaphoretic. No erythema.   Psychiatric: He has a normal mood and affect. His behavior is normal.   Nursing note and vitals reviewed.   Lab Results   Component Value Date/Time    WBC 14.5* 02/13/2017 11:46 PM    HEMOGLOBIN 12.0* 02/13/2017 11:46 PM    HEMATOCRIT 35.2* 02/13/2017 11:46 PM    PLATELET COUNT 314 02/13/2017 11:46 PM     Lab Results   Component Value Date/Time    SODIUM 132* 02/13/2017 11:46 PM    POTASSIUM 4.3 02/13/2017 11:46 PM    CHLORIDE 102 02/13/2017 11:46 PM    CO2 22 02/13/2017 11:46 PM    GLUCOSE 133* 02/13/2017 11:46 PM    BUN 25* 02/13/2017 11:46 PM    CREATININE 0.75 02/13/2017 11:46 PM      Assessment/Plan    * Intractable pain  Assessment & Plan  MS Contin 30mg bid - confirmed this is his home dose on NarcX report  PO dilaudid 2-4mg PRN - home dose is 4MG PRN  Start Neurontin to help effectiveness of narcotics  Already on high dose steroids - skeletal involvement  Finishes radiation on Wednesday        Metastatic lung cancer (metastasis from lung to other site) (CMS-HCC)  Assessment & Plan  Dr Bennett patient - f/u in 1 week post discharge      Loosening of prosthetic hip (CMS-HCC)  Assessment & Plan  Non-surgical per   [facility]      Urinary retention  Assessment & Plan  Resolved  Flomax  Yañez out      Hematuria  Assessment & Plan  None recent      Anemia  Assessment & Plan  Mild, monitor       Labs reviewed, Radiology images reviewed and Medications reviewed  Yañez catheter: No Yañez        DVT prophylaxis - mechanical: SCDs      Assessed for rehab: Patient was assess for and/or received rehabilitation services during this hospitalization

## 2017-02-14 NOTE — DISCHARGE PLANNING
Received choice form from ERNESTINE Chamberlain at 1230. Referral sent to [facility] at 1234.

## 2017-02-14 NOTE — PROGRESS NOTES
Bedside report received from night shift, assumed care of pt at 0715. Pt to radiation this am 4/5. Pt complaining of pain to LLE, long acting pain medications provided, refusing short acting at this time. Ice applied to LLE. Pt calls appropriately for medication or assistance as needed. Call light within reach, all appropriate fall precautions in place and personal belongings within reach; hourly rounding in place. No needs at this time. See flowsheets for further assessment details.

## 2017-02-14 NOTE — ASSESSMENT & PLAN NOTE
MS Contin 30mg bid - confirmed this is his home dose on NarcX report  PO dilaudid 2-4mg PRN - home dose is 4MG PRN  Start Neurontin to help effectiveness of narcotics  Already on high dose steroids - skeletal involvement  Finishes radiation on Wednesday

## 2017-02-14 NOTE — PROGRESS NOTES
Bedside report received, assumed care of patient. Patient resting in bed. Assessment performed. Patient AAO to person, place, time and situation. Pain to be addressed by medications given per MAR, will reassess appropriately. Discussed plan of care, no questions at this time. Call light within reach, fall precautions and hourly rounding in place.

## 2017-02-15 VITALS
BODY MASS INDEX: 28.28 KG/M2 | RESPIRATION RATE: 18 BRPM | WEIGHT: 190.92 LBS | OXYGEN SATURATION: 95 % | SYSTOLIC BLOOD PRESSURE: 102 MMHG | HEIGHT: 69 IN | HEART RATE: 64 BPM | TEMPERATURE: 98.1 F | DIASTOLIC BLOOD PRESSURE: 69 MMHG

## 2017-02-15 LAB
ANION GAP SERPL CALC-SCNC: 8 MMOL/L (ref 0–11.9)
BASOPHILS # BLD AUTO: 0.1 % (ref 0–1.8)
BASOPHILS # BLD: 0.02 K/UL (ref 0–0.12)
BUN SERPL-MCNC: 20 MG/DL (ref 8–22)
CALCIUM SERPL-MCNC: 8.9 MG/DL (ref 8.5–10.5)
CHLORIDE SERPL-SCNC: 104 MMOL/L (ref 96–112)
CO2 SERPL-SCNC: 21 MMOL/L (ref 20–33)
CREAT SERPL-MCNC: 0.73 MG/DL (ref 0.5–1.4)
EOSINOPHIL # BLD AUTO: 0 K/UL (ref 0–0.51)
EOSINOPHIL NFR BLD: 0 % (ref 0–6.9)
ERYTHROCYTE [DISTWIDTH] IN BLOOD BY AUTOMATED COUNT: 47.3 FL (ref 35.9–50)
GFR SERPL CREATININE-BSD FRML MDRD: >60 ML/MIN/1.73 M 2
GLUCOSE SERPL-MCNC: 128 MG/DL (ref 65–99)
HCT VFR BLD AUTO: 35.4 % (ref 42–52)
HGB BLD-MCNC: 11.6 G/DL (ref 14–18)
IMM GRANULOCYTES # BLD AUTO: 0.15 K/UL (ref 0–0.11)
IMM GRANULOCYTES NFR BLD AUTO: 1.1 % (ref 0–0.9)
LYMPHOCYTES # BLD AUTO: 0.37 K/UL (ref 1–4.8)
LYMPHOCYTES NFR BLD: 2.7 % (ref 22–41)
MCH RBC QN AUTO: 31.4 PG (ref 27–33)
MCHC RBC AUTO-ENTMCNC: 32.8 G/DL (ref 33.7–35.3)
MCV RBC AUTO: 95.7 FL (ref 81.4–97.8)
MONOCYTES # BLD AUTO: 0.97 K/UL (ref 0–0.85)
MONOCYTES NFR BLD AUTO: 7 % (ref 0–13.4)
NEUTROPHILS # BLD AUTO: 12.42 K/UL (ref 1.82–7.42)
NEUTROPHILS NFR BLD: 89.1 % (ref 44–72)
NRBC # BLD AUTO: 0 K/UL
NRBC BLD AUTO-RTO: 0 /100 WBC
PLATELET # BLD AUTO: 325 K/UL (ref 164–446)
PMV BLD AUTO: 9.2 FL (ref 9–12.9)
POTASSIUM SERPL-SCNC: 4.1 MMOL/L (ref 3.6–5.5)
RBC # BLD AUTO: 3.7 M/UL (ref 4.7–6.1)
SODIUM SERPL-SCNC: 133 MMOL/L (ref 135–145)
WBC # BLD AUTO: 13.9 K/UL (ref 4.8–10.8)

## 2017-02-15 PROCEDURE — A9270 NON-COVERED ITEM OR SERVICE: HCPCS | Performed by: INTERNAL MEDICINE

## 2017-02-15 PROCEDURE — 99239 HOSP IP/OBS DSCHRG MGMT >30: CPT | Performed by: INTERNAL MEDICINE

## 2017-02-15 PROCEDURE — 700111 HCHG RX REV CODE 636 W/ 250 OVERRIDE (IP): Performed by: HOSPITALIST

## 2017-02-15 PROCEDURE — 700102 HCHG RX REV CODE 250 W/ 637 OVERRIDE(OP): Performed by: HOSPITALIST

## 2017-02-15 PROCEDURE — A9270 NON-COVERED ITEM OR SERVICE: HCPCS | Performed by: HOSPITALIST

## 2017-02-15 PROCEDURE — 700102 HCHG RX REV CODE 250 W/ 637 OVERRIDE(OP): Performed by: INTERNAL MEDICINE

## 2017-02-15 PROCEDURE — 77427 RADIATION TX MANAGEMENT X5: CPT | Performed by: RADIOLOGY

## 2017-02-15 PROCEDURE — 77387 GUIDANCE FOR RADJ TX DLVR: CPT | Performed by: RADIOLOGY

## 2017-02-15 PROCEDURE — 700102 HCHG RX REV CODE 250 W/ 637 OVERRIDE(OP): Performed by: FAMILY MEDICINE

## 2017-02-15 PROCEDURE — 77412 RADIATION TX DELIVERY LVL 3: CPT | Performed by: RADIOLOGY

## 2017-02-15 PROCEDURE — A9270 NON-COVERED ITEM OR SERVICE: HCPCS | Performed by: FAMILY MEDICINE

## 2017-02-15 PROCEDURE — 700112 HCHG RX REV CODE 229: Performed by: HOSPITALIST

## 2017-02-15 PROCEDURE — 77014 PR CT GUIDANCE PLACEMENT RAD THERAPY FIELDS: CPT | Mod: 26 | Performed by: RADIOLOGY

## 2017-02-15 RX ORDER — DEXAMETHASONE 4 MG/1
2 TABLET ORAL EVERY 8 HOURS
Status: DISCONTINUED | OUTPATIENT
Start: 2017-02-15 | End: 2017-02-15 | Stop reason: HOSPADM

## 2017-02-15 RX ORDER — PSEUDOEPHEDRINE HCL 30 MG
100 TABLET ORAL EVERY MORNING
Qty: 60 CAP | Status: SHIPPED | DISCHARGE
Start: 2017-02-15 | End: 2017-06-14

## 2017-02-15 RX ORDER — GABAPENTIN 100 MG/1
200 CAPSULE ORAL 3 TIMES DAILY
Qty: 90 CAP | Status: SHIPPED | DISCHARGE
Start: 2017-02-15 | End: 2017-06-14

## 2017-02-15 RX ORDER — TAMSULOSIN HYDROCHLORIDE 0.4 MG/1
0.4 CAPSULE ORAL
Qty: 30 CAP | Status: SHIPPED | DISCHARGE
Start: 2017-02-15 | End: 2017-06-14

## 2017-02-15 RX ORDER — DIAZEPAM 2 MG/1
2 TABLET ORAL EVERY 8 HOURS PRN
Qty: 30 TAB | Refills: 0 | Status: SHIPPED | DISCHARGE
Start: 2017-02-15 | End: 2017-02-15

## 2017-02-15 RX ORDER — MORPHINE SULFATE 30 MG/1
30 TABLET, FILM COATED, EXTENDED RELEASE ORAL EVERY 12 HOURS
Qty: 60 TAB | Status: SHIPPED | DISCHARGE
Start: 2017-02-15 | End: 2017-02-15

## 2017-02-15 RX ORDER — DEXAMETHASONE 2 MG/1
2 TABLET ORAL EVERY 8 HOURS
Qty: 10 TAB | Refills: 0 | Status: ON HOLD | DISCHARGE
Start: 2017-02-15 | End: 2017-02-17

## 2017-02-15 RX ORDER — DIAZEPAM 2 MG/1
2 TABLET ORAL EVERY 8 HOURS PRN
Qty: 10 TAB | Refills: 0 | Status: SHIPPED | OUTPATIENT
Start: 2017-02-15 | End: 2017-03-28

## 2017-02-15 RX ORDER — HYDROMORPHONE HYDROCHLORIDE 2 MG/1
4 TABLET ORAL EVERY 4 HOURS PRN
Qty: 15 TAB | Refills: 0 | Status: SHIPPED | OUTPATIENT
Start: 2017-02-15 | End: 2017-06-14 | Stop reason: SDUPTHER

## 2017-02-15 RX ORDER — GABAPENTIN 100 MG/1
200 CAPSULE ORAL 3 TIMES DAILY
Status: DISCONTINUED | OUTPATIENT
Start: 2017-02-15 | End: 2017-02-15 | Stop reason: HOSPADM

## 2017-02-15 RX ORDER — MORPHINE SULFATE 30 MG/1
30 TABLET, FILM COATED, EXTENDED RELEASE ORAL EVERY 12 HOURS
Qty: 20 TAB | Refills: 0 | Status: SHIPPED | OUTPATIENT
Start: 2017-02-15 | End: 2017-03-15

## 2017-02-15 RX ADMIN — MORPHINE SULFATE 30 MG: 30 TABLET, EXTENDED RELEASE ORAL at 08:23

## 2017-02-15 RX ADMIN — HYDROMORPHONE HYDROCHLORIDE 2 MG: 1 INJECTION, SOLUTION INTRAMUSCULAR; INTRAVENOUS; SUBCUTANEOUS at 03:06

## 2017-02-15 RX ADMIN — GABAPENTIN 200 MG: 100 CAPSULE ORAL at 15:00

## 2017-02-15 RX ADMIN — TAMSULOSIN HYDROCHLORIDE 0.4 MG: 0.4 CAPSULE ORAL at 08:23

## 2017-02-15 RX ADMIN — HYDROMORPHONE HYDROCHLORIDE 2 MG: 2 TABLET ORAL at 12:58

## 2017-02-15 RX ADMIN — OMEPRAZOLE 20 MG: 20 CAPSULE, DELAYED RELEASE ORAL at 08:23

## 2017-02-15 RX ADMIN — DOCUSATE SODIUM 100 MG: 100 CAPSULE ORAL at 08:23

## 2017-02-15 RX ADMIN — DEXAMETHASONE 4 MG: 4 TABLET ORAL at 05:30

## 2017-02-15 RX ADMIN — HYDROMORPHONE HYDROCHLORIDE 2 MG: 2 TABLET ORAL at 08:23

## 2017-02-15 RX ADMIN — DEXAMETHASONE 2 MG: 4 TABLET ORAL at 15:00

## 2017-02-15 RX ADMIN — GABAPENTIN 100 MG: 100 CAPSULE ORAL at 08:23

## 2017-02-15 RX ADMIN — CHOLECALCIFEROL TAB 25 MCG (1000 UNIT) 2000 UNITS: 25 TAB at 08:23

## 2017-02-15 ASSESSMENT — ENCOUNTER SYMPTOMS
CONSTIPATION: 0
COUGH: 1
FEVER: 0
PALPITATIONS: 0
VOMITING: 0
DIARRHEA: 0
NAUSEA: 0
CHILLS: 0
SHORTNESS OF BREATH: 0

## 2017-02-15 ASSESSMENT — PAIN SCALES - GENERAL
PAINLEVEL_OUTOF10: 7
PAINLEVEL_OUTOF10: 7
PAINLEVEL_OUTOF10: 5

## 2017-02-15 NOTE — PROGRESS NOTES
Assumed care of patient @ 1900. Bedside report received. Patient AO x4. VSS,  Pain 7/10. Fall precautions in place, PIV SL. POC discussed with patient, all questions answered, call light within reach, hourly rounding in place.

## 2017-02-15 NOTE — PROGRESS NOTES
Patient was brought to [facility] and has received 5 / 5 treatments. Pt has completed this course of radiation treatments.

## 2017-02-15 NOTE — DISCHARGE INSTRUCTIONS
Discharge Instructions    Discharged to group home by medical transportation with self. Discharged via wheelchair, hospital escort: Refused.  Special equipment needed: Walker    Be sure to schedule a follow-up appointment with your primary care doctor or any specialists as instructed.     Discharge Plan:   Diet Plan: Discussed  Activity Level: Discussed  Confirmed Follow up Appointment: Appointment Scheduled  Confirmed Symptoms Management: Discussed  Medication Reconciliation Updated: Yes  Influenza Vaccine Indication:  (Not indicated due to condition)    I understand that a diet low in cholesterol, fat, and sodium is recommended for good health. Unless I have been given specific instructions below for another diet, I accept this instruction as my diet prescription.   Other diet: Regular     Special Instructions: None    · Is patient discharged on Warfarin / Coumadin?   No     · Is patient Post Blood Transfusion?  No    Depression / Suicide Risk    As you are discharged from this [facility] facility, it is important to learn how to keep safe from harming yourself.    Recognize the warning signs:  · Abrupt changes in personality, positive or negative- including increase in energy   · Giving away possessions  · Change in eating patterns- significant weight changes-  positive or negative  · Change in sleeping patterns- unable to sleep or sleeping all the time   · Unwillingness or inability to communicate  · Depression  · Unusual sadness, discouragement and loneliness  · Talk of wanting to die  · Neglect of personal appearance   · Rebelliousness- reckless behavior  · Withdrawal from people/activities they love  · Confusion- inability to concentrate     If you or a loved one observes any of these behaviors or has concerns about self-harm, here's what you can do:  · Talk about it- your feelings and reasons for harming yourself  · Remove any means that you might use to hurt yourself (examples: pills, rope, extension  cords, firearm)  · Get professional help from the community (Mental Health, Substance Abuse, psychological counseling)  · Do not be alone:Call your Safe Contact- someone whom you trust who will be there for you.  · Call your local CRISIS HOTLINE 951-3688 or 206-443-2082  · Call your local Children's Mobile Crisis Response Team [place] (007) 260-2987 or www.IEMO  · Call the toll free National Suicide Prevention Hotlines   · National Suicide Prevention Lifeline 381-597-QJZI (0823)  · National Hope Line Network 800-SUICIDE (256-7775)

## 2017-02-15 NOTE — CARE PLAN
Problem: Nutritional:  Goal: Achieve adequate nutritional intake  Patient will consume >75% of meals  Outcome: MET Date Met:  02/14/17  Pt reports eating 80% of meals with no questions/concerns. RD available PRN.

## 2017-02-15 NOTE — DISCHARGE PLANNING
Received voicemail from JUANCARLOS Quiroz to see if [facility] can accept patient today. Contacted Adin with [facility] they can take patient and will call [facility] for a transport time. Notified JUANCARLOS Quiroz via voicemail.

## 2017-02-15 NOTE — THERAPY
"Occupational Therapy Treatment completed with focus on ADLs, ADL transfers and patient education.  Functional Status:  Pt was seen for Occupational Therapy treatment today, see Therapy Kardex for details.  Treatment included education in breath control with activity and at rest, self pacing techs and energy conservation for pain management. Educated pt in safety awareness techs as well. Fall prevention techs educated prior to activity.  Psychosocial intervention addressed.  Pt demo Supervision for bed mobility using RLE to support LLE up and off bed to floor. Pt demonstrated Mod Indep  for UB dressing seated base, CGA for LB dressing using AE seated base. CGA for clothing management up over hips in a standing position with FWW. Pt also demonstrated  CGA for Ambulating ADL's with FWW with verbal cues to maintain TTWB LLE. Supervised /CGA for toilet transfers needing RTS with arm rests using FWW. Pt stood at sink for oral hygiene and grooming with reminder cues for TTWB on LLE. Demonstrated fair+ dynamic standing balance. Pt educated and demo return knowledge of seated shower transfers. Pt was given a handout of DME/AE needed for home use and where to purchase. Pt lives in a motor home . W/C /FWW will not fit in hallway. Pt will need a ramp built. Recommend continued OT services prior to d/c home to maximize strength, safety awareness and increased Indep for ADL's, I ADL's and ADL transfers. RN updated and informed of OT recommendations. Pt was left up seated EOB to eat his dinner meal , call light in reach and nursing is aware.  Continue Occupational Therapy services as per plan.    Plan of Care: Will benefit from Occupational Therapy 2 times per week  Discharge Recommendations:  Equipment Front-Wheel Walker, Shower Chair and reacher and sock aid and removable shower head. Post-acute therapy recommended before discharged home.    See \"Rehab Therapy-Acute\" Patient Summary Report for complete documentation.   "

## 2017-02-15 NOTE — DISCHARGE PLANNING
I spoke with Kimberly from [facility] and she said that [facility] will  the patient at 4:30 PM.  I will let the patient and his bedside nurse know the transport time.  I completed a COBRA transfer form and the patient signed it.  I copied the chart and placed it in the transfer packet along with 2 face sheets.  The discharge summary is in the process of being typed but has been prioritized.

## 2017-02-15 NOTE — DISCHARGE PLANNING
Received call from Adin with [facility]. Transportation arranged for [facility] to [facility] at 1630. Notified JUANCARLOS Quiroz via phone. DC summary faxed to [facility] at [phone].

## 2017-02-15 NOTE — DISCHARGE SUMMARY
Received transport form from JUANCARLOS Quiroz at 1322. Contacted Adin left message for a call back with transport time.

## 2017-02-15 NOTE — PROGRESS NOTES
Oncology/Hematology Progress Note               Author: Sobia Leal Date & Time created: 2/15/2017  3:29 PM     Interval History:  66-year-old male patient of Dr. Bennett with metastatic lung cancer, admitted for intractable pain.    Patient is feeling very well today.  He completed his last fraction of radiation therapy today.  He stated the pain in his hip has gone down from an 8 out of 10 to a 5 out of 10. He is very happy with the relief he has gone from radiation therapy.  Patient denies nausea, vomiting, diarrhea, constipation. He is able to tolerate his food and has a great appetite at this time.  He states that he has a cough at times when he is lying flat in bed.  He is due to be transferred to [facility] later today.    Review of Systems:  Review of Systems   Constitutional: Negative for fever and chills.   Respiratory: Positive for cough (at times when laying flat). Negative for shortness of breath.    Cardiovascular: Negative for chest pain and palpitations.   Gastrointestinal: Negative for nausea, vomiting, diarrhea and constipation.   Genitourinary: Negative for dysuria.   Musculoskeletal: Positive for joint pain (hip pain improved from 8/10 to 5/10 after XRT).       Physical Exam:  Physical Exam   Constitutional: He is oriented to person, place, and time. He appears well-developed and well-nourished. No distress.   HENT:   Head: Normocephalic and atraumatic.   Cardiovascular: Normal rate, regular rhythm, normal heart sounds and intact distal pulses.    Pulmonary/Chest: Effort normal and breath sounds normal. No respiratory distress. He has no wheezes.   Abdominal: Soft. Bowel sounds are normal. He exhibits no distension. There is no tenderness.   Neurological: He is alert and oriented to person, place, and time.   Skin: Skin is warm and dry. No rash noted. He is not diaphoretic. No erythema. No pallor.   Vitals reviewed.      Labs:        Invalid input(s): [id number]      Recent Labs       17   2336   SODIUM  135  132*  133*   POTASSIUM  5.1  4.3  4.1   CHLORIDE  104  102  104   CO2  26  22  21   BUN  22  25*  20   CREATININE  0.93  0.75  0.73   CALCIUM  9.3  9.2  8.9     Recent Labs      17   2336   GLUCOSE  128*  133*  128*     Recent Labs      17   2336   RBC  3.58*  3.71*  3.70*   HEMOGLOBIN  11.5*  12.0*  11.6*   HEMATOCRIT  34.1*  35.2*  35.4*   PLATELETCT  316  314  325     Recent Labs      17   2336   WBC  12.2*  14.5*  13.9*   NEUTSPOLYS  86.30*  93.30*  89.10*   LYMPHOCYTES  6.40*  1.70*  2.70*   MONOCYTES  6.60  4.10  7.00   EOSINOPHILS  0.00  0.00  0.00   BASOPHILS  0.10  0.10  0.10     Recent Labs      17   2336   SODIUM  135  132*  133*   POTASSIUM  5.1  4.3  4.1   CHLORIDE  104  102  104   CO2  26  22  21   GLUCOSE  128*  133*  128*   BUN  22  25*  20   CREATININE  0.93  0.75  0.73   CALCIUM  9.3  9.2  8.9     Hemodynamics:  Temp (24hrs), Av.8 °C (98.2 °F), Min:36.4 °C (97.6 °F), Max:37.1 °C (98.8 °F)  Temperature: 36.7 °C (98.1 °F)  Pulse  Av.8  Min: 57  Max: 118   Blood Pressure : 102/69 mmHg     Respiratory:    Respiration: 18, Pulse Oximetry: 95 %        RUL Breath Sounds: Clear, RML Breath Sounds: Diminished, RLL Breath Sounds: Diminished, LIN Breath Sounds: Clear, LLL Breath Sounds: Diminished  Fluids:    Intake/Output Summary (Last 24 hours) at 02/15/17 1529  Last data filed at 17   Gross per 24 hour   Intake      0 ml   Output    ml   Net  - ml        GI/Nutrition:  Orders Placed This Encounter   Procedures   • Diet Order     Standing Status: Standing      Number of Occurrences: 1      Standing Expiration Date:      Order Specific Question:  Diet:     Answer:  Regular [1]     Medical Decision Making, by Problem:  Active Hospital Problems    Diagnosis   •  *Intractable pain [R52]   • Metastatic lung cancer (metastasis from lung to other site) (CMS-HCC) [C34.90]   • Loosening of prosthetic hip (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 Metastatic adenocarcinoma of the lung, with bony metastatic disease - patient has been evaluated for the match trial. Plan is to start patient on systemic therapy once he completes his radiation therapy and is clinically stable. Patient has completed his last day of radiation for his bone disease to the hip as well as shoulder lesion. He will be started on bisphosphonate every 4 weeks as an outpatient as well.    2. Intractable pain - patient currently undergoing radiation therapy for his pain. He is also on dexamethasone and a slow tapering has been started. Patient also on long-acting MS Contin with Dilaudid for breakthrough pain.    3. Patient does live in a 2 story apartment and will require some rehabilitation. He will be transferred to [facility] today. He is to follow-up in the outpatient clinic with Dr. Bennett in one week. He is scheduled to be seen on Wednesday, February 22 to discuss further when of care.    4. Leukocytosis - likely reactive due to dexamethasone. Patient on a slow taper.      Labs reviewed and Medications reviewed  Yañez catheter: No Yañez
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
## 2017-02-15 NOTE — DISCHARGE SUMMARY
DIAGNOSES:  Metastatic lung cancer with skeletal involvement, pain, urinary   retention, which has resolved, hematuria which is resolved, anemia which is   mild, loosening of the prosthetic hip causing pain, nonsurgical.    REASON FOR HOSPITALIZATION:  The patient is a 66-year-old male who presented   to the emergency room with complaints of increased pain.  He has metastatic   lung cancer followed by Dr. Bennett and was to start radiation therapy, but   had not yet begun because of pain.  Patient has had course of Tarceva and   Opdivo.  Bony involvement was seen on the bone scan in December.  Patient had   increasing amounts of pain.    HOSPITAL COURSE:  Patient was admitted.  He was seen by Dr. Cochran as   loosening of the hip prosthesis was also suspicious, recommended toe-touch   weightbearing on the left lower extremity.  No surgical involvement.  Patient   was also seen by his oncologist, Dr. Bennett, and radiation oncology, Dr. Sims.  Patient had radiation treatment _____ to the left hip and 5 to the   right scapula, last of which was completed today on 02/15/2017.  Patient has   had improvement of his pain with both radiation and adjustment of his pain   medications.  He is currently on high dose dexamethasone in the process of   being tapered down.  Today, his medication dose is 2 mg p.o. every 8 hours and   this should continue to be tapered off over the next course of the month.  He   is also in the process of an up titration of Neurontin.  Yesterday, he   received 100 mg 3 times daily.  Today, he was started 200 mg 3 times daily and   should continue to increase up to 300 mg 3 times daily as tolerated and may   add an additional fourth dose if there is benefit.  He has been seen by   physical and occupational therapy and will need continued therapies until he   is appropriate to return home independently, but at this time, he is   appropriate to transition to skilled care.    DISCHARGE  MEDICATIONS:  Vitamin D 2000 units p.o. daily, Decadron 2 mg every 8   hours, diazepam 2 mg every 8 hours as needed for muscle spasm or anxiety,   Colace 100 mg p.o. q.a.m., gabapentin 200 mg p.o. t.i.d., Flomax 0.4 mg p.o.   q.a.m., Xanax 1 mg p.o. at bedtime p.r.n. anxiety or insomnia, Dilaudid 4 mg   every 4 hours p.r.n. pain, MS Contin 30 mg every 12 hours, Prilosec 20 mg p.o.   daily, Zofran 40 mg every 4 hours as needed for nausea and vomiting.    DIET:  As tolerated.    ACTIVITY:  Per PT, OT.    Patient to follow with Dr. Bennett in the next 1-2 weeks.  Also, to follow up   with Dr. Isidro after discharge from the skilled nursing, his primary care   doctor.    Discharge process lasted approximately 35 minutes.    Discharge explained to the patient, agreeable with transfers.    CODE STATUS:  DNR.       ____________________________________     DO IVRING Ibrahim / CELSO    DD:  02/15/2017 12:44:35  DT:  02/15/2017 14:13:09    D#:  [id number]  Job#:  [id number]

## 2017-02-16 ENCOUNTER — HOSPITAL ENCOUNTER (EMERGENCY)
Facility: MEDICAL CENTER | Age: 67
End: 2017-02-16
Attending: EMERGENCY MEDICINE
Payer: MEDICARE

## 2017-02-16 VITALS
SYSTOLIC BLOOD PRESSURE: 106 MMHG | RESPIRATION RATE: 16 BRPM | HEART RATE: 78 BPM | DIASTOLIC BLOOD PRESSURE: 77 MMHG | WEIGHT: 190.92 LBS | TEMPERATURE: 97.7 F | BODY MASS INDEX: 28.28 KG/M2 | HEIGHT: 69 IN | OXYGEN SATURATION: 93 %

## 2017-02-16 DIAGNOSIS — G89.29 OTHER CHRONIC PAIN: ICD-10-CM

## 2017-02-16 PROCEDURE — 99284 EMERGENCY DEPT VISIT MOD MDM: CPT

## 2017-02-16 PROCEDURE — 36415 COLL VENOUS BLD VENIPUNCTURE: CPT

## 2017-02-16 ASSESSMENT — LIFESTYLE VARIABLES: DO YOU DRINK ALCOHOL: NO

## 2017-02-16 ASSESSMENT — PAIN SCALES - GENERAL: PAINLEVEL_OUTOF10: 10

## 2017-02-16 NOTE — DISCHARGE PLANNING
MSW called [facility] to arranges pt's transportation back to their facility. [facility] stated they do no have any information on that pt. MSW relayed information to [facility] staff that pt was just released from [facility] yesterday afternoon and was transported their facility. RN at [facility] still said they do not know that pt. MSW to try and get in contact with their  to facilitate transportation back.

## 2017-02-16 NOTE — OR NURSING
Preadmit appointment:  Patient instructed to continue regularly prescribed medications through day before surgery.  Instructed to take the following medications the  day of surgery with a sip of water per anesthesia protocol: Dexamethasone, Gabapentin, Hydromorphone if needed, morphine, omeprazole, tamsulosin.

## 2017-02-16 NOTE — DISCHARGE PLANNING
Patient is arranging an outpatient appointment with Dr. Sparks (pain mgmt)  to address his intolerable lt hip pain. D/W ERNESTINE Black, Kimberly at [facility] and patient: If the patient receives a pain pump to manage his pain [facility] will not accept patient back for care as they will not manage pain pumps in their facility. Bruce, bedside RN is also aware. Kimberly at [facility] has advised this CM that they are arranging transportation back to [facility] at this time and as long as the patient is aware of the policy regarding pain pumps they will be able to care for him. Per patient: he has an appointment for pain pump insertion at [facility] tomorrow 2/17/17.  Patient has been advised that [facility] will not take him back with a pain pump.

## 2017-02-16 NOTE — ED PROVIDER NOTES
"ED Provider Note    CHIEF COMPLAINT  Chief Complaint   Patient presents with   • Hip Pain     Chronic left hip pain. Here for better pain management       HPI  Gabriele Torres is a 66 y.o. male who presents to the ER with reports of left hip pain. This is a chronic issue for him. He has a history of chronic pain related to metastatic lung cancer. He was hospitalized and just released to rehab yesterday. He was discharged after evaluation by orthopedics and negative workup. He denies any acute trauma or falls. He states he was being given a shot of Dilaudid at night that was helping him sleep. At [facility] he is being given 30 mg of MS Contin twice daily and 4 mg of oral Dilaudid every 4 hours as needed for breakthrough pain. He's also been given meloxicam. He denies having a fever or any acute injuries. He states that he was unable to sleep last night and his pain was severe. He is followed by Dr. Sparks with pain management. There was a tentative plan for the patient to have a pain pump. The patient is willing to go forward with this to control his pain. Patient was given IV fentanyl en route to the hospital and states that he feels fine and back at his baseline now.    REVIEW OF SYSTEMS  Pertinent negative: No fevers, chills, falls or trauma. 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   • Hiatus hernia syndrome    • Cancer (CMS-HCC) 5/2015     Lung cancer, chemo only, last dose Oct 27, 2016; 1/17 bone cancer   • Cancer, metastatic to bone (CMS-HCC)    • GERD (gastroesophageal reflux " disease)    • Anxiety      on xanax   • Chronic low back pain        SOCIAL HISTORY  Social History   Substance Use Topics   • Smoking status: Former Smoker -- 1.00 packs/day for 14 years     Quit date: 01/01/1983   • Smokeless tobacco: Never Used   • Alcohol Use: 0.0 oz/week     0 Standard drinks or equivalent per week      Comment: rare drink       SURGICAL HISTORY  Past Surgical History   Procedure Laterality Date   • Other abdominal surgery  2007     umbilical hernia   • Thoracoscopy Right 5/7/2015     Procedure: THORACOSCOPY, with lung biopsy;  Surgeon: Mikki Rivera M.D.;  Location: [facility];  Service:    • Pr inj dx/ther agnt paravert facet joint, luz maria* Left 12/8/2016     Procedure: INJ PARA FACET L/S 1 LVL W/IG - L4, 5, S1;  Surgeon: Cricket Sparks M.D.;  Location: [facility];  Service: Pain Management   • Pr inj dx/ther agnt paravert facet joint, luz maria*  12/8/2016     Procedure: INJ PARA FACET L/S 2D LVL W/IG;  Surgeon: Cricket Sparks M.D.;  Location: [facility];  Service: Pain Management   • Other orthopedic surgery  2001     bilateral arthroscopy, knees   • Other orthopedic surgery  2010     rt shoulder surgery   • Other orthopedic surgery  2014      left hip arthroplasty   • Pr inj dx/ther agnt paravert facet joint, luz maria* Left 1/5/2017     Procedure: INJ PARA FACET L/S 1 LVL W/IG - L4, L5, S1;  Surgeon: Cricket Sparks M.D.;  Location: [facility];  Service: Pain Management   • Pr inj dx/ther agnt paravert facet joint, luz maria*  1/5/2017     Procedure: INJ PARA FACET L/S 2D LVL W/IG;  Surgeon: Cricket Sparks M.D.;  Location: [facility];  Service: Pain Management   • Block epidural steroid injection N/A 1/26/2017     Procedure: BLOCK EPIDURAL STEROID INJECTION - SINGLE SHOT INJECTION PAIN PUMP;  Surgeon: Cricket Sparks M.D.;  Location: [facility];  Service:    • Athroplasty Left 2014     left hip   • Lumbar  "laminectomy diskectomy  2005     L3-L4   • Bone biopsy  1/17/17     left acetabulum   • Lung needle biopsy  Nov 2016       ALLERGIES  No Known Allergies    PHYSICAL EXAM  VITAL SIGNS: /77 mmHg  Pulse 85  Temp(Src) 36.5 °C (97.7 °F)  Resp 18  Ht 1.753 m (5' 9.02\")  Wt 86.6 kg (190 lb 14.7 oz)  BMI 28.18 kg/m2  SpO2 95%   Constitutional: Awake and alert. Nontoxic  HENT:  Grossly normal  Eyes: Grossly normal  Neck: Normal range of motion  Cardiovascular: Normal heart rate   Thorax & Lungs: No respiratory distress  Abdomen: Nontender  Skin:  No pathologic rash.   Extremities: Pain with range of motion of both hips. No deformity.    Medical record is reviewed from the previous hospital stay    COURSE & MEDICAL DECISION MAKING  Patient presents with chronic pain of his hip secondary to metastatic cancer. He reports his pain is controlled and declines additional analgesic while here in the ER. He does not have any acute complaints. No trauma or fevers.    I consulted Dr. Sparks, pain management. We discussed the case. Dr. Sparks was familiar with the patient. He would like to see the patient in his office this afternoon where medications can be adjusted and plan can be put in place for a pain pump. The patient tells me that his son can transport him to the office for an appointment this afternoon. The patient feels comfortable going back to [facility] at this time.    Patient will return to the ER for any acute medical concerns or complaints.      FINAL IMPRESSION  1. Chronic pain          This dictation was created using voice recognition software. The accuracy of the dictation is limited to the abilities of the software.  The nursing notes were reviewed and certain aspects of this information were incorporated into this note.      Electronically signed by: Justice Hopkins, 2/16/2017 8:01 AM        "

## 2017-02-16 NOTE — ED NOTES
"Chief Complaint   Patient presents with   • Hip Pain     Chronic left hip pain. Here for better pain management     Pt is from [facility], here for better pain management. Pt DC'd from this facility yesterday around 1600- admission for hip pain r/t bone cancer. States he receives 4mg dilaudid PO for breakthrough pain which is not helping per pt. Pt states \"I need IV management or that pump\". Pt very pleasant, calm, cooperative. Pt placed on monitors. VSS. Chart up for ERP.    "

## 2017-02-16 NOTE — DISCHARGE INSTRUCTIONS
Chronic Pain  Chronic pain can be defined as pain that is off and on and lasts for 3-6 months or longer. Many things cause chronic pain, which can make it difficult to make a diagnosis. There are many treatment options available for chronic pain. However, finding a treatment that works well for you may require trying various approaches until the right one is found. Many people benefit from a combination of two or more types of treatment to control their pain.  SYMPTOMS   Chronic pain can occur anywhere in the body and can range from mild to very severe. Some types of chronic pain include:  · Headache.  · Low back pain.  · Cancer pain.  · Arthritis pain.  · Neurogenic pain. This is pain resulting from damage to nerves.   People with chronic pain may also have other symptoms such as:  · Depression.  · Anger.  · Insomnia.  · Anxiety.  DIAGNOSIS   Your health care provider will help diagnose your condition over time. In many cases, the initial focus will be on excluding possible conditions that could be causing the pain. Depending on your symptoms, your health care provider may order tests to diagnose your condition. Some of these tests may include:   · Blood tests.    · CT scan.    · MRI.    · X-rays.    · Ultrasounds.    · Nerve conduction studies.    You may need to see a specialist.   TREATMENT   Finding treatment that works well may take time. You may be referred to a pain specialist. He or she may prescribe medicine or therapies, such as:   · Mindful meditation or yoga.  · Shots (injections) of numbing or pain-relieving medicines into the spine or area of pain.  · Local electrical stimulation.  · Acupuncture.    · Massage therapy.    · Aroma, color, light, or sound therapy.    · Biofeedback.    · Working with a physical therapist to keep from getting stiff.    · Regular, gentle exercise.    · Cognitive or behavioral therapy.    · Group support.    Sometimes, surgery may be recommended.   HOME CARE INSTRUCTIONS    · Take all medicines as directed by your health care provider.    · Lessen stress in your life by relaxing and doing things such as listening to calming music.    · Exercise or be active as directed by your health care provider.    · Eat a healthy diet and include things such as vegetables, fruits, fish, and lean meats in your diet.    · Keep all follow-up appointments with your health care provider.    · Attend a support group with others suffering from chronic pain.  SEEK MEDICAL CARE IF:   · Your pain gets worse.    · You develop a new pain that was not there before.    · You cannot tolerate medicines given to you by your health care provider.    · You have new symptoms since your last visit with your health care provider.    SEEK IMMEDIATE MEDICAL CARE IF:   · You feel weak.    · You have decreased sensation or numbness.    · You lose control of bowel or bladder function.    · Your pain suddenly gets much worse.    · You develop shaking.  · You develop chills.  · You develop confusion.  · You develop chest pain.  · You develop shortness of breath.    MAKE SURE YOU:  · Understand these instructions.  · Will watch your condition.  · Will get help right away if you are not doing well or get worse.     This information is not intended to replace advice given to you by your health care provider. Make sure you discuss any questions you have with your health care provider.     Document Released: 09/09/2003 Document Revised: 08/20/2014 Document Reviewed: 06/13/2014  [employer] Interactive Patient Education ©2016 [employer]

## 2017-02-16 NOTE — PROGRESS NOTES
Report called to [facility] All discharge paperwork completed. All questions answered.

## 2017-02-16 NOTE — DISCHARGE PLANNING
MSW call Kimberly at [facility] who stated she would be arranging to get transport back and would call MSW back with time.

## 2017-02-16 NOTE — ED AVS SNAPSHOT
2/16/2017          Gabriele Meyersn Theodorecinthya  [facility] [place] [id number]    Dear Gabriele:    [facility] wants to ensure your discharge home is safe and you or your loved ones have had all your questions answered regarding your care after you leave the hospital.    You may receive a telephone call within two days of your discharge.  This call is to make certain you understand your discharge instructions as well as ensure we provided you with the best care possible during your stay with us.     The call will only last approximately 3-5 minutes and will be done by a nurse.    Once again, we want to ensure your discharge home is safe and that you have a clear understanding of any next steps in your care.  If you have any questions or concerns, please do not hesitate to contact us, we are here for you.  Thank you for choosing [facility] for your healthcare needs.    Sincerely,    Viktor Dougherty    [facility]

## 2017-02-16 NOTE — ED AVS SNAPSHOT
Home Care Instructions                                                                                                                Gabriele Torres   MRN: [id number]    Department:  [facility], Emergency Dept   Date of Visit:  2/16/2017            [facility], Emergency Dept    [address]    [place] [address]    Phone:  [phone]      You were seen by     Justice Hopkins M.D.      Your Diagnosis Was     Other chronic pain     G89.29       Follow-up Information     1. Please follow up.    Why:  see Dr. Sparks was afternoon.      Medication Information     Review all of your home medications and newly ordered medications with your primary doctor and/or pharmacist as soon as possible. Follow medication instructions as directed by your doctor and/or pharmacist.     Please keep your complete medication list with you and share with your physician. Update the information when medications are discontinued, doses are changed, or new medications (including over-the-counter products) are added; and carry medication information at all times in the event of emergency situations.               Medication List      ASK your doctor about these medications        Instructions    alprazolam 1 MG Tabs   Commonly known as:  XANAX    Take 1 mg by mouth every bedtime. Indications: Feeling Anxious   Dose:  1 mg       Cholecalciferol 2000 UNIT Tabs    Take 1 Tab by mouth every day.   Dose:  2000 Units       dexamethasone 2 MG tablet   Commonly known as:  DECADRON    Take 1 Tab by mouth every 8 hours.   Dose:  2 mg       diazepam 2 MG Tabs   Commonly known as:  VALIUM    Take 1 Tab by mouth every 8 hours as needed.   Dose:  2 mg        MG Caps    Take 100 mg by mouth every morning.   Dose:  100 mg       gabapentin 100 MG Caps   Commonly known as:  NEURONTIN    Take 2 Caps by mouth 3 times a day.   Dose:  200 mg       HYDROmorphone 2 MG Tabs   Commonly known as:  DILAUDID    Take 2  Tabs by mouth every four hours as needed for Severe Pain.   Dose:  4 mg       morphine ER 30 MG Tbcr tablet   Commonly known as:  MS CONTIN    Take 1 Tab by mouth every 12 hours.   Dose:  30 mg       omeprazole 20 MG delayed-release capsule   Commonly known as:  PRILOSEC    Take 20 mg by mouth every day.   Dose:  20 mg       ondansetron 8 MG Tabs   Commonly known as:  ZOFRAN    Take 1 Tab by mouth 1 time daily as needed for Nausea/Vomiting.   Dose:  8 mg       tamsulosin 0.4 MG capsule   Commonly known as:  FLOMAX    Take 1 Cap by mouth ONE-HALF HOUR AFTER BREAKFAST.   Dose:  0.4 mg                 Discharge Instructions       Chronic Pain  Chronic pain can be defined as pain that is off and on and lasts for 3-6 months or longer. Many things cause chronic pain, which can make it difficult to make a diagnosis. There are many treatment options available for chronic pain. However, finding a treatment that works well for you may require trying various approaches until the right one is found. Many people benefit from a combination of two or more types of treatment to control their pain.  SYMPTOMS   Chronic pain can occur anywhere in the body and can range from mild to very severe. Some types of chronic pain include:  · Headache.  · Low back pain.  · Cancer pain.  · Arthritis pain.  · Neurogenic pain. This is pain resulting from damage to nerves.   People with chronic pain may also have other symptoms such as:  · Depression.  · Anger.  · Insomnia.  · Anxiety.  DIAGNOSIS   Your health care provider will help diagnose your condition over time. In many cases, the initial focus will be on excluding possible conditions that could be causing the pain. Depending on your symptoms, your health care provider may order tests to diagnose your condition. Some of these tests may include:   · Blood tests.    · CT scan.    · MRI.    · X-rays.    · Ultrasounds.    · Nerve conduction studies.    You may need to see a specialist.      TREATMENT   Finding treatment that works well may take time. You may be referred to a pain specialist. He or she may prescribe medicine or therapies, such as:   · Mindful meditation or yoga.  · Shots (injections) of numbing or pain-relieving medicines into the spine or area of pain.  · Local electrical stimulation.  · Acupuncture.    · Massage therapy.    · Aroma, color, light, or sound therapy.    · Biofeedback.    · Working with a physical therapist to keep from getting stiff.    · Regular, gentle exercise.    · Cognitive or behavioral therapy.    · Group support.    Sometimes, surgery may be recommended.   HOME CARE INSTRUCTIONS   · Take all medicines as directed by your health care provider.    · Lessen stress in your life by relaxing and doing things such as listening to calming music.    · Exercise or be active as directed by your health care provider.    · Eat a healthy diet and include things such as vegetables, fruits, fish, and lean meats in your diet.    · Keep all follow-up appointments with your health care provider.    · Attend a support group with others suffering from chronic pain.  SEEK MEDICAL CARE IF:   · Your pain gets worse.    · You develop a new pain that was not there before.    · You cannot tolerate medicines given to you by your health care provider.    · You have new symptoms since your last visit with your health care provider.    SEEK IMMEDIATE MEDICAL CARE IF:   · You feel weak.    · You have decreased sensation or numbness.    · You lose control of bowel or bladder function.    · Your pain suddenly gets much worse.    · You develop shaking.  · You develop chills.  · You develop confusion.  · You develop chest pain.  · You develop shortness of breath.    MAKE SURE YOU:  · Understand these instructions.  · Will watch your condition.  · Will get help right away if you are not doing well or get worse.     This information is not intended to replace advice given to you by your health care  provider. Make sure you discuss any questions you have with your health care provider.     Document Released: 09/09/2003 Document Revised: 08/20/2014 Document Reviewed: 06/13/2014  Elsevier Interactive Patient Education ©2016 [employer]            Patient Information     Patient Information    Following emergency treatment: all patient requiring follow-up care must return either to a private physician or a clinic if your condition worsens before you are able to obtain further medical attention, please return to the emergency room.     Billing Information    At [facility], we work to make the billing process streamlined for our patients.  Our Representatives are here to answer any questions you may have regarding your hospital bill.  If you have insurance coverage and have supplied your insurance information to us, we will submit a claim to your insurer on your behalf.  Should you have any questions regarding your bill, we can be reached online or by phone as follows:  Online: You are able pay your bills online or live chat with our representatives about any billing questions you may have. We are here to help Monday - Friday from 8:00am to 7:30pm and 9:00am - 12:00pm on Saturdays.  Please visit [email or web address]  for more information.   Phone:  [phone] or [phone]    Please note that your emergency physician, surgeon, pathologist, radiologist, anesthesiologist, and other specialists are not employed by [facility] and will therefore bill separately for their services.  Please contact them directly for any questions concerning their bills at the numbers below:     [facility]:  [phone]  [facility]:  [phone]  [facility]:  [phone]  [facility]:  [phone]    1. Your final bill may vary from the amount quoted upon discharge if all procedures are not complete at that time, or if your doctor  has additional procedures of which we are not aware. You will receive an additional bill if you return to the Emergency Department at [facility] for suture removal regardless of the facility of which the sutures were placed.     2. Please arrange for settlement of this account at the emergency registration.    3. All self-pay accounts are due in full at the time of treatment.  If you are unable to meet this obligation then payment is expected within 4-5 days.     4. If you have had radiology studies (CT, X-ray, Ultrasound, MRI), you have received a preliminary result during your emergency department visit. Please contact the radiology department [phone] to receive a copy of your final result. Please discuss the Final result with your primary physician or with the follow up physician provided.     Crisis Hotline:  [place] Crisis Hotline:  7-142-QNOZSHU or 1-113.614.1061  [place] Crisis Hotline:    1-360.382.7184 or 406-296-8186         ED Discharge Follow Up Questions    1. In order to provide you with very good care, we would like to follow up with a phone call in the next few days.  May we have your permission to contact you?     YES /  NO    2. What is the best phone number to call you? (       )_____-__________    3. What is the best time to call you?      Morning  /  Afternoon  /  Evening                   Patient Signature:  ____________________________________________________________    Date:  ____________________________________________________________      Your appointments     Feb 22, 2017  9:00 AM   ONCOLOGY EST PATIENT 30 MIN with Shawanda Bennett M.D.   [facility] (--)    [address], [address]  [facility] [address]   [phone]            May 02, 2017  2:30 PM   Follow Up with Laure DOMINGO M.D.   [facility] (--)    [address] [id number] [phone]

## 2017-02-16 NOTE — ED AVS SNAPSHOT
The [employer] Creators Access Code: [email or web address]  Expires: 2/24/2017  1:47 PM    [employer] Game Creators  A secure, online tool to manage your health information     [employer]’s [employer] Game Creators® is a secure, online tool that connects you to your personalized health information from the privacy of your home -- day or night - making it very easy for you to manage your healthcare. Once the activation process is completed, you can even access your medical information using the [employer]ors balaji, which is available for free in the Apple Balaji store or Google Play store.     The Game Creators provides the following levels of access (as shown below):   My Chart Features   [facility] Primary Care Doctor [facility]  Specialists [facility]  Urgent  Care Non-[facility]  Primary Care  Doctor   Email your healthcare team securely and privately 24/7 X X X X   Manage appointments: schedule your next appointment; view details of past/upcoming appointments X      Request prescription refills. X      View recent personal medical records, including lab and immunizations X X X X   View health record, including health history, allergies, medications X X X X   Read reports about your outpatient visits, procedures, consult and ER notes X X X X   See your discharge summary, which is a recap of your hospital and/or ER visit that includes your diagnosis, lab results, and care plan. X X       How to register for [employer] Creat[employer]:  1. Go to  [email or web address].  2. Click on the Sign Up Now box, which takes you to the New Member Sign Up page. You will need to provide the following information:  a. Enter your [employer] Game Creators Access Code exactly as it appears at the top of this page. (You will not need to use this code after you’ve completed the sign-up process. If you do not sign up before the expiration date, you must request a new code.)   b. Enter your date of birth.   c. Enter your home email address.   d. Click Submit, and follow the next screen’s instructions.  3. Create a The Game Creators ID. This will be your [employer] Game Creators  login ID and cannot be changed, so think of one that is secure and easy to remember.  4. Create a [employer] password. You can change your password at any time.  5. Enter your Password Reset Question and Answer. This can be used at a later time if you forget your password.   6. Enter your e-mail address. This allows you to receive e-mail notifications when new information is available in [employer].  7. Click Sign Up. You can now view your health information.    For assistance activating your [employer] account, call [phone]

## 2017-02-17 ENCOUNTER — APPOINTMENT (OUTPATIENT)
Dept: RADIOLOGY | Facility: MEDICAL CENTER | Age: 67
End: 2017-02-17
Attending: PAIN MEDICINE
Payer: MEDICARE

## 2017-02-17 ENCOUNTER — HOSPITAL ENCOUNTER (OUTPATIENT)
Facility: MEDICAL CENTER | Age: 67
End: 2017-02-18
Attending: PAIN MEDICINE | Admitting: PAIN MEDICINE
Payer: MEDICARE

## 2017-02-17 PROBLEM — C79.51 BONE METASTASIS: Status: ACTIVE | Noted: 2017-02-17

## 2017-02-17 PROCEDURE — 700102 HCHG RX REV CODE 250 W/ 637 OVERRIDE(OP): Performed by: PAIN MEDICINE

## 2017-02-17 PROCEDURE — A6402 STERILE GAUZE <= 16 SQ IN: HCPCS | Performed by: PAIN MEDICINE

## 2017-02-17 PROCEDURE — A9270 NON-COVERED ITEM OR SERVICE: HCPCS | Performed by: PAIN MEDICINE

## 2017-02-17 PROCEDURE — 700105 HCHG RX REV CODE 258: Performed by: PAIN MEDICINE

## 2017-02-17 PROCEDURE — C1755 CATHETER, INTRASPINAL: HCPCS | Performed by: PAIN MEDICINE

## 2017-02-17 PROCEDURE — 502240 HCHG MISC OR SUPPLY RC 0272: Performed by: PAIN MEDICINE

## 2017-02-17 PROCEDURE — G0378 HOSPITAL OBSERVATION PER HR: HCPCS

## 2017-02-17 PROCEDURE — A4606 OXYGEN PROBE USED W OXIMETER: HCPCS | Performed by: PAIN MEDICINE

## 2017-02-17 PROCEDURE — 110382 HCHG SHELL REV 271: Performed by: PAIN MEDICINE

## 2017-02-17 PROCEDURE — 160002 HCHG RECOVERY MINUTES (STAT): Performed by: PAIN MEDICINE

## 2017-02-17 PROCEDURE — 700111 HCHG RX REV CODE 636 W/ 250 OVERRIDE (IP): Performed by: PAIN MEDICINE

## 2017-02-17 PROCEDURE — 160036 HCHG PACU - EA ADDL 30 MINS PHASE I: Performed by: PAIN MEDICINE

## 2017-02-17 PROCEDURE — 501838 HCHG SUTURE GENERAL: Performed by: PAIN MEDICINE

## 2017-02-17 PROCEDURE — 502000 HCHG MISC OR IMPLANTS RC 0278: Performed by: PAIN MEDICINE

## 2017-02-17 PROCEDURE — A6222 GAUZE <=16 IN NO W/SAL W/O B: HCPCS | Performed by: PAIN MEDICINE

## 2017-02-17 PROCEDURE — 302699 HCHG ABDOMINAL BINDER: Performed by: PAIN MEDICINE

## 2017-02-17 PROCEDURE — 500888 HCHG PACK, MINOR BASIN: Performed by: PAIN MEDICINE

## 2017-02-17 PROCEDURE — 700101 HCHG RX REV CODE 250

## 2017-02-17 PROCEDURE — 700111 HCHG RX REV CODE 636 W/ 250 OVERRIDE (IP)

## 2017-02-17 PROCEDURE — 700112 HCHG RX REV CODE 229: Performed by: PAIN MEDICINE

## 2017-02-17 PROCEDURE — 160048 HCHG OR STATISTICAL LEVEL 1-5: Performed by: PAIN MEDICINE

## 2017-02-17 PROCEDURE — 501445 HCHG STAPLER, SKIN DISP: Performed by: PAIN MEDICINE

## 2017-02-17 PROCEDURE — 160028 HCHG SURGERY MINUTES - 1ST 30 MINS LEVEL 3: Performed by: PAIN MEDICINE

## 2017-02-17 PROCEDURE — 500126 HCHG BOVIE, NEEDLE TIP: Performed by: PAIN MEDICINE

## 2017-02-17 PROCEDURE — 160039 HCHG SURGERY MINUTES - EA ADDL 1 MIN LEVEL 3: Performed by: PAIN MEDICINE

## 2017-02-17 PROCEDURE — C1772 INFUSION PUMP, PROGRAMMABLE: HCPCS | Performed by: PAIN MEDICINE

## 2017-02-17 PROCEDURE — 160009 HCHG ANES TIME/MIN: Performed by: PAIN MEDICINE

## 2017-02-17 PROCEDURE — 160035 HCHG PACU - 1ST 60 MINS PHASE I: Performed by: PAIN MEDICINE

## 2017-02-17 DEVICE — IMPLANTABLE DEVICE: Type: IMPLANTABLE DEVICE | Status: FUNCTIONAL

## 2017-02-17 RX ORDER — OXYCODONE HYDROCHLORIDE 10 MG/1
20 TABLET ORAL
Status: DISCONTINUED | OUTPATIENT
Start: 2017-02-17 | End: 2017-02-18 | Stop reason: HOSPADM

## 2017-02-17 RX ORDER — ACETAMINOPHEN 500 MG
1000 TABLET ORAL EVERY 6 HOURS
Status: DISCONTINUED | OUTPATIENT
Start: 2017-02-17 | End: 2017-02-18 | Stop reason: HOSPADM

## 2017-02-17 RX ORDER — SODIUM CHLORIDE, SODIUM LACTATE, POTASSIUM CHLORIDE, CALCIUM CHLORIDE 600; 310; 30; 20 MG/100ML; MG/100ML; MG/100ML; MG/100ML
INJECTION, SOLUTION INTRAVENOUS CONTINUOUS
Status: DISCONTINUED | OUTPATIENT
Start: 2017-02-17 | End: 2017-02-18 | Stop reason: HOSPADM

## 2017-02-17 RX ORDER — OXYCODONE HYDROCHLORIDE 10 MG/1
10 TABLET ORAL
Status: DISCONTINUED | OUTPATIENT
Start: 2017-02-17 | End: 2017-02-18 | Stop reason: HOSPADM

## 2017-02-17 RX ORDER — BUPIVACAINE HYDROCHLORIDE AND EPINEPHRINE 5; 5 MG/ML; UG/ML
INJECTION, SOLUTION EPIDURAL; INTRACAUDAL; PERINEURAL
Status: DISCONTINUED | OUTPATIENT
Start: 2017-02-17 | End: 2017-02-17 | Stop reason: HOSPADM

## 2017-02-17 RX ORDER — BACITRACIN 50000 [IU]/1
INJECTION, POWDER, FOR SOLUTION INTRAMUSCULAR
Status: DISCONTINUED | OUTPATIENT
Start: 2017-02-17 | End: 2017-02-17 | Stop reason: HOSPADM

## 2017-02-17 RX ORDER — MORPHINE SULFATE 30 MG/1
30 TABLET, FILM COATED, EXTENDED RELEASE ORAL EVERY 12 HOURS
Status: DISCONTINUED | OUTPATIENT
Start: 2017-02-17 | End: 2017-02-18 | Stop reason: HOSPADM

## 2017-02-17 RX ORDER — NALOXONE HYDROCHLORIDE 0.4 MG/ML
0.4 INJECTION, SOLUTION INTRAMUSCULAR; INTRAVENOUS; SUBCUTANEOUS PRN
Status: DISCONTINUED | OUTPATIENT
Start: 2017-02-17 | End: 2017-02-18 | Stop reason: HOSPADM

## 2017-02-17 RX ORDER — SODIUM CHLORIDE, SODIUM LACTATE, POTASSIUM CHLORIDE, CALCIUM CHLORIDE 600; 310; 30; 20 MG/100ML; MG/100ML; MG/100ML; MG/100ML
1000 INJECTION, SOLUTION INTRAVENOUS
Status: DISCONTINUED | OUTPATIENT
Start: 2017-02-17 | End: 2017-02-18 | Stop reason: HOSPADM

## 2017-02-17 RX ORDER — DEXAMETHASONE 4 MG/1
2 TABLET ORAL EVERY 8 HOURS
Status: DISCONTINUED | OUTPATIENT
Start: 2017-02-17 | End: 2017-02-18 | Stop reason: HOSPADM

## 2017-02-17 RX ORDER — DIAZEPAM 2 MG/1
2 TABLET ORAL EVERY 8 HOURS PRN
Status: DISCONTINUED | OUTPATIENT
Start: 2017-02-17 | End: 2017-02-18 | Stop reason: HOSPADM

## 2017-02-17 RX ORDER — OMEPRAZOLE 20 MG/1
20 CAPSULE, DELAYED RELEASE ORAL DAILY
Status: DISCONTINUED | OUTPATIENT
Start: 2017-02-17 | End: 2017-02-18 | Stop reason: HOSPADM

## 2017-02-17 RX ORDER — GABAPENTIN 100 MG/1
200 CAPSULE ORAL 3 TIMES DAILY
Status: DISCONTINUED | OUTPATIENT
Start: 2017-02-17 | End: 2017-02-18 | Stop reason: HOSPADM

## 2017-02-17 RX ORDER — HYDROMORPHONE HYDROCHLORIDE 2 MG/1
4 TABLET ORAL EVERY 4 HOURS PRN
Status: DISCONTINUED | OUTPATIENT
Start: 2017-02-17 | End: 2017-02-18 | Stop reason: HOSPADM

## 2017-02-17 RX ORDER — ALPRAZOLAM 0.5 MG/1
1 TABLET ORAL
Status: DISCONTINUED | OUTPATIENT
Start: 2017-02-17 | End: 2017-02-18 | Stop reason: HOSPADM

## 2017-02-17 RX ORDER — TAMSULOSIN HYDROCHLORIDE 0.4 MG/1
0.4 CAPSULE ORAL
Status: DISCONTINUED | OUTPATIENT
Start: 2017-02-17 | End: 2017-02-18 | Stop reason: HOSPADM

## 2017-02-17 RX ORDER — DOCUSATE SODIUM 100 MG/1
100 CAPSULE, LIQUID FILLED ORAL EVERY MORNING
Status: DISCONTINUED | OUTPATIENT
Start: 2017-02-17 | End: 2017-02-18 | Stop reason: HOSPADM

## 2017-02-17 RX ADMIN — TAMSULOSIN HYDROCHLORIDE 0.4 MG: 0.4 CAPSULE ORAL at 12:41

## 2017-02-17 RX ADMIN — DEXAMETHASONE 2 MG: 4 TABLET ORAL at 14:03

## 2017-02-17 RX ADMIN — DOCUSATE SODIUM 100 MG: 100 CAPSULE, LIQUID FILLED ORAL at 12:41

## 2017-02-17 RX ADMIN — GABAPENTIN 200 MG: 100 CAPSULE ORAL at 21:57

## 2017-02-17 RX ADMIN — ACETAMINOPHEN 1000 MG: 500 TABLET ORAL at 19:41

## 2017-02-17 RX ADMIN — ACETAMINOPHEN 1000 MG: 500 TABLET ORAL at 23:59

## 2017-02-17 RX ADMIN — CEFAZOLIN 1 G: 1 INJECTION, POWDER, FOR SOLUTION INTRAVENOUS at 14:03

## 2017-02-17 RX ADMIN — CEFAZOLIN 1 G: 1 INJECTION, POWDER, FOR SOLUTION INTRAVENOUS at 22:00

## 2017-02-17 RX ADMIN — MORPHINE SULFATE 30 MG: 30 TABLET, EXTENDED RELEASE ORAL at 21:57

## 2017-02-17 RX ADMIN — DEXAMETHASONE 2 MG: 4 TABLET ORAL at 21:57

## 2017-02-17 RX ADMIN — ALPRAZOLAM 1 MG: 0.5 TABLET ORAL at 21:57

## 2017-02-17 RX ADMIN — ACETAMINOPHEN 1000 MG: 500 TABLET ORAL at 12:41

## 2017-02-17 RX ADMIN — GABAPENTIN 200 MG: 100 CAPSULE ORAL at 14:03

## 2017-02-17 RX ADMIN — MORPHINE SULFATE 30 MG: 30 TABLET, EXTENDED RELEASE ORAL at 12:40

## 2017-02-17 RX ADMIN — CHOLECALCIFEROL TAB 25 MCG (1000 UNIT) 2000 UNITS: 25 TAB at 12:40

## 2017-02-17 RX ADMIN — OMEPRAZOLE 20 MG: 20 CAPSULE, DELAYED RELEASE ORAL at 12:41

## 2017-02-17 ASSESSMENT — PAIN SCALES - GENERAL
PAINLEVEL_OUTOF10: ASSUMED PAIN PRESENT
PAINLEVEL_OUTOF10: ASSUMED PAIN PRESENT
PAINLEVEL_OUTOF10: 0
PAINLEVEL_OUTOF10: 6
PAINLEVEL_OUTOF10: 0
PAINLEVEL_OUTOF10: 2
PAINLEVEL_OUTOF10: 0
PAINLEVEL_OUTOF10: 2
PAINLEVEL_OUTOF10: 0

## 2017-02-17 ASSESSMENT — LIFESTYLE VARIABLES
EVER_SMOKED: YES
EVER_SMOKED: YES
ALCOHOL_USE: NO

## 2017-02-17 NOTE — FLOWSHEET NOTE
02/17/17 1150   Interdisciplinary Plan of Care-Goals (Indications)   Hyperinflation Protocol Indications Pre or Post-op Abdominal, Thoracic or Orthopedic Surgery   Incentive Spirometry Group   Incentive Spirometry Instruction Yes   Incentive Spirometer Volume 3000 mL   Chest Exam   Respiration 16   Pulse 89   Oxygen   Home O2 Use Prior To Admission? No   Pulse Oximetry 96 %   O2 (LPM) 2.5   O2 Daily Delivery Respiratory  Nasal Cannula

## 2017-02-17 NOTE — OP REPORT
DATE OF SERVICE:  02/17/2017    NAME OF PROCEDURE:  Implantation of Medtronic intrathecal infusion pump and   catheter.    PREPROCEDURAL DIAGNOSES:  Patient with metastatic lung cancer with mets to the   hip, intractable hip pain, unresponsive to radiation therapy requiring high   dose orals with inadequate analgesia and dramatic improvement following spinal   narcotic trial.    POSTPROCEDURAL DIAGNOSES:  Patient with metastatic lung cancer with mets to   the hip, intractable hip pain, unresponsive to radiation therapy requiring   high dose orals with inadequate analgesia and dramatic improvement following   spinal narcotic trial.    ANESTHESIA:  General.    ANESTHESIOLOGIST:  Jaswant Hwang MD    SURGEON:  Cricket Sparks M.D.    PROCEDURE NOTE:  The patient was brought into the preop holding area _____   thorough explanation of the procedure itself and possible complications.  They   understood and accepted the risks.  He was then brought in the operating   room.  General anesthesia was induced.  He placed in a right lateral decubitus   position.  The back and left lower quadrant were sterilely prepped.  The   patient was sterilely draped.  A total of 20 mL of 0.5% bupivacaine 1:100,000   epinephrine was then infiltrated over the left lower quadrant and the left   paramedian at the L2-3 level.  A 16-gauge Tuohy needle was advanced into the   intrathecal space under fluoroscopic imaging.  The stylet was removed.  Clear   CSF was noted to be freely flowing.  The stylet of the catheter was fed up to   the T7-8 level.  The stylet was removed.  CSF was noted to be coming from the   distal aspect of the catheter.  Needle was pulled back 1 cm.  A 4 cm incision   was made.  Dissection down to the fascia was performed.  Electrocautery was   used to staunch the bleeding.  A 0 Ethibond suture was used as pursestring   around the needle and catheter and tied.  The needle was removed.  Silastic   anchor was deployed to  the fascia and tied using 0 Ethibond.  A 10 cm   longitudinal incision was then made in the left lower quadrant.  Blunt   dissection was used to make a pocket for the pump.  Again, electrocautery was   used to staunch the bleeding.  A tunneling koby was passed from the abdominal   wound, site to the lumbar wound site.  The catheter was passed back through   and distal aspect of the catheter was ligated and connected to the pump using   the sutureless connector.  A 24-gauge Villarreal needle was placed in the access   port access and aspirated with clear fluid return confirming patency of the   entire system.  Pump was then implanted in the pocket and anchored using 0   Ethibond sutures.  Irrigation of the anterior and posterior wound sites were   performed.  The wound sites were closed with 3-0 Vicryl subcuticular and   staples on the skin for the abdominal wound site in a locking 2-0 continuous   suture for the lumbar wound site.  The patient will be programmed at 0.5 mg   per day of hydromorphone and 0.05 mg bolus, PTM of 0.1 with 12 boluses in 24   hours with a 1 hour lockout was programmed as well.  The patient will be   scheduled follow up in our office on Tuesday morning to determine his response   to this or p.r.n. problems.       ____________________________________     MD JOSEPH MEDRANO / CELSO    DD:  02/17/2017 08:40:13  DT:  02/17/2017 09:27:55    D#:  [id number]  Job#:  [id number]    cc: Insurance Company

## 2017-02-17 NOTE — PROGRESS NOTES
Late Entry    2/17/17, 1030- Patient admitted to floor from PACU. No visible distress noted at this time. Patient resting in bed. The plan of care for today discussed with patient. Call bell at side.     2/17/17, 1200- Patient resting. Medtronic Medication Pump representative called and discussed with patient and nurse the use of medication pump.    2/17/17, 1400- Patient resting to bed.

## 2017-02-17 NOTE — OR SURGEON
Immediate Post-Operative Note      PreOp Diagnosis: metastatic lung cancer with hip pain    PostOp Diagnosis: as above    Procedure(s):  PUMP INSERT/REMOVE - IMPLANT PERMANENT PAIN PUMP - Wound Class: Clean    Surgeon(s):  Cricket Sparks M.D.    Anesthesiologist/Type of Anesthesia:  Anesthesiologist: Jaswant Hwang M.D./General    Surgical Staff:  Circulator: Sharon Sweeney R.N.  Scrub Person: Marcello Serrano    Specimen: no    Estimated Blood Loss: <50cc    Findings: none    Complications: none        2/17/2017 8:33 AM Cricket Sparks

## 2017-02-17 NOTE — OR NURSING
0909: report from GENE Guevara. Patient sleeping.  0920: Medtronic rep at bedside, indicated he will see patient in his room since he is still sleeping. Would like him more awake/alert to do teaching.  0930: sleeping.  0935: report to GENE Guevara.

## 2017-02-17 NOTE — CARE PLAN
Problem: Safety  Goal: Will remain free from injury  Patient alert and oriented, ambulatory with assistance and the use of a walker. While in bed patient bed in low position with wheels of bed locked and call bell at side.     Problem: Knowledge Deficit  Goal: Knowledge of disease process/condition, treatment plan, diagnostic tests, and medications will improve  The plan of care for today discussed with patient (rest, pain management, movement with staff, discharge home in the AM)

## 2017-02-18 VITALS
WEIGHT: 179.45 LBS | DIASTOLIC BLOOD PRESSURE: 54 MMHG | TEMPERATURE: 97.8 F | RESPIRATION RATE: 19 BRPM | OXYGEN SATURATION: 91 % | BODY MASS INDEX: 26.49 KG/M2 | HEART RATE: 65 BPM | SYSTOLIC BLOOD PRESSURE: 109 MMHG

## 2017-02-18 PROCEDURE — 700111 HCHG RX REV CODE 636 W/ 250 OVERRIDE (IP): Performed by: PAIN MEDICINE

## 2017-02-18 PROCEDURE — 700102 HCHG RX REV CODE 250 W/ 637 OVERRIDE(OP): Performed by: PAIN MEDICINE

## 2017-02-18 PROCEDURE — G0378 HOSPITAL OBSERVATION PER HR: HCPCS

## 2017-02-18 PROCEDURE — A9270 NON-COVERED ITEM OR SERVICE: HCPCS | Performed by: PAIN MEDICINE

## 2017-02-18 PROCEDURE — 700105 HCHG RX REV CODE 258: Performed by: PAIN MEDICINE

## 2017-02-18 PROCEDURE — 700112 HCHG RX REV CODE 229: Performed by: PAIN MEDICINE

## 2017-02-18 RX ADMIN — OXYCODONE HYDROCHLORIDE 10 MG: 10 TABLET ORAL at 05:01

## 2017-02-18 RX ADMIN — MORPHINE SULFATE 30 MG: 30 TABLET, EXTENDED RELEASE ORAL at 08:42

## 2017-02-18 RX ADMIN — CHOLECALCIFEROL TAB 25 MCG (1000 UNIT) 2000 UNITS: 25 TAB at 08:41

## 2017-02-18 RX ADMIN — DOCUSATE SODIUM 100 MG: 100 CAPSULE, LIQUID FILLED ORAL at 08:42

## 2017-02-18 RX ADMIN — DEXAMETHASONE 2 MG: 4 TABLET ORAL at 05:01

## 2017-02-18 RX ADMIN — ACETAMINOPHEN 1000 MG: 500 TABLET ORAL at 05:01

## 2017-02-18 RX ADMIN — TAMSULOSIN HYDROCHLORIDE 0.4 MG: 0.4 CAPSULE ORAL at 08:41

## 2017-02-18 RX ADMIN — CEFAZOLIN 1 G: 1 INJECTION, POWDER, FOR SOLUTION INTRAVENOUS at 05:01

## 2017-02-18 RX ADMIN — DIAZEPAM 2 MG: 2 TABLET ORAL at 01:48

## 2017-02-18 RX ADMIN — OMEPRAZOLE 20 MG: 20 CAPSULE, DELAYED RELEASE ORAL at 08:41

## 2017-02-18 RX ADMIN — GABAPENTIN 200 MG: 100 CAPSULE ORAL at 08:41

## 2017-02-18 ASSESSMENT — PAIN SCALES - GENERAL
PAINLEVEL_OUTOF10: 3
PAINLEVEL_OUTOF10: 6
PAINLEVEL_OUTOF10: 2

## 2017-02-18 NOTE — PROGRESS NOTES
Discharge instructions provided to pt. Pt verbalized understanding. All questions answered.  Pt instructed to call for further questions. Pt states all personal belongings are in their possession. Pt escorted out by CNA via a wheelchair. Pt discharged to home with son.

## 2017-02-18 NOTE — PROGRESS NOTES
"Patient up to the bathroom with front wheel walker and stand by assist.  Patient states he is \"having a panic attack\".  Diazepam 2mg given as ordered.  Patient denies further needs at this time.   remains on and oxygen at 3 liters per NC with saturation of 92%.  Patient states he uses 2 liters at home as needed.  "

## 2017-02-18 NOTE — DISCHARGE INSTRUCTIONS
Discharge Instructions    Discharged to home by car with relative. Discharged via wheelchair, hospital escort: Yes.  Special equipment needed: Not Applicable    Be sure to schedule a follow-up appointment with your primary care doctor or any specialists as instructed.     Discharge Plan:   Diet Plan: Discussed  Activity Level: Discussed  Confirmed Follow up Appointment: Appointment Scheduled  Confirmed Symptoms Management: Discussed  Medication Reconciliation Updated: Yes  Influenza Vaccine Indication: Patient Refuses    I understand that a diet low in cholesterol, fat, and sodium is recommended for good health. Unless I have been given specific instructions below for another diet, I accept this instruction as my diet prescription.   Other diet: Regular    Special Instructions: None    · Is patient discharged on Warfarin / Coumadin?   No     · Is patient Post Blood Transfusion?  No    Depression / Suicide Risk    As you are discharged from this [facility] facility, it is important to learn how to keep safe from harming yourself.    Recognize the warning signs:  · Abrupt changes in personality, positive or negative- including increase in energy   · Giving away possessions  · Change in eating patterns- significant weight changes-  positive or negative  · Change in sleeping patterns- unable to sleep or sleeping all the time   · Unwillingness or inability to communicate  · Depression  · Unusual sadness, discouragement and loneliness  · Talk of wanting to die  · Neglect of personal appearance   · Rebelliousness- reckless behavior  · Withdrawal from people/activities they love  · Confusion- inability to concentrate     If you or a loved one observes any of these behaviors or has concerns about self-harm, here's what you can do:  · Talk about it- your feelings and reasons for harming yourself  · Remove any means that you might use to hurt yourself (examples: pills, rope, extension cords, firearm)  · Get professional  help from the community (Mental Health, Substance Abuse, psychological counseling)  · Do not be alone:Call your Safe Contact- someone whom you trust who will be there for you.  · Call your local CRISIS HOTLINE 016-4979 or 641-908-8838  · Call your local Children's Mobile Crisis Response Team [place] (823) 584-1954 or www.PricePanda  · Call the toll free National Suicide Prevention Hotlines   · National Suicide Prevention Lifeline 596-670-BKCY (3416)  · National Hope Line Network 800-SUICIDE (579-4297)

## 2017-02-18 NOTE — PROGRESS NOTES
Report received from Marisol MILLS. Assumed care of pt. Pt resting in bed. Pt A&Ox4. POC discussed. Pt verbalized understanding. No signs of distress or discomfort noted at this time. Pt instructed to use call light for assistance. Call light and personal belongings within reach. Pt denies any concerns at this time. All questions answered. Safety measures in place. Will continue to monitor.

## 2017-02-18 NOTE — PROGRESS NOTES
"Assumed care of patient at 1900.  Patient is alert and oriented, resting in bed at this time.  Encouraged patient to call for assistance with ambulation.  Dressing to abdomen remains dry and intact and abdominal binder remains on.  IV is saline locked.  Patient rating pain at 2/10 to left hip and states it has improved from a \"9\" this morning.  Patient denies needs at this time.  Plans discussed for this evening including medications.    "

## 2017-02-18 NOTE — CARE PLAN
Problem: Safety  Goal: Will remain free from injury  Outcome: PROGRESSING AS EXPECTED  Bed in low and locked position.  Call light and belongings remain in reach of patient.  Side rails up X2.  Patient uses call light appropriately for assistance with ambulation.  Hourly rounding continues.      Problem: Pain Management  Goal: Pain level will decrease to patient’s comfort goal  Outcome: PROGRESSING AS EXPECTED    02/17/17 1942 02/17/17 2311   OTHER   Nurse Pain Scale 0 - 10  2 --    Non Verbal Scale  --  Calm;Sleeping;Unlabored Breathing   Comfort Goal Comfort at Rest;Comfort with Movement --

## 2017-02-19 PROCEDURE — 99309 SBSQ NF CARE MODERATE MDM 30: CPT | Performed by: PHYSICIAN ASSISTANT

## 2017-02-20 ENCOUNTER — RESOLUTE PROFESSIONAL BILLING HOSPITAL PROF FEE (OUTPATIENT)
Dept: HOSPITALIST | Facility: SKILLED NURSING FACILITY | Age: 67
End: 2017-02-20
Payer: MEDICARE

## 2017-02-20 PROCEDURE — 99306 1ST NF CARE HIGH MDM 50: CPT | Mod: AI | Performed by: FAMILY MEDICINE

## 2017-02-21 PROCEDURE — 99308 SBSQ NF CARE LOW MDM 20: CPT | Performed by: NURSE PRACTITIONER

## 2017-02-22 ENCOUNTER — OFFICE VISIT (OUTPATIENT)
Dept: HEMATOLOGY ONCOLOGY | Facility: MEDICAL CENTER | Age: 67
End: 2017-02-22
Payer: MEDICARE

## 2017-02-22 VITALS
TEMPERATURE: 98.2 F | RESPIRATION RATE: 16 BRPM | DIASTOLIC BLOOD PRESSURE: 62 MMHG | WEIGHT: 188.71 LBS | BODY MASS INDEX: 27.86 KG/M2 | SYSTOLIC BLOOD PRESSURE: 128 MMHG | OXYGEN SATURATION: 93 % | HEART RATE: 78 BPM

## 2017-02-22 DIAGNOSIS — C34.2 MALIGNANT NEOPLASM OF MIDDLE LOBE OF RIGHT LUNG (HCC): ICD-10-CM

## 2017-02-22 PROCEDURE — 4040F PNEUMOC VAC/ADMIN/RCVD: CPT | Performed by: INTERNAL MEDICINE

## 2017-02-22 PROCEDURE — G8484 FLU IMMUNIZE NO ADMIN: HCPCS | Performed by: INTERNAL MEDICINE

## 2017-02-22 PROCEDURE — 1101F PT FALLS ASSESS-DOCD LE1/YR: CPT | Performed by: INTERNAL MEDICINE

## 2017-02-22 PROCEDURE — 3017F COLORECTAL CA SCREEN DOC REV: CPT | Performed by: INTERNAL MEDICINE

## 2017-02-22 PROCEDURE — G8420 CALC BMI NORM PARAMETERS: HCPCS | Performed by: INTERNAL MEDICINE

## 2017-02-22 PROCEDURE — 1111F DSCHRG MED/CURRENT MED MERGE: CPT | Performed by: INTERNAL MEDICINE

## 2017-02-22 PROCEDURE — 99214 OFFICE O/P EST MOD 30 MIN: CPT | Performed by: INTERNAL MEDICINE

## 2017-02-22 PROCEDURE — 1036F TOBACCO NON-USER: CPT | Performed by: INTERNAL MEDICINE

## 2017-02-22 PROCEDURE — G8432 DEP SCR NOT DOC, RNG: HCPCS | Performed by: INTERNAL MEDICINE

## 2017-02-22 ASSESSMENT — PAIN SCALES - GENERAL: PAINLEVEL: NO PAIN

## 2017-02-22 NOTE — Clinical Note
[facility]    [address], [address]  [place] [address]      Date:2/25/2017                     Reference to Gabriele Torres    Follow Up Note:  Oncology      Date: 2/22/17  Time: 9:20 am       Primary Care:  Sasha Isidro M.D.  Prior Oncology: Dr. Moses  Pain management: Dr. Sparks  Spinal surgery: Dr. Lopez    Radiation Oncology: Dr. Sims       Diagnosis: Metastatic adenocarcinoma of lung      Chief Complaint:  He is here for a follow up visit      History of Presenting Illness:  Gabriele Torres is a 66 y.o. Male with metastatic adenocarcinoma of lung diagnosed in 5/2015. At diagnosis he was found to have multiple pulmonary nodules and biopsy of the right upper lobe and lower lobe both were positive for moderately differentiated adenocarcinoma with lymphovascular invasion. Also found to have a left adrenal nodule and trace pleural effusion.  Bone scan showed uptake in the right scapula and third/fourth rib. Further testing of the tissue was EGFR 20 mutation. At the time he was still treated with Tarceva from 6/2015-9/2015 and was found to have progression of disease. He was then treated with carbo/Alimta ×1 cycle but had significant toxicity. He was then treated with single agent Alimta and again was unable to tolerated. 3/2016 he was started on Opdivo.  On tissue testing he was PDL one negative. He was continued on the regimen till 7/2016 when he had mixed responses. At the time MRI of the spine was consistent with femoral stenosis and degenerative changes. Imaging done in 11/2016 showed progression of disease. In the therapy was held. CT scan showed destructive metastatic lesion in the superior left acetabulum. 11/2016 he underwent biopsy of the right middle lobe and testing for T7 90 and mutation was negative. He then presented for a second opinion. He had increased pain has underwent a bone scan which showed increased uptake in the right scapula as well as  extensive uptake in the left ileum/acetabular area. 1/2017 he underwent biopsy of the bony lesion for more tissue as he was a candidate for MATCH trial.  Tissue was sent on for further testing. His pain significantly worsened hence he was admitted to the hospital.  He was seen by Dr. Sims and completed palliative radiation to the hip as well as the shoulder. He was then discharged to rehabilitation center.  He then again presented to the ER secondary to persistent left hip pain. On 2/17/17 he underwent plantation of an pump.          Interval history:    History of her follow-up visit.  He is still at the rehabilitation center but states that he is starting to feel better. He had not noticed a significant improvement in pain with completion of radiation. He recently had pain pump placed which appears to be helping with the pain.  During the hospital he had urinary retention and Yañez injury which has improved. A few days ago he had another episode of urinary retention which has resolved. He is currently urinating well. He is constant on Oxygen at 3 L/m mostly at nighttime.  He has been getting physical therapy. At present his pain ranges from 5-7/10. He has noticed mild constipation and is having daily bowel movements. He has intermittent dry cough but denies any significant shortness of breath.          Past Medical History:  1.  Metastatic lung cancer  2. 2005: Chronic lower back pain secondary to injury  3. Arthritis with multiple joint involved  4. GERD  5. Anxiety with intermittent attacks  6. O2 3l/min only at night x 3 weeks          Allergies as of 02/22/2017    •  (No Known Allergies)          Current outpatient prescriptions:    •  docusate sodium 100 MG Cap, Take 100 mg by mouth every morning., Disp: 60 Cap, Rfl:    •  gabapentin (NEURONTIN) 100 MG Cap, Take 2 Caps by mouth 3 times a day., Disp: 90 Cap, Rfl:    •  tamsulosin (FLOMAX) 0.4 MG capsule, Take 1 Cap by mouth ONE-HALF HOUR AFTER BREAKFAST.,  Disp: 30 Cap, Rfl:    •  HYDROmorphone (DILAUDID) 2 MG Tab, Take 2 Tabs by mouth every four hours as needed for Severe Pain., Disp: 15 Tab, Rfl: 0  •  morphine ER (MS CONTIN) 30 MG Tab CR tablet, Take 1 Tab by mouth every 12 hours., Disp: 20 Tab, Rfl: 0  •  diazepam (VALIUM) 2 MG Tab, Take 1 Tab by mouth every 8 hours as needed., Disp: 10 Tab, Rfl: 0  •  alprazolam (XANAX) 1 MG Tab, Take 1 mg by mouth every bedtime. Indications: Feeling Anxious, Disp: , Rfl:    •  omeprazole (PRILOSEC) 20 MG delayed-release capsule, Take 20 mg by mouth every day., Disp: , Rfl:       Review of Systems:  All other review of systems are negative except what was mentioned above in the HPI.  Constitutional: Negative for fever and chills. Positive for malaise/fatigue.    HENT: Negative for ear pain and nosebleeds.     Eyes: Negative for blurred vision.    Respiratory: Positive for dry cough. Positive for shortness of breath on exertion.     Cardiovascular: Negative for chest pain and leg swelling.    Gastrointestinal: Negative for nausea, vomiting and abdominal pain.    Genitourinary: Negative for dysuria.    Musculoskeletal: Negative for myalgias. Positive for right shoulder pain and left hip pain   Skin: Negative for rash.    Neurological: Negative for dizziness, sensory change, and headaches.    Endo/Heme/Allergies: No bruise/bleed easily.    Psychiatric/Behavioral: Negative for depression and memory loss.        Physical Exam:   Vitals   Filed Vitals:      02/22/17 0925    BP:  128/62    Pulse:  78    Temp:  36.8 °C (98.2 °F)    Resp:  16    Weight:  85.6 kg (188 lb 11.4 oz)    SpO2:  93%         Performance status: I    General: Not in acute distress, alert and oriented x 3  HEENT: normalcephalic, atraumatic, extra ocular muscles intact, moist oral mucus membranes, and oral cavity without any lesions.  Neck: Supple neck and full range of motion  Lymph nodes: No palpable bilateral cervical and supraclavicular lymphadenopathy.     CVS:  regular rate and rhythm  RESP: Decrease breath sound in bilateral bases  ABD: Soft, non tender, non distended, positive bowel sounds, and no palpable hepatomegaly    EXT: No edema  CNS: Alert and oriented x 3 and cranial nerves grossly intact. Altered gait due to hip pain.       Labs:  No visits with results within 1 Day(s) from this visit.  Latest known visit with results is:      Admission on 02/07/2017, Discharged on 02/15/2017    No results displayed because visit has over 200 results.          ]      Assessment & Plan:  Metastatic adenocarcinoma of lung, EGFR exon 20: He was treated with multiple lines of therapy in the past. He was initially treated with Tarceva with progression of disease. He was unable to tolerate carbo/Alimta as well as single agent Alimta. He was also treated with Opdivo with progression of disease. Patient is being evaluated for MATCH trial.  Further testing has been submitted and still pending. If patient is unable to go to the trial then plan is to start him on single agent docetaxel with escalation to combination therapy.  As they have been a significant delay in starting of his systemic therapy I recommend repeating CT of the chest abdomen pelvis prior to his initiating systemic therapy.  Intractable hip pain: Patient underwent palliative radiation to the hip as well as the shoulder. He did not have significant improvement in pain. He is followed by pain management. Secondary to increased pain he recently underwent pain pump placement.    Bony metastatic disease: Patient is recovering from his recent hospitalization. Upon recovery he will be started on bisphosphonates to be given every 4 weeks.  Normocytic anemia: Patient had urothelial bleeding during his hospitalization which has resolved. His hemoglobin had been fairly stable. He will need repeat labs prior to starting chemotherapy.  He is to follow-up again in one week to finalize his treatment plan or sooner as needed.      he  agreed and verbalized his agreement and understanding with the current plan. I answered all questions and concerns he has at this time and advised him to call at any time in the interim with questions or concerns in regards to his care. Thank you for allowing me to participate in his care, I will continue to follow.      Please note that this dictation was created using voice recognition software. I have made every reasonable attempt to correct obvious errors, but I expect that there are errors of grammar and possibly content that I did not discover before finalizing the note.      Sincerely,  Shawanda Bennett  [address] [place] [address], [address]   [phone]

## 2017-02-22 NOTE — Clinical Note
February 22, 2017        Gabriele Torres  [facility] [place] [id number]      To whom it may concern,    Gabriele was seen in my office today with a diagnosis of metastatic lung cancer. He is physically unable to travel and is experiencing a lot of pain due to his diagnosis. He is on active treatment and it is my recommendation that he not travel.     If you have any further questions or concerns, please feel free to contact my office.      Thank you.          ___________________  Shawanda Bennett  Medical Oncologist

## 2017-02-22 NOTE — MR AVS SNAPSHOT
Gabriele Torres   2017 9:20 AM   Office Visit   MRN: [id number]    Department:  [facility]   Dept Phone:  [phone]    Description:  Male : 1950   Provider:  Shawanda Bennett M.D.           Reason for Visit     Follow-Up 1wk FV      Allergies as of 2017     No Known Allergies      Vital Signs     Blood Pressure Pulse Temperature Respirations Weight Oxygen Saturation    128/62 mmHg 78 36.8 °C (98.2 °F) 16 85.6 kg (188 lb 11.4 oz) 93%    Smoking Status                   Former Smoker           Basic Information     Date Of Birth Sex Race Ethnicity Preferred Language    1950 Male White Non- English      Your appointments     Mar 02, 2017  9:20 AM   ONCOLOGY EST PATIENT 30 MIN with Shawanda Bennett M.D.   [facility] (--)    [address] [facility] [address]  [facility] [id number] [phone]            May 02, 2017  2:30 PM   Follow Up with Laure DOMINGO M.D.   [facility] (--)    [address] [id number] [phone]              Problem List              ICD-10-CM Priority Class Noted - Resolved    GERD (gastroesophageal reflux disease) K21.9 Low  2015 - Present    Pulmonary nodule seen on imaging study R91.1   2015 - Present    OA (osteoarthritis) M19.90   2015 - Present    Anxiety F41.9   2015 - Present    Shortness of breath R06.02   2015 - Present    Lung mass R91.8 High  2015 - Present    Hypercalcemia E83.52 Medium  2015 - Present    Lung cancer (CMS-HCC) C34.90   2015 - Present    Dependence on supplemental oxygen Z99.81   10/7/2015 - Present    Laryngitis, chronic J37.0   2015 - Present    Chronic low back pain with left-sided sciatica M54.42, G89.29   10/18/2016 - Present    Weakness of left lower extremity M62.81   11/15/2016 - Present    Lumbar spondylosis M47.816   2016 - Present    Research study patient Z00.6   2017 - Present    [facility] Research-Oncology Billing    2017 -  Present    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   1/26/2017 - Present    Pain from bone metastases (CMS-HCC) G89.3, C79.51   2/7/2017 - Present    Metastatic lung cancer (metastasis from lung to other site) (CMS-HCC) C34.90 High  2/8/2017 - Present    Intractable pain R52 High  2/8/2017 - Present    Urinary retention R33.9 Medium  2/8/2017 - Present    Hematuria R31.9 Medium  2/8/2017 - Present    Anemia D64.9 Medium  2/8/2017 - Present    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 Review all of your home medications and newly ordered medications with your provider and/or pharmacist. Follow medication instructions as directed by your provider and/or pharmacist. Please keep your medication list with you and share with your provider. Update the information when medications are discontinued, doses are changed, or new medications (including over-the-counter products) are added; and carry medication information at all times in the event of emergency situations     Allergies:  No Known Allergies          Medications  Valid as of: February 22, 2017 -  9:58 AM    Generic Name Brand Name Tablet Size Instructions for use    ALPRAZolam (Tab) XANAX 1 MG Take 1 mg by mouth every bedtime. Indications: Feeling Anxious        DiazePAM (Tab) VALIUM 2 MG Take 1 Tab by mouth every 8 hours as needed.        Docusate Sodium (Cap)   MG Take 100 mg by mouth every morning.        Gabapentin (Cap) NEURONTIN 100 MG Take 2 Caps by mouth 3 times a day.        HYDROmorphone HCl (Tab) DILAUDID 2 MG Take 2 Tabs by mouth every four hours as needed for Severe Pain.        Morphine Sulfate (Tab CR) MS CONTIN 30 MG Take 1 Tab by mouth every 12 hours.        Omeprazole (CAPSULE DELAYED RELEASE) PRILOSEC 20 MG Take 20 mg by mouth every day.        Tamsulosin HCl (Cap) FLOMAX 0.4 MG Take 1 Cap by mouth ONE-HALF HOUR AFTER BREAKFAST.        .                 Medicines prescribed today were sent to:     [facility] #[id number] - [place], [place] - [address] AT [address] [place] [address]    Phone: [phone] Fax: [phone]    Open 24 Hours?: No      Medication refill instructions:       If your prescription bottle indicates you have medication refills left, it is not necessary to call your provider’s office. Please contact your pharmacy and they will refill your medication.    If your prescription bottle indicates you do not have any refills left, you may request refills at any time through one of the following ways: The online [employer] system (except Urgent Care), by calling your provider’s office, or by asking your pharmacy to contact your provider’s office with a refill request. Medication refills are processed only during regular business hours and may not be available until the next business day. Your provider may request additional information or to have a follow-up visit with you prior to refilling your medication.   *Please Note: Medication refills are assigned a new Rx number when refilled electronically. Your pharmacy may indicate that no refills were authorized even though a new prescription for the same medication is available at the pharmacy. Please request the medicine by name with the pharmacy before contacting your provider for a refill.           Ducatt Access Code: [email or web address]  Expires:  2/24/2017  1:47 PM    Sky Frequency  A secure, online tool to manage your health information     [employer]’s Sky Frequency® is a secure, online tool that connects you to your personalized health information from the privacy of your home -- day or night - making it very easy for you to manage your healthcare. Once the activation process is completed, you can even access your medical information using the Sky Frequency balaji, which is available for free in the Apple Balaji store or Google Play store.     Sky Frequency provides the following levels of access (as shown below):   My Chart Features   [facility] Doctor [facility]  Specialists [facility]  Urgent  Care [facility]  Primary Care  Doctor   Email your healthcare team securely and privately 24/7 X X X    Manage appointments: schedule your next appointment; view details of past/upcoming appointments X      Request prescription refills. X      View recent personal medical records, including lab and immunizations X X X X   View health record, including health history, allergies, medications X X X X   Read reports about your outpatient visits, procedures, consult and ER notes X X X X   See your discharge summary, which is a recap of your hospital and/or ER visit that includes your diagnosis, lab results, and care plan. X X       How to register for Sky Frequency:  1. Go to  [email or web address].  2. Click on the Sign Up Now box, which takes you to the New Member Sign Up page. You will need to provide the following information:  a. Enter your Sky Frequency Access Code exactly as it appears at the top of this page. (You will not need to use this code after you’ve completed the sign-up process. If you do not sign up before the expiration date, you must request a new code.)   b. Enter your date of birth.   c. Enter your home email address.   d. Click Submit, and follow the next screen’s instructions.  3. Create a Sky Frequency ID. This will be your Sky Frequency login ID and cannot be changed, so think of one  that is secure and easy to remember.  4. Create a dBMEDx password. You can change your password at any time.  5. Enter your Password Reset Question and Answer. This can be used at a later time if you forget your password.   6. Enter your e-mail address. This allows you to receive e-mail notifications when new information is available in dBMEDx.  7. Click Sign Up. You can now view your health information.    For assistance activating your dBMEDx account, call [phone]

## 2017-02-22 NOTE — PROGRESS NOTES
Follow Up Note:  Oncology    Date: 2/22/17  Time: 9:20 am      Primary Care:  Sasha Isidro M.D.  Prior Oncology: Dr. Moses  Pain management: Dr. Sparks  Spinal surgery: Dr. Lpoez   Radiation Oncology: Dr. Sims     Diagnosis: Metastatic adenocarcinoma of lung    Chief Complaint:  He is here for a follow up visit    History of Presenting Illness:  Gabriele Torres is a 66 y.o. Male with metastatic adenocarcinoma of lung diagnosed in 5/2015. At diagnosis he was found to have multiple pulmonary nodules and biopsy of the right upper lobe and lower lobe both were positive for moderately differentiated adenocarcinoma with lymphovascular invasion. Also found to have a left adrenal nodule and trace pleural effusion.  Bone scan showed uptake in the right scapula and third/fourth rib. Further testing of the tissue was EGFR 20 mutation. At the time he was still treated with Tarceva from 6/2015-9/2015 and was found to have progression of disease. He was then treated with carbo/Alimta ×1 cycle but had significant toxicity. He was then treated with single agent Alimta and again was unable to tolerated. 3/2016 he was started on Opdivo.  On tissue testing he was PDL one negative. He was continued on the regimen till 7/2016 when he had mixed responses. At the time MRI of the spine was consistent with femoral stenosis and degenerative changes. Imaging done in 11/2016 showed progression of disease. In the therapy was held. CT scan showed destructive metastatic lesion in the superior left acetabulum. 11/2016 he underwent biopsy of the right middle lobe and testing for T7 90 and mutation was negative. He then presented for a second opinion. He had increased pain has underwent a bone scan which showed increased uptake in the right scapula as well as extensive uptake in the left ileum/acetabular area. 1/2017 he underwent biopsy of the bony lesion for more tissue as he was a candidate for MATCH trial.  Tissue was sent on for further  testing. His pain significantly worsened hence he was admitted to the hospital.  He was seen by Dr. Sims and completed palliative radiation to the hip as well as the shoulder. He was then discharged to rehabilitation center.  He then again presented to the ER secondary to persistent left hip pain. On 2/17/17 he underwent plantation of an pump.      Interval history:   History of her follow-up visit.  He is still at the rehabilitation center but states that he is starting to feel better. He had not noticed a significant improvement in pain with completion of radiation. He recently had pain pump placed which appears to be helping with the pain.  During the hospital he had urinary retention and Yañez injury which has improved. A few days ago he had another episode of urinary retention which has resolved. He is currently urinating well. He is constant on Oxygen at 3 L/m mostly at nighttime.  He has been getting physical therapy. At present his pain ranges from 5-7/10. He has noticed mild constipation and is having daily bowel movements. He has intermittent dry cough but denies any significant shortness of breath.      Past Medical History:  1.  Metastatic lung cancer  2. 2005: Chronic lower back pain secondary to injury  3. Arthritis with multiple joint involved  4. GERD  5. Anxiety with intermittent attacks  6. O2 3l/min only at night x 3 weeks      Allergies as of 02/22/2017   • (No Known Allergies)         Current outpatient prescriptions:   •  docusate sodium 100 MG Cap, Take 100 mg by mouth every morning., Disp: 60 Cap, Rfl:   •  gabapentin (NEURONTIN) 100 MG Cap, Take 2 Caps by mouth 3 times a day., Disp: 90 Cap, Rfl:   •  tamsulosin (FLOMAX) 0.4 MG capsule, Take 1 Cap by mouth ONE-HALF HOUR AFTER BREAKFAST., Disp: 30 Cap, Rfl:   •  HYDROmorphone (DILAUDID) 2 MG Tab, Take 2 Tabs by mouth every four hours as needed for Severe Pain., Disp: 15 Tab, Rfl: 0  •  morphine ER (MS CONTIN) 30 MG Tab CR tablet, Take 1 Tab  by mouth every 12 hours., Disp: 20 Tab, Rfl: 0  •  diazepam (VALIUM) 2 MG Tab, Take 1 Tab by mouth every 8 hours as needed., Disp: 10 Tab, Rfl: 0  •  alprazolam (XANAX) 1 MG Tab, Take 1 mg by mouth every bedtime. Indications: Feeling Anxious, Disp: , Rfl:   •  omeprazole (PRILOSEC) 20 MG delayed-release capsule, Take 20 mg by mouth every day., Disp: , Rfl:     Review of Systems:  All other review of systems are negative except what was mentioned above in the HPI.  Constitutional: Negative for fever and chills. Positive for malaise/fatigue.    HENT: Negative for ear pain and nosebleeds.     Eyes: Negative for blurred vision.    Respiratory: Positive for dry cough. Positive for shortness of breath on exertion.    Cardiovascular: Negative for chest pain and leg swelling.    Gastrointestinal: Negative for nausea, vomiting and abdominal pain.    Genitourinary: Negative for dysuria.    Musculoskeletal: Negative for myalgias. Positive for right shoulder pain and left hip pain   Skin: Negative for rash.    Neurological: Negative for dizziness, sensory change, and headaches.    Endo/Heme/Allergies: No bruise/bleed easily.    Psychiatric/Behavioral: Negative for depression and memory loss.      Physical Exam:  Filed Vitals:    02/22/17 0925   BP: 128/62   Pulse: 78   Temp: 36.8 °C (98.2 °F)   Resp: 16   Weight: 85.6 kg (188 lb 11.4 oz)   SpO2: 93%     Performance status: I   General: Not in acute distress, alert and oriented x 3  HEENT: normalcephalic, atraumatic, extra ocular muscles intact, moist oral mucus membranes, and oral cavity without any lesions.  Neck: Supple neck and full range of motion  Lymph nodes: No palpable bilateral cervical and supraclavicular lymphadenopathy.    CVS: regular rate and rhythm  RESP: Decrease breath sound in bilateral bases  ABD: Soft, non tender, non distended, positive bowel sounds, and no palpable hepatomegaly   EXT: No edema  CNS: Alert and oriented x 3 and cranial nerves grossly intact.  Altered gait due to hip pain.     Labs:  No visits with results within 1 Day(s) from this visit.  Latest known visit with results is:    Admission on 02/07/2017, Discharged on 02/15/2017   No results displayed because visit has over 200 results.      ]    Assessment & Plan:  1. Metastatic adenocarcinoma of lung, EGFR exon 20: He was treated with multiple lines of therapy in the past. He was initially treated with Tarceva with progression of disease. He was unable to tolerate carbo/Alimta as well as single agent Alimta. He was also treated with Opdivo with progression of disease. Patient is being evaluated for MATCH trial.  Further testing has been submitted and still pending. If patient is unable to go to the trial then plan is to start him on single agent docetaxel with escalation to combination therapy.  As they have been a significant delay in starting of his systemic therapy I recommend repeating CT of the chest abdomen pelvis prior to his initiating systemic therapy.  2. Intractable hip pain: Patient underwent palliative radiation to the hip as well as the shoulder. He did not have significant improvement in pain. He is followed by pain management. Secondary to increased pain he recently underwent pain pump placement.   3. Bony metastatic disease: Patient is recovering from his recent hospitalization. Upon recovery he will be started on bisphosphonates to be given every 4 weeks.  4. Normocytic anemia: Patient had urothelial bleeding during his hospitalization which has resolved. His hemoglobin had been fairly stable. He will need repeat labs prior to starting chemotherapy.  5. He is to follow-up again in one week to finalize his treatment plan or sooner as needed.    he agreed and verbalized his agreement and understanding with the current plan. I answered all questions and concerns he has at this time and advised him to call at any time in the interim with questions or concerns in regards to his care. Thank  you for allowing me to participate in his care, I will continue to follow.    Please note that this dictation was created using voice recognition software. I have made every reasonable attempt to correct obvious errors, but I expect that there are errors of grammar and possibly content that I did not discover before finalizing the note.

## 2017-02-24 PROCEDURE — 99308 SBSQ NF CARE LOW MDM 20: CPT | Performed by: NURSE PRACTITIONER

## 2017-02-28 PROCEDURE — 99309 SBSQ NF CARE MODERATE MDM 30: CPT | Performed by: PHYSICIAN ASSISTANT

## 2017-03-01 PROCEDURE — 99316 NF DSCHRG MGMT 30 MIN+: CPT | Performed by: PHYSICIAN ASSISTANT

## 2017-03-02 ENCOUNTER — OFFICE VISIT (OUTPATIENT)
Dept: HEMATOLOGY ONCOLOGY | Facility: MEDICAL CENTER | Age: 67
End: 2017-03-02
Payer: MEDICARE

## 2017-03-02 VITALS
BODY MASS INDEX: 27.93 KG/M2 | HEART RATE: 110 BPM | SYSTOLIC BLOOD PRESSURE: 122 MMHG | HEIGHT: 69 IN | DIASTOLIC BLOOD PRESSURE: 74 MMHG | TEMPERATURE: 98.1 F | RESPIRATION RATE: 20 BRPM | OXYGEN SATURATION: 92 % | WEIGHT: 188.6 LBS

## 2017-03-02 DIAGNOSIS — C7B.03 METASTATIC CARCINOID TUMOR TO BONE (HCC): ICD-10-CM

## 2017-03-02 DIAGNOSIS — C34.91 NON-SMALL CELL LUNG CANCER, RIGHT (HCC): ICD-10-CM

## 2017-03-02 PROCEDURE — G8420 CALC BMI NORM PARAMETERS: HCPCS | Performed by: INTERNAL MEDICINE

## 2017-03-02 PROCEDURE — 1111F DSCHRG MED/CURRENT MED MERGE: CPT | Performed by: INTERNAL MEDICINE

## 2017-03-02 PROCEDURE — 1101F PT FALLS ASSESS-DOCD LE1/YR: CPT | Performed by: INTERNAL MEDICINE

## 2017-03-02 PROCEDURE — 3017F COLORECTAL CA SCREEN DOC REV: CPT | Performed by: INTERNAL MEDICINE

## 2017-03-02 PROCEDURE — 1036F TOBACCO NON-USER: CPT | Performed by: INTERNAL MEDICINE

## 2017-03-02 PROCEDURE — 4040F PNEUMOC VAC/ADMIN/RCVD: CPT | Performed by: INTERNAL MEDICINE

## 2017-03-02 PROCEDURE — 99214 OFFICE O/P EST MOD 30 MIN: CPT | Performed by: INTERNAL MEDICINE

## 2017-03-02 PROCEDURE — G8484 FLU IMMUNIZE NO ADMIN: HCPCS | Performed by: INTERNAL MEDICINE

## 2017-03-02 PROCEDURE — G8432 DEP SCR NOT DOC, RNG: HCPCS | Performed by: INTERNAL MEDICINE

## 2017-03-02 ASSESSMENT — PAIN SCALES - GENERAL: PAINLEVEL: 5=MODERATE PAIN

## 2017-03-02 NOTE — PROGRESS NOTES
Follow Up Note:  Oncology    Date: 3/2/17  Time: 9:20 am      Primary Care:  Sasha Isidro M.D.  Prior Oncology: Dr. Moses  Pain management: Dr. Sparks  Spinal surgery: Dr. Lopez   Radiation Oncology: Dr. Sims     Diagnosis: Metastatic adenocarcinoma of lung    Chief Complaint:  He is here for a follow up visit    History of Presenting Illness:  Gabriele Torres is a 66 y.o. Male diagnosed in 5/2015 with metastatic adenocarcinoma of the lung. At diagnosis he had multiple pulmonary nodules.  He underwent biopsy of the right upper lobe and lower lobe which were positive for moderately differential adenocarcinoma with lymphovascular invasion. On further imaging he was found to have a left adrenal nodule with trace pleural effusions and bone scan showed uptake in the right scapula and third/fourth rib. He was found to be EGFR 20 mutation. He was seen by Dr. Moses at the time and was treated with Tarceva from 6/2015-9/2015. Repeat imaging showed progression of disease. He was then treated with carbo/Alimta ×1 cycle but had increased toxicity and was unable to tolerated. He was then attempted single agent Alimta but still unable to tolerate. He is PDL one was negative. He was then treated with Opdivo from 3/2016-11/2016. Interval imaging showed mixed responses and in 11/2016 imaging showed progression of disease.   On repeat imaging and was found to have a destructive metastatic lesion in the superior left acetabulum. 11/2016 he underwent biopsy of the right middle lobe lung nodule and further testing was negative for T790M mutation.  He had been off treatment and presented for second opinion. He increased and left hip pain which was affecting his quality of life. Bone scan showed increased uptake in the right scapula and left hip. 1/2017 he underwent repeat biopsy of the bone lesion for further testing for MATCH trial.  He also underwent palliative radiation to the hip and the scapular lesion. He required  admission for his intractable pain. Post completion of radiation he did not have significant improvement. He is followed closely by pain management and on 2/17/17 had a pain pump placement.  Further testing and evaluation unfortunately did not yield any arms for the match trial.    Interval history:   He is here for a follow-up visit. He has been discharged for rehabilitation and is back home. He states that clinically he is doing better. He feels that the pain pump is working well. His pain is at present 5/10. He has been urinating well without any significant issues. He is on home oxygen at 2 L/m. He is having regular bowel movements and is using her bowel regimen. He has noticed some improvement in his energy. He has stable appetite and weight. He denies any significant cough. He denies any headaches or dizziness. He denies any fevers, chills or night sweats.      Past Medical History:  1.  Metastatic lung cancer  2. 2005: Chronic lower back pain secondary to injury  3. Arthritis with multiple joint involved  4. GERD  5. Anxiety with intermittent attacks  6. O2 3l/min only at night x 3 weeks      Allergies as of 03/02/2017   • (No Known Allergies)         Current outpatient prescriptions:   •  docusate sodium 100 MG Cap, Take 100 mg by mouth every morning., Disp: 60 Cap, Rfl:   •  gabapentin (NEURONTIN) 100 MG Cap, Take 2 Caps by mouth 3 times a day., Disp: 90 Cap, Rfl:   •  tamsulosin (FLOMAX) 0.4 MG capsule, Take 1 Cap by mouth ONE-HALF HOUR AFTER BREAKFAST., Disp: 30 Cap, Rfl:   •  HYDROmorphone (DILAUDID) 2 MG Tab, Take 2 Tabs by mouth every four hours as needed for Severe Pain., Disp: 15 Tab, Rfl: 0  •  morphine ER (MS CONTIN) 30 MG Tab CR tablet, Take 1 Tab by mouth every 12 hours., Disp: 20 Tab, Rfl: 0  •  diazepam (VALIUM) 2 MG Tab, Take 1 Tab by mouth every 8 hours as needed., Disp: 10 Tab, Rfl: 0  •  alprazolam (XANAX) 1 MG Tab, Take 1 mg by mouth every bedtime. Indications: Feeling Anxious, Disp: ,  "Rfl:   •  omeprazole (PRILOSEC) 20 MG delayed-release capsule, Take 20 mg by mouth every day., Disp: , Rfl:     Review of Systems:  All other review of systems are negative except what was mentioned above in the HPI.  Constitutional: Negative for fever and chills. Positive for malaise/fatigue.    HENT: Negative for ear pain and nosebleeds.     Eyes: Negative for blurred vision.    Respiratory: Positive for dry cough. Positive for shortness of breath on exertion.    Cardiovascular: Negative for chest pain and leg swelling.    Gastrointestinal: Negative for nausea, vomiting and abdominal pain.    Genitourinary: Negative for dysuria.    Musculoskeletal: Negative for myalgias. Positive for right shoulder pain and left hip pain controlled.   Skin: Negative for rash.    Neurological: Negative for dizziness, sensory change, and headaches.    Endo/Heme/Allergies: No bruise/bleed easily.    Psychiatric/Behavioral: Negative for depression and memory loss.      Physical Exam:  Filed Vitals:    03/02/17 0919   BP: 122/74   Pulse: 110   Temp: 36.7 °C (98.1 °F)   Resp: 20   Height: 1.753 m (5' 9\")   Weight: 85.548 kg (188 lb 9.6 oz)   SpO2: 92%     General: Not in acute distress, alert and oriented x 3  HEENT: normalcephalic, atraumatic, extra ocular muscles intact, moist oral mucus membranes, and oral cavity without any lesions.  Neck: Supple neck and full range of motion  Lymph nodes: No palpable bilateral cervical and supraclavicular lymphadenopathy.    CVS: regular rate and rhythm  RESP: Decrease breath sound in bilateral bases  ABD: Soft, non tender, non distended, positive bowel sounds, and no palpable hepatomegaly   EXT: No edema  CNS: Alert and oriented x 3 and cranial nerves grossly intact. Altered gait due to hip pain.     Labs:  No visits with results within 1 Day(s) from this visit.  Latest known visit with results is:    Admission on 02/07/2017, Discharged on 02/15/2017   No results displayed because visit has over " 200 results.      ]    Assessment & Plan:  1. Metastatic adenocarcinoma of lung, EGFR exon 20: He had been on multiple lines of treatment with progression of disease. His last therapy was in 11/2016 at which time opdivo was held. Patient was evaluated but did not qualify for MATCH trial. Patient has completed palliative radiation and is currently stable. I recommend starting him on systemic chemotherapy as he likely has progression of disease. I went over various options and recommended single agent docetaxel with plans to escalation. He is agreeable hence he will be scheduled for chemotherapy education as well as chemotherapy shortly. I will also order repeat CT scans for new baseline.  2. Intractable hip pain: He is S/P palliative radiation to the left hip in the right scapular lesion. He did not have significant improvement and required a pain pump placement. His pain appears to be fairly stable.  3. Bony metastatic disease: Patient is recommended bisphosphonates for bony lesions. He is due to see his dentist shortly to get a dental clearance. Plan is to start Zometa every 4 weeks once he receives a dental clearance.  4. Normocytic anemia: Stable. Continue to monitor.  5. He is to follow up pre-chemotherapy or sooner as needed.    he agreed and verbalized his agreement and understanding with the current plan. I answered all questions and concerns he has at this time and advised him to call at any time in the interim with questions or concerns in regards to his care. Thank you for allowing me to participate in his care, I will continue to follow.    Please note that this dictation was created using voice recognition software. I have made every reasonable attempt to correct obvious errors, but I expect that there are errors of grammar and possibly content that I did not discover before finalizing the note.

## 2017-03-02 NOTE — MR AVS SNAPSHOT
"        Gabriele Torres   3/2/2017 9:20 AM   Office Visit   MRN: [id number]    Department:  [facility]   Dept Phone:  [phone]    Description:  Male : 1950   Provider:  Shawanda Bennett M.D.           Reason for Visit     Follow-Up 1 week      Allergies as of 3/2/2017     No Known Allergies      You were diagnosed with     Non-small cell lung cancer, right (CMS-HCC)   [792880]         Vital Signs     Blood Pressure Pulse Temperature Respirations Height Weight    122/74 mmHg 110 36.7 °C (98.1 °F) 20 1.753 m (5' 9\") 85.548 kg (188 lb 9.6 oz)    Body Mass Index Oxygen Saturation Smoking Status             27.84 kg/m2 92% Former Smoker         Basic Information     Date Of Birth Sex Race Ethnicity Preferred Language    1950 Male White Non- English      Your appointments     Mar 03, 2017 11:00 AM   CT BODY WITH with VISTA CT 1   [facility] ([facility]    [address] [place] [id number] [phone]           Taking medications as regularly scheduled is strongly encouraged.  *For Abdominal CT-Patient needs to  oral contrast and instruction from the department at least 2 hours prior to exam. Patient may  contrast at any imaging facility.            Mar 07, 2017 11:00 AM   Non Provider 1 with ONC RN 1   [facility] (--)    [address], [facility] [address]  [facility] [address] [phone]           You will be receiving a confirmation call a few days before your appointment from our automated call confirmation system.            Mar 10, 2017  1:20 PM   ONCOLOGY EST PATIENT 30 MIN with Shawanda Bennett M.D.   [facility] (--)    [address], [address]  [facility] [address] [phone]            Mar 10, 2017  2:00 PM   New Chemo 2 with RN 4   Infusion Services ([facility])    [address] [facility] [address]  [facility] [id number] [phone]            May 02, 2017  2:30 PM   Follow Up with Laure DOMINGO M.D.   [facility] (--)    [address] [place] [address]" [facility] [address]   [phone]              Problem List              ICD-10-CM Priority Class Noted - Resolved    GERD (gastroesophageal reflux disease) K21.9 Low  5/1/2015 - Present    Pulmonary nodule seen on imaging study R91.1   5/1/2015 - Present    OA (osteoarthritis) M19.90   5/1/2015 - Present    Anxiety F41.9   5/1/2015 - Present    Shortness of breath R06.02   5/5/2015 - Present    Hypercalcemia E83.52 Medium  5/6/2015 - Present    Lung cancer (CMS-HCC) C34.90   5/8/2015 - Present    Dependence on supplemental oxygen Z99.81   10/7/2015 - Present    Laryngitis, chronic J37.0   12/23/2015 - Present    Chronic low back pain with left-sided sciatica M54.42, G89.29   10/18/2016 - Present    Weakness of left lower extremity M62.81   11/15/2016 - Present    Lumbar spondylosis M47.816   12/8/2016 - Present    Research study patient Z00.6   1/9/2017 - Present    [facility]    1/9/2017 - Present    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   1/26/2017 - Present    Pain from bone metastases (CMS-[facility]) G89.3, C79.51   2/7/2017 - Present    Intractable pain R52 High  2/8/2017 - Present    Urinary retention R33.9 Medium  2/8/2017 - Present    Hematuria R31.9 Medium  2/8/2017 - Present    Anemia D64.9 Medium  2/8/2017 - Present    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, Z96.649 High  2/8/2017 - Present    Bone metastasis (CMS-[facility]) C79.51   2/17/2017 - Present      Health Maintenance        Date Due Completion Dates    IMM DTaP/Tdap/Td Vaccine (1 - Tdap) 8/10/1970 ---    IMM ZOSTER VACCINE 8/10/2011 ---    IMM PNEUMOCOCCAL 65+ (ADULT) LOW/MEDIUM RISK SERIES (1 of 2 - PCV13) 8/10/2016 10/7/2013    IMM INFLUENZA (1) 9/1/2016 10/7/2015            Current Immunizations     Influenza Vaccine Quad Inj (Preserved) 10/7/2015    Pneumococcal polysaccharide vaccine (PPSV-23) 10/7/2013      Below and/or attached are the medications your provider expects you to take. Review all of your  home medications and newly ordered medications with your provider and/or pharmacist. Follow medication instructions as directed by your provider and/or pharmacist. Please keep your medication list with you and share with your provider. Update the information when medications are discontinued, doses are changed, or new medications (including over-the-counter products) are added; and carry medication information at all times in the event of emergency situations     Allergies:  No Known Allergies          Medications  Valid as of: March 02, 2017 - 10:59 AM    Generic Name Brand Name Tablet Size Instructions for use    ALPRAZolam (Tab) XANAX 1 MG Take 1 mg by mouth every bedtime. Indications: Feeling Anxious        DiazePAM (Tab) VALIUM 2 MG Take 1 Tab by mouth every 8 hours as needed.        Docusate Sodium (Cap)  MG Take 100 mg by mouth every morning.        Gabapentin (Cap) NEURONTIN 100 MG Take 2 Caps by mouth 3 times a day.        HYDROmorphone HCl (Tab) DILAUDID 2 MG Take 2 Tabs by mouth every four hours as needed for Severe Pain.        Morphine Sulfate (Tab CR) MS CONTIN 30 MG Take 1 Tab by mouth every 12 hours.        Omeprazole (CAPSULE DELAYED RELEASE) PRILOSEC 20 MG Take 20 mg by mouth every day.        Tamsulosin HCl (Cap) FLOMAX 0.4 MG Take 1 Cap by mouth ONE-HALF HOUR AFTER BREAKFAST.        .                 Medicines prescribed today were sent to:     [facility]/PHARMACY #[id number] - [place], [place] - [address] [place] [address] AT [address] [address]    Phone: [phone] Fax: [phone]    Open 24 Hours?: No      Medication refill instructions:       If your prescription bottle indicates you have medication refills left, it is not necessary to call your provider’s office. Please contact your pharmacy and they will refill your medication.    If your prescription bottle indicates you do not have any refills left, you may request refills at any time through one of the  following ways: The online Discourse Analytics system (except Urgent Care), by calling your provider’s office, or by asking your pharmacy to contact your provider’s office with a refill request. Medication refills are processed only during regular business hours and may not be available until the next business day. Your provider may request additional information or to have a follow-up visit with you prior to refilling your medication.   *Please Note: Medication refills are assigned a new Rx number when refilled electronically. Your pharmacy may indicate that no refills were authorized even though a new prescription for the same medication is available at the pharmacy. Please request the medicine by name with the pharmacy before contacting your provider for a refill.        Your To Do List     Future Labs/Procedures Complete By Expires    CT-CHEST,ABDOMEN,PELVIS WITH  As directed 3/2/2018    Comments:    ORAL&IV 3/2 ok  Cl 2/15 cr 0.73         Discourse [employer] Access Code: [email or web address]  Expires: 4/1/2017  9:11 AM    Discourse Analytics  A secure, online tool to manage your health information     [employer]’s Discourse Analytics® is a secure, online tool that connects you to your personalized health information from the privacy of your home -- day or night - making it very easy for you to manage your healthcare. Once the activation process is completed, you can even access your medical information using the Discourse Analytics balaji, which is available for free in the Apple Balaji store or Google Play store.     Discourse Analytics provides the following levels of access (as shown below):   My Chart Features   [facility] Doctor [facility]  Specialists [facility]  Urgent  Care Non-[facility]  Primary Care  Doctor   Email your healthcare team securely and privately 24/7 X X X    Manage appointments: schedule your next appointment; view details of past/upcoming appointments X      Request prescription refills. X      View recent personal medical records, including lab and immunizations X  X X X   View health record, including health history, allergies, medications X X X X   Read reports about your outpatient visits, procedures, consult and ER notes X X X X   See your discharge summary, which is a recap of your hospital and/or ER visit that includes your diagnosis, lab results, and care plan. X X       How to register for Viade[employer]:  1. Go to  [email or web address].  2. Click on the Sign Up Now box, which takes you to the New Member Sign Up page. You will need to provide the following information:  a. Enter your Viadeo Access Code exactly as it appears at the top of this page. (You will not need to use this code after you’ve completed the sign-up process. If you do not sign up before the expiration date, you must request a new code.)   b. Enter your date of birth.   c. Enter your home email address.   d. Click Submit, and follow the next screen’s instructions.  3. Create a Viadeo ID. This will be your Viadeo login ID and cannot be changed, so think of one that is secure and easy to remember.  4. Create a Share [employer]icet password. You can change your password at any time.  5. Enter your Password Reset Question and Answer. This can be used at a later time if you forget your password.   6. Enter your e-mail address. This allows you to receive e-mail notifications when new information is available in Viadeo.  7. Click Sign Up. You can now view your health information.    For assistance activating your Viadeo account, call [phone]

## 2017-03-03 ENCOUNTER — TELEPHONE (OUTPATIENT)
Dept: VASCULAR LAB | Facility: MEDICAL CENTER | Age: 67
End: 2017-03-03

## 2017-03-03 ENCOUNTER — TELEPHONE (OUTPATIENT)
Dept: HEMATOLOGY ONCOLOGY | Facility: MEDICAL CENTER | Age: 67
End: 2017-03-03

## 2017-03-03 ENCOUNTER — HOSPITAL ENCOUNTER (OUTPATIENT)
Dept: RADIOLOGY | Facility: MEDICAL CENTER | Age: 67
End: 2017-03-03
Attending: INTERNAL MEDICINE
Payer: MEDICARE

## 2017-03-03 DIAGNOSIS — C34.91 NON-SMALL CELL LUNG CANCER, RIGHT (HCC): ICD-10-CM

## 2017-03-03 DIAGNOSIS — I26.99 OTHER PULMONARY EMBOLISM WITHOUT ACUTE COR PULMONALE, UNSPECIFIED CHRONICITY (HCC): ICD-10-CM

## 2017-03-03 PROCEDURE — 700117 HCHG RX CONTRAST REV CODE 255: Performed by: INTERNAL MEDICINE

## 2017-03-03 PROCEDURE — 71260 CT THORAX DX C+: CPT

## 2017-03-03 RX ADMIN — IOHEXOL 100 ML: 350 INJECTION, SOLUTION INTRAVENOUS at 11:41

## 2017-03-03 NOTE — TELEPHONE ENCOUNTER
Spoke with pt over the phone regarding started Xarelto 15mg BID.  [facility] Tuesday at 4:15pm which the pt states he will be here for further education.  Stressed the importance of not missing any doses and it needs to be taken with food.  Instructed him if he were to experience SOB or chest pain he needs to seek immediate medical attention.    Gogo MEJIA

## 2017-03-03 NOTE — TELEPHONE ENCOUNTER
Spoke with Sobia JIM regarding pt with new small pulmonary embolism.  This was discovered on an outpatient CT scan.  She called to consult to determine next anticoagulation steps.  She states the pt does not need to be seen in ER as he is asymptomatic.  Will have pt begin Xarelto today 15mg BID with food and follow up at next available appt on Tuesday for a new pt appt.  Attempted to reach pt over phone, however received voicemail. Ordered Xarelto to pt's pharmacy and instructed pt to begin taking it immediately.  Will attempt to speak to pt directly over the phone again later today to ensure he received our instructions.      Gogo MEJIA

## 2017-03-04 RX ORDER — ONDANSETRON 4 MG/1
4 TABLET, FILM COATED ORAL EVERY 4 HOURS PRN
Qty: 30 TAB | Refills: 6 | Status: SHIPPED | OUTPATIENT
Start: 2017-03-04 | End: 2017-03-28

## 2017-03-04 RX ORDER — DEXAMETHASONE 4 MG/1
4 TABLET ORAL 2 TIMES DAILY
Qty: 12 TAB | Refills: 4 | Status: SHIPPED | OUTPATIENT
Start: 2017-03-03 | End: 2017-03-06

## 2017-03-04 RX ORDER — PROCHLORPERAZINE MALEATE 10 MG
10 TABLET ORAL EVERY 6 HOURS PRN
Qty: 30 TAB | Refills: 6 | Status: SHIPPED | OUTPATIENT
Start: 2017-03-04 | End: 2017-03-15

## 2017-03-07 ENCOUNTER — ANTICOAGULATION VISIT (OUTPATIENT)
Dept: VASCULAR LAB | Facility: MEDICAL CENTER | Age: 67
End: 2017-03-07
Attending: INTERNAL MEDICINE
Payer: MEDICARE

## 2017-03-07 ENCOUNTER — TELEPHONE (OUTPATIENT)
Dept: HEMATOLOGY ONCOLOGY | Facility: MEDICAL CENTER | Age: 67
End: 2017-03-07

## 2017-03-07 DIAGNOSIS — I26.99 OTHER PULMONARY EMBOLISM WITHOUT ACUTE COR PULMONALE (HCC): ICD-10-CM

## 2017-03-07 PROCEDURE — 85610 PROTHROMBIN TIME: CPT

## 2017-03-07 PROCEDURE — 99213 OFFICE O/P EST LOW 20 MIN: CPT

## 2017-03-07 NOTE — MR AVS SNAPSHOT
Gabriele Patel Brian   3/7/2017 4:15 PM   Anticoagulation Visit   MRN: [id number]    Department:  Vascular Medicine   Dept Phone:  [phone]    Description:  Male : 1950   Provider:  [facility] EXAM 5           Allergies as of 3/7/2017     No Known Allergies      Vital Signs     Smoking Status                   Former Smoker           Basic Information     Date Of Birth Sex Race Ethnicity Preferred Language    1950 Male White Non- English      Your appointments     Mar 10, 2017  1:20 PM   ONCOLOGY EST PATIENT 30 MIN with Shawanda Bennett M.D.   [facility] (--)    [address], [address]  [facility] [address]   [phone]            Mar 10, 2017  2:00 PM   New Chemo 2 with RN 4   Infusion Services ([facility])    [address] [facility] [address]  [facility] [address]   [phone]            Mar 17, 2017 10:30 AM   ONCOLOGY EST PATIENT 30 MIN with PRAFUL PeoplesPSYBIL   [facility] (--)    [address], [address]  [facility] [address]   [phone]            Mar 23, 2017  4:15 PM   Established Patient with [facility] EXAM 4   [facility]  (--)    [address] [facility] [place] [address]   [phone]            May 02, 2017  2:30 PM   Follow Up with Laure DOMINGO M.D.   [facility] (--)    [address] [facility] [address]   [phone]              Problem List              ICD-10-CM Priority Class Noted - Resolved    GERD (gastroesophageal reflux disease) K21.9 Low  2015 - Present    Pulmonary nodule seen on imaging study R91.1   2015 - Present    OA (osteoarthritis) M19.90   2015 - Present    Anxiety F41.9   2015 - Present    Shortness of breath R06.02   2015 - Present    Hypercalcemia E83.52 Medium  2015 - Present    Lung cancer (CMS-HCC) C34.90   2015 - Present    Dependence on supplemental oxygen Z99.81   10/7/2015 - Present    Laryngitis, chronic J37.0   2015 - Present    Chronic low back pain with left-sided sciatica M54.42, G89.29   10/18/2016 - Present    Weakness of left lower extremity M62.81   11/15/2016 - Present    Lumbar spondylosis M47.816   12/8/2016 - Present    Research study patient Z00.6   1/9/2017 - Present    [facility]On[facility] Billing    1/9/2017 - Present    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   1/26/2017 - Present    Pain from bone metastases (CMS-HCC) G89.3, C79.51   2/7/2017 - Present    Intractable pain R52 High  2/8/2017 - Present    Urinary retention R33.9 Medium  2/8/2017 - Present    Hematuria R31.9 Medium  2/8/2017 - Present    Anemia D64.9 Medium  2/8/2017 - Present    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, Z96.649 High  2/8/2017 - Present    Bone metastasis (CMS-HCC) C79.51   2/17/2017 - Present    Pulmonary embolism without acute cor pulmonale (CMS-HCC) I26.99   3/3/2017 - Present      Health Maintenance        Date Due Completion Dates    IMM DTaP/Tdap/Td Vaccine (1 - Tdap) 8/10/1970 ---    IMM ZOSTER VACCINE 8/10/2011 ---    IMM PNEUMOCOCCAL 65+ (ADULT) LOW/MEDIUM RISK SERIES (1 of 2 - PCV13) 8/10/2016 10/7/2013    IMM INFLUENZA (1) 9/1/2016 10/7/2015            Current Immunizations     Influenza Vaccine Quad Inj (Preserved) 10/7/2015    Pneumococcal polysaccharide vaccine (PPSV-23) 10/7/2013      Below and/or attached are the medications your provider expects you to take. Review all of your home medications and newly ordered medications with your provider and/or pharmacist. Follow medication instructions as directed by your provider and/or pharmacist. Please keep your medication list with you and share with your provider. Update the information when medications are discontinued, doses are changed, or new medications (including over-the-counter products) are added; and carry medication information at all times in the event of emergency situations     Allergies:  No Known Allergies          Medications  Valid as of:  March 07, 2017 -  5:04 PM    Generic Name Brand Name Tablet Size Instructions for use    ALPRAZolam (Tab) XANAX 1 MG Take 1 mg by mouth every bedtime. Indications: Feeling Anxious        DiazePAM (Tab) VALIUM 2 MG Take 1 Tab by mouth every 8 hours as needed.        Docusate Sodium (Cap)  MG Take 100 mg by mouth every morning.        Gabapentin (Cap) NEURONTIN 100 MG Take 2 Caps by mouth 3 times a day.        HYDROmorphone HCl (Tab) DILAUDID 2 MG Take 2 Tabs by mouth every four hours as needed for Severe Pain.        Morphine Sulfate (Tab CR) MS CONTIN 30 MG Take 1 Tab by mouth every 12 hours.        Omeprazole (CAPSULE DELAYED RELEASE) PRILOSEC 20 MG Take 20 mg by mouth every day.        Ondansetron HCl (Tab) ZOFRAN 4 MG Take 1 Tab by mouth every four hours as needed for Nausea/Vomiting (for nausea, vomiting).        Prochlorperazine Maleate (Tab) COMPAZINE 10 MG Take 1 Tab by mouth every 6 hours as needed (for nausea, vomiting).        Rivaroxaban (Tab) XARELTO 15 MG Take 1 Tab by mouth 2 Times a Day. TAKE WITH FOOD        Tamsulosin HCl (Cap) FLOMAX 0.4 MG Take 1 Cap by mouth ONE-HALF HOUR AFTER BREAKFAST.        .                 Medicines prescribed today were sent to:     [facility]/PHARMACY #[id number] - [place], [place] - [address] [place] [address] AT [address] [address]    Phone: [phone] Fax: [phone]    Open 24 Hours?: No      Medication refill instructions:       If your prescription bottle indicates you have medication refills left, it is not necessary to call your provider’s office. Please contact your pharmacy and they will refill your medication.    If your prescription bottle indicates you do not have any refills left, you may request refills at any time through one of the following ways: The online [employer] system (except Urgent Care), by calling your provider’s office, or by asking your pharmacy to contact your provider’s office with a refill request. Medication  refills are processed only during regular business hours and may not be available until the next business day. Your provider may request additional information or to have a follow-up visit with you prior to refilling your medication.   *Please Note: Medication refills are assigned a new Rx number when refilled electronically. Your pharmacy may indicate that no refills were authorized even though a new prescription for the same medication is available at the pharmacy. Please request the medicine by name with the pharmacy before contacting your provider for a refill.           [employer] Access Code: [email or web address]  Expires: 4/1/2017  9:11 AM    [employer]  A secure, online tool to manage your health information     [employer]’s [employer]® is a secure, online tool that connects you to your personalized health information from the privacy of your home -- day or night - making it very easy for you to manage your healthcare. Once the activation process is completed, you can even access your medical information using the [employer] balaji, which is available for free in the Apple Balaji store or Google Play store.     [employer] provides the following levels of access (as shown below):   My Chart Features   [facility] Doctor [facility]  Specialists [facility]  Urgent  Care Non-[facility]  Primary Care  Doctor   Email your healthcare team securely and privately 24/7 X X X    Manage appointments: schedule your next appointment; view details of past/upcoming appointments X      Request prescription refills. X      View recent personal medical records, including lab and immunizations X X X X   View health record, including health history, allergies, medications X X X X   Read reports about your outpatient visits, procedures, consult and ER notes X X X X   See your discharge summary, which is a recap of your hospital and/or ER visit that includes your diagnosis, lab results, and care plan. X X       How to register for [employer]:  1. Go to   [email or web address].  2. Click on the Sign Up Now box, which takes you to the New Member Sign Up page. You will need to provide the following information:  a. Enter your CodeStreet Access Code exactly as it appears at the top of this page. (You will not need to use this code after you’ve completed the sign-up process. If you do not sign up before the expiration date, you must request a new code.)   b. Enter your date of birth.   c. Enter your home email address.   d. Click Submit, and follow the next screen’s instructions.  3. Create a CodeStreet ID. This will be your CodeStreet login ID and cannot be changed, so think of one that is secure and easy to remember.  4. Create a Presidiot password. You can change your password at any time.  5. Enter your Password Reset Question and Answer. This can be used at a later time if you forget your password.   6. Enter your e-mail address. This allows you to receive e-mail notifications when new information is available in CodeStreet.  7. Click Sign Up. You can now view your health information.    For assistance activating your CodeStreet account, call [phone]

## 2017-03-07 NOTE — TELEPHONE ENCOUNTER
Called pt regarding his missed chemotherapy education scheduled for this morning.  Pt states he was on his way when he got a flat tire.  He did call to cancel however there is no documentation of this.  Pt reports feeling overwhelmed with amount of appointments and he is not sure he will be able to reschedule prior to starting chemotherapy this Friday.  Will touch base with pt in AM when he has a better idea of his schedule.

## 2017-03-08 LAB
INR BLD: 1.1 (ref 0.9–1.2)
INR PPP: 1.1 (ref 2–3.5)

## 2017-03-08 NOTE — PROGRESS NOTES
"Anticoagulation Summary as of 3/7/2017     INR goal 1.5-2.0   Selected INR    Next INR check    Target end date 9/1/2017    Indications   Pulmonary embolism without acute cor pulmonale (CMS-HCC) [I26.99]         Anticoagulation Episode Summary     INR check location     Preferred lab     Send INR reminders to     Comments         Anticoagulation Patient Findings     Pt is new to Xarelto and new to [facility].  Discussed indication for drug therapy.  Explained our services, hours of operation, Xarelto therapy, potential SE, potential DI.. Discussed monitoring parameters, such as blood in urine, blood in stool, discussed what to do if a dose is missed, or suspected as missed.  Pt to continue with current Xarelto dosing regimen. Pt denies any unusual s/s of bleeding, bruising, clotting or any changes to diet or medications.    Pt had a CT taken for hip pain and subsequently a PE was found on CT.  The CT report states:  \"There is segmental left lower lobe compared pulmonary arterial enhancement compatible with thromboembolism. Note the study was not optimized for detection of this, this is an incidental finding\"    He currently has lung cancer, therefore he has chronically had a SOB, but states it's no worse than usual.  Denies pain upon deep inspiration.  Denies a history of thromboembolism.      Pt was started on Xarelto 15 mg BID (Cr Cl >50 ml/min).    Discussed initiating LMWH or warfarin, however per discussion with the patient he would like to retain as much quality of life as possible and requests to continue with a DOAC.  He understands there is less supporting data for a DOAC in his condition.      Due to the incidental findings of the CT, suggest a possible short course of 6 months of anticoagulaiton unless oncology has further recommendations that would more appropriate given the pt's current cancer.                Target end date:   9/1/17     Indication: Pulmonary Embolism     Drug: Xarelto     CHADsVASC = " N/A    Bleeding Risk Assessment     Denies Epistaxis   Pt denies any excessive or unusual bleeding/hematomas.  Pt denies any GI bleeds or hematemesis.  Pt denies any concerning daily headache or sub dural hematoma symptoms.     Pt denies any hematuria or abnormal vaginal bleeding.   Latest Hemoglobin 11.6   ETOH overuse Denies     Creatinine Clearance/Renal Function     Latest ClCr ~110 mL/min     Drug Interactions   ASA/other antiplatelets None   NSAID None   Other drug interactions No clinically relevant DDI    Examination   Blood Pressure 117/79    Symptomatic hypotension Denies   Significant gait impairment/imbalance/high risk for falls? No obvious risk    Final Assessment and Recommendations:   Patient appears stable from the anticoagulation staindpoint.     Benefits of continued DOAC therapy outweigh risks for this patient   Recommend pt continue with current DOAC therapy      Other Actions:   Pt provided with written education.      Follow up:   Follow up in 2 weeks prior to transition to the 20 mg once daily dose of Xarelto.     Lawrence Harding, PHARMD

## 2017-03-10 ENCOUNTER — OFFICE VISIT (OUTPATIENT)
Dept: HEMATOLOGY ONCOLOGY | Facility: MEDICAL CENTER | Age: 67
End: 2017-03-10
Payer: MEDICARE

## 2017-03-10 ENCOUNTER — HOSPITAL ENCOUNTER (OUTPATIENT)
Facility: MEDICAL CENTER | Age: 67
End: 2017-03-10
Attending: INTERNAL MEDICINE
Payer: MEDICARE

## 2017-03-10 ENCOUNTER — OUTPATIENT INFUSION SERVICES (OUTPATIENT)
Dept: ONCOLOGY | Facility: MEDICAL CENTER | Age: 67
End: 2017-03-10
Attending: INTERNAL MEDICINE
Payer: MEDICARE

## 2017-03-10 ENCOUNTER — NON-PROVIDER VISIT (OUTPATIENT)
Dept: HEMATOLOGY ONCOLOGY | Facility: MEDICAL CENTER | Age: 67
End: 2017-03-10
Payer: MEDICARE

## 2017-03-10 VITALS
RESPIRATION RATE: 18 BRPM | SYSTOLIC BLOOD PRESSURE: 122 MMHG | HEART RATE: 103 BPM | TEMPERATURE: 98.8 F | OXYGEN SATURATION: 90 % | HEIGHT: 67 IN | DIASTOLIC BLOOD PRESSURE: 75 MMHG | BODY MASS INDEX: 30.24 KG/M2 | WEIGHT: 192.68 LBS

## 2017-03-10 VITALS
OXYGEN SATURATION: 92 % | RESPIRATION RATE: 16 BRPM | BODY MASS INDEX: 28.5 KG/M2 | WEIGHT: 192.4 LBS | DIASTOLIC BLOOD PRESSURE: 60 MMHG | SYSTOLIC BLOOD PRESSURE: 122 MMHG | HEIGHT: 69 IN | HEART RATE: 106 BPM | TEMPERATURE: 98.2 F

## 2017-03-10 VITALS
HEIGHT: 69 IN | TEMPERATURE: 98.2 F | DIASTOLIC BLOOD PRESSURE: 60 MMHG | WEIGHT: 192.4 LBS | OXYGEN SATURATION: 92 % | HEART RATE: 106 BPM | SYSTOLIC BLOOD PRESSURE: 122 MMHG | BODY MASS INDEX: 28.5 KG/M2 | RESPIRATION RATE: 16 BRPM

## 2017-03-10 DIAGNOSIS — C7B.03 METASTATIC CARCINOID TUMOR TO BONE (HCC): ICD-10-CM

## 2017-03-10 DIAGNOSIS — R60.0 BILATERAL EDEMA OF LOWER EXTREMITY: ICD-10-CM

## 2017-03-10 DIAGNOSIS — C34.2 MALIGNANT NEOPLASM OF MIDDLE LOBE OF RIGHT LUNG (HCC): ICD-10-CM

## 2017-03-10 LAB
ALBUMIN SERPL BCP-MCNC: 3.4 G/DL (ref 3.2–4.9)
ALBUMIN/GLOB SERPL: 1.1 G/DL
ALP SERPL-CCNC: 111 U/L (ref 30–99)
ALT SERPL-CCNC: 18 U/L (ref 2–50)
ANION GAP SERPL CALC-SCNC: 8 MMOL/L (ref 0–11.9)
AST SERPL-CCNC: 13 U/L (ref 12–45)
BASOPHILS # BLD AUTO: 0.03 K/UL (ref 0–0.12)
BASOPHILS NFR BLD AUTO: 0.6 % (ref 0–1.8)
BILIRUB SERPL-MCNC: 0.3 MG/DL (ref 0.1–1.5)
BUN SERPL-MCNC: 16 MG/DL (ref 8–22)
CALCIUM SERPL-MCNC: 9.4 MG/DL (ref 8.5–10.5)
CHLORIDE SERPL-SCNC: 109 MMOL/L (ref 96–112)
CO2 SERPL-SCNC: 23 MMOL/L (ref 20–33)
CREAT SERPL-MCNC: 0.72 MG/DL (ref 0.5–1.4)
EOSINOPHIL # BLD: 0.04 K/UL (ref 0–0.51)
EOSINOPHIL NFR BLD AUTO: 0.8 % (ref 0–6.9)
ERYTHROCYTE [DISTWIDTH] IN BLOOD BY AUTOMATED COUNT: 54.1 FL (ref 35.9–50)
GLOBULIN SER CALC-MCNC: 3 G/DL (ref 1.9–3.5)
GLUCOSE SERPL-MCNC: 115 MG/DL (ref 65–99)
HCT VFR BLD AUTO: 33.4 % (ref 42–52)
HGB BLD-MCNC: 10.7 G/DL (ref 14–18)
IMM GRANULOCYTES # BLD AUTO: 0.03 K/UL (ref 0–0.11)
IMM GRANULOCYTES NFR BLD AUTO: 0.6 % (ref 0–0.9)
LYMPHOCYTES # BLD: 0.56 K/UL (ref 1–4.8)
LYMPHOCYTES NFR BLD AUTO: 11.1 % (ref 22–41)
MCH RBC QN AUTO: 31.7 PG (ref 27–33)
MCHC RBC AUTO-ENTMCNC: 32 G/DL (ref 33.7–35.3)
MCV RBC AUTO: 98.8 FL (ref 81.4–97.8)
MONOCYTES # BLD: 0.48 K/UL (ref 0–0.85)
MONOCYTES NFR BLD AUTO: 9.5 % (ref 0–13.4)
NEUTROPHILS # BLD: 3.91 K/UL (ref 1.82–7.42)
NEUTROPHILS NFR BLD AUTO: 77.4 % (ref 44–72)
NRBC # BLD AUTO: 0 K/UL
NRBC BLD-RTO: 0 /100 WBC
PLATELET # BLD AUTO: 226 K/UL (ref 164–446)
PMV BLD AUTO: 9.4 FL (ref 9–12.9)
POTASSIUM SERPL-SCNC: 3.7 MMOL/L (ref 3.6–5.5)
PROT SERPL-MCNC: 6.4 G/DL (ref 6–8.2)
RBC # BLD AUTO: 3.38 M/UL (ref 4.7–6.1)
SODIUM SERPL-SCNC: 140 MMOL/L (ref 135–145)
WBC # BLD AUTO: 5.1 K/UL (ref 4.8–10.8)

## 2017-03-10 PROCEDURE — 1101F PT FALLS ASSESS-DOCD LE1/YR: CPT | Performed by: INTERNAL MEDICINE

## 2017-03-10 PROCEDURE — 1036F TOBACCO NON-USER: CPT | Performed by: INTERNAL MEDICINE

## 2017-03-10 PROCEDURE — G8484 FLU IMMUNIZE NO ADMIN: HCPCS | Performed by: INTERNAL MEDICINE

## 2017-03-10 PROCEDURE — 96375 TX/PRO/DX INJ NEW DRUG ADDON: CPT

## 2017-03-10 PROCEDURE — 700111 HCHG RX REV CODE 636 W/ 250 OVERRIDE (IP): Performed by: INTERNAL MEDICINE

## 2017-03-10 PROCEDURE — 99214 OFFICE O/P EST MOD 30 MIN: CPT | Performed by: INTERNAL MEDICINE

## 2017-03-10 PROCEDURE — 3017F COLORECTAL CA SCREEN DOC REV: CPT | Performed by: INTERNAL MEDICINE

## 2017-03-10 PROCEDURE — 700105 HCHG RX REV CODE 258: Performed by: INTERNAL MEDICINE

## 2017-03-10 PROCEDURE — 96415 CHEMO IV INFUSION ADDL HR: CPT

## 2017-03-10 PROCEDURE — 304540 HCHG NITRO SET VENT 2ND TUB

## 2017-03-10 PROCEDURE — 4040F PNEUMOC VAC/ADMIN/RCVD: CPT | Performed by: INTERNAL MEDICINE

## 2017-03-10 PROCEDURE — 96413 CHEMO IV INFUSION 1 HR: CPT

## 2017-03-10 PROCEDURE — 36592 COLLECT BLOOD FROM PICC: CPT | Performed by: INTERNAL MEDICINE

## 2017-03-10 PROCEDURE — G8420 CALC BMI NORM PARAMETERS: HCPCS | Performed by: INTERNAL MEDICINE

## 2017-03-10 PROCEDURE — 1111F DSCHRG MED/CURRENT MED MERGE: CPT | Performed by: INTERNAL MEDICINE

## 2017-03-10 PROCEDURE — G8432 DEP SCR NOT DOC, RNG: HCPCS | Performed by: INTERNAL MEDICINE

## 2017-03-10 RX ORDER — SODIUM CHLORIDE 9 MG/ML
INJECTION, SOLUTION INTRAVENOUS CONTINUOUS
Status: DISCONTINUED | OUTPATIENT
Start: 2017-03-10 | End: 2017-03-10 | Stop reason: HOSPADM

## 2017-03-10 RX ADMIN — DEXAMETHASONE SODIUM PHOSPHATE 12 MG: 4 INJECTION, SOLUTION INTRAMUSCULAR; INTRAVENOUS at 14:43

## 2017-03-10 RX ADMIN — SODIUM CHLORIDE: 9 INJECTION, SOLUTION INTRAVENOUS at 14:43

## 2017-03-10 RX ADMIN — DOCETAXEL 152.2 MG: 20 INJECTION, SOLUTION, CONCENTRATE INTRAVENOUS at 15:23

## 2017-03-10 ASSESSMENT — PAIN SCALES - GENERAL
PAINLEVEL: 2=MINIMAL-SLIGHT
PAINLEVEL: 5=MODERATE PAIN
PAINLEVEL: 2=MINIMAL-SLIGHT

## 2017-03-10 NOTE — Clinical Note
[facility]    [address], [address]  [facility] [address]      Date:3/10/2017                     Reference to Gabriele Torres    Follow Up Note: Oncology   Date: 3/10/17   Time: 1:20 pm   Primary Care: Sasha Isidro M.D.   Prior Oncology: Dr. Moses   Pain management: Dr. Sparks   Spinal surgery: Dr. Lopez   Radiation Oncology: Dr. Sims     Diagnosis: Metastatic adenocarcinoma of lung     Chief Complaint: He is here for a follow up visit     History of Presenting Illness:   Gabriele Torres is a 66 y.o. Male metastatic adenocarcinoma the lung initially diagnosed in 5/2015. He was diagnosed secondary to multiple pulmonary nodules and underwent biopsy of the right upper lobe and right lower lobe nodules which were positive for moderately differentiated adenocarcinoma with lymphovascular invasion. He was also found to have left vaginal nodule, trace pleural effusions and uptake in the right scapula and third/fourth rib. He was found to have EGFR 20 mutation. He was treated by Dr. Moses at the time and received Tarceva from 6/2015-9/2015. He was found to have progression of disease on repeat imaging. He was then treated with carbo/Alimta ×1 cycle. He had significant toxicity and was unable to tolerate it and required hospitalization. He was then attempted single agent Alimta but was unable to tolerate that as well. Further testing was negative for PDL1. She was then treated with Opdivo on 3/2016-11/16. Initial imaging was consistent with mixed responses however repeat scans done in 11/2016 showed progression of disease. He was also found to have a destructive metastatic lesion in the superior left acetabulum. 11/2016 he underwent biopsy of the right middle lobe and further testing was negative for T790M mutation. He was off treatment and presented for a second opinion and transitioned his care over. Bone scan showed increased uptake in the right scapula as well as the left hip.  1/2017 he underwent repeat biopsy of the bone lesion for further testing for MATCH trial. He was screened for the match trial but was not a candidate. Should continue to have increased pain in the left hip and underwent palliative radiation to the hip as well as the scapula. Secondary to intractable pain he required hospitalization during radiation. Post completion of radiation and he was discharged to rehabilitation. He continued to have increased pain and is followed closely by Dr. Sparks. He then underwent an plantation of pain pump on 2/17/17. He was recommended single agent docetaxel with plans to escalation based on his tolerability. Repeat baseline imaging was ordered. He had a CT done in 3/3/17 which showed significant worsening of metastatic disease in the lungs as well as left lower lobe segment pulmonary embolus. He has established with Coumadin clinic and has been started on Xarelto.     Interval history:   He is here for a follow-up visit. He is accompanied by his son was present in the room. He has been fairly stable since his last visit. He was unable to show for a repeat chemotherapy education. He had difficulty with the right hands unable to get further education this week. He otherwise states that his pain is fairly well controlled with the pain pump. He is requiring to use boluses approximately 2-3 times a day. His pain is 5-7/10. He otherwise has stable appetite and weight. His cough and shortness of breath have been stable. He is continued on home oxygen at 2 L/m. He is having regular bowel movements. He denies any fevers, chills or night sweats.     Past Medical History:   1. Metastatic lung cancer   2. 2005: Chronic lower back pain secondary to injury   3. Arthritis with multiple joint involved   4. GERD   5. Anxiety with intermittent attacks   6. O2 3l/min only at night x 3 weeks   Allergies as of 03/10/2017    •  (No Known Allergies)        Current outpatient prescriptions:   • ondansetron  (ZOFRAN) 4 MG Tab tablet, Take 1 Tab by mouth every four hours as needed for Nausea/Vomiting (for nausea, vomiting)., Disp: 30 Tab, Rfl: 6   • prochlorperazine (COMPAZINE) 10 MG Tab, Take 1 Tab by mouth every 6 hours as needed (for nausea, vomiting)., Disp: 30 Tab, Rfl: 6   • rivaroxaban (XARELTO) 15 MG Tab tablet, Take 1 Tab by mouth 2 Times a Day. TAKE WITH FOOD, Disp: 42 Tab, Rfl: 0   • docusate sodium 100 MG Cap, Take 100 mg by mouth every morning., Disp: 60 Cap, Rfl:   • gabapentin (NEURONTIN) 100 MG Cap, Take 2 Caps by mouth 3 times a day., Disp: 90 Cap, Rfl:   • tamsulosin (FLOMAX) 0.4 MG capsule, Take 1 Cap by mouth ONE-HALF HOUR AFTER BREAKFAST., Disp: 30 Cap, Rfl:   • HYDROmorphone (DILAUDID) 2 MG Tab, Take 2 Tabs by mouth every four hours as needed for Severe Pain., Disp: 15 Tab, Rfl: 0   • morphine ER (MS CONTIN) 30 MG Tab CR tablet, Take 1 Tab by mouth every 12 hours., Disp: 20 Tab, Rfl: 0   • diazepam (VALIUM) 2 MG Tab, Take 1 Tab by mouth every 8 hours as needed., Disp: 10 Tab, Rfl: 0   • alprazolam (XANAX) 1 MG Tab, Take 1 mg by mouth every bedtime. Indications: Feeling Anxious, Disp: , Rfl:   • omeprazole (PRILOSEC) 20 MG delayed-release capsule, Take 20 mg by mouth every day., Disp: , Rfl:   Review of Systems:   All other review of systems are negative except what was mentioned above in the HPI.   Constitutional: Negative for fever and chills. Positive for malaise/fatigue.   HENT: Negative for ear pain and nosebleeds.   Eyes: Negative for blurred vision.   Respiratory: Positive for dry cough. Positive for shortness of breath on exertion stable.   Cardiovascular: Negative for chest pain and leg swelling.   Gastrointestinal: Negative for nausea, vomiting and abdominal pain.   Genitourinary: Negative for dysuria.   Musculoskeletal: Negative for myalgias. Positive for right shoulder pain and left hip pain.   Skin: Negative for rash.   Neurological: Negative for dizziness, sensory change, and headaches.    Endo/Heme/Allergies: No bruise/bleed easily.   Psychiatric/Behavioral: Negative for depression and memory loss.     Physical Exam:   General: Not in acute distress, alert and oriented x 3   HEENT: normalcephalic, atraumatic, extra ocular muscles intact, moist oral mucus membranes, and oral cavity without any lesions.   Neck: Supple neck and full range of motion   Lymph nodes: No palpable bilateral cervical and supraclavicular lymphadenopathy.   CVS: regular rate and rhythm   RESP: Decrease breath sound in bilateral bases. No crackles   ABD: Soft, non tender, non distended, positive bowel sounds, and no palpable hepatomegaly   EXT: Bilateral leg edema worse on the left than right.   CNS: Alert and oriented x 3 and cranial nerves grossly intact. Altered gait due to hip pain.   Labs:   Hospital Outpatient Visit on 03/10/2017    Component  Date  Value  Ref Range  Status    •  WBC  03/10/2017  5.1  4.8 - 10.8 K/uL  Final    •  RBC  03/10/2017  3.38*  4.70 - 6.10 M/uL  Final    •  Hemoglobin  03/10/2017  10.7*  14.0 - 18.0 g/dL  Final    •  Hematocrit  03/10/2017  33.4*  42.0 - 52.0 %  Final    •  MCV  03/10/2017  98.8*  81.4 - 97.8 fL  Final    •  MCH  03/10/2017  31.7  27.0 - 33.0 pg  Final    •  MCHC  03/10/2017  32.0*  33.7 - 35.3 g/dL  Final    •  RDW  03/10/2017  54.1*  35.9 - 50.0 fL  Final    •  Platelet Count  03/10/2017  226  164 - 446 K/uL  Final    •  MPV  03/10/2017  9.4  9.0 - 12.9 fL  Final    •  Neutrophils-Polys  03/10/2017  77.40*  44.00 - 72.00 %  Final    •  Lymphocytes  03/10/2017  11.10*  22.00 - 41.00 %  Final    •  Monocytes  03/10/2017  9.50  0.00 - 13.40 %  Final    •  Eosinophils  03/10/2017  0.80  0.00 - 6.90 %  Final    •  Basophils  03/10/2017  0.60  0.00 - 1.80 %  Final    •  Immature Granulocytes  03/10/2017  0.60  0.00 - 0.90 %  Final    •  Nucleated RBC  03/10/2017  0.00   Final    •  Neutrophils (Absolute)  03/10/2017  3.91  1.82 - 7.42 K/uL  Final     Includes immature neutrophils, if  present.    •  Lymphs (Absolute)  03/10/2017  0.56*  1.00 - 4.80 K/uL  Final    •  Monos (Absolute)  03/10/2017  0.48  0.00 - 0.85 K/uL  Final    •  Eos (Absolute)  03/10/2017  0.04  0.00 - 0.51 K/uL  Final    •  Baso (Absolute)  03/10/2017  0.03  0.00 - 0.12 K/uL  Final    •  Immature Granulocytes (abs)  03/10/2017  0.03  0.00 - 0.11 K/uL  Final    •  NRBC (Absolute)  03/10/2017  0.00   Final    •  Sodium  03/10/2017  140  135 - 145 mmol/L  Final    •  Potassium  03/10/2017  3.7  3.6 - 5.5 mmol/L  Final    •  Chloride  03/10/2017  109  96 - 112 mmol/L  Final    •  Co2  03/10/2017  23  20 - 33 mmol/L  Final    •  Anion Gap  03/10/2017  8.0  0.0 - 11.9  Final    •  Glucose  03/10/2017  115*  65 - 99 mg/dL  Final    •  Bun  03/10/2017  16  8 - 22 mg/dL  Final    •  Creatinine  03/10/2017  0.72  0.50 - 1.40 mg/dL  Final    •  Calcium  03/10/2017  9.4  8.5 - 10.5 mg/dL  Final    •  AST(SGOT)  03/10/2017  13  12 - 45 U/L  Final    •  ALT(SGPT)  03/10/2017  18  2 - 50 U/L  Final    •  Alkaline Phosphatase  03/10/2017  111*  30 - 99 U/L  Final    •  Total Bilirubin  03/10/2017  0.3  0.1 - 1.5 mg/dL  Final    •  Albumin  03/10/2017  3.4  3.2 - 4.9 g/dL  Final    •  Total Protein  03/10/2017  6.4  6.0 - 8.2 g/dL  Final    •  Globulin  03/10/2017  3.0  1.9 - 3.5 g/dL  Final    •  A-G Ratio  03/10/2017  1.1   Final    •  GFR If   03/10/2017  >60  >60 mL/min/1.73 m 2  Final    •  GFR If Non   03/10/2017  >60  >60 mL/min/1.73 m 2  Final    ]   Imagin. 3/3/16 CT CAP: Significant interval worsening of pulmonary metastatic disease with increasing size and number of pulmonary nodules, new right lymphangitic spread. Enlarging right middle lobe mass. Left lower lobe segmental pulmonary thromboemboli with no evidence of right heart strain. No significant interval worsening of left supra-acetabular ilium osteolytic metastasis. There has been some remodeling and there is anterior femoral subluxation.  Stable abdomen and pelvis with too small to characterize left hepatic and splenic hypodensities may represent cysts although metastases are not excluded    Assessment & Plan:   1. Metastatic adenocarcinoma of lung, EGFR exon 20: Has been on multiple lines of treatment with progression of disease or intolerability. His last therapy was with Opdivo and he had progression of disease. He was evaluated for MATCH trial but was not a candidate. He underwent palliative radiation and also has a pain pump with some improvement in his pain. He is due to be started on single agent docetaxel.   2. Pulmonary embolus: On recent CT scan he was found to have incidental left lower lobe segmental pulmonary embolus. He has been referred to Coumadin clinic and is currently on Xarelto and tolerating it well. On examination he is found to have bilateral leg edema with Barr on the left than the right. I will also order an ultrasound of lower extremities.  3. Intractable hip pain: He underwent palliative radiation to the right scapular lesion as well as left hip. There was some improvement with radiation. Patient however continued to have significant pain and had a pain pump placement. He is continues to follows closely with pain management and his pain appears to be fairly well controlled.  4. Bony metastatic disease: Patient is recommended bisphosphonates therapy. He will need dental clearance prior to starting Zometa. Once dental clearance is available he is to start Zometa every 4 weeks.  5. Normocytic anemia: Stable. Continue to monitor.  6. Steroid taper: Patient is currently on dexamethasone. After completion of chemotherapy and 2 days of dexamethasone 4 mg twice a day he is advised to continue dexamethasone at 0.5 mg ×1 week then to stop dexamethasone. He is advised to watch for symptoms closely.  4. He is to follow up in one week for toxicity check. Patient will be started on chemotherapy today.    He agreed and verbalized his  agreement and understanding with the current plan. I answered all questions and concerns he has at this time and advised him to call at any time in the interim with questions or concerns in regards to his care. Thank you for allowing me to participate in his care, I will continue to follow.     Please note that this dictation was created using voice recognition software. I have made every reasonable attempt to correct obvious errors, but I expect that there are errors of grammar and possibly content that I did not discover before finalizing the note.      Sincerely,  Shawanda Bennett  [address] [place] [address], [address]   [phone]

## 2017-03-10 NOTE — NON-PROVIDER
1pm- pt arrived ambulatory with 4ww accompanied by his son for IV start and labs prior to chemotherapy today. Patient in agreement with plan of care. One unsuccessful attempt in patient's right upper arm with 22g.  Requested Nita MILLS attempt, 22g IV established in left forearm. Pt tolerated well. Labs drawn from IV and delivered to lab via tube system.  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

## 2017-03-10 NOTE — PROGRESS NOTES
"Chemotherapy Verification - PRIMARY RN      Height = 66.53\"  Weight = 87.4 kg  BSA = 2.02 m2       Medication: Taxotere  Dose: 75 mg/m2  Calculated Dose: 151.6 mg                            I confirm this process was performed independently with the BSA and all final chemotherapy dosing calculations congruent.  Any discrepancies of 5% or greater have been addressed with the chemotherapy pharmacist. The resolution of the discrepancy has been documented in the EPIC progress notes.       "

## 2017-03-10 NOTE — PROGRESS NOTES
Chemotherapy Verification - SECONDARY RN       Height = 169 cm  Weight = 87.4 kg  BSA = 2.02       Medication: Taxotere  Dose: 75 mg/m2  Calculated Dose: 151.9 mg                             (In mg/m2, AUC, mg/kg)     I confirm that this process was performed independently.

## 2017-03-10 NOTE — MR AVS SNAPSHOT
"        Gabriele Torres   3/10/2017 1:00 PM   Non-Provider Visit   MRN: [id number]    [facility]   Dept Phone:  [phone]    Description:  Male : 1950   Provider:  ONC RN 2           Reason for Visit     Labs Only           Allergies as of 3/10/2017     No Known Allergies      Vital Signs     Blood Pressure Pulse Temperature Respirations Height Weight    122/60 mmHg 106 36.8 °C (98.2 °F) 16 1.753 m (5' 9\") 87.272 kg (192 lb 6.4 oz)    Body Mass Index Oxygen Saturation Smoking Status             28.40 kg/m2 92% Former Smoker         Basic Information     Date Of Birth Sex Race Ethnicity Preferred Language    1950 Male White Non- English      Your appointments     Mar 17, 2017 10:30 AM   ONCOLOGY EST PATIENT 30 MIN with JULEE Peoples   [facility] (--)    [address], [address]  [facility] [id number] [phone]            Mar 23, 2017  4:15 PM   Established Patient with [facility] EXAM 4   [facility]  (--)    [address] [address]   [phone]            Mar 30, 2017  1:30 PM   US EXTREMITY (60) with [address] US 1   [facility] - ULTRASOUND - [address]    [address]  [facility] [address]   [phone]            May 02, 2017  2:30 PM   Follow Up with Laure DOMINGO M.D.   [facility] (--)    [address] [facility] [address]   [phone]              Problem List              ICD-10-CM Priority Class Noted - Resolved    GERD (gastroesophageal reflux disease) K21.9 Low  2015 - Present    Pulmonary nodule seen on imaging study R91.1   2015 - Present    OA (osteoarthritis) M19.90   2015 - Present    Anxiety F41.9   2015 - Present    Shortness of breath R06.02   2015 - Present    Hypercalcemia E83.52 Medium  2015 - Present    Lung cancer (CMS-HCC) C34.90   2015 - Present    Dependence on supplemental oxygen Z99.81   " 10/7/2015 - Present    Laryngitis, chronic J37.0   12/23/2015 - Present    Chronic low back pain with left-sided sciatica M54.42, G89.29   10/18/2016 - Present    Weakness of left lower extremity M62.81   11/15/2016 - Present    Lumbar spondylosis M47.816   12/8/2016 - Present    Research study patient Z00.6   1/9/2017 - Present    [facility]    1/9/2017 - Present    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   1/26/2017 - Present    Pain from bone metastases (CMS-HCC) G89.3, C79.51   2/7/2017 - Present    Intractable pain R52 High  2/8/2017 - Present    Urinary retention R33.9 Medium  2/8/2017 - Present    Hematuria R31.9 Medium  2/8/2017 - Present    Anemia D64.9 Medium  2/8/2017 - Present    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, Z96.649 High  2/8/2017 - Present    Bone metastasis (CMS-HCC) C79.51   2/17/2017 - Present    Pulmonary embolism without acute cor pulmonale (CMS-HCC) I26.99   3/3/2017 - Present      Health Maintenance        Date Due Completion Dates    IMM DTaP/Tdap/Td Vaccine (1 - Tdap) 8/10/1970 ---    IMM ZOSTER VACCINE 8/10/2011 ---    IMM PNEUMOCOCCAL 65+ (ADULT) LOW/MEDIUM RISK SERIES (1 of 2 - PCV13) 8/10/2016 10/7/2013    IMM INFLUENZA (1) 9/1/2016 10/7/2015            Current Immunizations     Influenza Vaccine Quad Inj (Preserved) 10/7/2015    Pneumococcal polysaccharide vaccine (PPSV-23) 10/7/2013      Below and/or attached are the medications your provider expects you to take. Review all of your home medications and newly ordered medications with your provider and/or pharmacist. Follow medication instructions as directed by your provider and/or pharmacist. Please keep your medication list with you and share with your provider. Update the information when medications are discontinued, doses are changed, or new medications (including over-the-counter products) are added; and carry medication information at all times in the event of emergency situations      Allergies:  No Known Allergies          Medications  Valid as of: March 10, 2017 -  2:28 PM    Generic Name Brand Name Tablet Size Instructions for use    ALPRAZolam (Tab) XANAX 1 MG Take 1 mg by mouth every bedtime. Indications: Feeling Anxious        DiazePAM (Tab) VALIUM 2 MG Take 1 Tab by mouth every 8 hours as needed.        Docusate Sodium (Cap)  MG Take 100 mg by mouth every morning.        Gabapentin (Cap) NEURONTIN 100 MG Take 2 Caps by mouth 3 times a day.        HYDROmorphone HCl (Tab) DILAUDID 2 MG Take 2 Tabs by mouth every four hours as needed for Severe Pain.        Morphine Sulfate (Tab CR) MS CONTIN 30 MG Take 1 Tab by mouth every 12 hours.        Omeprazole (CAPSULE DELAYED RELEASE) PRILOSEC 20 MG Take 20 mg by mouth every day.        Ondansetron HCl (Tab) ZOFRAN 4 MG Take 1 Tab by mouth every four hours as needed for Nausea/Vomiting (for nausea, vomiting).        Prochlorperazine Maleate (Tab) COMPAZINE 10 MG Take 1 Tab by mouth every 6 hours as needed (for nausea, vomiting).        Rivaroxaban (Tab) XARELTO 15 MG Take 1 Tab by mouth 2 Times a Day. TAKE WITH FOOD        Tamsulosin HCl (Cap) FLOMAX 0.4 MG Take 1 Cap by mouth ONE-HALF HOUR AFTER BREAKFAST.        .                 Medicines prescribed today were sent to:     [facility] #[id number] - [place], [place] - [address] [place] [address]    Phone: [phone] Fax: [phone]    Open 24 Hours?: No      Medication refill instructions:       If your prescription bottle indicates you have medication refills left, it is not necessary to call your provider’s office. Please contact your pharmacy and they will refill your medication.    If your prescription bottle indicates you do not have any refills left, you may request refills at any time through one of the following ways: The online [employer] system (except Urgent Care), by calling your provider’s office, or by asking your pharmacy to  contact your provider’s office with a refill request. Medication refills are processed only during regular business hours and may not be available until the next business day. Your provider may request additional information or to have a follow-up visit with you prior to refilling your medication.   *Please Note: Medication refills are assigned a new Rx number when refilled electronically. Your pharmacy may indicate that no refills were authorized even though a new prescription for the same medication is available at the pharmacy. Please request the medicine by name with the pharmacy before contacting your provider for a refill.           [employer] Access Code: [email or web address]  Expires: 4/1/2017  9:11 AM    [employer]  A secure, online tool to manage your health information     [employer]’s [employer]® is a secure, online tool that connects you to your personalized health information from the privacy of your home -- day or night - making it very easy for you to manage your healthcare. Once the activation process is completed, you can even access your medical information using the [employer] balaji, which is available for free in the Apple Balaji store or Google Play store.     [employer] provides the following levels of access (as shown below):   My Chart Features   [facility]  Specialists [facility]  Urgent  Care [facility]  Primary Care  Doctor   Email your healthcare team securely and privately 24/7 X X X    Manage appointments: schedule your next appointment; view details of past/upcoming appointments X      Request prescription refills. X      View recent personal medical records, including lab and immunizations X X X X   View health record, including health history, allergies, medications X X X X   Read reports about your outpatient visits, procedures, consult and ER notes X X X X   See your discharge summary, which is a recap of your hospital and/or ER visit that includes your diagnosis, lab results, and  care plan. X X       How to register for [employer]:  1. Go to  [email or web address].  2. Click on the Sign Up Now box, which takes you to the New Member Sign Up page. You will need to provide the following information:  a. Enter your [employer] Access Code exactly as it appears at the top of this page. (You will not need to use this code after you’ve completed the sign-up process. If you do not sign up before the expiration date, you must request a new code.)   b. Enter your date of birth.   c. Enter your home email address.   d. Click Submit, and follow the next screen’s instructions.  3. Create a [employer] ID. This will be your [employer] login ID and cannot be changed, so think of one that is secure and easy to remember.  4. Create a [employer] password. You can change your password at any time.  5. Enter your Password Reset Question and Answer. This can be used at a later time if you forget your password.   6. Enter your e-mail address. This allows you to receive e-mail notifications when new information is available in [employer].  7. Click Sign Up. You can now view your health information.    For assistance activating your [employer] account, call [phone]

## 2017-03-10 NOTE — PROGRESS NOTES
"Pharmacy Chemotherapy Verification Note:    Patient Name: Gabriele Torres      Dx: Lung Cancer w/ mets        Protocol: Taxotere     Docetaxel (Taxotere) 75 mg/m2 IV on Day 1  21-day cycle until disease progression or unacceptable toxicity  NCCN Guidelines for NSCLC. V.3.2017.  Trung FV, et al. JCO. 2000;18(12):2354-62.  Lupillo PM, et al. JCO. 2009;27(4):591-8.    Allergies:  Review of patient's allergies indicates no known allergies.     /75 mmHg  Pulse 103  Temp(Src) 37.1 °C (98.8 °F)  Resp 18  Ht 1.69 m (5' 6.53\")  Wt 87.4 kg (192 lb 10.9 oz)  BMI 30.60 kg/m2  SpO2 90% Body surface area is 2.03 meters squared.  ANC~ 3910 Plt = 226 k Hgb = 10.7 SCr = 0.72 mg/dL CrCl >125 ml/min  LFT = WNL, except   TBili = 0.3     Drug Order   (Drug name, dose, route, IV Fluid & volume, frequency, number of doses) Cycle: 1      Previous treatment: Tarceva, carbo/Alimta x 1 cycle (not tolerated), Alimta (not tolerated), and then Opdivo through 11/2016     Medication = docetaxel (Taxotere)  Base Dose = 75 mg/m2  Calc Dose: Base Dose x 2.03 m2 = 152.25 mg  Final Dose = 152.2 mg  Route = IV  Fluid & Volume =  mL  Admin Duration = Over 1 hour          <5% difference, okay to treat with final dose     By my signature below, I confirm this process was performed independently with the BSA and all final chemotherapy dosing calculations congruent. I have reviewed the above chemotherapy order and that my calculation of the final dose and BSA (when applicable) corroborate those calculations of the  pharmacist.     Negin Santillan, PharmD     "

## 2017-03-10 NOTE — PROGRESS NOTES
"Pharmacy Chemotherapy Calculations    Dx: NSC lung cancer with bone metastisis  Cycle: 1 (Previous treatment = extensively pretreated with Tarceva, Carbo/Alimta, Alimta, and Opdivo; last tx 11/2016)    Regimen and Dosing Reference  Docetaxel (Taxotere) 75 mg/m2 IV on day 1  21-day cycle for 4-6 cycles or until disease progression or unacceptable toxicity  NCCN Guidelines for Non-Small Cell Lung Cancer V.3.2017.  Lindy FV, et al. J CLin Oncol. 2000;18(12):2273-62.    /75 mmHg  Pulse 103  Temp(Src) 37.1 °C (98.8 °F)  Resp 18  Ht 1.69 m (5' 6.53\")  Wt 87.4 kg (192 lb 10.9 oz)  BMI 30.60 kg/m2  SpO2 90% BSA 2.03    3/10/17:  ANC ~ 3910     Plt = 226 k     Hgb= 10.7 SCr =  0.72      CrCl > 125 ml/min    LFT = WNL except alk phos = 111    Docetaxel (Taxotere) 75 mg/m2 x 2.03 = 152.25 mg   <5% difference, ok to treat with final written dose = 152.2 mg IV      Nova Meyer, PharmD      "

## 2017-03-10 NOTE — PROGRESS NOTES
Follow Up Note:  Oncology    Date: 3/10/17  Time: 1:20 pm      Primary Care:  Sasha Isidro M.D.  Prior Oncology: Dr. Moses  Pain management: Dr. Sparks  Spinal surgery: Dr. Lopez   Radiation Oncology: Dr. Sims     Diagnosis: Metastatic adenocarcinoma of lung    Chief Complaint:  He is here for a follow up visit    History of Presenting Illness:  Gabriele Torres is a 66 y.o. Male metastatic adenocarcinoma the lung initially diagnosed in 5/2015. He was diagnosed secondary to multiple pulmonary nodules and underwent biopsy of the right upper lobe and right lower lobe nodules which were positive for moderately differentiated adenocarcinoma with lymphovascular invasion. He was also found to have left vaginal nodule, trace pleural effusions and uptake in the right scapula and third/fourth rib. He was found to have EGFR 20 mutation. He was treated by Dr. Moses at the time and received Tarceva from 6/2015-9/2015. He was found to have progression of disease on repeat imaging. He was then treated with carbo/Alimta ×1 cycle. He had significant toxicity and was unable to tolerate it and required hospitalization. He was then attempted single agent Alimta but was unable to tolerate that as well. Further testing was negative for PDL1.  She was then treated with Opdivo on 3/2016-11/16. Initial imaging was consistent with mixed responses however repeat scans done in 11/2016 showed progression of disease. He was also found to have a destructive metastatic lesion in the superior left acetabulum. 11/2016 he underwent biopsy of the right middle lobe and further testing was negative for T790M mutation. He was off treatment and presented for a second opinion and transitioned his care over. Bone scan showed increased uptake in the right scapula as well as the left hip. 1/2017 he underwent repeat biopsy of the bone lesion for further testing for MATCH trial.  He was screened for the match trial but was not a candidate. Should  continue to have increased pain in the left hip and underwent palliative radiation to the hip as well as the scapula. Secondary to intractable pain he required hospitalization during radiation. Post completion of radiation and he was discharged to rehabilitation. He continued to have increased pain and is followed closely by Dr. Sparks. He then underwent an plantation of pain pump on 2/17/17. He was recommended single agent docetaxel with plans to escalation based on his tolerability. Repeat baseline imaging was ordered. He had a CT done in 3/3/17 which showed significant worsening of metastatic disease in the lungs as well as left lower lobe segment pulmonary embolus.  He has established with Coumadin clinic and has been started on Xarelto.    Interval history:   He is here for a follow-up visit. He is accompanied by his son was present in the room. He has been fairly stable since his last visit. He was unable to show for a repeat chemotherapy education. He had difficulty with the right hands unable to get further education this week. He otherwise states that his pain is fairly well controlled with the pain pump. He is requiring to use boluses approximately 2-3 times a day. His pain is 5-7/10. He otherwise has stable appetite and weight. His cough and shortness of breath have been stable. He is continued on home oxygen at 2 L/m. He is having regular bowel movements. He denies any fevers, chills or night sweats.      Past Medical History:  1.  Metastatic lung cancer  2. 2005: Chronic lower back pain secondary to injury  3. Arthritis with multiple joint involved  4. GERD  5. Anxiety with intermittent attacks  6. O2 3l/min only at night x 3 weeks      Allergies as of 03/10/2017   • (No Known Allergies)         Current outpatient prescriptions:   •  ondansetron (ZOFRAN) 4 MG Tab tablet, Take 1 Tab by mouth every four hours as needed for Nausea/Vomiting (for nausea, vomiting)., Disp: 30 Tab, Rfl: 6  •  prochlorperazine  (COMPAZINE) 10 MG Tab, Take 1 Tab by mouth every 6 hours as needed (for nausea, vomiting)., Disp: 30 Tab, Rfl: 6  •  rivaroxaban (XARELTO) 15 MG Tab tablet, Take 1 Tab by mouth 2 Times a Day. TAKE WITH FOOD, Disp: 42 Tab, Rfl: 0  •  docusate sodium 100 MG Cap, Take 100 mg by mouth every morning., Disp: 60 Cap, Rfl:   •  gabapentin (NEURONTIN) 100 MG Cap, Take 2 Caps by mouth 3 times a day., Disp: 90 Cap, Rfl:   •  tamsulosin (FLOMAX) 0.4 MG capsule, Take 1 Cap by mouth ONE-HALF HOUR AFTER BREAKFAST., Disp: 30 Cap, Rfl:   •  HYDROmorphone (DILAUDID) 2 MG Tab, Take 2 Tabs by mouth every four hours as needed for Severe Pain., Disp: 15 Tab, Rfl: 0  •  morphine ER (MS CONTIN) 30 MG Tab CR tablet, Take 1 Tab by mouth every 12 hours., Disp: 20 Tab, Rfl: 0  •  diazepam (VALIUM) 2 MG Tab, Take 1 Tab by mouth every 8 hours as needed., Disp: 10 Tab, Rfl: 0  •  alprazolam (XANAX) 1 MG Tab, Take 1 mg by mouth every bedtime. Indications: Feeling Anxious, Disp: , Rfl:   •  omeprazole (PRILOSEC) 20 MG delayed-release capsule, Take 20 mg by mouth every day., Disp: , Rfl:     Review of Systems:  All other review of systems are negative except what was mentioned above in the HPI.  Constitutional: Negative for fever and chills. Positive for malaise/fatigue.    HENT: Negative for ear pain and nosebleeds.     Eyes: Negative for blurred vision.    Respiratory: Positive for dry cough. Positive for shortness of breath on exertion stable.    Cardiovascular: Negative for chest pain and leg swelling.    Gastrointestinal: Negative for nausea, vomiting and abdominal pain.    Genitourinary: Negative for dysuria.    Musculoskeletal: Negative for myalgias. Positive for right shoulder pain and left hip pain.   Skin: Negative for rash.    Neurological: Negative for dizziness, sensory change, and headaches.    Endo/Heme/Allergies: No bruise/bleed easily.    Psychiatric/Behavioral: Negative for depression and memory loss.      Physical  Exam:    General: Not in acute distress, alert and oriented x 3  HEENT: normalcephalic, atraumatic, extra ocular muscles intact, moist oral mucus membranes, and oral cavity without any lesions.  Neck: Supple neck and full range of motion  Lymph nodes: No palpable bilateral cervical and supraclavicular lymphadenopathy.    CVS: regular rate and rhythm  RESP: Decrease breath sound in bilateral bases. No crackles   ABD: Soft, non tender, non distended, positive bowel sounds, and no palpable hepatomegaly   EXT: Bilateral leg edema worse on the left than right.  CNS: Alert and oriented x 3 and cranial nerves grossly intact. Altered gait due to hip pain.     Labs:   Hospital Outpatient Visit on 03/10/2017   Component Date Value Ref Range Status   • WBC 03/10/2017 5.1  4.8 - 10.8 K/uL Final   • RBC 03/10/2017 3.38* 4.70 - 6.10 M/uL Final   • Hemoglobin 03/10/2017 10.7* 14.0 - 18.0 g/dL Final   • Hematocrit 03/10/2017 33.4* 42.0 - 52.0 % Final   • MCV 03/10/2017 98.8* 81.4 - 97.8 fL Final   • MCH 03/10/2017 31.7  27.0 - 33.0 pg Final   • MCHC 03/10/2017 32.0* 33.7 - 35.3 g/dL Final   • RDW 03/10/2017 54.1* 35.9 - 50.0 fL Final   • Platelet Count 03/10/2017 226  164 - 446 K/uL Final   • MPV 03/10/2017 9.4  9.0 - 12.9 fL Final   • Neutrophils-Polys 03/10/2017 77.40* 44.00 - 72.00 % Final   • Lymphocytes 03/10/2017 11.10* 22.00 - 41.00 % Final   • Monocytes 03/10/2017 9.50  0.00 - 13.40 % Final   • Eosinophils 03/10/2017 0.80  0.00 - 6.90 % Final   • Basophils 03/10/2017 0.60  0.00 - 1.80 % Final   • Immature Granulocytes 03/10/2017 0.60  0.00 - 0.90 % Final   • Nucleated RBC 03/10/2017 0.00   Final   • Neutrophils (Absolute) 03/10/2017 3.91  1.82 - 7.42 K/uL Final    Includes immature neutrophils, if present.   • Lymphs (Absolute) 03/10/2017 0.56* 1.00 - 4.80 K/uL Final   • Monos (Absolute) 03/10/2017 0.48  0.00 - 0.85 K/uL Final   • Eos (Absolute) 03/10/2017 0.04  0.00 - 0.51 K/uL Final   • Baso (Absolute) 03/10/2017 0.03   0.00 - 0.12 K/uL Final   • Immature Granulocytes (abs) 03/10/2017 0.03  0.00 - 0.11 K/uL Final   • NRBC (Absolute) 03/10/2017 0.00   Final   • Sodium 03/10/2017 140  135 - 145 mmol/L Final   • Potassium 03/10/2017 3.7  3.6 - 5.5 mmol/L Final   • Chloride 03/10/2017 109  96 - 112 mmol/L Final   • Co2 03/10/2017 23  20 - 33 mmol/L Final   • Anion Gap 03/10/2017 8.0  0.0 - 11.9 Final   • Glucose 03/10/2017 115* 65 - 99 mg/dL Final   • Bun 03/10/2017 16  8 - 22 mg/dL Final   • Creatinine 03/10/2017 0.72  0.50 - 1.40 mg/dL Final   • Calcium 03/10/2017 9.4  8.5 - 10.5 mg/dL Final   • AST(SGOT) 03/10/2017 13  12 - 45 U/L Final   • ALT(SGPT) 03/10/2017 18  2 - 50 U/L Final   • Alkaline Phosphatase 03/10/2017 111* 30 - 99 U/L Final   • Total Bilirubin 03/10/2017 0.3  0.1 - 1.5 mg/dL Final   • Albumin 03/10/2017 3.4  3.2 - 4.9 g/dL Final   • Total Protein 03/10/2017 6.4  6.0 - 8.2 g/dL Final   • Globulin 03/10/2017 3.0  1.9 - 3.5 g/dL Final   • A-G Ratio 03/10/2017 1.1   Final   • GFR If  03/10/2017 >60  >60 mL/min/1.73 m 2 Final   • GFR If Non  03/10/2017 >60  >60 mL/min/1.73 m 2 Final   ]    Imagin. 3/3/16 CT CAP: Significant interval worsening of pulmonary metastatic disease with increasing size and number of pulmonary nodules, new right lymphangitic spread.  Enlarging right middle lobe mass.  Left lower lobe segmental pulmonary thromboemboli with no evidence of right heart strain.  No significant interval worsening of left supra-acetabular ilium osteolytic metastasis. There has been some remodeling and there is anterior femoral subluxation.  Stable abdomen and pelvis with too small to characterize left hepatic and splenic hypodensities may represent cysts although metastases are not excluded      Assessment & Plan:  1. Metastatic adenocarcinoma of lung, EGFR exon 20: Has been on multiple lines of treatment with progression of disease or intolerability. His last therapy was with Opdivo  and he had progression of disease. He was evaluated for MATCH trial but was not a candidate. He underwent palliative radiation and also has a pain pump with some improvement in his pain. He is due to be started on single agent docetaxel.   2. Pulmonary embolus: On recent CT scan he was found to have incidental left lower lobe segmental pulmonary embolus. He has been referred to Coumadin clinic and is currently on  Xarelto and tolerating it well. On examination he is found to have bilateral leg edema with Barr on the left than the right. I will also order an ultrasound of lower extremities.  3. Intractable hip pain: He underwent palliative radiation to the right scapular lesion as well as left hip. There was some improvement with radiation. Patient however continued to have significant pain and had a pain pump placement. He is continues to follows closely with pain management and his pain appears to be fairly well controlled.  4. Bony metastatic disease: Patient is recommended bisphosphonates therapy. He will need dental clearance prior to starting Zometa. Once dental clearance is available he is to start Zometa every 4 weeks.  5. Normocytic anemia: Stable. Continue to monitor.  6. Steroid taper: Patient is currently on dexamethasone. After completion of chemotherapy and 2 days of dexamethasone 4 mg twice a day he is advised to continue dexamethasone at 0.5 mg ×1 week then to stop dexamethasone. He is advised to watch for symptoms closely.  4. He is to follow up in one week for toxicity check. Patient will be started on chemotherapy today.    He agreed and verbalized his agreement and understanding with the current plan. I answered all questions and concerns he has at this time and advised him to call at any time in the interim with questions or concerns in regards to his care. Thank you for allowing me to participate in his care, I will continue to follow.    Please note that this dictation was created using voice  recognition software. I have made every reasonable attempt to correct obvious errors, but I expect that there are errors of grammar and possibly content that I did not discover before finalizing the note.

## 2017-03-10 NOTE — MR AVS SNAPSHOT
"        Gabriele oTrres   3/10/2017 1:20 PM   Office Visit   MRN: [id number]    Department:  [facility]   Dept Phone:  [phone]    Description:  Male : 1950   Provider:  Shawanda Bennett M.D.           Reason for Visit     Follow-Up           Allergies as of 3/10/2017     No Known Allergies      You were diagnosed with     Malignant neoplasm of middle lobe of right lung (CMS-HCC)   [3830824]       Bilateral edema of lower extremity   [540827]         Vital Signs     Blood Pressure Pulse Temperature Respirations Height Weight    122/60 mmHg 106 36.8 °C (98.2 °F) 16 1.753 m (5' 9\") 87.272 kg (192 lb 6.4 oz)    Body Mass Index Oxygen Saturation Smoking Status             28.40 kg/m2 92% Former Smoker         Basic Information     Date Of Birth Sex Race Ethnicity Preferred Language    1950 Male White Non- English      Your appointments     2017  2:00 PM   Established Patient with Sasha Isidro M.D.   [address] [address]   [phone]           You will be receiving a confirmation call a few days before your appointment from our automated call confirmation system.            May 02, 2017  2:30 PM   Follow Up with Laure DOMINGO M.D.   [facility] (--)    [address] [address]   [phone]            2017  9:00 AM   Established Patient with V EXAM 4   [facility]  (--)    [address] [place] [id number]   [phone]              Problem List              ICD-10-CM Priority Class Noted - Resolved    GERD (gastroesophageal reflux disease) K21.9 Low  2015 - Present    Pulmonary nodule seen on imaging study R91.1   2015 - Present    OA (osteoarthritis) M19.90   2015 - Present    Anxiety F41.9   2015 - Present    Shortness of breath R06.02   2015 - Present    Hypercalcemia E83.52 Medium  " 5/6/2015 - Present    Lung cancer (CMS-HCC) C34.90   5/8/2015 - Present    Dependence on supplemental oxygen Z99.81   10/7/2015 - Present    Laryngitis, chronic J37.0   12/23/2015 - Present    Chronic low back pain with left-sided sciatica M54.42, G89.29   10/18/2016 - Present    Weakness of left lower extremity M62.81   11/15/2016 - Present    Lumbar spondylosis M47.816   12/8/2016 - Present    Research study patient Z00.6   1/9/2017 - Present    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   1/26/2017 - Present    Pain from bone metastases (CMS-HCC) G89.3, C79.51   2/7/2017 - Present    Intractable pain R52 High  2/8/2017 - Present    Urinary retention R33.9 Medium  2/8/2017 - Present    Hematuria R31.9 Medium  2/8/2017 - Present    Anemia D64.9 Medium  2/8/2017 - Present    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, Z96.649 High  2/8/2017 - Present    Bone metastasis (CMS-HCC) C79.51   2/17/2017 - Present    Pulmonary embolism without acute cor pulmonale (CMS-HCC) I26.99   3/3/2017 - Present    Fall W19.XXXA Low  3/28/2017 - Present    Left hip pain M25.552 High  3/28/2017 - Present    Lung cancer metastatic to bone (CMS-HCC) C34.90, C79.51 Medium  3/28/2017 - Present    Hip dislocation, left (CMS-HCC) S73.005A High  3/29/2017 - Present    History of pulmonary embolism Z86.711 Medium  3/29/2017 - Present      Health Maintenance        Date Due Completion Dates    IMM DTaP/Tdap/Td Vaccine (1 - Tdap) 8/10/1970 ---    IMM ZOSTER VACCINE 8/10/2011 ---    IMM PNEUMOCOCCAL 65+ (ADULT) LOW/MEDIUM RISK SERIES (1 of 2 - PCV13) 8/10/2016 10/7/2013            Current Immunizations     Influenza Vaccine Quad Inj (Preserved) 10/7/2015    Pneumococcal polysaccharide vaccine (PPSV-23) 10/7/2013      Below and/or attached are the medications your provider expects you to take. Review all of your home medications and newly ordered medications with your provider and/or pharmacist. Follow medication instructions as  "directed by your provider and/or pharmacist. Please keep your medication list with you and share with your provider. Update the information when medications are discontinued, doses are changed, or new medications (including over-the-counter products) are added; and carry medication information at all times in the event of emergency situations     Allergies:  No Known Allergies          Medications  Valid as of: April 04, 2017 - 10:20 AM    Generic Name Brand Name Tablet Size Instructions for use    ALPRAZolam (Tab) XANAX 1 MG Take 1 mg by mouth 3 times a day as needed for Anxiety. Indications: Feeling Anxious        Docusate Sodium (Cap)  MG Take 100 mg by mouth every morning.        Gabapentin (Cap) NEURONTIN 100 MG Take 2 Caps by mouth 3 times a day.        Morphine Sulfate (Tab) MS IR 15 MG Take 15 mg by mouth every four hours as needed for Severe Pain.        Omeprazole (CAPSULE DELAYED RELEASE) PRILOSEC 20 MG Take 20 mg by mouth every day.        Pain Pump (PATIENT SUPPLIED) XX ROSARIO (Device) patient supplied  1 Each by Injection route Continuous. Simple continuous drug:  Hydromorphone 1.5009 MG/Day  Infusion PA drug\"  PA Lockout interval  01:00 h;m  Maximum Activations/Day  12  Last changed was 3/7/2017  Indications: Hydromophone 5.0 MG/ML  (Concentration)        Psyllium (Pack) METAMUCIL 58.12 % Take 1 Packet by mouth every day.        Rivaroxaban (Tab) XARELTO 20 MG Take 1 Tab by mouth with dinner.        Tamsulosin HCl (Cap) FLOMAX 0.4 MG Take 1 Cap by mouth ONE-HALF HOUR AFTER BREAKFAST.        Venlafaxine HCl (CAPSULE SR 24 HR) EFFEXOR XR 37.5 MG Take 1 Cap by mouth every day.        .                 Medicines prescribed today were sent to:     [facility] #[id number] - [place], [place] - [address] AT [address] [address]    Phone: [phone] Fax: [phone]    Open 24 Hours?: No      Medication refill instructions:       If your prescription bottle " indicates you have medication refills left, it is not necessary to call your provider’s office. Please contact your pharmacy and they will refill your medication.    If your prescription bottle indicates you do not have any refills left, you may request refills at any time through one of the following ways: The online [employer] system (except Urgent Care), by calling your provider’s office, or by asking your pharmacy to contact your provider’s office with a refill request. Medication refills are processed only during regular business hours and may not be available until the next business day. Your provider may request additional information or to have a follow-up visit with you prior to refilling your medication.   *Please Note: Medication refills are assigned a new Rx number when refilled electronically. Your pharmacy may indicate that no refills were authorized even though a new prescription for the same medication is available at the pharmacy. Please request the medicine by name with the pharmacy before contacting your provider for a refill.        Your To Do List     Future Labs/Procedures Complete By Expires    US-EXTREMITY VENOUS BILATERAL LOWER  As directed 3/10/2018         [employer] Access Code: [email or web address]  Expires: 5/1/2017 10:01 AM    [employer]  A secure, online tool to manage your health information     [employer]® is a secure, online tool that connects you to your personalized health information from the privacy of your home -- day or night - making it very easy for you to manage your healthcare. Once the activation process is completed, you can even access your medical information using the Nav[employer]is balaji, which is available for free in the Apple Balaji store or Google Play store.     [employer] provides the following levels of access (as shown below):   My Chart Features   [facility] Doctor [facility]  Specialists [facility]  Urgent  Care Non-[facility]  Primary Care  Doctor   Email your healthcare  team securely and privately 24/7 X X X    Manage appointments: schedule your next appointment; view details of past/upcoming appointments X      Request prescription refills. X      View recent personal medical records, including lab and immunizations X X X X   View health record, including health history, allergies, medications X X X X   Read reports about your outpatient visits, procedures, consult and ER notes X X X X   See your discharge summary, which is a recap of your hospital and/or ER visit that includes your diagnosis, lab results, and care plan. X X       How to register for [employer]:  1. Go to  [email or web address].  2. Click on the Sign Up Now box, which takes you to the New Member Sign Up page. You will need to provide the following information:  a. Enter your [employer] Access Code exactly as it appears at the top of this page. (You will not need to use this code after you’ve completed the sign-up process. If you do not sign up before the expiration date, you must request a new code.)   b. Enter your date of birth.   c. Enter your home email address.   d. Click Submit, and follow the next screen’s instructions.  3. Create a [employer] ID. This will be your [employer] login ID and cannot be changed, so think of one that is secure and easy to remember.  4. Create a [employer] password. You can change your password at any time.  5. Enter your Password Reset Question and Answer. This can be used at a later time if you forget your password.   6. Enter your e-mail address. This allows you to receive e-mail notifications when new information is available in [employer].  7. Click Sign Up. You can now view your health information.    For assistance activating your [employer] account, call [phone]

## 2017-03-11 NOTE — PROGRESS NOTES
Pt presents ambulatory via walker for initial Taxotere infusion. Pt reports that he has antiemetic prescriptions and his son is picking up currently. Pt has received chemotherapy in the past and received education in the MD office regarding current treatment. IV established at MD office and blood return noted. Labs reviewed and WNL for treatment. Pre-medication given and 20 mins observed prior to starting treatment. Taxotere being titrated per initial protocol, current rate 50 mL/hr. Report given to Annabella MILLS at chairside for remainder of infusion, pt resting in chair with call light within reach.

## 2017-03-13 ENCOUNTER — TELEPHONE (OUTPATIENT)
Dept: VASCULAR LAB | Facility: MEDICAL CENTER | Age: 67
End: 2017-03-13

## 2017-03-13 NOTE — TELEPHONE ENCOUNTER
Initial anticoagulation clinic note reviewed.  Patient started on anticoagulant for possible malignant see associated PE.  Case discussed with his oncologist who is currently undertaking workup for possible DVT.    Oncology recommends continuing with 6 months oral anticoagulant and then reevaluation. We will defer to that recommendation    Michael J. Bloch, MD  [facility]

## 2017-03-15 ENCOUNTER — OFFICE VISIT (OUTPATIENT)
Dept: MEDICAL GROUP | Facility: PHYSICIAN GROUP | Age: 67
End: 2017-03-15
Payer: MEDICARE

## 2017-03-15 VITALS
HEIGHT: 67 IN | RESPIRATION RATE: 16 BRPM | WEIGHT: 190 LBS | HEART RATE: 112 BPM | BODY MASS INDEX: 29.82 KG/M2 | DIASTOLIC BLOOD PRESSURE: 74 MMHG | OXYGEN SATURATION: 95 % | SYSTOLIC BLOOD PRESSURE: 120 MMHG | TEMPERATURE: 97.1 F

## 2017-03-15 DIAGNOSIS — C79.51 BONE METASTASIS: ICD-10-CM

## 2017-03-15 DIAGNOSIS — G89.3 PAIN FROM BONE METASTASES (HCC): ICD-10-CM

## 2017-03-15 DIAGNOSIS — R52 INTRACTABLE PAIN: ICD-10-CM

## 2017-03-15 DIAGNOSIS — F41.8 ANXIETY ABOUT HEALTH: Primary | ICD-10-CM

## 2017-03-15 DIAGNOSIS — C79.51 PAIN FROM BONE METASTASES (HCC): ICD-10-CM

## 2017-03-15 DIAGNOSIS — C7B.03 METASTATIC CARCINOID TUMOR TO BONE (HCC): ICD-10-CM

## 2017-03-15 PROCEDURE — G8419 CALC BMI OUT NRM PARAM NOF/U: HCPCS | Performed by: NURSE PRACTITIONER

## 2017-03-15 PROCEDURE — G8432 DEP SCR NOT DOC, RNG: HCPCS | Performed by: NURSE PRACTITIONER

## 2017-03-15 PROCEDURE — 4040F PNEUMOC VAC/ADMIN/RCVD: CPT | Performed by: NURSE PRACTITIONER

## 2017-03-15 PROCEDURE — 99213 OFFICE O/P EST LOW 20 MIN: CPT | Performed by: NURSE PRACTITIONER

## 2017-03-15 PROCEDURE — 1036F TOBACCO NON-USER: CPT | Performed by: NURSE PRACTITIONER

## 2017-03-15 PROCEDURE — 3017F COLORECTAL CA SCREEN DOC REV: CPT | Performed by: NURSE PRACTITIONER

## 2017-03-15 PROCEDURE — G8484 FLU IMMUNIZE NO ADMIN: HCPCS | Performed by: NURSE PRACTITIONER

## 2017-03-15 PROCEDURE — 1111F DSCHRG MED/CURRENT MED MERGE: CPT | Performed by: NURSE PRACTITIONER

## 2017-03-15 PROCEDURE — 1101F PT FALLS ASSESS-DOCD LE1/YR: CPT | Performed by: NURSE PRACTITIONER

## 2017-03-15 RX ORDER — MORPHINE SULFATE 15 MG/1
TABLET, FILM COATED, EXTENDED RELEASE ORAL
COMMUNITY
Start: 2017-03-03 | End: 2017-03-28

## 2017-03-15 NOTE — MR AVS SNAPSHOT
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)   [789165]         Vital Signs     Blood Pressure Pulse Temperature Respirations Height Weight    120/74 mmHg 112 36.2 °C (97.1 °F) 16 1.69 m (5' 6.53\") 86.183 kg (190 lb)    Body Mass Index Oxygen Saturation Smoking Status             30.18 kg/m2 95% Former Smoker         Basic Information     Date Of Birth Sex Race Ethnicity Preferred Language    1950 Male White Non- English      Your appointments     Mar 17, 2017 10:30 AM   ONCOLOGY EST PATIENT 30 MIN with Sobia Leal A.P.NBrown   [facility] (--)    [address], [address]  [facility] [address] [phone]            Mar 23, 2017  4:15 PM   Established Patient with [facility] EXAM 4   [facility]  (--)    [address] [facility] [address]   [phone]            Mar 30, 2017  1:30 PM   US EXTREMITY (60) with [address] US 1   [facility] IMAGING - ULTRASOUND - [address]    [address]  [facility] [id number] [phone]            Mar 31, 2017  1:00 PM   Non Provider 1 with ONC RN 1   [facility] (--)    [address], [address]  [facility] [id number] [phone]           You will be receiving a confirmation call a few days before your appointment from our automated call confirmation system.            Mar 31, 2017  1:40 PM   ONCOLOGY EST PATIENT 30 MIN with Shawanda Bennett, " M.D.   [facility] (--)    [address], [address]  [facility] [address] [phone]            Mar 31, 2017  2:30 PM   Est Chemo 3 with RN 2   [facility] ([facility])    [address] [facility] [address]  [facility] [id number] [phone]            May 02, 2017  2:30 PM   Follow Up with Laure DOMINGO M.D.   [facility] (--)    [address] [facility] [address]   [phone]              Problem List              ICD-10-CM Priority Class Noted - Resolved    GERD (gastroesophageal reflux disease) K21.9 Low  5/1/2015 - Present    Pulmonary nodule seen on imaging study R91.1   5/1/2015 - Present    OA (osteoarthritis) M19.90   5/1/2015 - Present    Anxiety F41.9   5/1/2015 - Present    Shortness of breath R06.02   5/5/2015 - Present    Hypercalcemia E83.52 Medium  5/6/2015 - Present    Lung cancer (CMS-HCC) C34.90   5/8/2015 - Present    Dependence on supplemental oxygen Z99.81   10/7/2015 - Present    Laryngitis, chronic J37.0   12/23/2015 - Present    Chronic low back pain with left-sided sciatica M54.42, G89.29   10/18/2016 - Present    Weakness of left lower extremity M62.81   11/15/2016 - Present    Lumbar spondylosis M47.816   12/8/2016 - Present    Research study patient Z00.6   1/9/2017 - Present    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   1/26/2017 - Present    Pain from bone metastases (CMS-HCC) G89.3, C79.51   2/7/2017 - Present    Intractable pain R52 High  2/8/2017 - Present    Urinary retention R33.9 Medium  2/8/2017 - Present    Hematuria R31.9 Medium  2/8/2017 - Present    Anemia D64.9 Medium  2/8/2017 - Present    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, Z96.649 High  2/8/2017 - Present    Bone metastasis (CMS-HCC) C79.51   2/17/2017 - Present    Pulmonary embolism without acute cor pulmonale (CMS-HCC) I26.99   3/3/2017 - Present      Health Maintenance        Date Due Completion Dates    IMM DTaP/Tdap/Td Vaccine (1 - Tdap) 8/10/1970 ---    IMM ZOSTER  VACCINE 8/10/2011 ---    IMM PNEUMOCOCCAL 65+ (ADULT) LOW/MEDIUM RISK SERIES (1 of 2 - PCV13) 8/10/2016 10/7/2013    IMM INFLUENZA (1) 9/1/2016 10/7/2015            Current Immunizations     Influenza Vaccine Quad Inj (Preserved) 10/7/2015    Pneumococcal polysaccharide vaccine (PPSV-23) 10/7/2013      Below and/or attached are the medications your provider expects you to take. Review all of your home medications and newly ordered medications with your provider and/or pharmacist. Follow medication instructions as directed by your provider and/or pharmacist. Please keep your medication list with you and share with your provider. Update the information when medications are discontinued, doses are changed, or new medications (including over-the-counter products) are added; and carry medication information at all times in the event of emergency situations     Allergies:  No Known Allergies          Medications  Valid as of: March 15, 2017 -  1:44 PM    Generic Name Brand Name Tablet Size Instructions for use    ALPRAZolam (Tab) XANAX 1 MG Take 1 mg by mouth every bedtime. Indications: Feeling Anxious        DiazePAM (Tab) VALIUM 2 MG Take 1 Tab by mouth every 8 hours as needed.        Docusate Sodium (Cap)  MG Take 100 mg by mouth every morning.        Gabapentin (Cap) NEURONTIN 100 MG Take 2 Caps by mouth 3 times a day.        Morphine Sulfate (Tab CR) MS CONTIN 15 MG         Omeprazole (CAPSULE DELAYED RELEASE) PRILOSEC 20 MG Take 20 mg by mouth every day.        Ondansetron HCl (Tab) ZOFRAN 4 MG Take 1 Tab by mouth every four hours as needed for Nausea/Vomiting (for nausea, vomiting).        Psyllium (Pack) METAMUCIL 58.12 % Take 1 Packet by mouth every day.        Rivaroxaban (Tab) XARELTO 15 MG Take 1 Tab by mouth 2 Times a Day. TAKE WITH FOOD        Tamsulosin HCl (Cap) FLOMAX 0.4 MG Take 1 Cap by mouth ONE-HALF HOUR AFTER BREAKFAST.        .                 Medicines prescribed today were sent to:      [facility] #[id number] - [place], [place] - [address] [place] [address]    Phone: [phone] Fax: [phone]    Open 24 Hours?: No      Medication refill instructions:       If your prescription bottle indicates you have medication refills left, it is not necessary to call your provider’s office. Please contact your pharmacy and they will refill your medication.    If your prescription bottle indicates you do not have any refills left, you may request refills at any time through one of the following ways: The online real trends system (except Urgent Care), by calling your provider’s office, or by asking your pharmacy to contact your provider’s office with a refill request. Medication refills are processed only during regular business hours and may not be available until the next business day. Your provider may request additional information or to have a follow-up visit with you prior to refilling your medication.   *Please Note: Medication refills are assigned a new Rx number when refilled electronically. Your pharmacy may indicate that no refills were authorized even though a new prescription for the same medication is available at the pharmacy. Please request the medicine by name with the pharmacy before contacting your provider for a refill.        Referral     A referral request has been sent to our patient care coordination department. Please allow 3-5 business days for us to process this request and contact you either by phone or mail. If you do not hear from us by the 5th business day, please call us at [phone].           real trends Access Code: [email or web address]  Expires: 4/1/2017 10:11 AM    real trends  A secure, online tool to manage your health information     Mindscore® is a secure, online tool that connects you to your personalized health information from the privacy of your home -- day or night - making it very easy for you to manage your  healthcare. Once the activation process is completed, you can even access your medical information using the Where's Up balaji, which is available for free in the Apple Balaji store or Google Play store.     Where's Up provides the following levels of access (as shown below):   My Chart Features   [facility] Doctor [facility]  Specialists [facility]  Urgent  Care Non-[facility]  Primary Care  Doctor   Email your healthcare team securely and privately 24/7 X X X    Manage appointments: schedule your next appointment; view details of past/upcoming appointments X      Request prescription refills. X      View recent personal medical records, including lab and immunizations X X X X   View health record, including health history, allergies, medications X X X X   Read reports about your outpatient visits, procedures, consult and ER notes X X X X   See your discharge summary, which is a recap of your hospital and/or ER visit that includes your diagnosis, lab results, and care plan. X X       How to register for Where's Up:  1. Go to  [email or web address].  2. Click on the Sign Up Now box, which takes you to the New Member Sign Up page. You will need to provide the following information:  a. Enter your Where's Up Access Code exactly as it appears at the top of this page. (You will not need to use this code after you’ve completed the sign-up process. If you do not sign up before the expiration date, you must request a new code.)   b. Enter your date of birth.   c. Enter your home email address.   d. Click Submit, and follow the next screen’s instructions.  3. Create a Where's Up ID. This will be your Where's Up login ID and cannot be changed, so think of one that is secure and easy to remember.  4. Create a Where's Up password. You can change your password at any time.  5. Enter your Password Reset Question and Answer. This can be used at a later time if you forget your password.   6. Enter your e-mail address. This allows you to receive e-mail  notifications when new information is available in [employer].  7. Click Sign Up. You can now view your health information.    For assistance activating your [employer] account, call [phone]

## 2017-03-15 NOTE — PROGRESS NOTES
Chief Complaint   Patient presents with   • Pain   • Advice Only     hospice       HISTORY OF PRESENT ILLNESS: Patient is a 66 y.o. male established patient who presents today to discuss the following, he is a patient of Dr. Isidro, this is his first visit with myself.    1. Anxiety about health  Patient was recently diagnosed with terminal cancer.  At the time of diagnosis he was expected to live 12-16 months and he is now on months 23.  Although he is pleased about this, he is battling with significant anxiety in regards to his diagnosis and preparing for end-of-life.  It is been recommended that he explore hospice options to give him the resources and tools that he needs to cope.  He is requesting a referral today.  He is currently using benzodiazepines as needed with only mild, temporary relief.    2. Intractable pain 3. Bone metastasis (CMS-HCC) 4. Pain from bone metastases (CMS-HCC) 5. 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  He recently had a pain pump implanted by Dr. Sparks.  This emits Dilaudid and allows for bolus dosing.  Patient also uses MS Contin 15 mg twice a day and has oral Dilaudid to use if the pain becomes unbearable.  He mentions that the pain has improved although he continues to rely on boluses and oral medications for pain relief.    Allergies:Review of patient's allergies indicates no known allergies.    Current Outpatient Prescriptions Ordered in [facility]   Medication Sig Dispense Refill   • morphine ER (MS CONTIN) 15 MG Tab CR tablet      • psyllium (METAMUCIL) 58.12 % Pack Take 1 Packet by mouth every day.     • ondansetron (ZOFRAN) 4 MG Tab tablet Take 1 Tab by mouth every four hours as needed for Nausea/Vomiting (for nausea, vomiting). 30 Tab 6   • rivaroxaban (XARELTO) 15 MG Tab tablet Take 1 Tab by mouth 2 Times a Day. TAKE WITH FOOD 42 Tab 0   • gabapentin (NEURONTIN) 100 MG Cap Take 2 Caps by mouth 3 times  "a day. 90 Cap    • tamsulosin (FLOMAX) 0.4 MG capsule Take 1 Cap by mouth ONE-HALF HOUR AFTER BREAKFAST. 30 Cap    • diazepam (VALIUM) 2 MG Tab Take 1 Tab by mouth every 8 hours as needed. 10 Tab 0   • alprazolam (XANAX) 1 MG Tab Take 1 mg by mouth every bedtime. Indications: Feeling Anxious     • omeprazole (PRILOSEC) 20 MG delayed-release capsule Take 20 mg by mouth every day.     • docusate sodium 100 MG Cap Take 100 mg by mouth every morning. 60 Cap      No current Epic-ordered facility-administered medications on file.       Past Medical History   Diagnosis Date   • Psychiatric problem      anxiety   • Indigestion    • Arthritis      osteoarthritis in all joints   • Snoring    • Carcinoma in situ of respiratory system      adenocarcinoma of lungs, 2015 dx   • Hiatus hernia syndrome    • Cancer (CMS-HCC) 2015     Lung cancer, chemo only, last dose Oct 27, 2016;  bone cancer   • Cancer, metastatic to bone (CMS-HCC)    • GERD (gastroesophageal reflux disease)    • Anxiety      on xanax   • Breath shortness      \"Needed oxygen in the past, because of my lung ca, haven't used it for 6 monthe\"   • Pain      chronic back pain   • Heart burn    • Chronic low back pain    • Hip pain 2017       Social History   Substance Use Topics   • Smoking status: Former Smoker -- 1.00 packs/day for 14 years     Quit date: 1983   • Smokeless tobacco: Never Used   • Alcohol Use: 0.0 oz/week     0 Standard drinks or equivalent per week      Comment: one a week       Family Status   Relation Status Death Age   • Mother     • Brother     • Father     • Son Alive    • Son Alive      Family History   Problem Relation Age of Onset   • Stroke Mother    • Cancer Mother      colon ca x2, thyroid ca, renal ca   • Cancer Brother      lung ca   • Cancer Father      prostate cancer       ROS: see above    Review of Systems   Constitutional: Negative for fever, chills, weight loss and " "malaise/fatigue.   HENT: Negative for ear pain, nosebleeds, congestion, sore throat and neck pain.    Eyes: Negative for blurred vision.   Respiratory: Negative for cough, sputum production, shortness of breath and wheezing.    Cardiovascular: Negative for chest pain, palpitations, orthopnea and leg swelling.   Gastrointestinal: Negative for heartburn, nausea, vomiting and abdominal pain.   Genitourinary: Negative for dysuria, urgency and frequency.   Musculoskeletal: Negative for myalgias, back pain and joint pain.   Skin: Negative for rash and itching.   Neurological: Negative for dizziness, tingling, tremors, sensory change, focal weakness and headaches.   Endo/Heme/Allergies: Does not bruise/bleed easily.   Psychiatric/Behavioral: Negative for depression, suicidal ideas and memory loss.  The patient is not nervous/anxious and does not have insomnia.        Exam:  Blood pressure 120/74, pulse 112, temperature 36.2 °C (97.1 °F), resp. rate 16, height 1.69 m (5' 6.54\"), weight 86.183 kg (190 lb), SpO2 95 %.  General:  Well nourished, well developed male in mild distress.  Appears chronically ill.  Head is grossly normal.  Neck: Thyroid is not enlarged.  Pulmonary: Normal effort.   Cardiovascular: Tachycardic rate.   Extremities: no clubbing, cyanosis, or edema.  Psych:  Mood and affect are normal.  Answering questions appropriately with good eye contact.      Please note that this dictation was created using voice recognition software. I have made every reasonable attempt to correct obvious errors, but I expect that there are errors of grammar and possibly content that I did not discover before finalizing the note.    Assessment/Plan:    1. Anxiety about health  morphine ER (MS CONTIN) 15 MG Tab CR tablet    REFERRAL TO HOSPICE   2. Intractable pain  morphine ER (MS CONTIN) 15 MG Tab CR tablet    REFERRAL TO HOSPICE   3. Bone metastasis (CMS-HCC)  morphine ER (MS CONTIN) 15 MG Tab CR tablet    REFERRAL TO HOSPICE "   4. Pain from bone metastases (CMS-HCC)  morphine ER (MS CONTIN) 15 MG Tab CR tablet    REFERRAL TO HOSPICE   5. 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  No changes to medications, follow-up with specialist as scheduled.  2.  Referral placed to hospice, encouraged him to discuss that his Friday appointment and explore the options and resources provided by hospice.  3.  Return to clinic as scheduled with PCP and when necessary.

## 2017-03-17 ENCOUNTER — OFFICE VISIT (OUTPATIENT)
Dept: HEMATOLOGY ONCOLOGY | Facility: MEDICAL CENTER | Age: 67
End: 2017-03-17
Payer: MEDICARE

## 2017-03-17 VITALS
OXYGEN SATURATION: 91 % | RESPIRATION RATE: 16 BRPM | SYSTOLIC BLOOD PRESSURE: 112 MMHG | WEIGHT: 190.26 LBS | DIASTOLIC BLOOD PRESSURE: 70 MMHG | HEIGHT: 66 IN | TEMPERATURE: 98.7 F | BODY MASS INDEX: 30.58 KG/M2 | HEART RATE: 96 BPM

## 2017-03-17 DIAGNOSIS — C34.2 MALIGNANT NEOPLASM OF MIDDLE LOBE OF RIGHT LUNG (HCC): ICD-10-CM

## 2017-03-17 DIAGNOSIS — C79.51 BONE METASTASIS: ICD-10-CM

## 2017-03-17 DIAGNOSIS — F32.A ANXIETY AND DEPRESSION: ICD-10-CM

## 2017-03-17 DIAGNOSIS — F41.9 ANXIETY AND DEPRESSION: ICD-10-CM

## 2017-03-17 PROCEDURE — 4040F PNEUMOC VAC/ADMIN/RCVD: CPT | Performed by: NURSE PRACTITIONER

## 2017-03-17 PROCEDURE — 3017F COLORECTAL CA SCREEN DOC REV: CPT | Performed by: NURSE PRACTITIONER

## 2017-03-17 PROCEDURE — 1036F TOBACCO NON-USER: CPT | Performed by: NURSE PRACTITIONER

## 2017-03-17 PROCEDURE — 1111F DSCHRG MED/CURRENT MED MERGE: CPT | Performed by: NURSE PRACTITIONER

## 2017-03-17 PROCEDURE — 1101F PT FALLS ASSESS-DOCD LE1/YR: CPT | Performed by: NURSE PRACTITIONER

## 2017-03-17 PROCEDURE — G8432 DEP SCR NOT DOC, RNG: HCPCS | Performed by: NURSE PRACTITIONER

## 2017-03-17 PROCEDURE — 99213 OFFICE O/P EST LOW 20 MIN: CPT | Performed by: NURSE PRACTITIONER

## 2017-03-17 PROCEDURE — G8419 CALC BMI OUT NRM PARAM NOF/U: HCPCS | Performed by: NURSE PRACTITIONER

## 2017-03-17 PROCEDURE — G8484 FLU IMMUNIZE NO ADMIN: HCPCS | Performed by: NURSE PRACTITIONER

## 2017-03-17 RX ORDER — VENLAFAXINE HYDROCHLORIDE 37.5 MG/1
37.5 CAPSULE, EXTENDED RELEASE ORAL DAILY
Qty: 30 CAP | Refills: 0 | Status: SHIPPED | OUTPATIENT
Start: 2017-03-17 | End: 2017-06-14

## 2017-03-17 ASSESSMENT — PAIN SCALES - GENERAL: PAINLEVEL: 6=MODERATE PAIN

## 2017-03-17 ASSESSMENT — ENCOUNTER SYMPTOMS
DEPRESSION: 1
TINGLING: 1
CHILLS: 0
VOMITING: 0
DIZZINESS: 0
DIARRHEA: 0
FEVER: 0
HEADACHES: 0
PALPITATIONS: 0
COUGH: 0
WEIGHT LOSS: 0
CONSTIPATION: 0
SHORTNESS OF BREATH: 1
NAUSEA: 0

## 2017-03-17 NOTE — PROGRESS NOTES
Subjective:      Gabriele Torres is a 66 y.o. male who presents for Follow-Up for metastatic lung cancer to bone.         HPI    Patient seen today in follow up for metastatic lung cancer to bone.  He is here for cycle 1, day 8 of Docetaxel.  Patient is accompanied by himself for today's visit.     Fever - None  Chills - None  Appetite - His appetite has been fair.   Fatigue - Present but stable.   N/V - None noted  Constipation/Diarrhea - BMs are normal per patient's routine. Last BM was yesterday.   Pain - Patient with persistent pain due to metastatic bone disease. He is followed by pain management and has recently undergone a pain pump. He stated he is using the pump but trying to use it sparingly. He met with his pain physician and stated that he did not need to use it sparingly as he will be cut off if he goes too much too fast. Patient also on long-acting morphine as well.  Neuropathy -patient denies any neuropathy.  Other -patient also denies any mouth sores. Patient stated he is feeling very depressed and anxiety lately. He met with his primary care provider a few days ago and a hospice referral was placed. Patient very tearful in the office today and questioning quality of life. Dr. Bennett and to discuss with patient as well today. Patient did state that he has tried an antidepressant in the past but stated that he got too scared of the side effects that he stopped it after one dose.    No Known Allergies  Current Outpatient Prescriptions on File Prior to Visit   Medication Sig Dispense Refill   • morphine ER (MS CONTIN) 15 MG Tab CR tablet      • rivaroxaban (XARELTO) 15 MG Tab tablet Take 1 Tab by mouth 2 Times a Day. TAKE WITH FOOD 42 Tab 0   • docusate sodium 100 MG Cap Take 100 mg by mouth every morning. 60 Cap    • gabapentin (NEURONTIN) 100 MG Cap Take 2 Caps by mouth 3 times a day. 90 Cap    • tamsulosin (FLOMAX) 0.4 MG capsule Take 1 Cap by mouth ONE-HALF HOUR AFTER BREAKFAST. 30 Cap    •  "alprazolam (XANAX) 1 MG Tab Take 1 mg by mouth every bedtime. Indications: Feeling Anxious     • omeprazole (PRILOSEC) 20 MG delayed-release capsule Take 20 mg by mouth every day.     • psyllium (METAMUCIL) 58.12 % Pack Take 1 Packet by mouth every day.     • ondansetron (ZOFRAN) 4 MG Tab tablet Take 1 Tab by mouth every four hours as needed for Nausea/Vomiting (for nausea, vomiting). 30 Tab 6   • diazepam (VALIUM) 2 MG Tab Take 1 Tab by mouth every 8 hours as needed. 10 Tab 0     No current facility-administered medications on file prior to visit.       Review of Systems   Constitutional: Positive for malaise/fatigue. Negative for fever, chills and weight loss.   Respiratory: Positive for shortness of breath (with exertion). Negative for cough.    Cardiovascular: Negative for chest pain and palpitations.   Gastrointestinal: Negative for nausea, vomiting, diarrhea and constipation.   Genitourinary: Negative for dysuria.   Musculoskeletal: Positive for joint pain (hip pain).   Neurological: Positive for tingling (bilateral 5th digits). Negative for dizziness and headaches.   Psychiatric/Behavioral: Positive for depression (constant).          Objective:     /70 mmHg  Pulse 96  Temp(Src) 37.1 °C (98.7 °F)  Resp 16  Ht 1.676 m (5' 6\")  Wt 86.3 kg (190 lb 4.1 oz)  BMI 30.72 kg/m2  SpO2 91%     Physical Exam   Constitutional: He is oriented to person, place, and time. He appears well-developed and well-nourished. No distress.   HENT:   Head: Normocephalic and atraumatic.   Mouth/Throat: Oropharynx is clear and moist. No oropharyngeal exudate.   Cardiovascular: Normal rate, regular rhythm, normal heart sounds and intact distal pulses.  Exam reveals no gallop and no friction rub.    No murmur heard.  Pulmonary/Chest: Effort normal and breath sounds normal. No respiratory distress. He has no wheezes.   Abdominal: Soft. Bowel sounds are normal. He exhibits no distension. There is no tenderness. "   Musculoskeletal: He exhibits edema (1+ pitting edema) and tenderness.   Ambulates with crutches    Neurological: He is alert and oriented to person, place, and time.   Skin: Skin is warm and dry. No rash noted. He is not diaphoretic. No erythema. No pallor.   Psychiatric: He has a normal mood and affect. His behavior is normal.   Vitals reviewed.              Assessment/Plan:       1. Malignant neoplasm of middle lobe of right lung (CMS-HCC)  venlafaxine XR (EFFEXOR XR) 37.5 MG CAPSULE SR 24 HR    REFERRAL TO BEHAVIORAL HEALTH   2. Bone metastasis (CMS-HCC)  venlafaxine XR (EFFEXOR XR) 37.5 MG CAPSULE SR 24 HR    REFERRAL TO BEHAVIORAL HEALTH   3. Anxiety and depression  venlafaxine XR (EFFEXOR XR) 37.5 MG CAPSULE SR 24 HR    REFERRAL TO BEHAVIORAL HEALTH     Plan  1. Patient has tolerated his first treatment of docetaxel. He denies any significant side effects today. He is to continue with docetaxel every 3 weeks. He is to follow-up in 2 weeks prior to his next cycle with labs.    2. Depression and anxiety - patient was significant depression and anxiety lately. He was quite tearful in the office today as he is considering the possibility of enrolling on hospice. Referral for hospice has been made by his primary care provider and patient is planning to speak to someone today about it. Dr. Bennett and to discuss options with the patient as well in detail today. Patient is feeling little overwhelmed and anxious regarding everything and did discuss poor quality of life at this time. He would like to continue at this time with treatment as planned to see how he is doing. We discussed in detail with the patient starting an antidepressant and patient has agreed to do that. At the request of Dr. Morfin I have placed him on Effexor at a very low dose 37.5 mg daily at this time. We've also discussed in detail with the patient and referral to behavioral health and patient has agreed to that as well. At this time we will  continue as planned with continuing on treatment unless patient chooses to proceed with hospice sooner.    3. Patient is to follow up in 2 weeks or sooner with labs before his next cycle of chemotherapy, or sooner if needed.

## 2017-03-17 NOTE — MR AVS SNAPSHOT
"        Gabriele Torres   3/17/2017 10:30 AM   Office Visit   MRN: [id number]    Department:  [facility]   Dept Phone:  [phone]    Description:  Male : 1950   Provider:  Sobia Leal A.PKATRIN.           Reason for Visit     Follow-Up tox check      Allergies as of 3/17/2017     No Known Allergies      You were diagnosed with     Malignant neoplasm of middle lobe of right lung (CMS-HCC)   [8015749]       Bone metastasis (CMS-HCC)   [826937]       Anxiety and depression   [526692]         Vital Signs     Blood Pressure Pulse Temperature Respirations Height Weight    112/70 mmHg 96 37.1 °C (98.7 °F) 16 1.676 m (5' 6\") 86.3 kg (190 lb 4.1 oz)    Body Mass Index Oxygen Saturation Smoking Status             30.72 kg/m2 91% Former Smoker         Basic Information     Date Of Birth Sex Race Ethnicity Preferred Language    1950 Male White Non- English      Your appointments     Mar 23, 2017  4:15 PM   Established Patient with [facility] EXAM 4   [facility]  (--)    [address] [facility] [address]   [phone]            Mar 30, 2017  1:30 PM   US EXTREMITY (60) with [address] US 1   [facility] IMAGING - ULTRASOUND - [address]    [address]  [facility] [address]   [phone]            Mar 31, 2017  1:00 PM   Non Provider 1 with ONC RN 1   [facility] (--)    [address], [facility] [address]  [facility] [id number] [phone]           You will be receiving a confirmation call a few days before your appointment from our automated call confirmation system.            Mar 31, 2017  1:40 PM   ONCOLOGY EST PATIENT 30 MIN with Shawanda Bennett M.D.   [facility] (--)    [address]  [facility] [address] [phone]            Mar 31, 2017  2:30 PM   Est Chemo 3 with RN 2   Infusion Services ([facility])    [address] [facility] [address]  [facility] [address]   [phone]            May 02, 2017  2:30 " PM   Follow Up with Laure DOMINGO M.D.   [facility] Radiation Therapy (--)    [address] [facility] [address]   [phone]              Problem List              ICD-10-CM Priority Class Noted - Resolved    GERD (gastroesophageal reflux disease) K21.9 Low  5/1/2015 - Present    Pulmonary nodule seen on imaging study R91.1   5/1/2015 - Present    OA (osteoarthritis) M19.90   5/1/2015 - Present    Anxiety F41.9   5/1/2015 - Present    Shortness of breath R06.02   5/5/2015 - Present    Hypercalcemia E83.52 Medium  5/6/2015 - Present    Lung cancer (CMS-HCC) C34.90   5/8/2015 - Present    Dependence on supplemental oxygen Z99.81   10/7/2015 - Present    Laryngitis, chronic J37.0   12/23/2015 - Present    Chronic low back pain with left-sided sciatica M54.42, G89.29   10/18/2016 - Present    Weakness of left lower extremity M62.81   11/15/2016 - Present    Lumbar spondylosis M47.816   12/8/2016 - Present    Research study patient Z00.6   1/9/2017 - Present    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   1/26/2017 - Present    Pain from bone metastases (CMS-HCC) G89.3, C79.51   2/7/2017 - Present    Intractable pain R52 High  2/8/2017 - Present    Urinary retention R33.9 Medium  2/8/2017 - Present    Hematuria R31.9 Medium  2/8/2017 - Present    Anemia D64.9 Medium  2/8/2017 - Present    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, Z96.649 High  2/8/2017 - Present    Bone metastasis (CMS-HCC) C79.51   2/17/2017 - Present    Pulmonary embolism without acute cor pulmonale (CMS-HCC) I26.99   3/3/2017 - Present      Health Maintenance        Date Due Completion Dates    IMM DTaP/Tdap/Td Vaccine (1 - Tdap) 8/10/1970 ---    IMM ZOSTER VACCINE 8/10/2011 ---    IMM PNEUMOCOCCAL 65+ (ADULT) LOW/MEDIUM RISK SERIES (1 of 2 - PCV13) 8/10/2016 10/7/2013    IMM INFLUENZA (1) 9/1/2016 10/7/2015            Current Immunizations     Influenza Vaccine Quad Inj (Preserved) 10/7/2015    Pneumococcal polysaccharide vaccine  (PPSV-23) 10/7/2013      Below and/or attached are the medications your provider expects you to take. Review all of your home medications and newly ordered medications with your provider and/or pharmacist. Follow medication instructions as directed by your provider and/or pharmacist. Please keep your medication list with you and share with your provider. Update the information when medications are discontinued, doses are changed, or new medications (including over-the-counter products) are added; and carry medication information at all times in the event of emergency situations     Allergies:  No Known Allergies          Medications  Valid as of: March 17, 2017 - 11:41 AM    Generic Name Brand Name Tablet Size Instructions for use    ALPRAZolam (Tab) XANAX 1 MG Take 1 mg by mouth every bedtime. Indications: Feeling Anxious        DiazePAM (Tab) VALIUM 2 MG Take 1 Tab by mouth every 8 hours as needed.        Docusate Sodium (Cap)  MG Take 100 mg by mouth every morning.        Gabapentin (Cap) NEURONTIN 100 MG Take 2 Caps by mouth 3 times a day.        Morphine Sulfate (Tab CR) MS CONTIN 15 MG         Omeprazole (CAPSULE DELAYED RELEASE) PRILOSEC 20 MG Take 20 mg by mouth every day.        Ondansetron HCl (Tab) ZOFRAN 4 MG Take 1 Tab by mouth every four hours as needed for Nausea/Vomiting (for nausea, vomiting).        Psyllium (Pack) METAMUCIL 58.12 % Take 1 Packet by mouth every day.        Rivaroxaban (Tab) XARELTO 15 MG Take 1 Tab by mouth 2 Times a Day. TAKE WITH FOOD        Tamsulosin HCl (Cap) FLOMAX 0.4 MG Take 1 Cap by mouth ONE-HALF HOUR AFTER BREAKFAST.        Venlafaxine HCl (CAPSULE SR 24 HR) EFFEXOR XR 37.5 MG Take 1 Cap by mouth every day.        .                 Medicines prescribed today were sent to:     [facility]/PHARMACY #[id number] - [place], [place] - [address] [facility] AT [address] [place] [address]    Phone: [phone] Fax: [phone]    Open 24 Hours?: No         Medication refill instructions:       If your prescription bottle indicates you have medication refills left, it is not necessary to call your provider’s office. Please contact your pharmacy and they will refill your medication.    If your prescription bottle indicates you do not have any refills left, you may request refills at any time through one of the following ways: The online SkyBitz system (except Urgent Care), by calling your provider’s office, or by asking your pharmacy to contact your provider’s office with a refill request. Medication refills are processed only during regular business hours and may not be available until the next business day. Your provider may request additional information or to have a follow-up visit with you prior to refilling your medication.   *Please Note: Medication refills are assigned a new Rx number when refilled electronically. Your pharmacy may indicate that no refills were authorized even though a new prescription for the same medication is available at the pharmacy. Please request the medicine by name with the pharmacy before contacting your provider for a refill.           SkyBitz Access Code: [email or web address]  Expires: 4/1/2017 10:11 AM    SkyBitz  A secure, online tool to manage your health information     [employer]’s SkyBitz® is a secure, online tool that connects you to your personalized health information from the privacy of your home -- day or night - making it very easy for you to manage your healthcare. Once the activation process is completed, you can even access your medical information using the SkyBitz balaji, which is available for free in the Apple Balaji store or Google Play store.     SkyBitz provides the following levels of access (as shown below):   My Chart Features   [facility] Doctor [facility]  Specialists [facility]  Urgent  Care Non-[facility]  Primary Care  Doctor   Email your healthcare team securely and privately 24/7 X X X    Manage appointments:  schedule your next appointment; view details of past/upcoming appointments X      Request prescription refills. X      View recent personal medical records, including lab and immunizations X X X X   View health record, including health history, allergies, medications X X X X   Read reports about your outpatient visits, procedures, consult and ER notes X X X X   See your discharge summary, which is a recap of your hospital and/or ER visit that includes your diagnosis, lab results, and care plan. X X       How to register for [email or web address]:  1. Go to  [email or web address].  2. Click on the Sign Up Now box, which takes you to the New Member Sign Up page. You will need to provide the following information:  a. Enter your [email or web address] Access Code exactly as it appears at the top of this page. (You will not need to use this code after you’ve completed the sign-up process. If you do not sign up before the expiration date, you must request a new code.)   b. Enter your date of birth.   c. Enter your home email address.   d. Click Submit, and follow the next screen’s instructions.  3. Create a [email or web address] ID. This will be your [email or web address] login ID and cannot be changed, so think of one that is secure and easy to remember.  4. Create a [email or web address] password. You can change your password at any time.  5. Enter your Password Reset Question and Answer. This can be used at a later time if you forget your password.   6. Enter your e-mail address. This allows you to receive e-mail notifications when new information is available in [email or web address].  7. Click Sign Up. You can now view your health information.    For assistance activating your [email or web address] account, call [phone]

## 2017-03-23 ENCOUNTER — ANTICOAGULATION VISIT (OUTPATIENT)
Dept: VASCULAR LAB | Facility: MEDICAL CENTER | Age: 67
End: 2017-03-23
Attending: INTERNAL MEDICINE
Payer: MEDICARE

## 2017-03-23 DIAGNOSIS — I26.99 OTHER PULMONARY EMBOLISM WITHOUT ACUTE COR PULMONALE (HCC): ICD-10-CM

## 2017-03-23 LAB — INR PPP: 1.7 (ref 2–3.5)

## 2017-03-23 PROCEDURE — 99212 OFFICE O/P EST SF 10 MIN: CPT

## 2017-03-23 PROCEDURE — 85610 PROTHROMBIN TIME: CPT

## 2017-03-23 NOTE — PROGRESS NOTES
Target end date:9/1/17     Indication: PE     Drug: Xarelto     CHADsVASC = N/A     INR: 1.7    Health Status Since Last Assessment   Patient denies any new relevant medical problems, ED visits or hospitalizations   Patient denies any embolic events (stroke/tia/systemic embolism)    Adherence with DOAC Therapy   Pt has NOT missed any doses in the average week    Bleeding Risk Assessment     Denies Epistaxis   Pt denies any excessive or unusual bleeding/hematomas.  Pt denies any GI bleeds or hematemesis.  Pt denies any concerning daily headache or sub dural hematoma symptoms.     Pt denies any hematuria or abnormal vaginal bleeding.   Latest Hemoglobin 10.7   ETOH overuse Denies     Creatinine Clearance/Renal Function     Latest ClCr ~110 mL/min     Drug Interactions   ASA/other antiplatelets Denies   NSAID Denies   Other drug interactions None    Examination   Blood Pressure 107/62    Symptomatic hypotension None   Significant gait impairment/imbalance/high risk for falls? On crutches, but seems mostly stable.      Final Assessment and Recommendations:   Patient appears stable from the anticoagulation staindpoint.     Benefits of continued DOAC therapy outweigh risks for this patient   Recommend pt continue with current DOAC therapy.      Other Actions:   D/C Xarelto 15 mg BID and begin 20 mg once daily.    Follow up: 3 months      Lawrence Harding, PHARMD

## 2017-03-23 NOTE — MR AVS SNAPSHOT
Gabriele Jaimescinthya   3/23/2017 4:15 PM   Anticoagulation Visit   MRN: [id number]    Department:  Vascular Medicine   Dept Phone:  [phone]    Description:  Male : 1950   Provider:  [facility] EXAM 4           Allergies as of 3/23/2017     No Known Allergies      You were diagnosed with     Other pulmonary embolism without acute cor pulmonale (CMS-HCC)   [5722456]         Vital Signs     Smoking Status                   Former Smoker           Basic Information     Date Of Birth Sex Race Ethnicity Preferred Language    1950 Male White Non- English      Your appointments     Mar 23, 2017  4:15 PM   Established Patient with [facility] EXAM 4   [facility] for [facility] V[facility] Health  (--)    [address] [facility]  [place] [address]   [phone]            Mar 30, 2017  1:30 PM   US EXTREMITY (60) with [address] US 1   [facility] IMAGING - ULTRASOUND - [address]    [address]  [place] [address]   [phone]            Mar 31, 2017  1:00 PM   Non Provider 1 with ONC RN 1   [facility] (--)    [address], [address]  [place] [address] [phone]           You will be receiving a confirmation call a few days before your appointment from our automated call confirmation system.            Mar 31, 2017  1:40 PM   ONCOLOGY EST PATIENT 30 MIN with Shawanda Bennett M.D.   [facility] (--)    [address], [address]  [place] [address] [phone]            Mar 31, 2017  2:30 PM   Est Chemo 3 with RN 2   Infusion Services ([facility])    [address] [facility] [address]  [place] [address]   [phone]            May 02, 2017  2:30 PM   Follow Up with Laure DOMINGO M.D.   [facility] Radiation Therapy (--)    [address] [facility]  [place] [address]   [phone]            2017  9:00 AM   Established Patient with [facility] EXAM 4   [facility]  (--)    [address] [facility]  [place] [address]      [phone]              Problem List              ICD-10-CM Priority Class Noted - Resolved    GERD (gastroesophageal reflux disease) K21.9 Low  5/1/2015 - Present    Pulmonary nodule seen on imaging study R91.1   5/1/2015 - Present    OA (osteoarthritis) M19.90   5/1/2015 - Present    Anxiety F41.9   5/1/2015 - Present    Shortness of breath R06.02   5/5/2015 - Present    Hypercalcemia E83.52 Medium  5/6/2015 - Present    Lung cancer (CMS-HCC) C34.90   5/8/2015 - Present    Dependence on supplemental oxygen Z99.81   10/7/2015 - Present    Laryngitis, chronic J37.0   12/23/2015 - Present    Chronic low back pain with left-sided sciatica M54.42, G89.29   10/18/2016 - Present    Weakness of left lower extremity M62.81   11/15/2016 - Present    Lumbar spondylosis M47.816   12/8/2016 - Present    Research study patient Z00.6   1/9/2017 - Present    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   1/26/2017 - Present    Pain from bone metastases (CMS-[facility]) G89.3, C79.51   2/7/2017 - Present    Intractable pain R52 High  2/8/2017 - Present    Urinary retention R33.9 Medium  2/8/2017 - Present    Hematuria R31.9 Medium  2/8/2017 - Present    Anemia D64.9 Medium  2/8/2017 - Present    Loosening of prosthetic hip (CMS-[facility]) T84.038A, Z96.649 High  2/8/2017 - Present    Bone metastasis (CMS-HCC) C79.51   2/17/2017 - Present    Pulmonary embolism without acute cor pulmonale (CMS-[facility]) I26.99   3/3/2017 - Present      Health Maintenance        Date Due Completion Dates    IMM DTaP/Tdap/Td Vaccine (1 - Tdap) 8/10/1970 ---    IMM ZOSTER VACCINE 8/10/2011 ---    IMM PNEUMOCOCCAL 65+ (ADULT) LOW/MEDIUM RISK SERIES (1 of 2 - PCV13) 8/10/2016 10/7/2013    IMM INFLUENZA (1) 9/1/2016 10/7/2015            Results     POCT Protime      Component    INR    1.7                        Current Immunizations     Influenza Vaccine Quad Inj (Preserved) 10/7/2015    Pneumococcal polysaccharide vaccine (PPSV-23) 10/7/2013      Below and/or  attached are the medications your provider expects you to take. Review all of your home medications and newly ordered medications with your provider and/or pharmacist. Follow medication instructions as directed by your provider and/or pharmacist. Please keep your medication list with you and share with your provider. Update the information when medications are discontinued, doses are changed, or new medications (including over-the-counter products) are added; and carry medication information at all times in the event of emergency situations     Allergies:  No Known Allergies          Medications  Valid as of: March 23, 2017 -  3:59 PM    Generic Name Brand Name Tablet Size Instructions for use    ALPRAZolam (Tab) XANAX 1 MG Take 1 mg by mouth every bedtime. Indications: Feeling Anxious        DiazePAM (Tab) VALIUM 2 MG Take 1 Tab by mouth every 8 hours as needed.        Docusate Sodium (Cap)  MG Take 100 mg by mouth every morning.        Gabapentin (Cap) NEURONTIN 100 MG Take 2 Caps by mouth 3 times a day.        Morphine Sulfate (Tab CR) MS CONTIN 15 MG         Omeprazole (CAPSULE DELAYED RELEASE) PRILOSEC 20 MG Take 20 mg by mouth every day.        Ondansetron HCl (Tab) ZOFRAN 4 MG Take 1 Tab by mouth every four hours as needed for Nausea/Vomiting (for nausea, vomiting).        Psyllium (Pack) METAMUCIL 58.12 % Take 1 Packet by mouth every day.        Rivaroxaban (Tab) XARELTO 20 MG Take 1 Tab by mouth with dinner.        Tamsulosin HCl (Cap) FLOMAX 0.4 MG Take 1 Cap by mouth ONE-HALF HOUR AFTER BREAKFAST.        Venlafaxine HCl (CAPSULE SR 24 HR) EFFEXOR XR 37.5 MG Take 1 Cap by mouth every day.        .                 Medicines prescribed today were sent to:     [facility]/PHARMACY #[id number] - [place], [place] - [address] [address]    Phone: [phone] Fax: [phone]    Open 24 Hours?: No      Medication refill instructions:       If your prescription  bottle indicates you have medication refills left, it is not necessary to call your provider’s office. Please contact your pharmacy and they will refill your medication.    If your prescription bottle indicates you do not have any refills left, you may request refills at any time through one of the following ways: The online Accellion system (except Urgent Care), by calling your provider’s office, or by asking your pharmacy to contact your provider’s office with a refill request. Medication refills are processed only during regular business hours and may not be available until the next business day. Your provider may request additional information or to have a follow-up visit with you prior to refilling your medication.   *Please Note: Medication refills are assigned a new Rx number when refilled electronically. Your pharmacy may indicate that no refills were authorized even though a new prescription for the same medication is available at the pharmacy. Please request the medicine by name with the pharmacy before contacting your provider for a refill.        Warfarin Dosing Calendar   March 2017 Details    Sun Mon Tue Wed Thu Fri Sat        1               2               3               4                 5               6               7               8               9               10               11                 12               13               14               15               16               17               18                 19               20               21               22               23   1.7      See details      24               25                 26               27               28               29               30               31                 Date Details   03/23 This INR check   INR: 1.7      Date of next INR: No date specified              Accellion Access Code: [email or web address]  Expires: 4/1/2017 10:11 AM    Accellion  A secure, online tool to manage your health information     [facility]’s MyChart® is a secure, online tool that connects you to your personalized health information from the privacy of your home -- day or night - making it very easy for you to manage your healthcare. Once the activation process is completed, you can even access your medical information using the [employer] balaji, which is available for free in the Apple Balaji store or Google Play store.     [employer] provides the following levels of access (as shown below):   My Chart Features   [facility] Doctor [facility]  Specialists [facility]  Urgent  Care Non-[facility]  Primary Care  Doctor   Email your healthcare team securely and privately 24/7 X X X    Manage appointments: schedule your next appointment; view details of past/upcoming appointments X      Request prescription refills. X      View recent personal medical records, including lab and immunizations X X X X   View health record, including health history, allergies, medications X X X X   Read reports about your outpatient visits, procedures, consult and ER notes X X X X   See your discharge summary, which is a recap of your hospital and/or ER visit that includes your diagnosis, lab results, and care plan. X X       How to register for [employer]:  1. Go to  [email or web address].  2. Click on the Sign Up Now box, which takes you to the New Member Sign Up page. You will need to provide the following information:  a. Enter your [employer] Access Code exactly as it appears at the top of this page. (You will not need to use this code after you’ve completed the sign-up process. If you do not sign up before the expiration date, you must request a new code.)   b. Enter your date of birth.   c. Enter your home email address.   d. Click Submit, and follow the next screen’s instructions.  3. Create a [employer] ID. This will be your [employer] login ID and cannot be changed, so think of one that is secure and easy to remember.  4. Create a [employer] password. You can change your password at  any time.  5. Enter your Password Reset Question and Answer. This can be used at a later time if you forget your password.   6. Enter your e-mail address. This allows you to receive e-mail notifications when new information is available in [employer].  7. Click Sign Up. You can now view your health information.    For assistance activating your [employer] account, call [phone]

## 2017-03-24 LAB — INR BLD: 1.7 (ref 0.9–1.2)

## 2017-03-27 ENCOUNTER — TELEPHONE (OUTPATIENT)
Dept: HEMATOLOGY ONCOLOGY | Facility: MEDICAL CENTER | Age: 67
End: 2017-03-27

## 2017-03-27 NOTE — TELEPHONE ENCOUNTER
He would like to know what he needs to be taking for medications. He express he feels he is taking to many. He is not sure what he should be taking and also if they can be all taken together. Hees very concerned he may hurt himself with medication. Please review and advise thank you

## 2017-03-27 NOTE — TELEPHONE ENCOUNTER
Attempted to return patient's phone regarding questions about medications he is taking.  Left VM for both Gabriele and his son's phone number. Awaiting call back.

## 2017-03-28 ENCOUNTER — APPOINTMENT (OUTPATIENT)
Dept: RADIOLOGY | Facility: MEDICAL CENTER | Age: 67
End: 2017-03-28
Attending: EMERGENCY MEDICINE
Payer: MEDICARE

## 2017-03-28 ENCOUNTER — RESOLUTE PROFESSIONAL BILLING HOSPITAL PROF FEE (OUTPATIENT)
Dept: HOSPITALIST | Facility: MEDICAL CENTER | Age: 67
End: 2017-03-28
Payer: MEDICARE

## 2017-03-28 ENCOUNTER — APPOINTMENT (OUTPATIENT)
Dept: RADIOLOGY | Facility: MEDICAL CENTER | Age: 67
End: 2017-03-28
Attending: SURGERY
Payer: MEDICARE

## 2017-03-28 ENCOUNTER — HOSPITAL ENCOUNTER (OUTPATIENT)
Facility: MEDICAL CENTER | Age: 67
End: 2017-04-01
Attending: EMERGENCY MEDICINE | Admitting: INTERNAL MEDICINE
Payer: MEDICARE

## 2017-03-28 DIAGNOSIS — S70.02XA CONTUSION OF LEFT HIP, INITIAL ENCOUNTER: ICD-10-CM

## 2017-03-28 PROBLEM — C79.51 LUNG CANCER METASTATIC TO BONE (HCC): Status: ACTIVE | Noted: 2017-03-28

## 2017-03-28 PROBLEM — C34.90 LUNG CANCER METASTATIC TO BONE (HCC): Status: ACTIVE | Noted: 2017-03-28

## 2017-03-28 PROBLEM — M25.552 LEFT HIP PAIN: Status: ACTIVE | Noted: 2017-03-28
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
## 2017-03-28 LAB
ALBUMIN SERPL BCP-MCNC: 3.8 G/DL (ref 3.2–4.9)
ALBUMIN/GLOB SERPL: 1.1 G/DL
ALP SERPL-CCNC: 127 U/L (ref 30–99)
ALT SERPL-CCNC: 16 U/L (ref 2–50)
ANION GAP SERPL CALC-SCNC: 8 MMOL/L (ref 0–11.9)
APPEARANCE UR: CLEAR
APPEARANCE UR: NORMAL
AST SERPL-CCNC: 13 U/L (ref 12–45)
BASOPHILS # BLD AUTO: 0.5 % (ref 0–1.8)
BASOPHILS # BLD: 0.04 K/UL (ref 0–0.12)
BILIRUB SERPL-MCNC: 0.3 MG/DL (ref 0.1–1.5)
BUN SERPL-MCNC: 13 MG/DL (ref 8–22)
CALCIUM SERPL-MCNC: 9.2 MG/DL (ref 8.5–10.5)
CHLORIDE SERPL-SCNC: 101 MMOL/L (ref 96–112)
CO2 SERPL-SCNC: 24 MMOL/L (ref 20–33)
COLOR UR AUTO: NORMAL
COLOR UR AUTO: YELLOW
CREAT SERPL-MCNC: 0.72 MG/DL (ref 0.5–1.4)
EOSINOPHIL # BLD AUTO: 0 K/UL (ref 0–0.51)
EOSINOPHIL NFR BLD: 0 % (ref 0–6.9)
ERYTHROCYTE [DISTWIDTH] IN BLOOD BY AUTOMATED COUNT: 52 FL (ref 35.9–50)
GFR SERPL CREATININE-BSD FRML MDRD: >60 ML/MIN/1.73 M 2
GLOBULIN SER CALC-MCNC: 3.4 G/DL (ref 1.9–3.5)
GLUCOSE SERPL-MCNC: 151 MG/DL (ref 65–99)
GLUCOSE UR QL STRIP.AUTO: NEGATIVE MG/DL
GLUCOSE UR STRIP.AUTO-MCNC: NORMAL MG/DL
HCT VFR BLD AUTO: 36.9 % (ref 42–52)
HGB BLD-MCNC: 11.8 G/DL (ref 14–18)
IMM GRANULOCYTES # BLD AUTO: 0.1 K/UL (ref 0–0.11)
IMM GRANULOCYTES NFR BLD AUTO: 1.3 % (ref 0–0.9)
INR PPP: 1.35 (ref 0.87–1.13)
KETONES UR QL STRIP.AUTO: NEGATIVE MG/DL
KETONES UR STRIP.AUTO-MCNC: NORMAL MG/DL
LEUKOCYTE ESTERASE UR QL STRIP.AUTO: NEGATIVE
LEUKOCYTE ESTERASE UR QL STRIP.AUTO: NORMAL
LYMPHOCYTES # BLD AUTO: 0.38 K/UL (ref 1–4.8)
LYMPHOCYTES NFR BLD: 5.1 % (ref 22–41)
MAGNESIUM SERPL-MCNC: 2.2 MG/DL (ref 1.5–2.5)
MCH RBC QN AUTO: 31.1 PG (ref 27–33)
MCHC RBC AUTO-ENTMCNC: 32 G/DL (ref 33.7–35.3)
MCV RBC AUTO: 97.4 FL (ref 81.4–97.8)
MONOCYTES # BLD AUTO: 0.61 K/UL (ref 0–0.85)
MONOCYTES NFR BLD AUTO: 8.2 % (ref 0–13.4)
NEUTROPHILS # BLD AUTO: 6.33 K/UL (ref 1.82–7.42)
NEUTROPHILS NFR BLD: 84.9 % (ref 44–72)
NITRITE UR QL STRIP.AUTO: NEGATIVE
NITRITE UR QL STRIP.AUTO: NORMAL
NRBC # BLD AUTO: 0.02 K/UL
NRBC BLD AUTO-RTO: 0.3 /100 WBC
PH UR STRIP.AUTO: 7 [PH]
PH UR STRIP.AUTO: NORMAL [PH] (ref 5–8)
PLATELET # BLD AUTO: 378 K/UL (ref 164–446)
PMV BLD AUTO: 9.6 FL (ref 9–12.9)
POTASSIUM SERPL-SCNC: 4 MMOL/L (ref 3.6–5.5)
PROT SERPL-MCNC: 7.2 G/DL (ref 6–8.2)
PROT UR QL STRIP: NEGATIVE MG/DL
PROT UR QL STRIP: NORMAL MG/DL
PROTHROMBIN TIME: 17.1 SEC (ref 12–14.6)
RBC # BLD AUTO: 3.79 M/UL (ref 4.7–6.1)
RBC UR QL AUTO: NEGATIVE
RBC UR QL AUTO: NORMAL
SODIUM SERPL-SCNC: 133 MMOL/L (ref 135–145)
SP GR UR STRIP.AUTO: NORMAL
SP GR UR: 1.01
WBC # BLD AUTO: 7.5 K/UL (ref 4.8–10.8)

## 2017-03-28 PROCEDURE — 81002 URINALYSIS NONAUTO W/O SCOPE: CPT | Performed by: EMERGENCY MEDICINE

## 2017-03-28 PROCEDURE — 81002 URINALYSIS NONAUTO W/O SCOPE: CPT

## 2017-03-28 PROCEDURE — 96375 TX/PRO/DX INJ NEW DRUG ADDON: CPT

## 2017-03-28 PROCEDURE — 700102 HCHG RX REV CODE 250 W/ 637 OVERRIDE(OP): Performed by: INTERNAL MEDICINE

## 2017-03-28 PROCEDURE — 304562 HCHG STAT O2 MASK/CANNULA

## 2017-03-28 PROCEDURE — 96374 THER/PROPH/DIAG INJ IV PUSH: CPT

## 2017-03-28 PROCEDURE — 700111 HCHG RX REV CODE 636 W/ 250 OVERRIDE (IP): Performed by: EMERGENCY MEDICINE

## 2017-03-28 PROCEDURE — 99285 EMERGENCY DEPT VISIT HI MDM: CPT

## 2017-03-28 PROCEDURE — 700111 HCHG RX REV CODE 636 W/ 250 OVERRIDE (IP): Performed by: INTERNAL MEDICINE

## 2017-03-28 PROCEDURE — 96376 TX/PRO/DX INJ SAME DRUG ADON: CPT

## 2017-03-28 PROCEDURE — 85610 PROTHROMBIN TIME: CPT

## 2017-03-28 PROCEDURE — 85025 COMPLETE CBC W/AUTO DIFF WBC: CPT

## 2017-03-28 PROCEDURE — 94760 N-INVAS EAR/PLS OXIMETRY 1: CPT

## 2017-03-28 PROCEDURE — A9270 NON-COVERED ITEM OR SERVICE: HCPCS | Performed by: EMERGENCY MEDICINE

## 2017-03-28 PROCEDURE — 80053 COMPREHEN METABOLIC PANEL: CPT

## 2017-03-28 PROCEDURE — 99220 PR INITIAL OBSERVATION CARE,LEVL III: CPT | Performed by: INTERNAL MEDICINE

## 2017-03-28 PROCEDURE — 72170 X-RAY EXAM OF PELVIS: CPT

## 2017-03-28 PROCEDURE — G0378 HOSPITAL OBSERVATION PER HR: HCPCS

## 2017-03-28 PROCEDURE — 36415 COLL VENOUS BLD VENIPUNCTURE: CPT

## 2017-03-28 PROCEDURE — 71010 DX-CHEST-PORTABLE (1 VIEW): CPT

## 2017-03-28 PROCEDURE — 83735 ASSAY OF MAGNESIUM: CPT

## 2017-03-28 PROCEDURE — A9270 NON-COVERED ITEM OR SERVICE: HCPCS | Performed by: INTERNAL MEDICINE

## 2017-03-28 PROCEDURE — 73700 CT LOWER EXTREMITY W/O DYE: CPT | Mod: LT

## 2017-03-28 PROCEDURE — 700102 HCHG RX REV CODE 250 W/ 637 OVERRIDE(OP): Performed by: EMERGENCY MEDICINE

## 2017-03-28 RX ORDER — VENLAFAXINE HYDROCHLORIDE 37.5 MG/1
37.5 CAPSULE, EXTENDED RELEASE ORAL DAILY
Status: DISCONTINUED | OUTPATIENT
Start: 2017-03-29 | End: 2017-04-01 | Stop reason: HOSPADM

## 2017-03-28 RX ORDER — AMOXICILLIN 250 MG
2 CAPSULE ORAL 2 TIMES DAILY
Status: DISCONTINUED | OUTPATIENT
Start: 2017-03-28 | End: 2017-04-01 | Stop reason: HOSPADM

## 2017-03-28 RX ORDER — GABAPENTIN 100 MG/1
200 CAPSULE ORAL 3 TIMES DAILY
Status: DISCONTINUED | OUTPATIENT
Start: 2017-03-28 | End: 2017-04-01 | Stop reason: HOSPADM

## 2017-03-28 RX ORDER — BISACODYL 10 MG
10 SUPPOSITORY, RECTAL RECTAL
Status: DISCONTINUED | OUTPATIENT
Start: 2017-03-28 | End: 2017-04-01 | Stop reason: HOSPADM

## 2017-03-28 RX ORDER — DOCUSATE SODIUM 100 MG/1
100 CAPSULE, LIQUID FILLED ORAL EVERY MORNING
Status: DISCONTINUED | OUTPATIENT
Start: 2017-03-29 | End: 2017-03-28

## 2017-03-28 RX ORDER — LORAZEPAM 2 MG/ML
.5-1 INJECTION INTRAMUSCULAR EVERY 4 HOURS PRN
Status: DISCONTINUED | OUTPATIENT
Start: 2017-03-28 | End: 2017-04-01 | Stop reason: HOSPADM

## 2017-03-28 RX ORDER — LORAZEPAM 2 MG/ML
1 INJECTION INTRAMUSCULAR ONCE
Status: COMPLETED | OUTPATIENT
Start: 2017-03-28 | End: 2017-03-28

## 2017-03-28 RX ORDER — OMEPRAZOLE 20 MG/1
20 CAPSULE, DELAYED RELEASE ORAL DAILY
Status: DISCONTINUED | OUTPATIENT
Start: 2017-03-29 | End: 2017-04-01 | Stop reason: HOSPADM

## 2017-03-28 RX ORDER — TAMSULOSIN HYDROCHLORIDE 0.4 MG/1
0.4 CAPSULE ORAL
Status: DISCONTINUED | OUTPATIENT
Start: 2017-03-29 | End: 2017-04-01 | Stop reason: HOSPADM

## 2017-03-28 RX ORDER — ACETAMINOPHEN 325 MG/1
650 TABLET ORAL EVERY 6 HOURS PRN
Status: DISCONTINUED | OUTPATIENT
Start: 2017-03-28 | End: 2017-04-01 | Stop reason: HOSPADM

## 2017-03-28 RX ORDER — METOCLOPRAMIDE HYDROCHLORIDE 5 MG/ML
10 INJECTION INTRAMUSCULAR; INTRAVENOUS EVERY 6 HOURS
Status: DISCONTINUED | OUTPATIENT
Start: 2017-03-28 | End: 2017-04-01 | Stop reason: HOSPADM

## 2017-03-28 RX ORDER — ALPRAZOLAM 1 MG/1
1 TABLET ORAL 3 TIMES DAILY PRN
Status: DISCONTINUED | OUTPATIENT
Start: 2017-03-28 | End: 2017-04-01 | Stop reason: HOSPADM

## 2017-03-28 RX ORDER — POLYETHYLENE GLYCOL 3350 17 G/17G
1 POWDER, FOR SOLUTION ORAL
Status: DISCONTINUED | OUTPATIENT
Start: 2017-03-28 | End: 2017-04-01 | Stop reason: HOSPADM

## 2017-03-28 RX ORDER — DEXTROSE MONOHYDRATE 25 G/50ML
25 INJECTION, SOLUTION INTRAVENOUS
Status: DISCONTINUED | OUTPATIENT
Start: 2017-03-28 | End: 2017-04-01 | Stop reason: HOSPADM

## 2017-03-28 RX ORDER — PROMETHAZINE HYDROCHLORIDE 25 MG/1
25 TABLET ORAL EVERY 4 HOURS PRN
Status: DISCONTINUED | OUTPATIENT
Start: 2017-03-28 | End: 2017-04-01 | Stop reason: HOSPADM

## 2017-03-28 RX ORDER — MORPHINE SULFATE 15 MG/1
15 TABLET ORAL EVERY 4 HOURS PRN
Status: DISCONTINUED | OUTPATIENT
Start: 2017-03-28 | End: 2017-03-28

## 2017-03-28 RX ORDER — ONDANSETRON 2 MG/ML
4 INJECTION INTRAMUSCULAR; INTRAVENOUS ONCE
Status: COMPLETED | OUTPATIENT
Start: 2017-03-28 | End: 2017-03-28

## 2017-03-28 RX ORDER — MORPHINE SULFATE 15 MG/1
15 TABLET ORAL EVERY 4 HOURS PRN
Status: ON HOLD | COMMUNITY
End: 2017-06-26

## 2017-03-28 RX ORDER — ONDANSETRON 4 MG/1
4 TABLET, ORALLY DISINTEGRATING ORAL EVERY 4 HOURS PRN
Status: DISCONTINUED | OUTPATIENT
Start: 2017-03-28 | End: 2017-04-01 | Stop reason: HOSPADM

## 2017-03-28 RX ORDER — ONDANSETRON 2 MG/ML
4 INJECTION INTRAMUSCULAR; INTRAVENOUS EVERY 4 HOURS PRN
Status: DISCONTINUED | OUTPATIENT
Start: 2017-03-28 | End: 2017-04-01 | Stop reason: HOSPADM

## 2017-03-28 RX ADMIN — ONDANSETRON 4 MG: 2 INJECTION, SOLUTION INTRAMUSCULAR; INTRAVENOUS at 17:22

## 2017-03-28 RX ADMIN — HYDROMORPHONE HYDROCHLORIDE 1 MG: 1 INJECTION, SOLUTION INTRAMUSCULAR; INTRAVENOUS; SUBCUTANEOUS at 19:31

## 2017-03-28 RX ADMIN — ONDANSETRON 4 MG: 2 INJECTION, SOLUTION INTRAMUSCULAR; INTRAVENOUS at 11:38

## 2017-03-28 RX ADMIN — LORAZEPAM 1 MG: 2 INJECTION INTRAMUSCULAR at 16:08

## 2017-03-28 RX ADMIN — GABAPENTIN 200 MG: 100 CAPSULE ORAL at 23:46

## 2017-03-28 RX ADMIN — HYDROMORPHONE HYDROCHLORIDE 1 MG: 1 INJECTION, SOLUTION INTRAMUSCULAR; INTRAVENOUS; SUBCUTANEOUS at 23:47

## 2017-03-28 RX ADMIN — METOCLOPRAMIDE 10 MG: 5 INJECTION, SOLUTION INTRAMUSCULAR; INTRAVENOUS at 18:19

## 2017-03-28 RX ADMIN — LORAZEPAM 1 MG: 2 INJECTION INTRAMUSCULAR at 06:46

## 2017-03-28 RX ADMIN — METOCLOPRAMIDE 10 MG: 5 INJECTION, SOLUTION INTRAMUSCULAR; INTRAVENOUS at 23:46

## 2017-03-28 RX ADMIN — STANDARDIZED SENNA CONCENTRATE AND DOCUSATE SODIUM 2 TABLET: 8.6; 5 TABLET, FILM COATED ORAL at 19:32

## 2017-03-28 RX ADMIN — LORAZEPAM 1 MG: 2 INJECTION INTRAMUSCULAR; INTRAVENOUS at 18:18

## 2017-03-28 RX ADMIN — LIDOCAINE HYDROCHLORIDE 30 ML: 20 SOLUTION OROPHARYNGEAL at 14:13

## 2017-03-28 RX ADMIN — ONDANSETRON 4 MG: 2 INJECTION, SOLUTION INTRAMUSCULAR; INTRAVENOUS at 09:59

## 2017-03-28 RX ADMIN — PROMETHAZINE HYDROCHLORIDE 25 MG: 25 TABLET ORAL at 19:31

## 2017-03-28 ASSESSMENT — LIFESTYLE VARIABLES: EVER_SMOKED: YES

## 2017-03-28 ASSESSMENT — PAIN SCALES - GENERAL
PAINLEVEL_OUTOF10: 8
PAINLEVEL_OUTOF10: 6
PAINLEVEL_OUTOF10: 8
PAINLEVEL_OUTOF10: ASSUMED PAIN PRESENT

## 2017-03-28 ASSESSMENT — COPD QUESTIONNAIRES
DO YOU EVER COUGH UP ANY MUCUS OR PHLEGM?: YES, A FEW DAYS A WEEK OR MONTH
COPD SCREENING SCORE: 7
DURING THE PAST 4 WEEKS HOW MUCH DID YOU FEEL SHORT OF BREATH: SOME OF THE TIME
HAVE YOU SMOKED AT LEAST 100 CIGARETTES IN YOUR ENTIRE LIFE: YES

## 2017-03-28 NOTE — ED PROVIDER NOTES
ED Provider Note    CHIEF COMPLAINT  No chief complaint on file.      HPI  Gabriele Torres is a 66 y.o. male who presents with history of slipping this morning, when his feet got caught up in the sheets. Fell and struck his left hip, about 5:30 AM. No loss of consciousness, no neck pain. Now complaining of left hip pain denies other injuries. Evidently he has a long history of lung cancer, with metastases, right shoulder left hip. He's had previous radiation to the left hip X5 and radiation to the right scapula, X5. Fell today, left hip pain severe dull worse with any motion. Difficulty ambulating.  He has a history of placement of femoral head, about 3 years ago in [place],  . He was seen here by Dr. Cochran, a month ago with the following dictation:ASSESSMENT:  1.  Failed left total hip arthroplasty.  2.  Metastatic lung cancer with large periacetabular lesion.  3.  Chronic pain syndrome associated with malignancy and opioid dependence.     PLAN:  We will get baseline radiographs of the pelvis and hip to evaluate the    prosthesis in its entirety.  At this point, I think his oncologic treatments    needs to take precedence.  We cannot really revise the hip while he has active   tumor and is supporting bone.  He will be admitted to the hospital for pain    control.  We will defer to the radiation oncology doctors for major treatment.    He should be toe touch weightbearing left lower extremity.        ____________________________________     MD JUAN WILLIS / CELSO     DD:  02/09/2017 10:38:21    REVIEW OF SYSTEMS  See HPI for further details. History of lung cancer, metastasis to right scapula left hip, arthritis and anxiety chronic low back pain. Has a pain pump, left upper quadrant, history of hip surgery, [place], 2014. Evidently femoral head was replaced at that time.. All other systems are negative.     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   • Hiatus hernia syndrome    • Cancer (CMS-HCC) 5/2015     Lung cancer, chemo only, last dose Oct 27, 2016; 1/17 bone cancer   • Cancer, metastatic to bone (CMS-HCC)    • GERD (gastroesophageal reflux disease)    • Anxiety      on xanax   • Breath shortness 2016     \"Needed oxygen in the past, because of my lung ca, haven't used it for 6 monthe\"   • Pain      chronic back pain   • Heart burn    • Chronic low back pain    • Hip pain 2/16/2017       FAMILY HISTORY  Family History   Problem Relation Age of Onset   • Stroke Mother    • Cancer Mother      colon ca x2, thyroid ca, renal ca   • Cancer Brother      lung ca   • Cancer Father      prostate cancer       SOCIAL HISTORY quit smoking, 1983  Social History     Social History   • Marital Status: Single     Spouse Name: N/A   • Number of Children: N/A   • Years of Education: N/A     Occupational History   • disabled      Social History Main Topics   • Smoking status: Former Smoker -- 1.00 packs/day for 14 years     Quit date: 01/01/1983   • Smokeless tobacco: Never Used   • Alcohol Use: 0.0 oz/week     0 Standard drinks or equivalent per week      Comment: one a week   • Drug Use: No   • Sexual Activity: No     Other Topics Concern   • Not on file     Social History Narrative       SURGICAL HISTORY  Past Surgical History   Procedure Laterality Date   • Umbilical hernia repair  2007     umbilical hernia   • Thoracoscopy Right 5/7/2015     Procedure: THORACOSCOPY, with lung biopsy;  Surgeon: Mikki Rivera M.D.;  Location: SURGERY [facility];  Service:    • Pr inj dx/ther agnt paravert facet joint, luz maria* Left 12/8/2016     Procedure: INJ PARA FACET L/S 1 LVL W/IG - L4, 5, S1;  Surgeon: Cricket Sparks M.D.;  Location: SURGERY [facility];  Service: Pain Management   • Pr " "inj dx/ther agnt paravert facet joint, luz maria*  12/8/2016     Procedure: INJ PARA FACET L/S 2D LVL W/IG;  Surgeon: Cricket Sparks M.D.;  Location: [facility];  Service: Pain Management   • Knee arthroscopy Bilateral 2001   • Shoulder arthroscopy Right 2010   • Hip arthroplasty total Left 2014    • Pr inj dx/ther agnt paravert facet joint, luz maria* Left 1/5/2017     Procedure: INJ PARA FACET L/S 1 LVL W/IG - L4, L5, S1;  Surgeon: Cricket Sparks M.D.;  Location: [facility];  Service: Pain Management   • Pr inj dx/ther agnt paravert facet joint, luz maria*  1/5/2017     Procedure: INJ PARA FACET L/S 2D LVL W/IG;  Surgeon: Cricket Sparks M.D.;  Location: [facility];  Service: Pain Management   • Block epidural steroid injection N/A 1/26/2017     Procedure: BLOCK EPIDURAL STEROID INJECTION - SINGLE SHOT INJECTION PAIN PUMP;  Surgeon: Cricket Sparks M.D.;  Location: [facility];  Service:    • Lumbar laminectomy diskectomy  2005     L3-L4   • Bone biopsy Left 1/17/17     left acetabulum   • Lung needle biopsy  Nov 2016   • Biopsy ortho Left 12/2015   • Pump insert/remove Right 2/17/2017     Procedure: PUMP INSERT/REMOVE - IMPLANT PERMANENT PAIN PUMP;  Surgeon: Cricket Sparks M.D.;  Location: [facility];  Service:        CURRENT MEDICATIONS  Home Medications     **Home medications have not yet been reviewed for this encounter**          ALLERGIES  No Known Allergies    PHYSICAL EXAM  VITAL SIGNS: Ht 1.753 m (5' 9\")  Wt 87.091 kg (192 lb)  BMI 28.34 kg/m2  Constitutional: Well developed, Well nourished, No acute distress, Non-toxic appearance.   Extremities: Intact distal pulses, he has tenderness, left greater trochanter, difficulty with any range of motion of left hip, unable to do a straight leg raise on the left., No cyanosis, No clubbing.   Musculoskeletal: Good range of motion in all major joints except the left hip as above     Neurologic: Alert & " oriented x 3, Normal motor function, Normal sensory function, No focal deficits noted.   Psychiatric: Affect normal, Judgment normal, Mood normal. Anxiety because  Abdomen: Nontender. There is a pain pump left lower quadrant  onstitutional: Normal appearance, appears to be in pain     HENT: Normocephalic, Atraumatic, Bilateral external ears normal, Oropharynx moist, No oral exudates, Nose normal.   Eyes: PERRL, EOMI, Conjunctiva normal, No discharge.   Neck: Normal range of motion, No tenderness, Supple, No stridor.   Lymphatic: No lymphadenopathy noted.   Cardiovascular: Normal heart rate, Normal rhythm, No murmurs, No rubs, No gallops.   Thorax & Lungs: Normal breath sounds, No respiratory distress, No wheezing, No chest tenderness.   Abdomen:  No tenderness, no guarding no rigidity and the abdomen is soft.  No masses, No pulsatile masses.  Skin: Warm, Dry, No erythema, No rash.   Back: No tenderness, No CVA tenderness.       RADIOLOGY/PROCEDURES  Impression        1.  Interval increase in size of expansile lytic lesion in the LEFT ilium which involves the acetabular roof and quadrilateral plate, with subsequent superior migration of LEFT hip prosthesis.  2.  Chronic L5 pars defects with associated degenerative disc disease and grade 1 anterolisthesis at L5-S1.         Reading Provider Reading Date     Buddy Ahumada M.D. Feb 7, 2017         CT-HIP W/O PLUS RECONS LEFT   Final Result      1.  Dislocation of left hip arthroplasty. Prosthetic femoral head is located superior and anterior to the prosthetic acetabulum.      2.  Destructive lytic lesion in left iliac bone is again identified.      3.  No acute fractures are identified.            COURSE & MEDICAL DECISION MAKING  Pertinent Labs & Imaging studies reviewed. (See chart for details)    This is a patient who fell today, striking his left hip, now unable to ambulate on his left hip. He lives in a trailer and does not go back home with this degree of pain. He  has a history of lung cancer with metastatic disease to his left hip with basically destruction of bone around the acetabular portion of the total hip on the left. I have talked with his orthopedist, she will probably require possible surgery,. Hospitalist will admit. Preoperative labs, have been ordered. I have talked with orthopedist who will consult, hospitalist to admit.  FINAL IMPRESSION  1. Left hip pain secondary to fall  2. Upon distraction, round acetabular portion of total left hip  3.     Disposition  Hospitalist to admit, Junior shaw, will be consult pending.  Electronically signed by: Tony Workman, 3/28/2017 6:29 AM

## 2017-03-28 NOTE — IP AVS SNAPSHOT
NeuroNascent Access Code: [email or web address]  Expires: 5/1/2017 10:01 AM    NeuroNascent  A secure, online tool to manage your health information     [employer]’s NeuroNascent® is a secure, online tool that connects you to your personalized health information from the privacy of your home -- day or night - making it very easy for you to manage your healthcare. Once the activation process is completed, you can even access your medical information using the NeuroNascent balaji, which is available for free in the Apple Balaji store or Google Play store.     NeuroNascent provides the following levels of access (as shown below):   My Chart Features   [facility] Primary Care Doctor [facility]  Specialists [facility]  Urgent  Care Non-[facility]  Primary Care  Doctor   Email your healthcare team securely and privately 24/7 X X X X   Manage appointments: schedule your next appointment; view details of past/upcoming appointments X      Request prescription refills. X      View recent personal medical records, including lab and immunizations X X X X   View health record, including health history, allergies, medications X X X X   Read reports about your outpatient visits, procedures, consult and ER notes X X X X   See your discharge summary, which is a recap of your hospital and/or ER visit that includes your diagnosis, lab results, and care plan. X X       How to register for NeuroNascent:  1. Go to  [email or web address].  2. Click on the Sign Up Now box, which takes you to the New Member Sign Up page. You will need to provide the following information:  a. Enter your NeuroNascent Access Code exactly as it appears at the top of this page. (You will not need to use this code after you’ve completed the sign-up process. If you do not sign up before the expiration date, you must request a new code.)   b. Enter your date of birth.   c. Enter your home email address.   d. Click Submit, and follow the next screen’s instructions.  3. Create a NeuroNascent ID. This will be your NeuroNascent  login ID and cannot be changed, so think of one that is secure and easy to remember.  4. Create a [employer] password. You can change your password at any time.  5. Enter your Password Reset Question and Answer. This can be used at a later time if you forget your password.   6. Enter your e-mail address. This allows you to receive e-mail notifications when new information is available in [employer]IMBex.  7. Click Sign Up. You can now view your health information.    For assistance activating your [employer] account, call [phone]

## 2017-03-28 NOTE — IP AVS SNAPSHOT
" <p align=\"LEFT\"><IMG SRC=\"//[email or web address]$/[email or web address]\" alt=\"Image\" WIDTH=\"50%\" HEIGHT=\"200\" BORDER=\"\"></p>                   Name:Gabriele Torres  Medical Record Number:[id number]  CSN: [id number]    YOB: 1950   Age: 66 y.o.  Sex: male  HT:1.753 m (5' 9\") WT: 87.091 kg (192 lb)          Admit Date: 3/28/2017     Discharge Date:   Today's Date: 4/1/2017  Attending Doctor:  Dameon Neely M.D.                  Allergies:  Review of patient's allergies indicates no known allergies.          Your appointments     Apr 07, 2017  3:00 PM   ONCOLOGY EST PATIENT 30 MIN with Shawanda Bennett M.D.   [facility] (--)    [address], [address]  [facility] [address]   [phone]            Apr 07, 2017  4:00 PM   Est Chemo 2 with RN 1   Infusion Services ([facility])    [address] [facility] [address]  [facility] [id number] [phone]            May 02, 2017  2:30 PM   Follow Up with Laure DOMINGO M.D.   [facility] (--)    [address] [facility] [address]   [phone]            Jun 29, 2017  9:00 AM   Established Patient with [facility] EXAM 4   [facility]  (--)    [address] [facility] [address]   [phone]              Follow-up Information     1. Follow up with Sasha Isidro M.D. In 2 weeks.    Specialty:  Family Medicine    Contact information    [address]  [address]  [facility] [address] [phone]          [facility]. [facility]    Specialty:  Hospice    Why:  Hospice Referral    Contact information    [address]  [facility] [phone]         Medication List      Take these Medications        Instructions    alprazolam 1 MG Tabs   Commonly known as:  XANAX    Take 1 mg by mouth 3 times a day as needed for Anxiety. Indications: Feeling Anxious   Dose:  1 mg        MG Caps    Take 100 mg by mouth every morning.   Dose:  100 mg       gabapentin 100 MG " "Caps   Commonly known as:  NEURONTIN    Take 2 Caps by mouth 3 times a day.   Dose:  200 mg       morphine 15 MG tablet   Commonly known as:  MS IR    Take 15 mg by mouth every four hours as needed for Severe Pain.   Dose:  15 mg       omeprazole 20 MG delayed-release capsule   Commonly known as:  PRILOSEC    Take 20 mg by mouth every day.   Dose:  20 mg       Pain Pump Jessica   Commonly known as:  patient supplied    1 Each by Injection route Continuous. Simple continuous drug: Hydromorphone 1.5009 MG/Day Infusion PA drug\" PA Lockout interval 01:00 h;m Maximum Activations/Day 12 Last changed was 3/7/2017  Indications: Hydromophone 5.0 MG/ML  (Concentration)   Dose:  1 Each       psyllium 58.12 % Pack   Commonly known as:  METAMUCIL    Take 1 Packet by mouth every day.   Dose:  1 Packet       rivaroxaban 20 MG Tabs tablet   Commonly known as:  XARELTO    Take 1 Tab by mouth with dinner.   Dose:  20 mg       tamsulosin 0.4 MG capsule   Commonly known as:  FLOMAX    Take 1 Cap by mouth ONE-HALF HOUR AFTER BREAKFAST.   Dose:  0.4 mg       venlafaxine XR 37.5 MG Cp24   Commonly known as:  EFFEXOR XR    Take 1 Cap by mouth every day.   Dose:  37.5 mg         "

## 2017-03-28 NOTE — IP AVS SNAPSHOT
" Home Care Instructions                                                                                                                  Name:Gabriele Torres  Medical Record Number:[id number]  CSN: [id number]    YOB: 1950   Age: 66 y.o.  Sex: male  HT:1.753 m (5' 9\") WT: 87.091 kg (192 lb)          Admit Date: 3/28/2017     Discharge Date:   Today's Date: 4/1/2017  Attending Doctor:  Dameon Neely M.D.                  Allergies:  Review of patient's allergies indicates no known allergies.            Discharge Instructions       Discharge Instructions    Discharged to home by car with relative. Discharged via wheelchair, hospital escort: Yes.  Special equipment needed: Not Applicable    Be sure to schedule a follow-up appointment with your primary care doctor or any specialists as instructed.     Discharge Plan:   Diet Plan: Discussed  Activity Level: Discussed  Confirmed Follow up Appointment: Patient to Call and Schedule Appointment  Confirmed Symptoms Management: Discussed  Medication Reconciliation Updated: Yes  Influenza Vaccine Indication: Patient Refuses    I understand that a diet low in cholesterol, fat, and sodium is recommended for good health. Unless I have been given specific instructions below for another diet, I accept this instruction as my diet prescription.   Other diet: regular    Special Instructions: None    · Is patient discharged on Warfarin / Coumadin?   No     · Is patient Post Blood Transfusion?  No    Depression / Suicide Risk    As you are discharged from this [facility] facility, it is important to learn how to keep safe from harming yourself.    Recognize the warning signs:  · Abrupt changes in personality, positive or negative- including increase in energy   · Giving away possessions  · Change in eating patterns- significant weight changes-  positive or negative  · Change in sleeping patterns- unable to sleep or sleeping all the time   · Unwillingness or inability to " communicate  · Depression  · Unusual sadness, discouragement and loneliness  · Talk of wanting to die  · Neglect of personal appearance   · Rebelliousness- reckless behavior  · Withdrawal from people/activities they love  · Confusion- inability to concentrate     If you or a loved one observes any of these behaviors or has concerns about self-harm, here's what you can do:  · Talk about it- your feelings and reasons for harming yourself  · Remove any means that you might use to hurt yourself (examples: pills, rope, extension cords, firearm)  · Get professional help from the community (Mental Health, Substance Abuse, psychological counseling)  · Do not be alone:Call your Safe Contact- someone whom you trust who will be there for you.  · Call your local CRISIS HOTLINE 646-5646 or 014-565-3549  · Call your local Children's Mobile Crisis Response Team [place] (862) 878-8254 or www.Labfolder  · Call the toll free National Suicide Prevention Hotlines   · National Suicide Prevention Lifeline 671-580-MALC (4211)  · National Hope Line Network 800-SUICIDE (639-1920)        Your appointments     Apr 07, 2017  3:00 PM   ONCOLOGY EST PATIENT 30 MIN with Shawanda Bennett M.D.   [facility] (--)    [address] [facility], [address]  [place] [id number] [phone]            Apr 07, 2017  4:00 PM   Est Chemo 2 with RN 1   [facility] ([facility])    [address] [facility] [address]  [place] [id number] [phone]            May 02, 2017  2:30 PM   Follow Up with Lauer DOMINGO M.D.   [facility] (--)    [address] [facility] [address]   [phone]            Jun 29, 2017  9:00 AM   Established Patient with V EXAM 4   [facility]  (--)    [address] [facility] [address]   [phone]              Follow-up Information     1. Follow up with Sasha Isidro M.D. In 2 weeks.    Specialty:  Family Medicine    Contact information    [address]  "[facility]  [address]  [facility] [address]  [phone]          2. Follow up with [facility] In 2 days.    Specialty:  Hospice    Why:  Hospice Referral    Contact information    [address]  [facility] [phone]         Discharge Medication Instructions:    Below are the medications your physician expects you to take upon discharge:    Review all your home medications and newly ordered medications with your doctor and/or pharmacist. Follow medication instructions as directed by your doctor and/or pharmacist.    Please keep your medication list with you and share with your physician.               Medication List      CONTINUE taking these medications        Instructions    alprazolam 1 MG Tabs   Last time this was given:  1 mg on 3/31/2017 10:38 AM   Commonly known as:  XANAX    Take 1 mg by mouth 3 times a day as needed for Anxiety. Indications: Feeling Anxious   Dose:  1 mg        MG Caps    Take 100 mg by mouth every morning.   Dose:  100 mg       gabapentin 100 MG Caps   Last time this was given:  200 mg on 4/1/2017  3:30 PM   Commonly known as:  NEURONTIN    Take 2 Caps by mouth 3 times a day.   Dose:  200 mg       morphine 15 MG tablet   Commonly known as:  MS IR    Take 15 mg by mouth every four hours as needed for Severe Pain.   Dose:  15 mg       omeprazole 20 MG delayed-release capsule   Last time this was given:  20 mg on 4/1/2017  9:15 AM   Commonly known as:  PRILOSEC    Take 20 mg by mouth every day.   Dose:  20 mg       Pain Pump Jessica   Last time this was given:  1 Each on 3/28/2017  5:45 PM   Commonly known as:  patient supplied    1 Each by Injection route Continuous. Simple continuous drug: Hydromorphone 1.5009 MG/Day Infusion PA drug\" PA Lockout interval 01:00 h;m Maximum Activations/Day 12 Last changed was 3/7/2017  Indications: Hydromophone 5.0 MG/ML  (Concentration)   Dose:  1 Each       psyllium 58.12 % Pack   Commonly known as:  METAMUCIL    " Take 1 Packet by mouth every day.   Dose:  1 Packet       rivaroxaban 20 MG Tabs tablet   Commonly known as:  XARELTO    Take 1 Tab by mouth with dinner.   Dose:  20 mg       tamsulosin 0.4 MG capsule   Last time this was given:  0.4 mg on 4/1/2017  9:15 AM   Commonly known as:  FLOMAX    Take 1 Cap by mouth ONE-HALF HOUR AFTER BREAKFAST.   Dose:  0.4 mg       venlafaxine XR 37.5 MG Cp24   Last time this was given:  37.5 mg on 4/1/2017  9:15 AM   Commonly known as:  EFFEXOR XR    Take 1 Cap by mouth every day.   Dose:  37.5 mg               Instructions           Diet / Nutrition:    Follow any diet instructions given to you by your doctor or the dietician, including how much salt (sodium) you are allowed each day.    If you are overweight, talk to your doctor about a weight reduction plan.    Activity:    Remain physically active following your doctor's instructions about exercise and activity.    Rest often.     Any time you become even a little tired or short of breath, SIT DOWN and rest.    Worsening Symptoms:    Report any of the following signs and symptoms to the doctor's office immediately:    *Pain of jaw, arm, or neck  *Chest pain not relieved by medication                               *Dizziness or loss of consciousness  *Difficulty breathing even when at rest   *More tired than usual                                       *Bleeding drainage or swelling of surgical site  *Swelling of feet, ankles, legs or stomach                 *Fever (>100ºF)  *Pink or blood tinged sputum  *Weight gain (3lbs/day or 5lbs /week)           *Shock from internal defibrillator (if applicable)  *Palpitations or irregular heartbeats                *Cool and/or numb extremities    Stroke Awareness    Common Risk Factors for Stroke include:    Age  Atrial Fibrillation  Carotid Artery Stenosis  Diabetes Mellitus  Excessive alcohol consumption  High blood pressure  Overweight   Physical inactivity  Smoking    Warning signs and  symptoms of a stroke include:    *Sudden numbness or weakness of the face, arm or leg (especially on one side of the body).  *Sudden confusion, trouble speaking or understanding.  *Sudden trouble seeing in one or both eyes.  *Sudden trouble walking, dizziness, loss of balance or coordination.Sudden severe headache with no known cause.    It is very important to get treatment quickly when a stroke occurs. If you experience any of the above warning signs, call 911 immediately.                   Disclaimer         Quit Smoking / Tobacco Use:    I understand the use of any tobacco products increases my chance of suffering from future heart disease or stroke and could cause other illnesses which may shorten my life. Quitting the use of tobacco products is the single most important thing I can do to improve my health. For further information on smoking / tobacco cessation call a Toll Free Quit Line at 1-735.913.4448 (*National Cancer [place]) or 1-353.530.7238 (American Lung Association) or you can access the web based program at www.lungmakeena.org.    [place] Tobacco Users Help Line:  (618) 162-2541       Toll Free: 1-102.493.5417  Quit Tobacco Program [facility] (462)067-0542    Crisis Hotline:    [place] Crisis Hotline:  3-456-OMAXUCO or 1-915.720.5418    [place] Crisis Hotline:    1-432.947.1265 or 444-994-9000    Discharge Survey:   Thank you for choosing [facility]. We hope we did everything we could to make your hospital stay a pleasant one. You may be receiving a phone survey and we would appreciate your time and participation in answering the questions. Your input is very valuable to us in our efforts to improve our service to our patients and their families.        My signature on this form indicates that:    1. I have reviewed and understand the above information.  2. My questions regarding this information have been answered to my satisfaction.  3. I have formulated a plan with my  discharge nurse to obtain my prescribed medications for home.                  Disclaimer         __________________________________                     __________       ________                       Patient Signature                                                 Date                    Time

## 2017-03-28 NOTE — ED NOTES
"Med rec updated and complete  Allergies reviewed  Received paper work from DR. Sparks's office regarding pain pump.   Put copy in pts chart, regarding pain pump.  Pt states \"No vitamins\".    "

## 2017-03-28 NOTE — ED NOTES
"Gabriele Torres  66 y.o. male  Chief Complaint   Patient presents with   • T-5000 GLF     This AM. Per pt, pt \"tripped over\" his sheets   • Hip Pain     Left Hip. S/P MGLF. 8/10 \"sharp, stabbing\" nonradiating pain     Pt BIB [facility] for above complaint.   Pt has an implanted pain pump in LLQ. Per pt, pump dispenses dilaudid.  Pt is appears in distress and in pain.   Pt placed in gown and on monitor.  "

## 2017-03-28 NOTE — ED NOTES
Pt denies head trauma, LOC.  Pt is A&OX4, speaking in complete sentences, follows commands and responds appropriately to questions  PIV placed, blood drawn.

## 2017-03-28 NOTE — IP AVS SNAPSHOT
4/1/2017          Gabriele Meyersn Theodorecinthya  [facility] [place] [id number]    Dear Gabriele:    [facility] wants to ensure your discharge home is safe and you or your loved ones have had all your questions answered regarding your care after you leave the hospital.    You may receive a telephone call within two days of your discharge.  This call is to make certain you understand your discharge instructions as well as ensure we provided you with the best care possible during your stay with us.     The call will only last approximately 3-5 minutes and will be done by a nurse.    Once again, we want to ensure your discharge home is safe and that you have a clear understanding of any next steps in your care.  If you have any questions or concerns, please do not hesitate to contact us, we are here for you.  Thank you for choosing [facility] for your healthcare needs.    Sincerely,    Viktor Dougherty    [facility]

## 2017-03-29 PROBLEM — Z86.711 HISTORY OF PULMONARY EMBOLISM: Status: ACTIVE | Noted: 2017-03-29

## 2017-03-29 PROBLEM — S73.005A HIP DISLOCATION, LEFT (HCC): Status: ACTIVE | Noted: 2017-03-29

## 2017-03-29 LAB
ANION GAP SERPL CALC-SCNC: 7 MMOL/L (ref 0–11.9)
BASOPHILS # BLD AUTO: 1.4 % (ref 0–1.8)
BASOPHILS # BLD: 0.06 K/UL (ref 0–0.12)
BUN SERPL-MCNC: 9 MG/DL (ref 8–22)
CALCIUM SERPL-MCNC: 9.2 MG/DL (ref 8.5–10.5)
CHLORIDE SERPL-SCNC: 104 MMOL/L (ref 96–112)
CO2 SERPL-SCNC: 30 MMOL/L (ref 20–33)
CREAT SERPL-MCNC: 0.74 MG/DL (ref 0.5–1.4)
EOSINOPHIL # BLD AUTO: 0.01 K/UL (ref 0–0.51)
EOSINOPHIL NFR BLD: 0.2 % (ref 0–6.9)
ERYTHROCYTE [DISTWIDTH] IN BLOOD BY AUTOMATED COUNT: 51.8 FL (ref 35.9–50)
GFR SERPL CREATININE-BSD FRML MDRD: >60 ML/MIN/1.73 M 2
GLUCOSE SERPL-MCNC: 96 MG/DL (ref 65–99)
HCT VFR BLD AUTO: 30.9 % (ref 42–52)
HGB BLD-MCNC: 9.7 G/DL (ref 14–18)
IMM GRANULOCYTES # BLD AUTO: 0.04 K/UL (ref 0–0.11)
IMM GRANULOCYTES NFR BLD AUTO: 0.9 % (ref 0–0.9)
LYMPHOCYTES # BLD AUTO: 0.51 K/UL (ref 1–4.8)
LYMPHOCYTES NFR BLD: 11.9 % (ref 22–41)
MCH RBC QN AUTO: 30.9 PG (ref 27–33)
MCHC RBC AUTO-ENTMCNC: 31.4 G/DL (ref 33.7–35.3)
MCV RBC AUTO: 98.4 FL (ref 81.4–97.8)
MONOCYTES # BLD AUTO: 0.57 K/UL (ref 0–0.85)
MONOCYTES NFR BLD AUTO: 13.3 % (ref 0–13.4)
NEUTROPHILS # BLD AUTO: 3.1 K/UL (ref 1.82–7.42)
NEUTROPHILS NFR BLD: 72.3 % (ref 44–72)
NRBC # BLD AUTO: 0 K/UL
NRBC BLD AUTO-RTO: 0 /100 WBC
PLATELET # BLD AUTO: 275 K/UL (ref 164–446)
PMV BLD AUTO: 8.8 FL (ref 9–12.9)
POTASSIUM SERPL-SCNC: 4.2 MMOL/L (ref 3.6–5.5)
RBC # BLD AUTO: 3.14 M/UL (ref 4.7–6.1)
SODIUM SERPL-SCNC: 141 MMOL/L (ref 135–145)
WBC # BLD AUTO: 4.3 K/UL (ref 4.8–10.8)

## 2017-03-29 PROCEDURE — G8979 MOBILITY GOAL STATUS: HCPCS | Mod: CI

## 2017-03-29 PROCEDURE — 302135 SEQUENTIAL COMPRESSION MACHINE: Performed by: SURGERY

## 2017-03-29 PROCEDURE — G8978 MOBILITY CURRENT STATUS: HCPCS | Mod: CJ

## 2017-03-29 PROCEDURE — 99226 PR SUBSEQUENT OBSERVATION CARE,LEVEL III: CPT | Performed by: HOSPITALIST

## 2017-03-29 PROCEDURE — 51798 US URINE CAPACITY MEASURE: CPT

## 2017-03-29 PROCEDURE — 97165 OT EVAL LOW COMPLEX 30 MIN: CPT

## 2017-03-29 PROCEDURE — A9270 NON-COVERED ITEM OR SERVICE: HCPCS | Performed by: INTERNAL MEDICINE

## 2017-03-29 PROCEDURE — 36415 COLL VENOUS BLD VENIPUNCTURE: CPT

## 2017-03-29 PROCEDURE — G0378 HOSPITAL OBSERVATION PER HR: HCPCS

## 2017-03-29 PROCEDURE — 700111 HCHG RX REV CODE 636 W/ 250 OVERRIDE (IP): Performed by: INTERNAL MEDICINE

## 2017-03-29 PROCEDURE — 80048 BASIC METABOLIC PNL TOTAL CA: CPT

## 2017-03-29 PROCEDURE — 96376 TX/PRO/DX INJ SAME DRUG ADON: CPT

## 2017-03-29 PROCEDURE — 85025 COMPLETE CBC W/AUTO DIFF WBC: CPT

## 2017-03-29 PROCEDURE — 97162 PT EVAL MOD COMPLEX 30 MIN: CPT

## 2017-03-29 PROCEDURE — G8987 SELF CARE CURRENT STATUS: HCPCS | Mod: CI

## 2017-03-29 PROCEDURE — 700102 HCHG RX REV CODE 250 W/ 637 OVERRIDE(OP): Performed by: INTERNAL MEDICINE

## 2017-03-29 PROCEDURE — G8988 SELF CARE GOAL STATUS: HCPCS | Mod: CI

## 2017-03-29 RX ADMIN — GABAPENTIN 200 MG: 100 CAPSULE ORAL at 22:00

## 2017-03-29 RX ADMIN — TAMSULOSIN HYDROCHLORIDE 0.4 MG: 0.4 CAPSULE ORAL at 09:21

## 2017-03-29 RX ADMIN — METOCLOPRAMIDE 10 MG: 5 INJECTION, SOLUTION INTRAMUSCULAR; INTRAVENOUS at 18:47

## 2017-03-29 RX ADMIN — METOCLOPRAMIDE 10 MG: 5 INJECTION, SOLUTION INTRAMUSCULAR; INTRAVENOUS at 06:42

## 2017-03-29 RX ADMIN — ENOXAPARIN SODIUM 40 MG: 100 INJECTION SUBCUTANEOUS at 09:20

## 2017-03-29 RX ADMIN — VENLAFAXINE HYDROCHLORIDE 37.5 MG: 37.5 CAPSULE, EXTENDED RELEASE ORAL at 09:21

## 2017-03-29 RX ADMIN — OMEPRAZOLE 20 MG: 20 CAPSULE, DELAYED RELEASE ORAL at 09:20

## 2017-03-29 RX ADMIN — STANDARDIZED SENNA CONCENTRATE AND DOCUSATE SODIUM 2 TABLET: 8.6; 5 TABLET, FILM COATED ORAL at 09:20

## 2017-03-29 RX ADMIN — METOCLOPRAMIDE 10 MG: 5 INJECTION, SOLUTION INTRAMUSCULAR; INTRAVENOUS at 13:14

## 2017-03-29 RX ADMIN — STANDARDIZED SENNA CONCENTRATE AND DOCUSATE SODIUM 2 TABLET: 8.6; 5 TABLET, FILM COATED ORAL at 22:00

## 2017-03-29 RX ADMIN — GABAPENTIN 200 MG: 100 CAPSULE ORAL at 09:21

## 2017-03-29 RX ADMIN — GABAPENTIN 200 MG: 100 CAPSULE ORAL at 14:55

## 2017-03-29 RX ADMIN — METOCLOPRAMIDE 10 MG: 5 INJECTION, SOLUTION INTRAMUSCULAR; INTRAVENOUS at 23:59

## 2017-03-29 ASSESSMENT — ENCOUNTER SYMPTOMS
BACK PAIN: 1
MYALGIAS: 0
SHORTNESS OF BREATH: 0
FEVER: 0
PALPITATIONS: 0
WHEEZING: 0
WEIGHT LOSS: 0
NAUSEA: 1
HEADACHES: 0
VOMITING: 0
ABDOMINAL PAIN: 0
COUGH: 0
TINGLING: 1
CHILLS: 0

## 2017-03-29 ASSESSMENT — PATIENT HEALTH QUESTIONNAIRE - PHQ9
SUM OF ALL RESPONSES TO PHQ9 QUESTIONS 1 AND 2: 0
2. FEELING DOWN, DEPRESSED, IRRITABLE, OR HOPELESS: NOT AT ALL
1. LITTLE INTEREST OR PLEASURE IN DOING THINGS: NOT AT ALL
SUM OF ALL RESPONSES TO PHQ QUESTIONS 1-9: 0

## 2017-03-29 ASSESSMENT — GAIT ASSESSMENTS
ASSISTIVE DEVICE: FRONT WHEEL WALKER
GAIT LEVEL OF ASSIST: STAND BY ASSIST
DISTANCE (FEET): 100
DEVIATION: STEP TO

## 2017-03-29 ASSESSMENT — PAIN SCALES - GENERAL
PAINLEVEL_OUTOF10: 4
PAINLEVEL_OUTOF10: 6
PAINLEVEL_OUTOF10: 6

## 2017-03-29 ASSESSMENT — ACTIVITIES OF DAILY LIVING (ADL): TOILETING: INDEPENDENT

## 2017-03-29 NOTE — THERAPY
"Occupational Therapy Evaluation completed.   Functional Status:  Pt seen for OT eval due to recent L hip fx from GLF. Hx of metastic lung cancer. Pt had been TTWB in LLE prior to fall. Now NWB. CGA for standing ADLs/ADL transfers.    Plan of Care: Will benefit from Occupational Therapy 3 times per week  Discharge Recommendations:  Equipment: Will Continue to Assess for Equipment Needs. Post-acute therapy Discharge to home with outpatient or home health for additional skilled therapy services.    See \"Rehab Therapy-Acute\" Patient Summary Report for complete documentation.    "

## 2017-03-29 NOTE — RESPIRATORY CARE
COPD EDUCATION by COPD CLINICAL EDUCATOR  3/29/2017 at 6:36 AM by Maryam Gould     Patient reviewed by COPD education team. Patient does not qualify for COPD program.

## 2017-03-29 NOTE — DISCHARGE PLANNING
Medical Social Work    Referral:  SW was informed that pt would like to talk to SW about hospice.    Intervention:  SW met with pt at bedside to discuss hospice services and provided pt with hospice brochures.  Pt lives in a mobile home at his sons house.  Pt reports that he really just needs assistance in preparing meals and doing his laundry as it is getting more and more difficult for him to do that himself.  Discussed making [facility] [place] and  services; pt is agreeable.  SW was not able to complete application for [facility] because pt started vomiting during conversation (SW informed bedside RN).  SW left VM for unit SW to f/u with pt tomorrow and complete [facility] application.    Plan:  Unit SW to meet with pt.  SS will remain available to provide support and assist as needed.

## 2017-03-29 NOTE — PROGRESS NOTES
2 RN skin check performed. Previous epidural sites to back. Previous sx site of implanted pain pump to abdomen.

## 2017-03-29 NOTE — PROGRESS NOTES
Received bedside updated shift report from night RN. Pt AOx4.  Pt reports pain is 6/10. Does call appropriately. Bed alarm is off.  Pt is resting in bed. PIV assessed and is patent. Pt is on RA. POC discussed as well as unit routine, comfort, and safety. Dicussed DC planning to home once a surgery plan is put in place. Discussed the goal for today mobility and pain control. Reviewed orders, notes, labs, and test results. Hourly rounding in place with RN rounding on odd hours and CNA on even hours.

## 2017-03-29 NOTE — PROGRESS NOTES
Hospital Medicine Progress Note, Adult, Complex               Author: Alexander Galaviz Date & Time created: 3/29/2017  3:34 PM     CC: 67 yo male hx met lung ca , pirior left hip replacement 3-2014 , Pe on xarelto admitted with fall on left side - dx hip dislocation     Interval History:    Plan for ortho eval hip revision .     Review of Systems:  Review of Systems   Constitutional: Negative for fever and chills.   HENT: Negative for congestion.    Respiratory: Negative for cough and shortness of breath.    Cardiovascular: Negative for chest pain, palpitations and leg swelling.   Gastrointestinal: Positive for nausea. Negative for vomiting and abdominal pain.   Musculoskeletal: Positive for back pain and joint pain. Negative for myalgias.   Neurological: Positive for tingling. Negative for headaches.       Physical Exam:  Physical Exam   Constitutional: He is oriented to person, place, and time. No distress.   Eyes: EOM are normal. No scleral icterus.   Neck: Neck supple.   Cardiovascular: Normal rate and regular rhythm.    No murmur heard.  Pulmonary/Chest: No stridor. He has no wheezes. He has no rales. He exhibits no tenderness.   Abdominal: Soft. Bowel sounds are normal. He exhibits no distension. There is no tenderness.   abd pain pump present    Musculoskeletal: He exhibits tenderness. He exhibits no edema.   Neurological: He is alert and oriented to person, place, and time. No cranial nerve deficit.   Skin: Skin is warm and dry. He is not diaphoretic.   Psychiatric: He has a normal mood and affect. His behavior is normal.       Labs:        Invalid input(s): [id number]      Recent Labs      03/28/17   0640  03/29/17 0319   SODIUM  133*  141   POTASSIUM  4.0  4.2   CHLORIDE  101  104   CO2  24  30   BUN  13  9   CREATININE  0.72  0.74   MAGNESIUM  2.2   --    CALCIUM  9.2  9.2     Recent Labs      03/28/17   0640  03/29/17 0319   ALTSGPT  16   --    ASTSGOT  13   --    ALKPHOSPHAT  127*   --    TBILIRUBIN   0.3   --    GLUCOSE  151*  96     Recent Labs      17   0640  17   0318   RBC  3.79*  3.14*   HEMOGLOBIN  11.8*  9.7*   HEMATOCRIT  36.9*  30.9*   PLATELETCT  378  275   PROTHROMBTM  17.1*   --    INR  1.35*   --      Recent Labs      17   0640  17   0318   WBC  7.5  4.3*   NEUTSPOLYS  84.90*  72.30*   LYMPHOCYTES  5.10*  11.90*   MONOCYTES  8.20  13.30   EOSINOPHILS  0.00  0.20   BASOPHILS  0.50  1.40   ASTSGOT  13   --    ALTSGPT  16   --    ALKPHOSPHAT  127*   --    TBILIRUBIN  0.3   --            Hemodynamics:  Temp (24hrs), Av.3 °C (97.3 °F), Min:35.9 °C (96.7 °F), Max:36.6 °C (97.8 °F)  Temperature: 36.2 °C (97.1 °F)  Pulse  Av.9  Min: 75  Max: 130   Blood Pressure : 102/61 mmHg, NIBP: 112/85 mmHg     Respiratory:    Respiration: 16, Pulse Oximetry: 93 %, O2 Daily Delivery Respiratory : Nasal Cannula     Work Of Breathing / Effort: Mild  RUL Breath Sounds: Diminished, RML Breath Sounds: Diminished (course), RLL Breath Sounds: Diminished (course), LIN Breath Sounds: Diminished, LLL Breath Sounds: Diminished  Fluids:    Intake/Output Summary (Last 24 hours) at 17 1534  Last data filed at 17 0600   Gross per 24 hour   Intake      0 ml   Output    700 ml   Net   -700 ml        GI/Nutrition:  Orders Placed This Encounter   Procedures   • DIET ORDER     Standing Status: Standing      Number of Occurrences: 1      Standing Expiration Date:      Order Specific Question:  Diet:     Answer:  Regular [1]     Medical Decision Making, by Problem:  Active Hospital Problems    Diagnosis   • *Hip arthroplasty dislocation, left (CMS-HCC) [S73.005A] w severe pain, debility .  Pain control w prn dilaudid    • Left hip pain [M25.552]   • History of pulmonary embolism [Z86.711]  Holding xarelto with possible surgery    • Lung cancer metastatic to bone (CMS-HCC) [C34.90, C79.51] palliative trmts   • Anemia [D64.9] likely chronic dz.   • Fall [W19.XXXA]  Anxiety , depression   Effexor,  prn ativan, xanax.       Labs reviewed, Radiology images reviewed and Medications reviewed  Yañez catheter: No Yañez        DVT prophylaxis - mechanical: SCDs

## 2017-03-29 NOTE — THERAPY
"Physical Therapy Evaluation completed.   Bed Mobility:  Supine to Sit: Stand by Assist  Transfers: Sit to Stand: Contact Guard Assist  Gait: Level Of Assist: Stand by Assist with Front-Wheel Walker       Plan of Care: Will benefit from Physical Therapy 3 times per week  Discharge Recommendations: Equipment: Will Continue to Assess for Equipment Needs. Post-acute therapy Discharge to home with outpatient or home health for additional skilled therapy services.    See \"Rehab Therapy-Acute\" Patient Summary Report for complete documentation.     "

## 2017-03-29 NOTE — DISCHARGE SUMMARY
BRIEF HISTORY:  The patient was admitted with diagnosis of metastatic cancer   with intractable back and hip pain for intrathecal pump implant.  The pump was   implanted on 02/17/2017.  After the surgery, he has had a 23-hour short stay   in the hospital.  He was discharged to the office with no complications.  He   is scheduled to follow up in our office 1 week following this.       ____________________________________     MD JOSEPH MEDRANO / CELSO    DD:  03/28/2017 09:01:43  DT:  03/28/2017 16:38:15    D#:  [id number]  Job#:  [id number]

## 2017-03-29 NOTE — PROGRESS NOTES
Report received from Silas MILLS at 1700. Assumed care at 1705. Pt in bed T333 bed 1. A/O x4. VSS. Responds appropriately. Denies SOB or chest pain. Pain is well maintained. Assessment complete. Discussed POC, pt verbalizes understanding. Pt has one bag and a cell phone  belongings with them. Pt has vomited 200 ml brown thin liquid. His son is at his bedside now. Explained importance of calling before getting OOB. Call light and belongings within reach. Bed alarm is off. Bed in the lowest position. Treaded socks in place. Hourly rounding in progress.

## 2017-03-29 NOTE — CARE PLAN
Problem: Pain Management  Goal: Pain level will decrease to patient’s comfort goal  Outcome: PROGRESSING AS EXPECTED  Patient originally c/o no pain control 1 mg dilaudid. Given contacted representative for pt pain pump. Pump is working for chronic pain. PRN for acute pain. Patient appears to be resting comfortably.     Problem: Safety  Goal: Will remain free from injury  Outcome: PROGRESSING AS EXPECTED  Educated patient regarding safety precautions explained rationale for crutches having to be in bathroom. Patient verbalized understanding. Call light within reach . Patient calls appropriately

## 2017-03-29 NOTE — DISCHARGE PLANNING
Pt requesting information on hospice. Ariane SINHA notified and will speak with pt.    Care Transition Team Assessment    Information Source  Orientation : Oriented x 4  Information Given By: Patient  Who is responsible for making decisions for patient? : Patient    Readmission Evaluation  Is this a readmission?: No    Elopement Risk  Legal Hold: No  Ambulatory or Self Mobile in Wheelchair: No-Not an Elopement Risk    Interdisciplinary Discharge Planning  Does Admitting Nurse Feel This Could be a Complex Discharge?: No  Primary Care Physician: Sasha Isidro  Lives with - Patient's Self Care Capacity: Adult Children  Support Systems: Children  Housing / Facility: [address] [facility]  Do You Take your Prescribed Medications Regularly: Yes  Able to Return to Previous ADL's: Future Time w/Therapy  Mobility Issues: Yes  Prior Services: None  Assistance Needed: Yes  Durable Medical Equipment: Home Oxygen (nocturnal O2)  DME Provider / Phone: Chris    Discharge Preparedness  What are your discharge supports?: Child  Prior Functional Level: Independent with Activities of Daily Living  Difficulity with ADLs: Walking  Difficulty with ADLs Comment: FWW and crutches  Difficulity with IADLs: None    Functional Assesment  Prior Functional Level: Independent with Activities of Daily Living    Finances  Financial Barriers to Discharge: No (social security and pension)  Prescription Coverage: Yes              Advance Directive  Advance Directive?: None              Discharge Risks or Barriers  Discharge risks or barriers?: No    Anticipated Discharge Information  Anticipated discharge disposition: Home, Hospice  Discharge Address: [address] [place] [address]  Discharge Contact Phone Number: [phone]

## 2017-03-29 NOTE — PROGRESS NOTES
Crutches delivered and set up for pt. Left at wall out of reach, pt asked to call RN for crutches.  RN notified.

## 2017-03-29 NOTE — PROGRESS NOTES
Paged company representative for patient out pt pain pump. Pt reports pain pump not working. Received call back from local representative. He can be reached at [phone] (RN that manages pain pumps for Dr. Sparks.) Stated that pain pump is probably working. We should remind patient to utilize his PTM dose that is a self dose that is available every 1 - 2 hours.  It only works for chronic pain. The acute pain should be treated with PRNs. Patient by this time was sleeping and some what drowsy. Will educate patient when more awake.

## 2017-03-29 NOTE — H&P
ADMITTING ATTENDING:  Dameon Neely MD    PRIMARY CARE PHYSICIAN:  Sasha Isidro MD    CONSULTANTS:  Orthopedics, Fernando Hernandez MD    CHIEF COMPLAINT:  Fall and left hip pain.    HISTORY OF PRESENTING ILLNESS:  This is a 66-year-old male with a past medical   history of lung cancer with mets to bone, status post chemo and palliative   radiation, left hip arthroplasty who presents with a fall and left hip pain.    Patient states that he got out of bed this morning and he slipped on some   sheets falling and striking his left hip.  He denied any loss of   consciousness, chest pain, or shortness of breath.  Patient is complaining of   significant left hip pain and has a history of total left hip arthroplasty.    Patient follows Dr. Shawanda Bennett, who is his oncologist and has received   multiple chemotherapy, which he has been unable to tolerate.  He has also   received palliative radiation therapy to his hip and his scapula.  Patient has   also received a pain pump for intractable pain.  Patient recently was   diagnosed with having a left lower lobe segment pulmonary embolus and was   started on Xarelto.  At this time, patient denies complaints of chest pain,   shortness of breath, fever, cough, abdominal pain, dysuria, or diarrhea.    REVIEW OF SYSTEMS:  Please see HPI, all other systems were reviewed and are   negative per AMA and CMS criteria.    PAST MEDICAL HISTORY:  Anxiety, indigestion, arthritis, snoring, metastatic   lung cancer with mets to the bone, GERD, chronic low back pain, hip pain.    PAST SURGICAL HISTORY:  Umbilical hernia repair, bronchoscopy, shoulder   arthroscopy, hip arthroplasty total of the left.    MEDICATION ALLERGIES:  No known allergies.    FAMILY HISTORY:  Stroke and colon cancer in mother, lung cancer in brother,   prostate cancer in father.    SOCIAL HISTORY:  Patient is a former smoker and quit in 1983.  Patient denies   alcohol use or illicit drug use.    CURRENT  OUTPATIENT MEDICATIONS:  Morphine 15 mg every 4 hours as needed for   severe pain, Xarelto 20 mg by mouth with dinner, Effexor 37.5 mg every day,   docusate sodium 100 mg by mouth every morning, gabapentin, Neurontin 100 mg 2   caps by mouth t.i.d., Flomax 0.4 mg every morning, Xanax 1 mg t.i.d. as needed   for anxiety, omeprazole 20 mg daily, pain pump with Dilaudid.    PHYSICAL EXAMINATION:  VITAL SIGNS:  BMI 28.3, blood pressure 121/84, pulse 98, temperature 97.3,   respiratory rate 17, oxygen saturation 91% on room air.  CONSTITUTIONAL:  Well developed, well nourished.  GENERAL:  He is very anxious and agitated and in pain.  HEENT:  Normocephalic, atraumatic head.  Oral mucous membranes are moist.    Eyes, PERRLA.  Conjunctivae normal without discharge.  NECK:  Supple without lymphadenopathy.  CARDIOVASCULAR:  Tachycardic, normal rhythm.  No murmurs.  No cyanosis,   clubbing, or edema.  LUNGS:  Respiratory effort is normal.  Coarse breath sounds noted.  No   wheezing or crackles.  ABDOMEN:  Soft, nontender.  No guarding or rebound.  EXTREMITIES:  Radial and dorsalis pedis pulses intact.  No cyanosis or edema.  SKIN:  Warm, dry, no erythema, no rash.  NEUROLOGIC:  Alert and oriented to time, place, and person.  Strength and   sensation intact.  No focal deficits.  Cranial nerves II-XII normal.  Patient   is unable to raise his left leg due to pain.  PSYCHIATRIC:  Patient appears very anxious.    PERTINENT LABORATORY DATA AND IMAGING REVIEW:  WBC 7.5, hemoglobin 11.8,   hematocrit 36.9, platelet count _____.  Sodium 133, potassium 4, chloride 101,   bicarbonate 24, anion gap 8, glucose 151, BUN 13, creatinine 0.72, calcium   9.2, AST 13, ALT 16, alkaline phosphatase 127, total bilirubin 0.3, albumin   3.8, total protein 7.2.  INR 1.35.  Magnesium 2.2.  Urinalysis is negative for   infection.    IMAGING:  X-ray of pelvis reveals dislocated left hip arthroplasty, again seen   cephalad migration of the left  acetabular cup with respect to the bony   acetabulum with a large _____ lesion.  Differential diagnosis for this lytic   lesion includes metastatic disease, primary bone lesion or metallosis.  Chest   x-ray reveals multiple confluent nodular opacities on the right, likely   representing extensive malignancy following loss on the right is seen.    Multiple nodules are also seen in the left lung.  CT hip without contrast   revealed dislocation of the left hip arthroplasty, prosthetic femoral head is   located superior and anterior to the prosthetic acetabulum, destructive lytic   lesion in the left iliac bone is again identified, no acute fractures are   identified.    ASSESSMENT AND PLAN:  1.  Left hip pain with dislocation of left hip arthroplasty status post fall.    Patient will be placed in observation for pain control.  Ortho has been   consulted by ER physician.  We are awaiting their recommendations.  Patient   currently has a pain pump, which we will continue.  We will add breakthrough   pain with Dilaudid 0.5-1 mg every 3 hours p.r.n.  2.  Metastatic lung cancer.  Patient follows Dr. Bennett, we will consider   consulting her in the morning.  Given extensive metastatic disease, it would   be appropriate to consult hospice at this point.  3.  Anxiety.  Patient is currently having a panic attack and is severely   anxious.  We will provide the patient with IV Ativan p.r.n. every 4 hours and   continue the patient on his home regimen of Xanax p.o.  We will also continue   the patient on his Effexor.  4.  Pulmonary embolism.  Patient was recently diagnosed with pulmonary   embolism on the 3rd of this month, we will hold off on his Xarelto at this   time given possible surgical procedure.  If ortho recommended no surgery, we   will consider resuming his Xarelto.  We will continue the patient on   supplemental oxygen as needed to aim for a pulse ox greater than 94%.  5.  Gastroesophageal reflux disease.  Patient  will be restarted on his home   regimen of omeprazole.  6.  Constipation.  Patient will be started on bowel protocol.  7.  Deep venous thrombosis prophylaxis.  Patient will be started on Lovenox   subQ for deep venous thrombosis prophylaxis.  8.  Code status, do not resuscitate.       ____________________________________     MD CANDELARIA Lou / CELSO    DD:  03/28/2017 22:39:06  DT:  03/28/2017 23:37:03    D#:  [id number]  Job#:  [id number]

## 2017-03-29 NOTE — CONSULTS
Ortho:    Please see forthcoming dictation for details. In brief, Mr. Torres is a 66 year-old male with metastatic lung cancer and a history of recent fall with worsening hip pain. He now has evidence of a subluxated left total hip with a vertical likely loose acetabular component. I have discussed with the patient our plan for deferral of his care to Dr. Vergara for consideration of revision left EBONY. Dr. Vergara plans to see the patient later today to discuss this plan in more detail.

## 2017-03-30 PROCEDURE — 700102 HCHG RX REV CODE 250 W/ 637 OVERRIDE(OP): Performed by: INTERNAL MEDICINE

## 2017-03-30 PROCEDURE — G0378 HOSPITAL OBSERVATION PER HR: HCPCS

## 2017-03-30 PROCEDURE — 96376 TX/PRO/DX INJ SAME DRUG ADON: CPT

## 2017-03-30 PROCEDURE — 99226 PR SUBSEQUENT OBSERVATION CARE,LEVEL III: CPT | Performed by: HOSPITALIST

## 2017-03-30 PROCEDURE — 700111 HCHG RX REV CODE 636 W/ 250 OVERRIDE (IP): Performed by: INTERNAL MEDICINE

## 2017-03-30 PROCEDURE — A9270 NON-COVERED ITEM OR SERVICE: HCPCS | Performed by: INTERNAL MEDICINE

## 2017-03-30 RX ADMIN — VENLAFAXINE HYDROCHLORIDE 37.5 MG: 37.5 CAPSULE, EXTENDED RELEASE ORAL at 07:55

## 2017-03-30 RX ADMIN — ENOXAPARIN SODIUM 40 MG: 100 INJECTION SUBCUTANEOUS at 07:50

## 2017-03-30 RX ADMIN — TAMSULOSIN HYDROCHLORIDE 0.4 MG: 0.4 CAPSULE ORAL at 07:50

## 2017-03-30 RX ADMIN — GABAPENTIN 200 MG: 100 CAPSULE ORAL at 07:50

## 2017-03-30 RX ADMIN — HYDROMORPHONE HYDROCHLORIDE 1 MG: 1 INJECTION, SOLUTION INTRAMUSCULAR; INTRAVENOUS; SUBCUTANEOUS at 21:01

## 2017-03-30 RX ADMIN — GABAPENTIN 200 MG: 100 CAPSULE ORAL at 21:01

## 2017-03-30 RX ADMIN — OMEPRAZOLE 20 MG: 20 CAPSULE, DELAYED RELEASE ORAL at 07:50

## 2017-03-30 RX ADMIN — GABAPENTIN 200 MG: 100 CAPSULE ORAL at 15:51

## 2017-03-30 RX ADMIN — METOCLOPRAMIDE 10 MG: 5 INJECTION, SOLUTION INTRAMUSCULAR; INTRAVENOUS at 06:36

## 2017-03-30 RX ADMIN — STANDARDIZED SENNA CONCENTRATE AND DOCUSATE SODIUM 2 TABLET: 8.6; 5 TABLET, FILM COATED ORAL at 21:01

## 2017-03-30 RX ADMIN — ALPRAZOLAM 1 MG: 1 TABLET ORAL at 15:50

## 2017-03-30 ASSESSMENT — LIFESTYLE VARIABLES
EVER_SMOKED: YES
EVER HAD A DRINK FIRST THING IN THE MORNING TO STEADY YOUR NERVES TO GET RID OF A HANGOVER: NO
ALCOHOL_USE: YES
TOTAL SCORE: 0
HOW MANY TIMES IN THE PAST YEAR HAVE YOU HAD 5 OR MORE DRINKS IN A DAY: 0
EVER FELT BAD OR GUILTY ABOUT YOUR DRINKING: NO
HAVE YOU EVER FELT YOU SHOULD CUT DOWN ON YOUR DRINKING: NO
AVERAGE NUMBER OF DAYS PER WEEK YOU HAVE A DRINK CONTAINING ALCOHOL: 1
CONSUMPTION TOTAL: NEGATIVE
ON A TYPICAL DAY WHEN YOU DRINK ALCOHOL HOW MANY DRINKS DO YOU HAVE: 0
HAVE PEOPLE ANNOYED YOU BY CRITICIZING YOUR DRINKING: NO
TOTAL SCORE: 0
TOTAL SCORE: 0

## 2017-03-30 ASSESSMENT — PAIN SCALES - GENERAL
PAINLEVEL_OUTOF10: 2
PAINLEVEL_OUTOF10: 7

## 2017-03-30 ASSESSMENT — ENCOUNTER SYMPTOMS
SHORTNESS OF BREATH: 0
COUGH: 0
WEAKNESS: 1
FEVER: 0
NAUSEA: 1
BACK PAIN: 1
MYALGIAS: 0
TINGLING: 1
ABDOMINAL PAIN: 0
CHILLS: 0
VOMITING: 0

## 2017-03-30 NOTE — PROGRESS NOTES
Oncology/Hematology Progress Note               Author: Shawanda Ragsdalela Date & Time created: 3/30/2017  4:21 PM     Diagnosis: Metastatic lung malignancy    Chief complaint: Mechanical fall with hip pain    Interval History:  He is resting in bed comfortably  He continues to have hip pain  He states that he was a mechanical fall  He denies any syncope at the time of fall  He otherwise has been feeling fairly stable overnight  He denies any nausea or vomiting  He denies any fevers or chills    Review of Systems:  ROS   All other review of systems are negative except what was mentioned above in the HPI.  Constitutional: Negative for fever and chills. Positive for malaise/fatigue.    HENT: Negative for ear pain and nosebleeds.     Eyes: Negative for blurred vision.     Respiratory: Positive for mild dry cough. Positive for shortness of breath on exertion.     Cardiovascular: Negative for chest pain and leg swelling.    Gastrointestinal: Negative for nausea, vomiting and abdominal pain.    Genitourinary: Negative for dysuria.    Musculoskeletal: Negative for myalgias. Positive for left hip pain.   Skin: Negative for rash.    Neurological: Negative for dizziness and headaches.    Endo/Heme/Allergies: No bruise/bleed easily.    Psychiatric/Behavioral: Negative for depression and memory loss.      Physical Exam:  Physical Exam   General: Not in acute distress, alert and oriented x 3  HEENT: normalcephalic, atraumatic, extra ocular muscles intact, moist oral mucus membranes, and oral cavity without any lesions.  Neck: Supple neck and full range of motion  Lymph nodes: No palpable bilateral cervical and supraclavicular lymphadenopathy.     CVS: regular rate and rhythm  RESP: clear breath sounds bilaterally    ABD: Soft, non tender, non distended    EXT: Negative for edema  Left hip tenderness   CNS: Alert and oriented x 3     Labs:        Invalid input(s): [id number]      Recent Labs      03/28/17   0640  03/29/17    0319   SODIUM  133*  141   POTASSIUM  4.0  4.2   CHLORIDE  101  104   CO2  24  30   BUN  13  9   CREATININE  0.72  0.74   MAGNESIUM  2.2   --    CALCIUM  9.2  9.2     Recent Labs      17   0640  179   ALTSGPT  16   --    ASTSGOT  13   --    ALKPHOSPHAT  127*   --    TBILIRUBIN  0.3   --    GLUCOSE  151*  96     Recent Labs      17   0640  17   0318   RBC  3.79*  3.14*   HEMOGLOBIN  11.8*  9.7*   HEMATOCRIT  36.9*  30.9*   PLATELETCT  378  275   PROTHROMBTM  17.1*   --    INR  1.35*   --      Recent Labs      1740  17   WBC  7.5  4.3*   NEUTSPOLYS  84.90*  72.30*   LYMPHOCYTES  5.10*  11.90*   MONOCYTES  8.20  13.30   EOSINOPHILS  0.00  0.20   BASOPHILS  0.50  1.40   ASTSGOT  13   --    ALTSGPT  16   --    ALKPHOSPHAT  127*   --    TBILIRUBIN  0.3   --      Recent Labs      17   0640  17   031   SODIUM  133*  141   POTASSIUM  4.0  4.2   CHLORIDE  101  104   CO2  24  30   GLUCOSE  151*  96   BUN  13  9   CREATININE  0.72  0.74   CALCIUM  9.2  9.2     Hemodynamics:  Temp (24hrs), Av.3 °C (97.3 °F), Min:36.2 °C (97.1 °F), Max:36.5 °C (97.7 °F)  Temperature: 36.2 °C (97.1 °F)  Pulse  Av.9  Min: 68  Max: 130   Blood Pressure : (!) 93/63 mmHg     Respiratory:    Respiration: 18, Pulse Oximetry: 91 %     Work Of Breathing / Effort: Mild  RUL Breath Sounds: Clear, RML Breath Sounds: Clear;Diminished, RLL Breath Sounds: Diminished, LIN Breath Sounds: Clear, LLL Breath Sounds: Diminished  Fluids:  No intake or output data in the 24 hours ending 17 1621     GI/Nutrition:  Orders Placed This Encounter   Procedures   • DIET ORDER     Standing Status: Standing      Number of Occurrences: 1      Standing Expiration Date:      Order Specific Question:  Diet:     Answer:  Regular [1]     Medical Decision Making, by Problem:  Active Hospital Problems    Diagnosis   • *Hip dislocation, left (CMS-HCC) [S73.005A]   • Left hip pain [M25.552]   • History of  pulmonary embolism [Z86.711]   • Lung cancer metastatic to bone (CMS-HCC) [C34.90, C79.51]   • Anemia [D64.9]   • Fall [W19.XXXA]       Assessment and Plan:  1. Left hip dislocation: Patient had a mechanical fall and is found to have dislocation of the hip. Patient does have metastatic bony disease with lytic lesion in the left iliac bone. Orthopedic has been consulted in our waiting recommendations in regards to need for surgery.  2. Metastatic adenocarcinoma of lung, EGFR exon 20: Has been on multiple lines of therapy. He is currently on single agent docetaxel. Last cycle was on 3/10/17. His next cycle is due tomorrow however this will be deferred secondary to above.  3. Pulmonary embolus: He and was found to have incidental left lower lobe pulmonary nodule. He had been started on Xeratlo.  Her son was ordered which is pending. Secondary to above he is currently on Lovenox.  4. Intractable hip pain: He is S/P palliative radiation to the right scapular lesion as well as left hip in the past. He also has a pain pump in place.  5. Bony metastatic disease: He was due to start bisphosphonate however he was awaiting dental clearance prior to starting.  6. Normocytic anemia: Drop overnight. Patient will need close monitoring and transfusion as needed.    Thank you for allowing me to participate in his care. Please feel free to contact me with questions or concerns in regards to his care.    Please note that this dictation was created using voice recognition software. I have made every reasonable attempt to correct obvious errors, but I expect that there are errors of grammar and possibly content that I did not discover before finalizing the note.      Core Measures

## 2017-03-30 NOTE — DIETARY
Nutrition note:  Pt with poor po intake PTA r/t chemo treatments. Pt has lung CA with mets to bone. Labs and meds reviewed. Skin intact. Pt has had previous hip surgery and has now fallen on that hip.  Pt is on a regular diet; may be NPO at [facility] for surgery tomorrow.  However, pt states he thinks he may have decided not to have surgery. Pt states the food here is good, he just isn't really interested.  Discussed the need for nutrition, especially if having surgery.  Pt verbalizes understanding.  Agrees to a bedtime snack every day.  Encouraged pt to make special food requests as desired.  RD will monitor per dept policy.

## 2017-03-30 NOTE — PROGRESS NOTES
Oncology/Hematology Progress Note               Author: Sobia Leal Date & Time created: 3/29/2017  5:14 PM     Interval History:  66-year-old male patient of Dr. Bennett with metastatic lung cancer to bone, admitted for a fall with increase in significant left hip pain.    Patient was initially diagnosed with lung adenocarcinoma in 2015. At the time of diagnosis he was also noted to have metastatic disease to the right scapula and third/fourth rib. He was found to have EGFR 20 mutation and was originally seen and treated by Dr. Mosse. He received Tarceva June 2015 to September 2015. He then had progression of disease and was treated with carboplatin and Alimta ×1 cycle but due to significant toxicity he was unable to tolerate the chemotherapy and was hospitalized. He did have further testing and did have negative for PDL1 mutation. He then was treated with Opdivo in March 2016 through November 2016. At that time he did show progression of disease and was found to have a destructive metastatic lesion in the superior left acetabulum. Biopsy was completed of the right middle lobe in November 2016 showing negative for T790 M mutation.  Patient did undergo a bone scan which showed uptake in the right scapula as well as in the left hip. Repeat biopsy in January 2017 of the bone lesion was completed for possible MATCH trial, however patient was not a candidate. Patient has sense transferred care to Dr. Bennett. Patient was recently hospitalized in February 2017 due to intractable pain. He did undergo radiation therapy to the left hip area and was discharged to rehabilitation. He does continue to have pain and is followed closely by Dr. Sparks, interventional pain management where patient did recently undergo a pain pump implantation. Since the implantation of the pain pump patient has done very well and is currently at home.  Patient did have a CT scan in March were to show progression of disease in the lungs, but  also noted to have a pulmonary embolus. He is established with our anticoagulation clinic and has been started on Xarelto. He did start back on systemic chemotherapy where he is received one dose of single agent docetaxel. He is due to receive his second dose of docetaxel this Friday, March 31. Patient has had some significant depression and anxiety surrounding his cancer diagnosis. He was recently started on a low-dose of Effexor approximately 2 weeks ago.    Patient had been doing very well at home and stated his pain was a little bit more controlled. He unfortunately got caught up in his bed sheets and fell yesterday on the left hip. It appears that he has caused more damage with the fall to that left hip and at this time orthopedic surgery consultation is pending.  Patient stated he is feeling well otherwise.   Denies coughing, wheezing, or shortness of breath.  He is voiding without difficulties and having normal bowel movements. His last problem was yesterday.  She is the pain pump for his significant pain which has helped reduce pain quite a bit.        Review of Systems:  Review of Systems   Constitutional: Negative for fever, chills, weight loss and malaise/fatigue.   Respiratory: Negative for cough, shortness of breath and wheezing.    Cardiovascular: Negative for chest pain, palpitations and leg swelling.   Gastrointestinal: Positive for nausea (one episode earlier this morning but none since.). Negative for vomiting.   Genitourinary: Negative for dysuria.   Musculoskeletal: Positive for joint pain.       Physical Exam:  Physical Exam   Constitutional: He is oriented to person, place, and time. He appears well-developed and well-nourished. No distress.   HENT:   Head: Normocephalic and atraumatic.   Cardiovascular: Normal rate, normal heart sounds and intact distal pulses.    Pulmonary/Chest: Effort normal and breath sounds normal. No respiratory distress. He has no wheezes.   Abdominal: Soft. Bowel  sounds are normal. He exhibits no distension. There is no tenderness.   Musculoskeletal: He exhibits tenderness.   Neurological: He is alert and oriented to person, place, and time.   Skin: Skin is warm and dry. He is not diaphoretic.   Psychiatric: He has a normal mood and affect. His behavior is normal.   Vitals reviewed.      Labs:        Invalid input(s): [id number]      Recent Labs      17   SODIUM  133*  141   POTASSIUM  4.0  4.2   CHLORIDE  101  104   CO2  24  30   BUN  13  9   CREATININE  0.72  0.74   MAGNESIUM  2.2   --    CALCIUM  9.2  9.2     Recent Labs      17   ALTSGPT  16   --    ASTSGOT  13   --    ALKPHOSPHAT  127*   --    TBILIRUBIN  0.3   --    GLUCOSE  151*  96     Recent Labs      17   RBC  3.79*  3.14*   HEMOGLOBIN  11.8*  9.7*   HEMATOCRIT  36.9*  30.9*   PLATELETCT  378  275   PROTHROMBTM  17.1*   --    INR  1.35*   --      Recent Labs      17   WBC  7.5  4.3*   NEUTSPOLYS  84.90*  72.30*   LYMPHOCYTES  5.10*  11.90*   MONOCYTES  8.20  13.30   EOSINOPHILS  0.00  0.20   BASOPHILS  0.50  1.40   ASTSGOT  13   --    ALTSGPT  16   --    ALKPHOSPHAT  127*   --    TBILIRUBIN  0.3   --      Recent Labs      17   SODIUM  133*  141   POTASSIUM  4.0  4.2   CHLORIDE  101  104   CO2  24  30   GLUCOSE  151*  96   BUN  13  9   CREATININE  0.72  0.74   CALCIUM  9.2  9.2     Hemodynamics:  Temp (24hrs), Av.2 °C (97.1 °F), Min:35.9 °C (96.7 °F), Max:36.5 °C (97.7 °F)  Temperature: 36.1 °C (97 °F)  Pulse  Av.6  Min: 68  Max: 130   Blood Pressure : 113/59 mmHg     Respiratory:    Respiration: 16, Pulse Oximetry: 97 %        RUL Breath Sounds: Diminished, RML Breath Sounds: Diminished (course), RLL Breath Sounds: Diminished (course), LIN Breath Sounds: Diminished, LLL Breath Sounds: Diminished  Fluids:    Intake/Output Summary (Last 24 hours) at  03/29/17 1714  Last data filed at 03/29/17 0600   Gross per 24 hour   Intake      0 ml   Output    500 ml   Net   -500 ml        GI/Nutrition:  Orders Placed This Encounter   Procedures   • DIET ORDER     Standing Status: Standing      Number of Occurrences: 1      Standing Expiration Date:      Order Specific Question:  Diet:     Answer:  Regular [1]     Medical Decision Making, by Problem:  Active Hospital Problems    Diagnosis   • *Hip dislocation, left (CMS-HCC) [S73.005A]   • Left hip pain [M25.552]   • History of pulmonary embolism [Z86.711]   • Lung cancer metastatic to bone (CMS-HCC) [C34.90, C79.51]   • Anemia [D64.9]   • Fall [W19.XXXA]       Plan:  1. Metastatic lung cancer to bone - currently patient is on single agent docetaxel. He is due to complete cycle 2 on Friday, March 31. At this time we will wait for orthopedic consultation recommendations on whether we will proceed with chemotherapy this week or hold it for possible surgery. I did discuss with hospitalist as well as Dr. Bennett.    2. Subluxated left total hip - awaiting orthopedic surgical consultation recommendations.      Yañez catheter: No Yañez      DVT Prophylaxis: Enoxaparin (Lovenox)    Ulcer prophylaxis: Yes

## 2017-03-30 NOTE — CARE PLAN
Problem: Mobility  Goal: Risk for activity intolerance will decrease  Outcome: PROGRESSING AS EXPECTED  Pt is ambulating better with a FWW than his crutches     Problem: Psychosocial Needs:  Goal: Level of anxiety will decrease  Outcome: PROGRESSING AS EXPECTED  Pt is less anxious today and has been able to sleep most the day

## 2017-03-30 NOTE — PROGRESS NOTES
Pt is resting comfortably in bed at this time. Denies any c/o pain or discomfort requiring intervention from RN. States he isn't hungry this morning and did not eat but a few bites of breakfast. Uses his call light appropriately for assistance OOB with walker. Up SBA. Declines the use of bed alarm. Educated on bed alarm purpose and continued safety. Agrees to call staff at all times. Will monitor.

## 2017-03-30 NOTE — PROGRESS NOTES
Hospital Medicine Progress Note, Adult, Complex               Author: Alexander Galaviz Date & Time created: 3/30/2017  3:47 PM     CC: 65 yo male hx met lung ca , pirior left hip replacement 3-2014 , Pe on xarelto admitted with fall on left side - dx hip dislocation     Interval History:    Evaluated by ortho- concern re January Bone scan with left acetabular , pelvic metastasis.      Review of Systems:  Review of Systems   Constitutional: Negative for fever and chills.   Respiratory: Negative for cough and shortness of breath.    Cardiovascular: Negative for chest pain and leg swelling.   Gastrointestinal: Positive for nausea. Negative for vomiting and abdominal pain.   Musculoskeletal: Positive for back pain and joint pain. Negative for myalgias.   Neurological: Positive for tingling and weakness.       Physical Exam:  Physical Exam   Constitutional: He is oriented to person, place, and time. No distress.   Eyes: EOM are normal. No scleral icterus.   Neck: No JVD present.   Cardiovascular: Normal rate and regular rhythm.    No murmur heard.  Pulmonary/Chest: No stridor. He has no wheezes. He has no rales. He exhibits no tenderness.   Abdominal: Soft. Bowel sounds are normal. He exhibits no distension. There is no tenderness.   abd pain pump present    Musculoskeletal: He exhibits tenderness (decreased Rom at hip 2nd pain.). He exhibits no edema.   Neurological: He is alert and oriented to person, place, and time. No cranial nerve deficit.   Skin: Skin is warm and dry. He is not diaphoretic.   Psychiatric: He has a normal mood and affect. His behavior is normal.       Labs:        Invalid input(s): [id number]      Recent Labs      03/28/17 0640  03/29/17 0319   SODIUM  133*  141   POTASSIUM  4.0  4.2   CHLORIDE  101  104   CO2  24  30   BUN  13  9   CREATININE  0.72  0.74   MAGNESIUM  2.2   --    CALCIUM  9.2  9.2     Recent Labs      03/28/17   0640  03/29/17 0319   ALTSGPT  16   --    ASTSGOT  13   --     ALKPHOSPHAT  127*   --    TBILIRUBIN  0.3   --    GLUCOSE  151*  96     Recent Labs      17   0640  17   0318   RBC  3.79*  3.14*   HEMOGLOBIN  11.8*  9.7*   HEMATOCRIT  36.9*  30.9*   PLATELETCT  378  275   PROTHROMBTM  17.1*   --    INR  1.35*   --      Recent Labs      17   0640  17   0318   WBC  7.5  4.3*   NEUTSPOLYS  84.90*  72.30*   LYMPHOCYTES  5.10*  11.90*   MONOCYTES  8.20  13.30   EOSINOPHILS  0.00  0.20   BASOPHILS  0.50  1.40   ASTSGOT  13   --    ALTSGPT  16   --    ALKPHOSPHAT  127*   --    TBILIRUBIN  0.3   --            Hemodynamics:  Temp (24hrs), Av.3 °C (97.3 °F), Min:36.1 °C (97 °F), Max:36.5 °C (97.7 °F)  Temperature: 36.2 °C (97.1 °F)  Pulse  Av.9  Min: 68  Max: 130   Blood Pressure : (!) 93/63 mmHg     Respiratory:    Respiration: 18, Pulse Oximetry: 91 %     Work Of Breathing / Effort: Mild  RUL Breath Sounds: Clear, RML Breath Sounds: Clear;Diminished, RLL Breath Sounds: Diminished, LIN Breath Sounds: Clear, LLL Breath Sounds: Diminished  Fluids:  No intake or output data in the 24 hours ending 17 1547     GI/Nutrition:  Orders Placed This Encounter   Procedures   • DIET ORDER     Standing Status: Standing      Number of Occurrences: 1      Standing Expiration Date:      Order Specific Question:  Diet:     Answer:  Regular [1]     Medical Decision Making, by Problem:  Active Hospital Problems    Diagnosis   • *Hip arthroplasty dislocation, left (CMS-HCC) [S73.005A] w severe pain, debility .  Pain control w prn dilaudid  - Surgery to assess for hip revision    • Left hip pain [M25.552]   • History of pulmonary embolism [Z86.711]  Holding xarelto    • Lung cancer metastatic to bone (CMS-HCC) [C34.90, C79.51] palliative trmts   • Anemia [D64.9] likely chronic dz.   • Fall [W19.XXXA]  Anxiety , depression   Effexor, prn ativan, xanax.       Labs reviewed, Radiology images reviewed and Medications reviewed  Yañez catheter: No Yañez        DVT prophylaxis  - mechanical: SCDs

## 2017-03-30 NOTE — PROGRESS NOTES
Assumed care of patient at 1845 on 3/29, patient oriented X4, cooperative with care, painful in left hip however has pain pump and able to bolus self, patient has denied need for additional pain medication this shift. Currently on 2 litets oxygen, per patient uses 1-2 liters and NOC at home. Lungs CTA in upper lobes, diminished in bases, encouraged IS use, patient calls appropriately, up with SBA with FWW, c/o dizziness when sitting up from a lying position, however denies dizziness while ambulating, eating/drinking well, WCTM...gabriel rn

## 2017-03-30 NOTE — PROGRESS NOTES
Ortho full consult to follow  Asked by Dr. Hernandez to evaluate this patient.  Briefly, left hip subluxation in loose, vertical cup  Patient has large metastatic lesion in pelvis, which would make revision hip replacement very unpredictable  May be a candidate for left hip revision, but this procedure is highly risky given his multiple medical comorbidities  Will discuss with patient about treatment goals and prognosis

## 2017-03-30 NOTE — CARE PLAN
Problem: Mobility  Goal: Risk for activity intolerance will decrease  Outcome: PROGRESSING AS EXPECTED

## 2017-03-31 ENCOUNTER — APPOINTMENT (OUTPATIENT)
Dept: HEMATOLOGY ONCOLOGY | Facility: MEDICAL CENTER | Age: 67
End: 2017-03-31
Payer: MEDICARE

## 2017-03-31 ENCOUNTER — APPOINTMENT (OUTPATIENT)
Dept: ONCOLOGY | Facility: MEDICAL CENTER | Age: 67
End: 2017-03-31
Attending: INTERNAL MEDICINE
Payer: MEDICARE

## 2017-03-31 LAB
ABO GROUP BLD: NORMAL
ABO GROUP BLD: NORMAL
ANION GAP SERPL CALC-SCNC: 6 MMOL/L (ref 0–11.9)
APTT PPP: 32.5 SEC (ref 24.7–36)
BASOPHILS # BLD AUTO: 1.4 % (ref 0–1.8)
BASOPHILS # BLD: 0.08 K/UL (ref 0–0.12)
BLD GP AB SCN SERPL QL: NORMAL
BUN SERPL-MCNC: 9 MG/DL (ref 8–22)
CALCIUM SERPL-MCNC: 9.2 MG/DL (ref 8.5–10.5)
CHLORIDE SERPL-SCNC: 100 MMOL/L (ref 96–112)
CO2 SERPL-SCNC: 28 MMOL/L (ref 20–33)
CREAT SERPL-MCNC: 0.68 MG/DL (ref 0.5–1.4)
EOSINOPHIL # BLD AUTO: 0.03 K/UL (ref 0–0.51)
EOSINOPHIL NFR BLD: 0.5 % (ref 0–6.9)
ERYTHROCYTE [DISTWIDTH] IN BLOOD BY AUTOMATED COUNT: 49.1 FL (ref 35.9–50)
GFR SERPL CREATININE-BSD FRML MDRD: >60 ML/MIN/1.73 M 2
GLUCOSE SERPL-MCNC: 109 MG/DL (ref 65–99)
HCT VFR BLD AUTO: 33.3 % (ref 42–52)
HGB BLD-MCNC: 10.9 G/DL (ref 14–18)
IMM GRANULOCYTES # BLD AUTO: 0.05 K/UL (ref 0–0.11)
IMM GRANULOCYTES NFR BLD AUTO: 0.9 % (ref 0–0.9)
INR PPP: 0.94 (ref 0.87–1.13)
LYMPHOCYTES # BLD AUTO: 0.55 K/UL (ref 1–4.8)
LYMPHOCYTES NFR BLD: 9.6 % (ref 22–41)
MCH RBC QN AUTO: 31.2 PG (ref 27–33)
MCHC RBC AUTO-ENTMCNC: 32.7 G/DL (ref 33.7–35.3)
MCV RBC AUTO: 95.4 FL (ref 81.4–97.8)
MONOCYTES # BLD AUTO: 0.74 K/UL (ref 0–0.85)
MONOCYTES NFR BLD AUTO: 13 % (ref 0–13.4)
NEUTROPHILS # BLD AUTO: 4.25 K/UL (ref 1.82–7.42)
NEUTROPHILS NFR BLD: 74.6 % (ref 44–72)
NRBC # BLD AUTO: 0 K/UL
NRBC BLD AUTO-RTO: 0 /100 WBC
PLATELET # BLD AUTO: 287 K/UL (ref 164–446)
PMV BLD AUTO: 9 FL (ref 9–12.9)
POTASSIUM SERPL-SCNC: 4 MMOL/L (ref 3.6–5.5)
PROTHROMBIN TIME: 12.9 SEC (ref 12–14.6)
RBC # BLD AUTO: 3.49 M/UL (ref 4.7–6.1)
RH BLD: NORMAL
SODIUM SERPL-SCNC: 134 MMOL/L (ref 135–145)
WBC # BLD AUTO: 5.7 K/UL (ref 4.8–10.8)

## 2017-03-31 PROCEDURE — 36415 COLL VENOUS BLD VENIPUNCTURE: CPT

## 2017-03-31 PROCEDURE — 86901 BLOOD TYPING SEROLOGIC RH(D): CPT

## 2017-03-31 PROCEDURE — 85610 PROTHROMBIN TIME: CPT

## 2017-03-31 PROCEDURE — 99217 PR OBSERVATION CARE DISCHARGE: CPT | Performed by: HOSPITALIST

## 2017-03-31 PROCEDURE — A9270 NON-COVERED ITEM OR SERVICE: HCPCS | Performed by: INTERNAL MEDICINE

## 2017-03-31 PROCEDURE — 700102 HCHG RX REV CODE 250 W/ 637 OVERRIDE(OP): Performed by: INTERNAL MEDICINE

## 2017-03-31 PROCEDURE — 85025 COMPLETE CBC W/AUTO DIFF WBC: CPT

## 2017-03-31 PROCEDURE — 80048 BASIC METABOLIC PNL TOTAL CA: CPT

## 2017-03-31 PROCEDURE — 96376 TX/PRO/DX INJ SAME DRUG ADON: CPT

## 2017-03-31 PROCEDURE — 86900 BLOOD TYPING SEROLOGIC ABO: CPT

## 2017-03-31 PROCEDURE — G0378 HOSPITAL OBSERVATION PER HR: HCPCS

## 2017-03-31 PROCEDURE — 700111 HCHG RX REV CODE 636 W/ 250 OVERRIDE (IP): Performed by: INTERNAL MEDICINE

## 2017-03-31 PROCEDURE — 85730 THROMBOPLASTIN TIME PARTIAL: CPT

## 2017-03-31 PROCEDURE — 86850 RBC ANTIBODY SCREEN: CPT

## 2017-03-31 RX ORDER — OXYCODONE HYDROCHLORIDE 5 MG/1
5 TABLET ORAL EVERY 4 HOURS PRN
Status: DISCONTINUED | OUTPATIENT
Start: 2017-03-31 | End: 2017-04-01 | Stop reason: HOSPADM

## 2017-03-31 RX ORDER — OXYCODONE HYDROCHLORIDE 10 MG/1
10 TABLET ORAL EVERY 4 HOURS PRN
Status: DISCONTINUED | OUTPATIENT
Start: 2017-03-31 | End: 2017-04-01 | Stop reason: HOSPADM

## 2017-03-31 RX ADMIN — HYDROMORPHONE HYDROCHLORIDE 1 MG: 1 INJECTION, SOLUTION INTRAMUSCULAR; INTRAVENOUS; SUBCUTANEOUS at 06:11

## 2017-03-31 RX ADMIN — GABAPENTIN 200 MG: 100 CAPSULE ORAL at 21:03

## 2017-03-31 RX ADMIN — GABAPENTIN 200 MG: 100 CAPSULE ORAL at 08:01

## 2017-03-31 RX ADMIN — TAMSULOSIN HYDROCHLORIDE 0.4 MG: 0.4 CAPSULE ORAL at 08:01

## 2017-03-31 RX ADMIN — OMEPRAZOLE 20 MG: 20 CAPSULE, DELAYED RELEASE ORAL at 08:01

## 2017-03-31 RX ADMIN — ALPRAZOLAM 1 MG: 1 TABLET ORAL at 10:38

## 2017-03-31 RX ADMIN — VENLAFAXINE HYDROCHLORIDE 37.5 MG: 37.5 CAPSULE, EXTENDED RELEASE ORAL at 08:01

## 2017-03-31 ASSESSMENT — PAIN SCALES - GENERAL
PAINLEVEL_OUTOF10: 0
PAINLEVEL_OUTOF10: 6
PAINLEVEL_OUTOF10: 7
PAINLEVEL_OUTOF10: 0

## 2017-03-31 ASSESSMENT — ENCOUNTER SYMPTOMS
CHILLS: 0
COUGH: 0
TINGLING: 1
ABDOMINAL PAIN: 0
NAUSEA: 0
STRIDOR: 0
DIARRHEA: 0
MYALGIAS: 0
VOMITING: 0
SHORTNESS OF BREATH: 0
BACK PAIN: 1
FEVER: 0

## 2017-03-31 NOTE — CONSULTS
DATE OF SERVICE:  03/30/2017    REQUESTING PHYSICIAN:  Dr. Dameon Neely and Alexander Galaviz MD    REASON FOR CONSULTATION AND CHIEF COMPLAINT:  Failed left hip replacement.    HISTORY OF PRESENT ILLNESS:  The patient is a 66-year-old gentleman who has a   past medical history of metastatic lung cancer with mets to bone including to   the left pelvic area.  He has had a left hip replacement done in [place]   many years ago.  Unfortunately, he was known to have a loose left acetabular   component.  He was managing to ambulate.  He had recently started   chemotherapy.  He is status post a course of chemotherapy and radiation.    Unfortunately, on 3/28/2017, when he was ambulating, he got caught in the   blankets of his bed and had a ground level fall onto his left side.  He had   immediate pain and inability to ambulate.  He was brought to the emergency   department where a CT scan as well as x-rays were taken and they showed   evidence of a displaced left acetabular component with a subluxated femoral   head.  He was made nonweightbearing.  He was admitted to the hospitalist   service and orthopedics was consulted for further evaluation.  Dr. Hernandez   who was on call has asked me to evaluate him for a possible left hip   revision.  He currently denies pain in his left hip.  He denies any recent   fevers, chills, nausea or vomiting.  He has been ambulatory with the   assistance of a walker.  He has been able to get from his bed to the bathroom   and is independent in his transfers.    REVIEW OF SYSTEMS:  Negative except for as mentioned in the HPI.  He   specifically denies chest pain, shortness of breath, fevers, chills, nausea or   vomiting.    PAST MEDICAL HISTORY:  1.  Metastatic lung cancer with mets to the bone.  2.  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  3.  History of pulmonary embolism, on Xarelto, which has been held since his   admission.  4.  Anxiety.  5.  Sleep apnea.  6.   Chronic low back pain.  7.  Gastroesophageal reflux disease.    PAST SURGICAL HISTORY:  Left hip replacement done in [place] many years   ago complicated by loose acetabular component that has been known since at   least December.    PAST SURGICAL HISTORY:  Includes shoulder arthroscopy, umbilical hernia repair   and bronchoscopy.    ALLERGIES:  No known drug allergies.    FAMILY HISTORY:  Reviewed and significant for lung cancer in his brother and   prostate cancer in his father.    SOCIAL HISTORY:  He did smoke and quit many years ago.  He does not use drugs   or alcohol.    MEDICATIONS:  He is on morphine, Effexor, gabapentin, Neurontin, Flomax,   Xanax, pain pump with Dilaudid, omeprazole, and Xarelto, which has been held   since his admission.    LABORATORY DATA:  He does have a white blood cell count of 7.5, hemoglobin of   11.8, hematocrit of 36.9, and recent INR is 1.3.    PHYSICAL EXAMINATION:  Thin individual, in no acute distress.  He is alert.  He is oriented.  He is   interviewed in his hospital bed.  He is able to transfer out of his hospital   bed and on to the walker without difficulty.  He has some pain with attempted   log roll of his left hip.  No significant pain with a shorter motion.  He has   a well-healed surgical incision consistent with a posterior approach to the   left hip replacement.  There is no evidence of infection.  There is no   purulent drainage.  There is no fluctuance noted.  He is able to plantar and   dorsiflex the ankle and extend his great toe.  His sensation is somewhat   decreased in a stocking distribution bilaterally, which he states is chronic.    His toes are warm and well perfused.  He does not have a palpable DP pulse.    His left leg is noticeably shorter than his right.    IMAGING:  We have reviewed series of left hip films as well as a CT scan of   his left hip.  It shows evidence of a left hip replacement, which the   cementless total hip arthroplasty.  It is  possible this is the Ericka mid   coat.  The acetabular component is vertical and anteverted and represents a   loose component.  The head is subluxated anteriorly, but is currently within   the acetabular socket.  The CT scan shows a large lytic lesion behind the   acetabular component.  There is almost no medial wall noted.    ASSESSMENT AND PLAN:  A 66-year-old gentleman with multiple medical   comorbidities including metastatic lung cancer and an active PE for which he   has been on Xarelto until just 2 days ago.  He has a loose left hip   replacement with the acetabular component sitting in a bed of presumed   metastatic lung cancer.  Currently, his head is subluxated anteriorly;   however, this likely represents a failure of his prosthetic component.  We   have told him that he is a candidate for a left hip revision, which would a   removal of his current loose acetabular component and placement of a complex   acetabular revision component.  We would likely try to have cages available   versus a large augment to place within the lytic defect.  He would need quite   a lot of lesion curetted, which would carry with it the risk of bleeding,   fractures and creation of a discontinuity.  We have told him that the recovery   time for this is likely 6 months and that we would not let him put weight on   it for at least 3 months.  Again, given the nature of these components and the   need for healing of his wound, we would ask that chemotherapy and radiation   would be held for at least 3 months.  He states he has a very realistic   outlook on his prognosis that he has already lived 22 months at a time when   his oncologist gave him a 12-18 month prognosis for a life.  He is going to   consider proceeding with surgical intervention versus proceeding with home   health and possibly hospice care.  We have him on the surgical schedule for   tomorrow afternoon.  I have told him that I certainly appreciate the difficult    nature of this decision.  I will check in with him tomorrow morning.  I have   made him n.p.o. after midnight, but told him that we can reverse this if   needed.  His procedure carries with it the risks of pain, bleeding, infection,   need for further surgery, lack of healing, blood clots, of which he currently   has an active pulmonary embolus, fractures, pelvic discontinuity, need for   further surgery and possibility of requiring an amputation or disarticulation   in the future.  He expresses an understanding.  We will check in with him in   the morning.  If he does decide to proceed with surgery, I will plan to have 2   units of blood typed and crossed, use the Aquamantys device.  We will likely   not use tranexamic acid given his active blood clot.       ____________________________________     MD JUAN Vicente / CELSO    DD:  03/30/2017 16:35:29  DT:  03/30/2017 19:19:47    D#:  [id number]  Job#:  [id number]

## 2017-03-31 NOTE — PROGRESS NOTES
Pt is AAOx4.  Reports a 7/10 L hip pain.  Medicated per MAR.  PIV patent, saline locked.  POC discussed with pt, as of now pt does not want to have surgery tomorrow. Informed pt he has until the AM to decide.  Pt refusing SCDs.  NPO at midnight, sips with meds OK.   All needs met at this time.  Bed in low position.  Call light within reach.  Rounding in place.

## 2017-03-31 NOTE — PROGRESS NOTES
Update    Called by RN this morning, patient has opted to not proceed with surgery (left hip revision)  I respect his decision.   Should use his walker at all times to prevent further migration of left acetabular component.   OK to eat  Ortho signing off  If patient changes his mind or wishes to discuss further please reconsult (Dr. Vergara)  No follow up needed

## 2017-03-31 NOTE — PROGRESS NOTES
Hospital Medicine Progress Note, Adult, Complex               Author: Alexander Galaviz Date & Time created: 3/31/2017  9:06 AM     CC: 65 yo male hx met lung ca , pirior left hip replacement 3-2014 , Pe on xarelto admitted with fall on left side - dx hip dislocation     Interval History:    High Risk of increased debility , surgical failure with  left acetabular, pelvic metastasis .  Patient wants to put off hip revision.  Interested in hospice.  Has been up with walker, pain tolerable.     Review of Systems:  Review of Systems   Constitutional: Negative for fever and chills.   HENT: Negative for congestion.    Respiratory: Negative for cough, shortness of breath and stridor.    Cardiovascular: Negative for chest pain and leg swelling.   Gastrointestinal: Negative for nausea, vomiting, abdominal pain and diarrhea.   Musculoskeletal: Positive for back pain and joint pain. Negative for myalgias.   Neurological: Positive for tingling.       Physical Exam:  Physical Exam   Constitutional: He is oriented to person, place, and time.   Alert, approp oriented, nad.    Eyes: EOM are normal. No scleral icterus.   Neck: No JVD present.   Cardiovascular: Normal rate and regular rhythm.    No murmur heard.  Pulmonary/Chest: No stridor. He has no wheezes. He has no rales.   Abdominal: Soft. Bowel sounds are normal. He exhibits no distension. There is no tenderness.   abd pain pump present    Musculoskeletal: He exhibits tenderness (weakness, pain w flexion left hip.). He exhibits no edema.   Neurological: He is alert and oriented to person, place, and time. No cranial nerve deficit.   Skin: Skin is warm and dry.       Labs:        Invalid input(s): [id number]      Recent Labs      03/29/17 0319 03/31/17   0301   SODIUM  141  134*   POTASSIUM  4.2  4.0   CHLORIDE  104  100   CO2  30  28   BUN  9  9   CREATININE  0.74  0.68   CALCIUM  9.2  9.2     Recent Labs      03/29/17 0319 03/31/17   0301   GLUCOSE  96  109*     Recent  Labs      17   0301   RBC  3.14*  3.49*   HEMOGLOBIN  9.7*  10.9*   HEMATOCRIT  30.9*  33.3*   PLATELETCT  275  287   PROTHROMBTM   --   12.9   APTT   --   32.5   INR   --   0.94     Recent Labs      17   0301   WBC  4.3*  5.7   NEUTSPOLYS  72.30*  74.60*   LYMPHOCYTES  11.90*  9.60*   MONOCYTES  13.30  13.00   EOSINOPHILS  0.20  0.50   BASOPHILS  1.40  1.40           Hemodynamics:  Temp (24hrs), Av.4 °C (97.5 °F), Min:36.2 °C (97.1 °F), Max:36.6 °C (97.9 °F)  Temperature: 36.3 °C (97.4 °F)  Pulse  Av.3  Min: 68  Max: 130   Blood Pressure : 103/53 mmHg     Respiratory:    Respiration: 18, Pulse Oximetry: 93 %     Work Of Breathing / Effort: Mild  RUL Breath Sounds: Clear, RML Breath Sounds: Diminished, RLL Breath Sounds: Diminished, LIN Breath Sounds: Clear, LLL Breath Sounds: Diminished  Fluids:  No intake or output data in the 24 hours ending 17 0906     GI/Nutrition:  Orders Placed This Encounter   Procedures   • DIET ORDER     Standing Status: Standing      Number of Occurrences: 1      Standing Expiration Date:      Order Specific Question:  Diet:     Answer:  Regular [1]     Medical Decision Making, by Problem:  Active Hospital Problems    Diagnosis   • *Hip arthroplasty dislocation, left (CMS-HCC) [S73.005A] w debility, improved pain--symptoms trmt   prn dilaudid , pain pump, add oral pain reliever   Walker use as fatoumata.   • Left hip pain [M25.552]   • History of pulmonary embolism [Z86.711]- no plans for surgery   Resume xarelto    • Lung cancer metastatic to bone (CMS-HCC) [C34.90, C79.51] palliative trmts   • Anemia [D64.9] likely chronic dz.   • Fall [W19.XXXA]  Anxiety , depression   Effexor, prn ativan, xanax.     Discussed with patient and Dr. Bennett- will consult Hospice.     Labs reviewed, Radiology images reviewed and Medications reviewed  Yañez catheter: No Yañez        DVT prophylaxis - mechanical: SCDs

## 2017-03-31 NOTE — CARE PLAN
Problem: Pain Management  Goal: Pain level will decrease to patient’s comfort goal  Pain managed with PRN pain med. Hot pack given for L hip. Will continue to keep at comfort goal.     Problem: Venous Thromboembolism (VTW)/Deep Vein Thrombosis (DVT) Prevention:  Goal: Patient will participate in Venous Thrombosis (VTE)/Deep Vein Thrombosis (DVT)Prevention Measures  Pt refusing SCDs at this time. Ensuring pt performs ROM and is mobile as a preventative measure.

## 2017-03-31 NOTE — PROGRESS NOTES
Oncology/Hematology Progress Note               Author: Shawanda R Sabrina Date & Time created: 3/31/2017  12:50 PM     Diagnosis: Metastatic lung malignancy    Chief complaint: Mechanical fall with hip pain    Interval History:  He is stable overnight  His hip pain is fairly well manageable  Patient was seen by orthopedic surgery and after a prolonged discussion about risks and benefits of surgery he had opted against surgery for his hip  He states that he is able to ambulate if he is very careful  He denies any other new issues  He has concerns in regards to anxiety and had an anxiety attack this morning.   He denies any nausea and vomiting    Review of Systems:  ROS   All other review of systems are negative except what was mentioned above in the HPI.  Constitutional: Negative for fever and chills. Positive for malaise/fatigue.    HENT: Negative for ear pain and nosebleeds.     Eyes: Negative for blurred vision.     Respiratory: Positive for mid cough and shortness of breath on exertion.     Cardiovascular: Negative for chest pain and leg swelling.    Gastrointestinal: Negative for nausea, vomiting and abdominal pain.    Genitourinary: Negative for dysuria.    Musculoskeletal: Negative for myalgias. Positive for left hip pain.   Skin: Negative for rash.    Neurological: Negative for dizziness and headaches.    Endo/Heme/Allergies: No bruise/bleed easily.    Psychiatric/Behavioral: Negative for depression and memory loss.      Physical Exam:  Physical Exam   General: Not in acute distress, alert and oriented x 3  HEENT: normalcephalic, atraumatic, moist oral mucus membranes, and oral cavity without any lesions.  Neck: Supple neck and full range of motion  Lymph nodes: No palpable bilateral cervical and supraclavicular lymphadenopathy.     CVS: regular rate and rhythm  RESP: clear breath sounds bilaterally    ABD: Soft, non tender, non distended    EXT: Negative for edema. Hip pain which is persistent   CNS: Alert  and oriented x 3     Labs:        Invalid input(s): [id number]      Recent Labs      17   0301   SODIUM  141  134*   POTASSIUM  4.2  4.0   CHLORIDE  104  100   CO2  30  28   BUN  9  9   CREATININE  0.74  0.68   CALCIUM  9.2  9.2     Recent Labs      17   0301   GLUCOSE  96  109*     Recent Labs      17   0301   RBC  3.14*  3.49*   HEMOGLOBIN  9.7*  10.9*   HEMATOCRIT  30.9*  33.3*   PLATELETCT  275  287   PROTHROMBTM   --   12.9   APTT   --   32.5   INR   --   0.94     Recent Labs      17   030   WBC  4.3*  5.7   NEUTSPOLYS  72.30*  74.60*   LYMPHOCYTES  11.90*  9.60*   MONOCYTES  13.30  13.00   EOSINOPHILS  0.20  0.50   BASOPHILS  1.40  1.40     Recent Labs      17   0301   SODIUM  141  134*   POTASSIUM  4.2  4.0   CHLORIDE  104  100   CO2  30  28   GLUCOSE  96  109*   BUN  9  9   CREATININE  0.74  0.68   CALCIUM  9.2  9.2     Hemodynamics:  Temp (24hrs), Av.4 °C (97.6 °F), Min:36.3 °C (97.4 °F), Max:36.6 °C (97.9 °F)  Temperature: 36.3 °C (97.4 °F)  Pulse  Av.3  Min: 68  Max: 130   Blood Pressure : 103/53 mmHg     Respiratory:    Respiration: 18, Pulse Oximetry: 93 %     Work Of Breathing / Effort: Mild  RUL Breath Sounds: Clear, RML Breath Sounds: Diminished, RLL Breath Sounds: Diminished, LIN Breath Sounds: Clear, LLL Breath Sounds: Diminished  Fluids:  No intake or output data in the 24 hours ending 17 1621     GI/Nutrition:  Orders Placed This Encounter   Procedures   • DIET ORDER     Standing Status: Standing      Number of Occurrences: 1      Standing Expiration Date:      Order Specific Question:  Diet:     Answer:  Regular [1]     Medical Decision Making, by Problem:  Active Hospital Problems    Diagnosis   • *Hip dislocation, left (CMS-HCC) [S73.005A]   • Left hip pain [M25.552]   • History of pulmonary embolism [Z86.711]   • Lung cancer metastatic to bone (CMS-HCC)  [C34.90, C79.51]   • Anemia [D64.9]   • Fall [W19.XXXA]       Assessment and Plan:  1. Left hip dislocation: Patient was seen by orthopedics and there was discussion about surgery.  After long discussion and consideration patient had opted against getting any further surgery for his hip. At present his hip pain appears to be fairly stable and appears to be more positional.  2. 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 More recently he had been on single agent docetaxel last received on 3/10/17. Patient was due to continue chemotherapy. We had a discussion today in regards to his current status as well as the patient has considered his options and has opted against active chemotherapy. He stated that he would like to be comfortable for the time that she left. We had a discussion about palliative care versus hospice. Patient states that he is ready to talk to hospice. A referral to hospice will be placed. I will also discuss the case with hospitalist.  3. Pulmonary embolus: He and was found to have incidental left lower lobe pulmonary nodule. He is on Xeratlo.  Ultrasound of the lower extremities have been ordered and pending. Patient had been on [place] for a possible day of surgery. Comment reinitiation of xeralto.  4. Bone pain: Patient currently has a pain pump in place. His pain appears to be fairly controlled.  5. Bone etastatic disease: Patient is a candidate for bisphosphonate however patient is opting against active therapy at present.  6. Anxiety: He is currently on Xanax as needed.   7.  Normocytic anemia: Initial drop in his hemoglobin. This has improved.    Thank you for allowing me to participate in his care. Please feel free to contact me with questions or concerns in regards to his care.    Please note that this dictation was created using voice recognition software. I have made every reasonable attempt to correct obvious errors, but I expect  that there are errors of grammar and possibly content that I did not discover before finalizing the note.      Core Measures

## 2017-03-31 NOTE — PROGRESS NOTES
Dr. Vergara notified that pt does not want to proceed with surgery this evening. Orders received to resume regular diet and instruct pt to ambulate with walker at all times. MD to speak to pt later today. Information relayed to pt. No further questions. Son at bedside at this time, agrees with pt decision. Will monitor.

## 2017-03-31 NOTE — PROGRESS NOTES
"Pt does not want to sign consent form for tomorrows surgery at this time. States he would like to speak to his son tonight to discuss whether or not he should do surgery. Stating \"if i only have 6 months to live and this surgery won't fix my hip, i don't want to spend the rest of my time in a hospital\".  "

## 2017-04-01 VITALS
HEIGHT: 69 IN | RESPIRATION RATE: 16 BRPM | DIASTOLIC BLOOD PRESSURE: 45 MMHG | BODY MASS INDEX: 28.44 KG/M2 | HEART RATE: 76 BPM | OXYGEN SATURATION: 93 % | TEMPERATURE: 97.5 F | SYSTOLIC BLOOD PRESSURE: 81 MMHG | WEIGHT: 192 LBS

## 2017-04-01 PROCEDURE — A9270 NON-COVERED ITEM OR SERVICE: HCPCS | Performed by: HOSPITALIST

## 2017-04-01 PROCEDURE — A9270 NON-COVERED ITEM OR SERVICE: HCPCS | Performed by: INTERNAL MEDICINE

## 2017-04-01 PROCEDURE — G0378 HOSPITAL OBSERVATION PER HR: HCPCS

## 2017-04-01 PROCEDURE — 700102 HCHG RX REV CODE 250 W/ 637 OVERRIDE(OP): Performed by: HOSPITALIST

## 2017-04-01 PROCEDURE — 700102 HCHG RX REV CODE 250 W/ 637 OVERRIDE(OP): Performed by: INTERNAL MEDICINE

## 2017-04-01 RX ADMIN — OXYCODONE HYDROCHLORIDE 10 MG: 10 TABLET ORAL at 03:36

## 2017-04-01 RX ADMIN — OMEPRAZOLE 20 MG: 20 CAPSULE, DELAYED RELEASE ORAL at 09:15

## 2017-04-01 RX ADMIN — GABAPENTIN 200 MG: 100 CAPSULE ORAL at 15:30

## 2017-04-01 RX ADMIN — VENLAFAXINE HYDROCHLORIDE 37.5 MG: 37.5 CAPSULE, EXTENDED RELEASE ORAL at 09:15

## 2017-04-01 RX ADMIN — TAMSULOSIN HYDROCHLORIDE 0.4 MG: 0.4 CAPSULE ORAL at 09:15

## 2017-04-01 RX ADMIN — GABAPENTIN 200 MG: 100 CAPSULE ORAL at 09:14

## 2017-04-01 ASSESSMENT — PAIN SCALES - GENERAL
PAINLEVEL_OUTOF10: 0
PAINLEVEL_OUTOF10: 7
PAINLEVEL_OUTOF10: 0

## 2017-04-01 ASSESSMENT — LIFESTYLE VARIABLES: EVER_SMOKED: YES

## 2017-04-01 NOTE — PROGRESS NOTES
Pt is AAOx4.  Denies any pain or discomfort at this time.  PIV patent, saline locked.  Pt refusing SCDs.  Pt fatigued tonight, will perform cluster care.  POC discussed.  All needs met at this time.  Bed in low position.  Call light within reach.  Rounding in place.

## 2017-04-01 NOTE — DISCHARGE INSTRUCTIONS
Discharge Instructions    Discharged to home by car with relative. Discharged via wheelchair, hospital escort: Yes.  Special equipment needed: Not Applicable    Be sure to schedule a follow-up appointment with your primary care doctor or any specialists as instructed.     Discharge Plan:   Diet Plan: Discussed  Activity Level: Discussed  Confirmed Follow up Appointment: Patient to Call and Schedule Appointment  Confirmed Symptoms Management: Discussed  Medication Reconciliation Updated: Yes  Influenza Vaccine Indication: Patient Refuses    I understand that a diet low in cholesterol, fat, and sodium is recommended for good health. Unless I have been given specific instructions below for another diet, I accept this instruction as my diet prescription.   Other diet: regular    Special Instructions: None    · Is patient discharged on Warfarin / Coumadin?   No     · Is patient Post Blood Transfusion?  No    Depression / Suicide Risk    As you are discharged from this [facility] facility, it is important to learn how to keep safe from harming yourself.    Recognize the warning signs:  · Abrupt changes in personality, positive or negative- including increase in energy   · Giving away possessions  · Change in eating patterns- significant weight changes-  positive or negative  · Change in sleeping patterns- unable to sleep or sleeping all the time   · Unwillingness or inability to communicate  · Depression  · Unusual sadness, discouragement and loneliness  · Talk of wanting to die  · Neglect of personal appearance   · Rebelliousness- reckless behavior  · Withdrawal from people/activities they love  · Confusion- inability to concentrate     If you or a loved one observes any of these behaviors or has concerns about self-harm, here's what you can do:  · Talk about it- your feelings and reasons for harming yourself  · Remove any means that you might use to hurt yourself (examples: pills, rope, extension cords,  firearm)  · Get professional help from the community (Mental Health, Substance Abuse, psychological counseling)  · Do not be alone:Call your Safe Contact- someone whom you trust who will be there for you.  · Call your local CRISIS HOTLINE 307-1863 or 176-461-4893  · Call your local Children's Mobile Crisis Response Team [place] (354) 830-4390 or www.[facility]  · Call the toll free National Suicide Prevention Hotlines   · National Suicide Prevention Lifeline 705-122-LWGT (0017)  · National Hope Line Network 800-SUICIDE (107-9812)

## 2017-04-01 NOTE — DISCHARGE PLANNING
I called and left a  for weekend ERNESTINE LACKEY to follow up on hospice consult and see if pt can be discharged today. If I can be of assistance my ext is [phone]

## 2017-04-01 NOTE — CARE PLAN
Problem: Knowledge Deficit  Goal: Knowledge of disease process/condition, treatment plan, diagnostic tests, and medications will improve  POc discussed in length with pt. All questions and concerns addressed.     Problem: Psychosocial Needs:  Goal: Level of anxiety will decrease  Emotional support given to decrease anxiety level.

## 2017-04-01 NOTE — PROGRESS NOTES
Pt discharged home with son at this time. Pt brought to private vehicle via wheelchair with hospital staff.

## 2017-04-01 NOTE — DISCHARGE PLANNING
Medical Social Work     Discharge Plan: Hospice choice form initialed and signed consenting for referral to be sent to [facility]. ERNESTINE faxed choice form to [facility].     Previous SW's noted indicated that pt would need assistance with preparing meals and doing his laundry. ERNESTINE completed [facility] referral. ERNESTINE faxed document to [phone].

## 2017-04-01 NOTE — PROGRESS NOTES
No interventions for low BP at this time, per MD orders.  Call hospitalist if MAP below 64.   Will continue to monitor.

## 2017-04-01 NOTE — DISCHARGE PLANNING
Received choice form from [facility] at 1122. Referral sent to [facility] at 1229 on 04-01-17.

## 2017-04-01 NOTE — PROGRESS NOTES
Pt wishes to go home at this time. Hospice nurse has not shown up yet, pt requests to go home and have hospice follow up with him there. [facility] phone number provided to pt. Discharge medications, discharge instructions, and discharge follow ups explained to pt. Pt expresses verbal understanding. Awaiting for pts son to come and pick him up. Will continue to monitor.

## 2017-04-02 ENCOUNTER — PATIENT OUTREACH (OUTPATIENT)
Dept: HEALTH INFORMATION MANAGEMENT | Facility: OTHER | Age: 67
End: 2017-04-02

## 2017-04-02 NOTE — DISCHARGE SUMMARY
ADMISSION DIAGNOSES:  1.  Left hip pain, dislocation of left hip arthroplasty post fall.  2.  Metastatic lung cancer.  3.  Anxiety.  4.  History of pulmonary embolism.  5.  Gastroesophageal reflux disease.    DISCHARGE DIAGNOSES:  1.  Left hip dislocation of hip arthroplasty with recent findings of a   metastatic cancer to left acetabulum, pelvis.  Plan, supportive nonoperative   treatment, hospice.  2.  Metastatic lung cancer.    IMAGING TESTS:  Patient had a CT of the hip _____ 03/28/2017 revealing   dislocation, left hip arthroplasty, prosthetic femoral head located superior   anterior to the prosthetic acetabulum, destructive lesion, left iliac bone.    No acute fracture identified.    Two view chest x-ray 03/28/2017 revealed multiple complete nodular opacity   right lung extensive malignancy.  A pelvis x-ray 03/28/2017 revealed again   dislocation, left hip arthroplasty, cephalad migration left acetabular cup,   large surrounding lytic lesion.    CONSULTS:  Dr. Vergara; Fernando Hernandez MD, orthopedics; Shawanda Bennett MD.    HOSPITAL COURSE:  Patient is a 66-year-old male with history of lung cancer   with mets to bone postchemotherapy _____ left hip arthroplasty, was admitted   with left hip pain after a fall.  The imaging was reviewed with dislocation of   the hemiarthroplasty.  Was consulted with Dr. Vergara.  He had CT scan   revealing a lytic lesion left acetabular pelvic region.  He had a bone scan   previously 01/12/2017 revealing metastasis to that area as well.  After risks,   benefits and explanation of high risk for failure with surgery and no   significant improvement in morbidity as he required extensive rehabilitation   and recent infection from recent chemotherapy.  Patient recently did not want   further interventions surgically.  He had hospice arranged and to have him   follow up with them outpatient.  Patient has been ambulatory with use of a   walker.  He has a pain pump for pain  management in addition to home morphine   as prescribed.  His respiratory symptoms were stable.  He was hypotensive,   although asymptomatic and is anxious to go home.  Social service was unable to   arrange for [facility].    DISCHARGE INSTRUCTIONS:  Discharge to home.    DIET:  As tolerated.    DISCHARGE MEDICATIONS:  1.  Xanax 1 mg t.i.d. as needed for anxiety.  2.  Colace 100 daily.  3.  Neurontin 200 mg 3 times a day.  4.  MSIR 15 mg every 4 hours p.r.n. severe pain.  5.  Prilosec 20 daily.  6.  Continue with Dilaudid pain pump.  7.  Psyllium 1 pack daily.  8.  Xarelto 20 daily.  9.  Flomax 0.4 mg daily.  10.  Effexor XR 37.5 daily.    FOLLOWUP:  1.  With [facility] in 1-2 days.  2.  With Dr. Sasha Isidro in 1-2 weeks.       ____________________________________     RAHAT MONTAÑO MD    QBV / NTS    DD:  04/02/2017 07:11:46  DT:  04/02/2017 08:05:51    D#:  [id number]  Job#:  [id number]

## 2017-04-02 NOTE — PROGRESS NOTES
· Chart reviewed.  Patient was discharged from [facility] 4/1/17 with hospice services through [facility].  Patient does not qualify for discharge outreach phone call.

## 2017-04-03 NOTE — DISCHARGE PLANNING
Multiple call placed to [facility] , per nurse Jess they are having major phone issues.  Call placed to Manager Nicole Herrera at [phone] requesting status of referral.

## 2017-04-04 DIAGNOSIS — F41.9 ANXIETY: ICD-10-CM

## 2017-04-04 RX ORDER — ALPRAZOLAM 1 MG/1
1 TABLET ORAL 3 TIMES DAILY PRN
Qty: 30 TAB | OUTPATIENT
Start: 2017-04-04

## 2017-04-05 NOTE — CONSULTS
DATE OF SERVICE:  03/29/2017    CHIEF COMPLAINT:  Left hip pain.    HISTORY OF PRESENT ILLNESS:  The patient is a 66-year-old gentleman with past   medical history of metastatic lung cancer, metastatic to bone.  He is status   post left hip arthroplasty in the past and fairly recently seen by Dr. Cochran   for possible loosening of his hip, possibly knee revision.  The patient was   readmitted yesterday, status post another fall with worsening left hip pain.    I was consulted as the [facility] surgeon on call.  Upon seeing   the patient today, his pain as approximately 6-8/10 in severity, it is aching   pain in his radiating down his leg.  He denies pain elsewhere in his body.    The left hip replacement, he denies any prior trauma or surgery about the left   lower extremity, denies any numbness in the left lower extremity.  Denies   pain elsewhere in his body from the fall.  He states his pain is somewhat   amenable to bed rest, ice, and pain medication.    PAST MEDICAL HISTORY:  Anxiety, indigestion, arthritis, metastatic lung   cancer, GERD, chronic low back pain and hip pain.    PAST SURGICAL HISTORY:  Umbilical hernia repair, bronchoscopy, shoulder   arthroscopy, left total hip arthroplasty.    MEDICATIONS:  Polypharmacy, please see chart.    ALLERGIES:  No known drug allergies.    FAMILY HISTORY:  Negative for bleeding disorders or anesthetic reactions.    SOCIAL HISTORY:  He is former smoker.  He denies current tobacco, alcohol, or   drug use.    REVIEW OF SYSTEMS:  Thorough review of systems was attempted and negative   except for past medical history and history of present illness.    PHYSICAL EXAMINATION:  GENERAL:  He is somewhat disheveled-appearing adult male appearing older than   his stated age.  HEENT:  Normocephalic, atraumatic.  NEUROLOGIC:  Cranial nerves II-XII are grossly intact.  CARDIOVASCULAR:  2+ distal pulses.  EXTREMITIES:  No cyanosis, clubbing, or edema.  PULMONARY:   Breathing comfortably on room air without labor.  ABDOMEN:  Flat and unremarkable.  SKIN:  No rashes, jaundice, or cyanosis.  SPINE:  Clinically midline.  GAIT:  Currently, nonambulatory in a hospital bed.  MUSCULOSKELETAL:  Bilateral upper extremities, right lower extremity, no   tenderness to palpation, pain with range of motion.  Left lower extremity does   have pain with log roll.  Left lower extremity is slightly short compared to   the contralateral side.  He has no tenderness to palpation about the left knee   or ankle.  NEUROLOGIC:  Left lower extremity, sensation intact to light touch, L4, L5, S1   dermatomes.    IMAGING:  Multiple views of left hip as well as a CT scan demonstrates   subluxated, but not dislocated total hip arthroplasty with some evidence of   loosening of component and osteolysis concerning for metastases.    ASSESSMENT:  The patient is a 66-year-old male with left hip pain, status post   left total hip arthroplasty, possible loosening of the components, possible   left hip metastases.    PLAN:  I educated the patient regarding his diagnosis.  He understands my   recommendation to referred his care to our joint arthroplasty team, Likely Dr. Vergara, he is aware of this patient, we will see the patient during this   admission.       ____________________________________     MD CARMEN Cano / CELSO    DD:  04/05/2017 08:46:34  DT:  04/05/2017 09:06:15    D#:  [id number]  Job#:  [id number]

## 2017-04-07 ENCOUNTER — APPOINTMENT (OUTPATIENT)
Dept: ONCOLOGY | Facility: MEDICAL CENTER | Age: 67
End: 2017-04-07
Attending: INTERNAL MEDICINE
Payer: MEDICARE

## 2017-04-18 ENCOUNTER — OFFICE VISIT (OUTPATIENT)
Dept: MEDICAL GROUP | Facility: PHYSICIAN GROUP | Age: 67
End: 2017-04-18
Payer: MEDICARE

## 2017-04-18 ENCOUNTER — HOSPITAL ENCOUNTER (OUTPATIENT)
Dept: RADIOLOGY | Facility: MEDICAL CENTER | Age: 67
End: 2017-04-18
Attending: NURSE PRACTITIONER
Payer: MEDICARE

## 2017-04-18 VITALS
OXYGEN SATURATION: 93 % | SYSTOLIC BLOOD PRESSURE: 104 MMHG | WEIGHT: 190 LBS | RESPIRATION RATE: 22 BRPM | DIASTOLIC BLOOD PRESSURE: 70 MMHG | TEMPERATURE: 97.5 F | HEIGHT: 67 IN | HEART RATE: 118 BPM | BODY MASS INDEX: 29.82 KG/M2
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
## 2017-04-18 PROCEDURE — 73502 X-RAY EXAM HIP UNI 2-3 VIEWS: CPT | Mod: LT

## 2017-04-18 PROCEDURE — 1036F TOBACCO NON-USER: CPT | Performed by: NURSE PRACTITIONER

## 2017-04-18 PROCEDURE — G8432 DEP SCR NOT DOC, RNG: HCPCS | Performed by: NURSE PRACTITIONER

## 2017-04-18 PROCEDURE — 1101F PT FALLS ASSESS-DOCD LE1/YR: CPT | Performed by: NURSE PRACTITIONER

## 2017-04-18 PROCEDURE — 4040F PNEUMOC VAC/ADMIN/RCVD: CPT | Performed by: NURSE PRACTITIONER

## 2017-04-18 PROCEDURE — G8419 CALC BMI OUT NRM PARAM NOF/U: HCPCS | Performed by: NURSE PRACTITIONER

## 2017-04-18 PROCEDURE — 3017F COLORECTAL CA SCREEN DOC REV: CPT | Performed by: NURSE PRACTITIONER

## 2017-04-18 PROCEDURE — 99214 OFFICE O/P EST MOD 30 MIN: CPT | Performed by: NURSE PRACTITIONER

## 2017-04-18 NOTE — ASSESSMENT & PLAN NOTE
Patient reports falling over his crutches and landing on a chair yesterday afternoon. Patient has lung cancer with metastasis to the bones. He fell on his left hip, but denies any other injury. He did not hit his head and had no loss of consciousness. He states that he went to bed, but cannot sleep due to left hip pain. He took 4 mg of Dilaudid prior to this appointment and still has a pain 7/10. He's had 2 episodes of vomiting on his way to the office. He denies any other injuries. He is currently on hospice.

## 2017-04-18 NOTE — PROGRESS NOTES
"Subjective:     Chief Complaint   Patient presents with   • Hip Pain   • Fall     4/17/16       HPI:  Gabriele Torres is a 66 y.o. male here today to discuss the following:    Fall  Patient reports falling over his crutches and landing on a chair yesterday afternoon. Patient has lung cancer with metastasis to the bones. He fell on his left hip, but denies any other injury. He did not hit his head and had no loss of consciousness. He states that he went to bed, but cannot sleep due to left hip pain. He took 4 mg of Dilaudid prior to this appointment and still has a pain 7/10. He's had 2 episodes of vomiting on his way to the office. She denies any other injuries.      Current medicines (including changes today)  Current Outpatient Prescriptions   Medication Sig Dispense Refill   • morphine (MS IR) 15 MG tablet Take 15 mg by mouth every four hours as needed for Severe Pain.     • Pain Pump (PATIENT SUPPLIED) XX ROSARIO 1 Each by Injection route Continuous. Simple continuous drug:  Hydromorphone 1.5009 MG/Day  Infusion PA drug\"  PA Lockout interval  01:00 h;m  Maximum Activations/Day  12  Last changed was 3/7/2017  Indications: Hydromophone 5.0 MG/ML  (Concentration)     • rivaroxaban (XARELTO) 20 MG Tab tablet Take 1 Tab by mouth with dinner. 30 Tab 4   • venlafaxine XR (EFFEXOR XR) 37.5 MG CAPSULE SR 24 HR Take 1 Cap by mouth every day. 30 Cap 0   • psyllium (METAMUCIL) 58.12 % Pack Take 1 Packet by mouth every day.     • docusate sodium 100 MG Cap Take 100 mg by mouth every morning. 60 Cap    • gabapentin (NEURONTIN) 100 MG Cap Take 2 Caps by mouth 3 times a day. 90 Cap    • tamsulosin (FLOMAX) 0.4 MG capsule Take 1 Cap by mouth ONE-HALF HOUR AFTER BREAKFAST. 30 Cap    • alprazolam (XANAX) 1 MG Tab Take 1 mg by mouth 3 times a day as needed for Anxiety. Indications: Feeling Anxious     • omeprazole (PRILOSEC) 20 MG delayed-release capsule Take 20 mg by mouth every day.       No current facility-administered " "medications for this visit.       He  has a past medical history of Psychiatric problem; Indigestion; Arthritis; Snoring; Carcinoma in situ of respiratory system (2015); Hiatus hernia syndrome; Cancer (CMS-HCC) (5/2015); Cancer, metastatic to bone (CMS-[facility]); GERD (gastroesophageal reflux disease); Anxiety; Breath shortness (2016); Pain; Heart burn; Chronic low back pain; and Hip pain (2/16/2017).    ROS   Review of Systems   Constitutional: Negative for fever, chills, weight loss and malaise/fatigue.   HENT: Negative for ear pain, nosebleeds, congestion, sore throat and neck pain.    Respiratory: Negative for cough, sputum production, shortness of breath and wheezing.  Positive for shortness of breath  Cardiovascular: Negative for chest pain, palpitations,  and leg swelling.   Gastrointestinal: Negative for heartburn, nausea, vomiting, diarrhea and abdominal pain.   MS: Positive for left hip pain  Neurological: Negative for dizziness, tingling, tremors, sensory change, focal weakness and headaches.   Psychiatric/Behavioral: Negative for depression, anxiety, suicidal ideas, insomnia and memory loss.    All other systems reviewed and are negative except as in HPI.     Objective:   Physical Exam:  Blood pressure 104/70, pulse 118, temperature 36.4 °C (97.5 °F), resp. rate 22, height 1.69 m (5' 6.54\"), weight 86.183 kg (190 lb), SpO2 93 %. Body mass index is 30.18 kg/(m^2).   General:  Well nourished, well developed in NAD  Head is grossly normal.  Neck: Supple without JVD   Pulmonary:  Normal effort. Lungs clear to auscultation all fields  Cardiovascular: Regular rate and rhythm   Extremities: no clubbing, cyanosis, or edema. Tenderness to palpate left hip. Ambulating on crutches   Assessment and Plan:   The following treatment plan was discussed   1. Fall, initial encounter  DX-HIP-COMPLETE - UNILATERAL 2+ LEFT    CANCELED: DX-HIP-COMPLETE - UNILATERAL 2+ LEFT   Hip xray revealed no fracture, but a mild dislocation. " Pelvis surrounding  acetabular cup is eroded due to metastasis. Patient informed and declined going to the ER    Followup: No Follow-up on file. when necessary  Please note that this dictation was created using voice recognition software. I have made every reasonable attempt to correct obvious errors, but I expect that there are errors of grammar and possibly content that I did not discover before finalizing the note.

## 2017-04-18 NOTE — MR AVS SNAPSHOT
"        Gabriele Jaimescinthya   2017 9:40 AM   Office Visit   MRN: [id number]    Department:  [facility]   Dept Phone:  [phone]    Description:  Male : 1950   Provider:  CHARMAINE Hutchins           Reason for Visit     Hip Pain     Fall 16      Allergies as of 2017     No Known Allergies      You were diagnosed with     Fall, initial encounter   [[id number]]         Vital Signs     Blood Pressure Pulse Temperature Respirations Height Weight    104/70 mmHg 118 36.4 °C (97.5 °F) 22 1.69 m (5' 6.53\") 86.183 kg (190 lb)    Body Mass Index Oxygen Saturation Smoking Status             30.18 kg/m2 93% Former Smoker         Basic Information     Date Of Birth Sex Race Ethnicity Preferred Language    1950 Male White Non- English      Your appointments     May 02, 2017  2:30 PM   Follow Up with Laure DOMINGO M.D.   [facility] (--)    [address] [facility] [address]   [phone]            2017  9:00 AM   Established Patient with [facility] EXAM 4   [facility]  (--)    [address] [facility] [address]   [phone]              Problem List              ICD-10-CM Priority Class Noted - Resolved    GERD (gastroesophageal reflux disease) K21.9 Low  2015 - Present    Pulmonary nodule seen on imaging study R91.1   2015 - Present    OA (osteoarthritis) M19.90   2015 - Present    Anxiety F41.9   2015 - Present    Shortness of breath R06.02   2015 - Present    Hypercalcemia E83.52 Medium  2015 - Present    Lung cancer (CMS-HCC) C34.90   2015 - Present    Dependence on supplemental oxygen Z99.81   10/7/2015 - Present    Laryngitis, chronic J37.0   2015 - Present    Chronic low back pain with left-sided sciatica M54.42, G89.29   10/18/2016 - Present    Weakness of left lower extremity M62.81   11/15/2016 - Present    Lumbar spondylosis M47.816   2016 - Present  "    Research study patient Z00.6   1/9/2017 - Present    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   1/26/2017 - Present    Pain from bone metastases (CMS-HCC) G89.3, C79.51   2/7/2017 - Present    Intractable pain R52 High  2/8/2017 - Present    Urinary retention R33.9 Medium  2/8/2017 - Present    Hematuria R31.9 Medium  2/8/2017 - Present    Anemia D64.9 Medium  2/8/2017 - Present    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, Z96.649 High  2/8/2017 - Present    Bone metastasis (CMS-HCC) C79.51   2/17/2017 - Present    Pulmonary embolism without acute cor pulmonale (CMS-HCC) I26.99   3/3/2017 - Present    Fall W19.XXXA Low  3/28/2017 - Present    Left hip pain M25.552 High  3/28/2017 - Present    Lung cancer metastatic to bone (CMS-HCC) C34.90, C79.51 Medium  3/28/2017 - Present    Hip dislocation, left (CMS-HCC) S73.005A High  3/29/2017 - Present    History of pulmonary embolism Z86.711 Medium  3/29/2017 - Present      Health Maintenance        Date Due Completion Dates    IMM DTaP/Tdap/Td Vaccine (1 - Tdap) 8/10/1970 ---    IMM ZOSTER VACCINE 8/10/2011 ---    IMM PNEUMOCOCCAL 65+ (ADULT) LOW/MEDIUM RISK SERIES (1 of 2 - PCV13) 8/10/2016 10/7/2013            Current Immunizations     Influenza Vaccine Quad Inj (Preserved) 10/7/2015    Pneumococcal polysaccharide vaccine (PPSV-23) 10/7/2013      Below and/or attached are the medications your provider expects you to take. Review all of your home medications and newly ordered medications with your provider and/or pharmacist. Follow medication instructions as directed by your provider and/or pharmacist. Please keep your medication list with you and share with your provider. Update the information when medications are discontinued, doses are changed, or new medications (including over-the-counter products) are added; and carry medication information at all times in the event of emergency situations     Allergies:  No Known Allergies          Medications   "Valid as of: April 18, 2017 -  1:20 PM    Generic Name Brand Name Tablet Size Instructions for use    ALPRAZolam (Tab) XANAX 1 MG Take 1 mg by mouth 3 times a day as needed for Anxiety. Indications: Feeling Anxious        Docusate Sodium (Cap)  MG Take 100 mg by mouth every morning.        Gabapentin (Cap) NEURONTIN 100 MG Take 2 Caps by mouth 3 times a day.        Morphine Sulfate (Tab) MS IR 15 MG Take 15 mg by mouth every four hours as needed for Severe Pain.        Omeprazole (CAPSULE DELAYED RELEASE) PRILOSEC 20 MG Take 20 mg by mouth every day.        Pain Pump (PATIENT SUPPLIED) XX ROSARIO (Device) patient supplied  1 Each by Injection route Continuous. Simple continuous drug:  Hydromorphone 1.5009 MG/Day  Infusion PA drug\"  PA Lockout interval  01:00 h;m  Maximum Activations/Day  12  Last changed was 3/7/2017  Indications: Hydromophone 5.0 MG/ML  (Concentration)        Psyllium (Pack) METAMUCIL 58.12 % Take 1 Packet by mouth every day.        Rivaroxaban (Tab) XARELTO 20 MG Take 1 Tab by mouth with dinner.        Tamsulosin HCl (Cap) FLOMAX 0.4 MG Take 1 Cap by mouth ONE-HALF HOUR AFTER BREAKFAST.        Venlafaxine HCl (CAPSULE SR 24 HR) EFFEXOR XR 37.5 MG Take 1 Cap by mouth every day.        .                 Medicines prescribed today were sent to:     [id number] - [place], [place] - [address] [place] [address] AT [address] [address]    Phone: [phone] Fax: [phone]    Open 24 Hours?: No      Medication refill instructions:       If your prescription bottle indicates you have medication refills left, it is not necessary to call your provider’s office. Please contact your pharmacy and they will refill your medication.    If your prescription bottle indicates you do not have any refills left, you may request refills at any time through one of the following ways: The online [employer] system (except Urgent Care), by calling your provider’s office, or by asking " your pharmacy to contact your provider’s office with a refill request. Medication refills are processed only during regular business hours and may not be available until the next business day. Your provider may request additional information or to have a follow-up visit with you prior to refilling your medication.   *Please Note: Medication refills are assigned a new Rx number when refilled electronically. Your pharmacy may indicate that no refills were authorized even though a new prescription for the same medication is available at the pharmacy. Please request the medicine by name with the pharmacy before contacting your provider for a refill.        Your To Do List     Future Labs/Procedures Complete By Expires    DX-HIP-COMPLETE - UNILATERAL 2+ LEFT  As directed 4/18/2018         Tatango Access Code: [email or web address]  Expires: 5/1/2017 10:01 AM    Tatango  A secure, online tool to manage your health information     [employer]’s Tatango® is a secure, online tool that connects you to your personalized health information from the privacy of your home -- day or night - making it very easy for you to manage your healthcare. Once the activation process is completed, you can even access your medical information using the Tatango balaji, which is available for free in the Apple Balaji store or Google Play store.     Tatango provides the following levels of access (as shown below):   My Chart Features   [facility] Doctor [facility]  Specialists [facility]  Urgent  Care [facility]  Primary Care  Doctor   Email your healthcare team securely and privately 24/7 X X X    Manage appointments: schedule your next appointment; view details of past/upcoming appointments X      Request prescription refills. X      View recent personal medical records, including lab and immunizations X X X X   View health record, including health history, allergies, medications X X X X   Read reports about your outpatient visits, procedures, consult  and ER notes X X X X   See your discharge summary, which is a recap of your hospital and/or ER visit that includes your diagnosis, lab results, and care plan. X X       How to register for [employer]:  1. Go to  [email or web address].  2. Click on the Sign Up Now box, which takes you to the New Member Sign Up page. You will need to provide the following information:  a. Enter your Kee Square Access Code exactly as it appears at the top of this page. (You will not need to use this code after you’ve completed the sign-up process. If you do not sign up before the expiration date, you must request a new code.)   b. Enter your date of birth.   c. Enter your home email address.   d. Click Submit, and follow the next screen’s instructions.  3. Create a [employer] Networkt ID. This will be your [employer]t login ID and cannot be changed, so think of one that is secure and easy to remember.  4. Create a Kee Square password. You can change your password at any time.  5. Enter your Password Reset Question and Answer. This can be used at a later time if you forget your password.   6. Enter your e-mail address. This allows you to receive e-mail notifications when new information is available in Kee Square.  7. Click Sign Up. You can now view your health information.    For assistance activating your Kee Square account, call [phone]

## 2017-04-28 ENCOUNTER — TELEPHONE (OUTPATIENT)
Dept: HEMATOLOGY ONCOLOGY | Facility: MEDICAL CENTER | Age: 67
End: 2017-04-28

## 2017-04-28 NOTE — TELEPHONE ENCOUNTER
Pt called stating that when he was on Hospice they were giving him a lot of pills, he just wants to know if taking all these pills are going to cause any problems for him or if he should stop taking all the pills.

## 2017-05-02 ENCOUNTER — HOSPITAL ENCOUNTER (OUTPATIENT)
Dept: RADIATION ONCOLOGY | Facility: MEDICAL CENTER | Age: 67
End: 2017-05-31
Attending: RADIOLOGY
Payer: MEDICARE

## 2017-05-02 VITALS
DIASTOLIC BLOOD PRESSURE: 62 MMHG | SYSTOLIC BLOOD PRESSURE: 92 MMHG | TEMPERATURE: 98.1 F | HEART RATE: 93 BPM | BODY MASS INDEX: 30.81 KG/M2 | WEIGHT: 194 LBS | OXYGEN SATURATION: 90 %

## 2017-05-02 PROCEDURE — 99212 OFFICE O/P EST SF 10 MIN: CPT | Performed by: RADIOLOGY

## 2017-05-02 NOTE — PROGRESS NOTES
"RADIATION ONCOLOGY FOLLOW-UP    DATE OF SERVICE: 5/2/2017    IDENTIFICATION:   A 66 y.o. male with stage IV adenocarcinoma of the lung EGFR mutated exon 20. He is status post palliative radiotherapy to painful metastasis involving the left hip and right scapula. He received dirty grade 5 fractions completed 2/15/2017.      HISTORY OF PRESENT ILLNESS:   Returns today for follow-up reports approximately 50% reduction in pain. He is on pain pump as well as oral analgesics. He is presently enrolled in hospice.    CURRENT MEDICATIONS:  Current Outpatient Prescriptions   Medication Sig Dispense Refill   • morphine (MS IR) 15 MG tablet Take 15 mg by mouth every four hours as needed for Severe Pain.     • Pain Pump (PATIENT SUPPLIED) XX ROSARIO 1 Each by Injection route Continuous. Simple continuous drug:  Hydromorphone 1.5009 MG/Day  Infusion PA drug\"  PA Lockout interval  01:00 h;m  Maximum Activations/Day  12  Last changed was 3/7/2017  Indications: Hydromophone 5.0 MG/ML  (Concentration)     • rivaroxaban (XARELTO) 20 MG Tab tablet Take 1 Tab by mouth with dinner. 30 Tab 4   • venlafaxine XR (EFFEXOR XR) 37.5 MG CAPSULE SR 24 HR Take 1 Cap by mouth every day. 30 Cap 0   • psyllium (METAMUCIL) 58.12 % Pack Take 1 Packet by mouth every day.     • docusate sodium 100 MG Cap Take 100 mg by mouth every morning. 60 Cap    • gabapentin (NEURONTIN) 100 MG Cap Take 2 Caps by mouth 3 times a day. 90 Cap    • tamsulosin (FLOMAX) 0.4 MG capsule Take 1 Cap by mouth ONE-HALF HOUR AFTER BREAKFAST. 30 Cap    • alprazolam (XANAX) 1 MG Tab Take 1 mg by mouth 3 times a day as needed for Anxiety. Indications: Feeling Anxious     • omeprazole (PRILOSEC) 20 MG delayed-release capsule Take 20 mg by mouth every day.       No current facility-administered medications for this encounter.       ALLERGIES:  Review of patient's allergies indicates no known allergies.    PHYSICAL EXAM:   Filed Vitals:    05/02/17 1431   BP: 92/62   Pulse: 93   Temp: " 36.7 °C (98.1 °F)   Weight: 87.998 kg (194 lb)   SpO2: 90%      Pain Scale: 0-10  Pain Assessement: 6  Pain Location, Orientation and Scale: left hip area  What makes the pain better: lying down/non weight bearing  What makes the pain worse: standing/weight bearing    GENERAL: Alert and oriented no acute distress. In wheelchair states that he is fatigued.      LABORATORY DATA:   Lab Results   Component Value Date/Time    SODIUM 134* 03/31/2017 03:01 AM    POTASSIUM 4.0 03/31/2017 03:01 AM    CHLORIDE 100 03/31/2017 03:01 AM    CO2 28 03/31/2017 03:01 AM    GLUCOSE 109* 03/31/2017 03:01 AM    BUN 9 03/31/2017 03:01 AM    CREATININE 0.68 03/31/2017 03:01 AM     Lab Results   Component Value Date/Time    ALKALINE PHOSPHATASE 127* 03/28/2017 06:40 AM    AST(SGOT) 13 03/28/2017 06:40 AM    ALT(SGPT) 16 03/28/2017 06:40 AM    TOTAL BILIRUBIN 0.3 03/28/2017 06:40 AM      Lab Results   Component Value Date/Time    WBC 5.7 03/31/2017 03:01 AM    RBC 3.49* 03/31/2017 03:01 AM    HEMOGLOBIN 10.9* 03/31/2017 03:01 AM    HEMATOCRIT 33.3* 03/31/2017 03:01 AM    MCV 95.4 03/31/2017 03:01 AM    MCH 31.2 03/31/2017 03:01 AM    MCHC 32.7* 03/31/2017 03:01 AM    MPV 9.0 03/31/2017 03:01 AM    NEUTROPHILS-POLYS 74.60* 03/31/2017 03:01 AM    LYMPHOCYTES 9.60* 03/31/2017 03:01 AM    MONOCYTES 13.00 03/31/2017 03:01 AM    EOSINOPHILS 0.50 03/31/2017 03:01 AM    BASOPHILS 1.40 03/31/2017 03:01 AM    ANISOCYTOSIS 2+ 12/21/2015 12:05 AM        RADIOLOGY DATA:  Dx-hip-complete - Unilateral 2+ Left    4/18/2017 4/18/2017 10:19 AM HISTORY/REASON FOR EXAM:  Fall on left hip and history of bone metastasis. TECHNIQUE/EXAM DESCRIPTION AND NUMBER OF VIEWS:  2 views of the LEFT hip. COMPARISON: 03/28/2017 FINDINGS: Hydration of prosthetic acetabular cup superiorly in the left hemipelvis appears increased compared to the prior radiograph. The prosthetic femoral head is located posterior to the center of the cup. Deformity of native left acetabular  area and iliac bone is again noted. No acute fracture involving the left hemipelvis or proximal femur is identified.     4/18/2017  1.  Apparent continued superior migration of the prosthetic acetabular cup within the left pelvic bone with surrounding erosion likely due to metastasis. 2.  Prosthetic femoral head appears to be located posterior to the center of the acetabular cup likely indicating a mild degree of dislocation. 3.  No acute fracture of the proximal femur is identified.      IMPRESSION:    A 66 y.o. with stage IV lung cancer on hospice.    RECOMMENDATIONS:   He appears to had a good response to radiotherapy. At this point I'll turn over pain management is to hospice and see him on an as-needed basis.    25 minutes was spent face-to-face with patient in the office and more than half of that time was spent counseling patient or coordinating care as described above.    Thank you for the opportunity to participate in his care.  If any questions or comments, please do not hesitate in calling.    Laure DOMINGO M.D.  Electronically signed by: Laure Sims V, 5/2/2017 2:54 PM  [phone]

## 2017-05-02 NOTE — NON-PROVIDER
"Patient was seen today in clinic with Dr. Sims for Stage IV adenocarcinoma lung w/ bone mets.  Vitals signs and weight were obtained and pain assessment was completed.  Allergies and medications were reviewed with the patient.  Toxicities of treatment assessed.     Vitals/Pain:  Filed Vitals:    05/02/17 1431   BP: 92/62   Pulse: 93   Temp: 36.7 °C (98.1 °F)   Weight: 87.998 kg (194 lb)   SpO2: 90%            Allergies:   Review of patient's allergies indicates no known allergies.    Current Medications:  Current Outpatient Prescriptions   Medication Sig Dispense Refill   • morphine (MS IR) 15 MG tablet Take 15 mg by mouth every four hours as needed for Severe Pain.     • Pain Pump (PATIENT SUPPLIED) XX ROSARIO 1 Each by Injection route Continuous. Simple continuous drug:  Hydromorphone 1.5009 MG/Day  Infusion PA drug\"  PA Lockout interval  01:00 h;m  Maximum Activations/Day  12  Last changed was 3/7/2017  Indications: Hydromophone 5.0 MG/ML  (Concentration)     • rivaroxaban (XARELTO) 20 MG Tab tablet Take 1 Tab by mouth with dinner. 30 Tab 4   • venlafaxine XR (EFFEXOR XR) 37.5 MG CAPSULE SR 24 HR Take 1 Cap by mouth every day. 30 Cap 0   • psyllium (METAMUCIL) 58.12 % Pack Take 1 Packet by mouth every day.     • docusate sodium 100 MG Cap Take 100 mg by mouth every morning. 60 Cap    • gabapentin (NEURONTIN) 100 MG Cap Take 2 Caps by mouth 3 times a day. 90 Cap    • tamsulosin (FLOMAX) 0.4 MG capsule Take 1 Cap by mouth ONE-HALF HOUR AFTER BREAKFAST. 30 Cap    • alprazolam (XANAX) 1 MG Tab Take 1 mg by mouth 3 times a day as needed for Anxiety. Indications: Feeling Anxious     • omeprazole (PRILOSEC) 20 MG delayed-release capsule Take 20 mg by mouth every day.       No current facility-administered medications for this encounter.         PCP:  Dwaine Beard R.N.    "

## 2017-06-01 ENCOUNTER — TELEPHONE (OUTPATIENT)
Dept: HEMATOLOGY ONCOLOGY | Facility: MEDICAL CENTER | Age: 67
End: 2017-06-01

## 2017-06-01 NOTE — TELEPHONE ENCOUNTER
"Patient called stating that he would like an appointment back in our office and he informed me that he is on Hospice.  He also informed me that  was upset with him and that's why he got on hospice and he also stated that our office was just waiting for him to \"die\" and that's why we wouldn't see him anymore.  I Informed him since he was on Hospice we couldn't get him appointment. Also   was never referred from our office to Hospice.  "

## 2017-06-14 ENCOUNTER — OFFICE VISIT (OUTPATIENT)
Dept: MEDICAL GROUP | Facility: PHYSICIAN GROUP | Age: 67
End: 2017-06-14
Payer: MEDICARE

## 2017-06-14 VITALS
HEIGHT: 66 IN | OXYGEN SATURATION: 90 % | BODY MASS INDEX: 30.53 KG/M2 | WEIGHT: 190 LBS | DIASTOLIC BLOOD PRESSURE: 58 MMHG | RESPIRATION RATE: 18 BRPM | HEART RATE: 58 BPM | TEMPERATURE: 99.5 F | SYSTOLIC BLOOD PRESSURE: 101 MMHG

## 2017-06-14 DIAGNOSIS — C79.51 LUNG CANCER METASTATIC TO BONE (HCC): ICD-10-CM

## 2017-06-14 DIAGNOSIS — C34.90 LUNG CANCER METASTATIC TO BONE (HCC): ICD-10-CM

## 2017-06-14 DIAGNOSIS — R52 INTRACTABLE PAIN: ICD-10-CM

## 2017-06-14 PROCEDURE — 99214 OFFICE O/P EST MOD 30 MIN: CPT | Performed by: FAMILY MEDICINE

## 2017-06-14 RX ORDER — HYDROMORPHONE HYDROCHLORIDE 2 MG/1
4 TABLET ORAL EVERY 4 HOURS PRN
Qty: 240 TAB | Refills: 0 | Status: ON HOLD | OUTPATIENT
Start: 2017-06-14 | End: 2017-06-26

## 2017-06-14 ASSESSMENT — PAIN SCALES - GENERAL: PAINLEVEL: 8=MODERATE-SEVERE PAIN

## 2017-06-14 NOTE — MR AVS SNAPSHOT
"        Gabriele Torres   2017 3:40 PM   Office Visit   MRN: [id number]    Department:  [facility]   Dept Phone:  [phone]    Description:  Male : 1950   Provider:  Sasha Isidro M.D.           Reason for Visit     Other discussion of options      Allergies as of 2017     No Known Allergies      You were diagnosed with     Intractable pain   [[id number]]       Lung cancer metastatic to bone (CMS-HCC)   [[id number]]         Vital Signs     Blood Pressure Pulse Temperature Respirations Height Weight    101/58 mmHg 58 37.5 °C (99.5 °F) 18 1.689 m (5' 6.5\") 86.183 kg (190 lb)    Body Mass Index Oxygen Saturation Smoking Status             30.21 kg/m2 90% Former Smoker         Basic Information     Date Of Birth Sex Race Ethnicity Preferred Language    1950 Male White Non- English      Your appointments     2017  9:00 AM   Established Patient with [facility] EXAM 4   [facility]  (--)    [address] [place] [address]   [phone]            2017  9:00 AM   Established Patient with Sasha Isidro M.D.   [address] [address] [phone]           You will be receiving a confirmation call a few days before your appointment from our automated call confirmation system.            2017 10:40 AM   Established Patient with Sasha Isidro M.D.   [address] [address] [phone]           You will be receiving a confirmation call a few days before your appointment from our automated call confirmation system.              Problem List              ICD-10-CM Priority Class Noted - Resolved    GERD (gastroesophageal reflux disease) K21.9 Low  2015 - Present    Pulmonary nodule seen on imaging study R91.1   2015 - Present    OA (osteoarthritis) M19.90   2015 - " Present    Anxiety F41.9   5/1/2015 - Present    Shortness of breath R06.02   5/5/2015 - Present    Hypercalcemia E83.52 Medium  5/6/2015 - Present    Lung cancer (CMS-HCC) C34.90   5/8/2015 - Present    Dependence on supplemental oxygen Z99.81   10/7/2015 - Present    Laryngitis, chronic J37.0   12/23/2015 - Present    Chronic low back pain with left-sided sciatica M54.42, G89.29   10/18/2016 - Present    Weakness of left lower extremity R29.898   11/15/2016 - Present    Lumbar spondylosis M47.816   12/8/2016 - Present    Research study patient Z00.6   1/9/2017 - Present    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   1/26/2017 - Present    Pain from bone metastases G89.3, C79.51   2/7/2017 - Present    Intractable pain R52 High  2/8/2017 - Present    Urinary retention R33.9 Medium  2/8/2017 - Present    Hematuria R31.9 Medium  2/8/2017 - Present    Anemia D64.9 Medium  2/8/2017 - Present    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, Z96.649 High  2/8/2017 - Present    Bone metastasis (CMS-HCC) C79.51   2/17/2017 - Present    Pulmonary embolism without acute cor pulmonale (CMS-HCC) I26.99   3/3/2017 - Present    Fall W19.XXXA Low  3/28/2017 - Present    Left hip pain M25.552 High  3/28/2017 - Present    Lung cancer metastatic to bone (CMS-HCC) C34.90, C79.51 Medium  3/28/2017 - Present    Hip dislocation, left (CMS-HCC) S73.005A High  3/29/2017 - Present    History of pulmonary embolism Z86.711 Medium  3/29/2017 - Present      Health Maintenance        Date Due Completion Dates    IMM DTaP/Tdap/Td Vaccine (1 - Tdap) 8/10/1970 ---    IMM ZOSTER VACCINE 8/10/2011 ---    IMM PNEUMOCOCCAL 65+ (ADULT) LOW/MEDIUM RISK SERIES (1 of 2 - PCV13) 8/10/2016 10/7/2013            Current Immunizations     Influenza Vaccine Quad Inj (Preserved) 10/7/2015    Pneumococcal polysaccharide vaccine (PPSV-23) 10/7/2013      Below and/or attached are the medications your provider expects you to take. Review all of your home  "medications and newly ordered medications with your provider and/or pharmacist. Follow medication instructions as directed by your provider and/or pharmacist. Please keep your medication list with you and share with your provider. Update the information when medications are discontinued, doses are changed, or new medications (including over-the-counter products) are added; and carry medication information at all times in the event of emergency situations     Allergies:  No Known Allergies          Medications  Valid as of: June 14, 2017 -  4:09 PM    Generic Name Brand Name Tablet Size Instructions for use    ALPRAZolam (Tab) XANAX 1 MG Take 1 mg by mouth 3 times a day as needed for Anxiety. Indications: Feeling Anxious        HYDROmorphone HCl (Tab) DILAUDID 2 MG Take 2 Tabs by mouth every four hours as needed for Severe Pain for up to 30 days.        Morphine Sulfate (Tab) MS IR 15 MG Take 15 mg by mouth every four hours as needed for Severe Pain.        Omeprazole (CAPSULE DELAYED RELEASE) PRILOSEC 20 MG Take 20 mg by mouth every day.        Pain Pump (PATIENT SUPPLIED) XX ROSARIO (Device) patient supplied  1 Each by Injection route Continuous. Simple continuous drug:  Hydromorphone 1.5009 MG/Day  Infusion PA drug\"  PA Lockout interval  01:00 h;m  Maximum Activations/Day  12  Last changed was 3/7/2017  Indications: Hydromophone 5.0 MG/ML  (Concentration)        Psyllium (Pack) METAMUCIL 58.12 % Take 1 Packet by mouth every day.        .                 Medicines prescribed today were sent to:     [id number] - [place], [place] - [address] [address]    Phone: [phone] Fax: [phone]    Open 24 Hours?: No      Medication refill instructions:       If your prescription bottle indicates you have medication refills left, it is not necessary to call your provider’s office. Please contact your pharmacy and they will refill your medication.    If your " prescription bottle indicates you do not have any refills left, you may request refills at any time through one of the following ways: The online [employer] system (except Urgent Care), by calling your provider’s office, or by asking your pharmacy to contact your provider’s office with a refill request. Medication refills are processed only during regular business hours and may not be available until the next business day. Your provider may request additional information or to have a follow-up visit with you prior to refilling your medication.   *Please Note: Medication refills are assigned a new Rx number when refilled electronically. Your pharmacy may indicate that no refills were authorized even though a new prescription for the same medication is available at the pharmacy. Please request the medicine by name with the pharmacy before contacting your provider for a refill.           MyChart Status: Patient Declined

## 2017-06-15 NOTE — PROGRESS NOTES
"Chief Complaint   Patient presents with   • Other     discussion of options       HISTORY OF PRESENT ILLNESS: Patient is a 66 y.o. male established patient here today for the following concerns:    1. Intractable pain  2. Lung cancer metastatic to bone (CMS-HCC)  This is a very unfortunate 66 year old gentleman currently on hospice for metastatic lung cancer.  He has had a significant decline in condition.  He reports he has been unable to get his pain under control and does not believe his intrathecal pump has been working.  He is out of dilaudid and is hoping for a refill.  He reports that he also had been struggling with severe nausea and by the time he takes an antiemetic he ends up throwing it up.       He is under the care of hospice at this time but is here in hopes to get additional pain medication and possible assistance with transportation.        Past Medical, Social, and Family history reviewed and updated in EPIC    Allergies:Review of patient's allergies indicates no known allergies.    Current Outpatient Prescriptions   Medication Sig Dispense Refill   • HYDROmorphone (DILAUDID) 2 MG Tab Take 2 Tabs by mouth every four hours as needed for Severe Pain for up to 30 days. 240 Tab 0   • morphine (MS IR) 15 MG tablet Take 15 mg by mouth every four hours as needed for Severe Pain.     • Pain Pump (PATIENT SUPPLIED) XX ROSARIO 1 Each by Injection route Continuous. Simple continuous drug:  Hydromorphone 1.5009 MG/Day  Infusion PA drug\"  PA Lockout interval  01:00 h;m  Maximum Activations/Day  12  Last changed was 3/7/2017  Indications: Hydromophone 5.0 MG/ML  (Concentration)     • psyllium (METAMUCIL) 58.12 % Pack Take 1 Packet by mouth every day.     • alprazolam (XANAX) 1 MG Tab Take 1 mg by mouth 3 times a day as needed for Anxiety. Indications: Feeling Anxious     • omeprazole (PRILOSEC) 20 MG delayed-release capsule Take 20 mg by mouth every day.       No current facility-administered medications for this " "visit.         ROS:  Review of Systems   Constitutional: Negative for fever, chills, weight loss and malaise/fatigue.   HENT: Negative for ear pain, nosebleeds, congestion, sore throat and neck pain.    Eyes: Negative for blurred vision.   Respiratory: Negative for cough, sputum production, shortness of breath and wheezing.    Cardiovascular: Negative for chest pain, palpitations,  and leg swelling.   Gastrointestinal: Negative for heartburn, nausea, vomiting, diarrhea and abdominal pain.   Genitourinary: Negative for dysuria, urgency and frequency.   Musculoskeletal: Negative for myalgias, back pain and joint pain.   Skin: Negative for rash and itching.   Neurological: Negative for dizziness, tingling, tremors, sensory change, focal weakness and headaches.   Endo/Heme/Allergies: Does not bruise/bleed easily.   Psychiatric/Behavioral: Negative for depression, anxiety, suicidal ideas, insomnia and memory loss.      Exam:  Blood pressure 101/58, pulse 58, temperature 37.5 °C (99.5 °F), resp. rate 18, height 1.689 m (5' 6.5\"), weight 86.183 kg (190 lb), SpO2 90 %.    General:  Cachectic appearing, ill and pale gentleman with some respiratory distress.   Head is grossly normal.  Neck: Supple without JVD   Pulmonary: speaking in full sentences but with some breaks to catch his breath.    Cardiovascular: Regular rate  Extremities: no clubbing, cyanosis, or bilateral pitting edema.  Psych: affect appropriate      Please note that this dictation was created using voice recognition software. I have made every reasonable attempt to correct obvious errors, but I expect that there are errors of grammar and possibly content that I did not discover before finalizing the note.    Assessment/Plan:  1. Intractable pain   HYDROmorphone (DILAUDID) 2 MG Tab; Take 2 Tabs by mouth every four hours as needed for Severe Pain for up to 30 days.  Dispense: 240 Tab; Refill: 0    2. Lung cancer metastatic to bone (CMS-HCC)  - HYDROmorphone " (DILAUDID) 2 MG Tab; Take 2 Tabs by mouth every four hours as needed for Severe Pain for up to 30 days.  Dispense: 240 Tab; Refill: 0    Call placed to the on-call nurse for [facility] care to discuss further need for follow up with hospice to get better pain control and anti-nausea.  I have asked him to schedule the antinausea medication every 4 hours.  He will also restart his marinol for nausea.  [facility] will help to arrange for transportation through his .  Gabriele is strongly advised not to drive with these medications and his limited physical ability.  Offered taxi ride home today or to call someone for safe ride home.  Patient declined.

## 2017-06-26 ENCOUNTER — APPOINTMENT (OUTPATIENT)
Dept: RADIOLOGY | Facility: MEDICAL CENTER | Age: 67
DRG: 871 | End: 2017-06-26
Attending: EMERGENCY MEDICINE
Payer: MEDICARE

## 2017-06-26 ENCOUNTER — RESOLUTE PROFESSIONAL BILLING HOSPITAL PROF FEE (OUTPATIENT)
Dept: HOSPITALIST | Facility: MEDICAL CENTER | Age: 67
End: 2017-06-26
Payer: MEDICARE

## 2017-06-26 ENCOUNTER — HOSPITAL ENCOUNTER (INPATIENT)
Facility: MEDICAL CENTER | Age: 67
LOS: 4 days | DRG: 871 | End: 2017-06-30
Attending: EMERGENCY MEDICINE | Admitting: HOSPITALIST
Payer: MEDICARE

## 2017-06-26 DIAGNOSIS — R45.851 SUICIDAL IDEATION: ICD-10-CM

## 2017-06-26 DIAGNOSIS — J18.9 PNEUMONIA DUE TO INFECTIOUS ORGANISM, UNSPECIFIED LATERALITY, UNSPECIFIED PART OF LUNG: ICD-10-CM

## 2017-06-26 DIAGNOSIS — C79.51 LUNG CANCER METASTATIC TO BONE (HCC): ICD-10-CM

## 2017-06-26 DIAGNOSIS — C34.90 LUNG CANCER METASTATIC TO BONE (HCC): ICD-10-CM

## 2017-06-26 DIAGNOSIS — T50.904S: ICD-10-CM

## 2017-06-26 DIAGNOSIS — F32.A DEPRESSION, UNSPECIFIED DEPRESSION TYPE: ICD-10-CM

## 2017-06-26 DIAGNOSIS — R52 INTRACTABLE PAIN: ICD-10-CM

## 2017-06-26 PROBLEM — A41.9 SEPSIS, UNSPECIFIED: Status: ACTIVE | Noted: 2017-06-26

## 2017-06-26 LAB
ALBUMIN SERPL BCP-MCNC: 3.4 G/DL (ref 3.2–4.9)
ALBUMIN/GLOB SERPL: 1.2 G/DL
ALP SERPL-CCNC: 99 U/L (ref 30–99)
ALT SERPL-CCNC: 11 U/L (ref 2–50)
AMPHET UR QL SCN: NEGATIVE
ANION GAP SERPL CALC-SCNC: 9 MMOL/L (ref 0–11.9)
APAP SERPL-MCNC: <10 UG/ML (ref 10–30)
APPEARANCE UR: ABNORMAL
APPEARANCE UR: CLEAR
AST SERPL-CCNC: 24 U/L (ref 12–45)
BACTERIA #/AREA URNS HPF: ABNORMAL /HPF
BARBITURATES UR QL SCN: NEGATIVE
BASOPHILS # BLD AUTO: 0.5 % (ref 0–1.8)
BASOPHILS # BLD: 0.07 K/UL (ref 0–0.12)
BENZODIAZ UR QL SCN: POSITIVE
BILIRUB SERPL-MCNC: 0.5 MG/DL (ref 0.1–1.5)
BILIRUB UR QL STRIP.AUTO: NEGATIVE
BILIRUB UR QL STRIP.AUTO: NEGATIVE
BUN SERPL-MCNC: 9 MG/DL (ref 8–22)
BZE UR QL SCN: NEGATIVE
CALCIUM SERPL-MCNC: 9.5 MG/DL (ref 8.5–10.5)
CANNABINOIDS UR QL SCN: POSITIVE
CHLORIDE SERPL-SCNC: 99 MMOL/L (ref 96–112)
CO2 SERPL-SCNC: 26 MMOL/L (ref 20–33)
COLOR UR: COLORLESS
COLOR UR: YELLOW
CREAT SERPL-MCNC: 0.66 MG/DL (ref 0.5–1.4)
CULTURE IF INDICATED INDCX: NO UA CULTURE
EOSINOPHIL # BLD AUTO: 0 K/UL (ref 0–0.51)
EOSINOPHIL NFR BLD: 0 % (ref 0–6.9)
ERYTHROCYTE [DISTWIDTH] IN BLOOD BY AUTOMATED COUNT: 48 FL (ref 35.9–50)
ETHANOL BLD-MCNC: 0.01 G/DL
GFR SERPL CREATININE-BSD FRML MDRD: >60 ML/MIN/1.73 M 2
GLOBULIN SER CALC-MCNC: 2.8 G/DL (ref 1.9–3.5)
GLUCOSE SERPL-MCNC: 143 MG/DL (ref 65–99)
GLUCOSE UR STRIP.AUTO-MCNC: NEGATIVE MG/DL
GLUCOSE UR STRIP.AUTO-MCNC: NEGATIVE MG/DL
GRAM STN SPEC: NORMAL
HCT VFR BLD AUTO: 37.5 % (ref 42–52)
HGB BLD-MCNC: 11.9 G/DL (ref 14–18)
IMM GRANULOCYTES # BLD AUTO: 0.05 K/UL (ref 0–0.11)
IMM GRANULOCYTES NFR BLD AUTO: 0.4 % (ref 0–0.9)
KETONES UR STRIP.AUTO-MCNC: 10 MG/DL
KETONES UR STRIP.AUTO-MCNC: NEGATIVE MG/DL
LACTATE BLD-SCNC: 1.2 MMOL/L (ref 0.5–2)
LACTATE BLD-SCNC: 1.7 MMOL/L (ref 0.5–2)
LACTATE BLD-SCNC: 1.8 MMOL/L (ref 0.5–2)
LACTATE BLD-SCNC: 1.9 MMOL/L (ref 0.5–2)
LACTATE BLD-SCNC: 3.3 MMOL/L (ref 0.5–2)
LEUKOCYTE ESTERASE UR QL STRIP.AUTO: ABNORMAL
LEUKOCYTE ESTERASE UR QL STRIP.AUTO: NEGATIVE
LIPASE SERPL-CCNC: 11 U/L (ref 11–82)
LYMPHOCYTES # BLD AUTO: 0.56 K/UL (ref 1–4.8)
LYMPHOCYTES NFR BLD: 4.2 % (ref 22–41)
MCH RBC QN AUTO: 27.1 PG (ref 27–33)
MCHC RBC AUTO-ENTMCNC: 31.7 G/DL (ref 33.7–35.3)
MCV RBC AUTO: 85.4 FL (ref 81.4–97.8)
METHADONE UR QL SCN: NEGATIVE
MICRO URNS: ABNORMAL
MICRO URNS: ABNORMAL
MONOCYTES # BLD AUTO: 0.95 K/UL (ref 0–0.85)
MONOCYTES NFR BLD AUTO: 7.2 % (ref 0–13.4)
MUCOUS THREADS #/AREA URNS HPF: ABNORMAL /HPF
NEUTROPHILS # BLD AUTO: 11.55 K/UL (ref 1.82–7.42)
NEUTROPHILS NFR BLD: 87.7 % (ref 44–72)
NITRITE UR QL STRIP.AUTO: NEGATIVE
NITRITE UR QL STRIP.AUTO: NEGATIVE
NRBC # BLD AUTO: 0 K/UL
NRBC BLD AUTO-RTO: 0 /100 WBC
OPIATES UR QL SCN: POSITIVE
OXYCODONE UR QL SCN: POSITIVE
PCP UR QL SCN: NEGATIVE
PH UR STRIP.AUTO: 7 [PH]
PH UR STRIP.AUTO: 7 [PH]
PLATELET # BLD AUTO: 498 K/UL (ref 164–446)
PMV BLD AUTO: 9 FL (ref 9–12.9)
POTASSIUM SERPL-SCNC: 4.2 MMOL/L (ref 3.6–5.5)
PROCALCITONIN SERPL-MCNC: 0.1 NG/ML
PROPOXYPH UR QL SCN: NEGATIVE
PROT SERPL-MCNC: 6.2 G/DL (ref 6–8.2)
PROT UR QL STRIP: NEGATIVE MG/DL
PROT UR QL STRIP: NEGATIVE MG/DL
RBC # BLD AUTO: 4.39 M/UL (ref 4.7–6.1)
RBC # URNS HPF: ABNORMAL /HPF
RBC UR QL AUTO: ABNORMAL
RBC UR QL AUTO: NEGATIVE
SALICYLATES SERPL-MCNC: 0 MG/DL (ref 15–25)
SIGNIFICANT IND 70042: NORMAL
SITE SITE: NORMAL
SODIUM SERPL-SCNC: 134 MMOL/L (ref 135–145)
SOURCE SOURCE: NORMAL
SP GR UR STRIP.AUTO: 1
SP GR UR STRIP.AUTO: 1.02
WBC # BLD AUTO: 13.2 K/UL (ref 4.8–10.8)
WBC #/AREA URNS HPF: ABNORMAL /HPF

## 2017-06-26 PROCEDURE — 93005 ELECTROCARDIOGRAM TRACING: CPT | Performed by: EMERGENCY MEDICINE

## 2017-06-26 PROCEDURE — A9270 NON-COVERED ITEM OR SERVICE: HCPCS | Performed by: HOSPITALIST

## 2017-06-26 PROCEDURE — 87040 BLOOD CULTURE FOR BACTERIA: CPT

## 2017-06-26 PROCEDURE — 700111 HCHG RX REV CODE 636 W/ 250 OVERRIDE (IP): Performed by: STUDENT IN AN ORGANIZED HEALTH CARE EDUCATION/TRAINING PROGRAM

## 2017-06-26 PROCEDURE — 700105 HCHG RX REV CODE 258: Performed by: HOSPITALIST

## 2017-06-26 PROCEDURE — 51798 US URINE CAPACITY MEASURE: CPT

## 2017-06-26 PROCEDURE — 96367 TX/PROPH/DG ADDL SEQ IV INF: CPT

## 2017-06-26 PROCEDURE — 96365 THER/PROPH/DIAG IV INF INIT: CPT

## 2017-06-26 PROCEDURE — A9270 NON-COVERED ITEM OR SERVICE: HCPCS

## 2017-06-26 PROCEDURE — 36415 COLL VENOUS BLD VENIPUNCTURE: CPT

## 2017-06-26 PROCEDURE — 700105 HCHG RX REV CODE 258: Performed by: EMERGENCY MEDICINE

## 2017-06-26 PROCEDURE — 700102 HCHG RX REV CODE 250 W/ 637 OVERRIDE(OP): Performed by: HOSPITALIST

## 2017-06-26 PROCEDURE — 770020 HCHG ROOM/CARE - TELE (206)

## 2017-06-26 PROCEDURE — 96366 THER/PROPH/DIAG IV INF ADDON: CPT

## 2017-06-26 PROCEDURE — 85025 COMPLETE CBC W/AUTO DIFF WBC: CPT

## 2017-06-26 PROCEDURE — 87070 CULTURE OTHR SPECIMN AEROBIC: CPT

## 2017-06-26 PROCEDURE — 80053 COMPREHEN METABOLIC PANEL: CPT

## 2017-06-26 PROCEDURE — 81003 URINALYSIS AUTO W/O SCOPE: CPT

## 2017-06-26 PROCEDURE — 700105 HCHG RX REV CODE 258: Performed by: PHARMACIST

## 2017-06-26 PROCEDURE — 99285 EMERGENCY DEPT VISIT HI MDM: CPT

## 2017-06-26 PROCEDURE — 304562 HCHG STAT O2 MASK/CANNULA

## 2017-06-26 PROCEDURE — 99223 1ST HOSP IP/OBS HIGH 75: CPT | Mod: GW,AI | Performed by: HOSPITALIST

## 2017-06-26 PROCEDURE — 700111 HCHG RX REV CODE 636 W/ 250 OVERRIDE (IP): Performed by: EMERGENCY MEDICINE

## 2017-06-26 PROCEDURE — 302146: Performed by: EMERGENCY MEDICINE

## 2017-06-26 PROCEDURE — 80307 DRUG TEST PRSMV CHEM ANLYZR: CPT

## 2017-06-26 PROCEDURE — 71010 DX-CHEST-PORTABLE (1 VIEW): CPT

## 2017-06-26 PROCEDURE — 302129 PCA PLUS: Performed by: HOSPITALIST

## 2017-06-26 PROCEDURE — 84145 PROCALCITONIN (PCT): CPT

## 2017-06-26 PROCEDURE — 87205 SMEAR GRAM STAIN: CPT

## 2017-06-26 PROCEDURE — 700111 HCHG RX REV CODE 636 W/ 250 OVERRIDE (IP): Performed by: HOSPITALIST

## 2017-06-26 PROCEDURE — 700102 HCHG RX REV CODE 250 W/ 637 OVERRIDE(OP)

## 2017-06-26 PROCEDURE — 81001 URINALYSIS AUTO W/SCOPE: CPT

## 2017-06-26 PROCEDURE — 304561 HCHG STAT O2

## 2017-06-26 PROCEDURE — 83605 ASSAY OF LACTIC ACID: CPT | Mod: 91

## 2017-06-26 PROCEDURE — 96375 TX/PRO/DX INJ NEW DRUG ADDON: CPT

## 2017-06-26 PROCEDURE — 83690 ASSAY OF LIPASE: CPT

## 2017-06-26 PROCEDURE — 700111 HCHG RX REV CODE 636 W/ 250 OVERRIDE (IP): Performed by: PHARMACIST

## 2017-06-26 PROCEDURE — 51702 INSERT TEMP BLADDER CATH: CPT

## 2017-06-26 PROCEDURE — 303105 HCHG CATHETER EXTRA

## 2017-06-26 PROCEDURE — 700111 HCHG RX REV CODE 636 W/ 250 OVERRIDE (IP)

## 2017-06-26 RX ORDER — ALPRAZOLAM 1 MG/1
1 TABLET ORAL 3 TIMES DAILY PRN
Status: DISCONTINUED | OUTPATIENT
Start: 2017-06-26 | End: 2017-06-30 | Stop reason: HOSPADM

## 2017-06-26 RX ORDER — ONDANSETRON 2 MG/ML
4 INJECTION INTRAMUSCULAR; INTRAVENOUS EVERY 4 HOURS PRN
Status: DISCONTINUED | OUTPATIENT
Start: 2017-06-26 | End: 2017-06-30 | Stop reason: HOSPADM

## 2017-06-26 RX ORDER — AZITHROMYCIN 250 MG/1
500 TABLET, FILM COATED ORAL DAILY
Status: DISCONTINUED | OUTPATIENT
Start: 2017-06-26 | End: 2017-06-27

## 2017-06-26 RX ORDER — LORAZEPAM 2 MG/ML
1 INJECTION INTRAMUSCULAR ONCE
Status: COMPLETED | OUTPATIENT
Start: 2017-06-26 | End: 2017-06-26

## 2017-06-26 RX ORDER — HALOPERIDOL 5 MG/ML
5 INJECTION INTRAMUSCULAR ONCE
Status: COMPLETED | OUTPATIENT
Start: 2017-06-26 | End: 2017-06-26

## 2017-06-26 RX ORDER — SODIUM CHLORIDE 9 MG/ML
INJECTION, SOLUTION INTRAVENOUS CONTINUOUS
Status: DISCONTINUED | OUTPATIENT
Start: 2017-06-26 | End: 2017-06-26

## 2017-06-26 RX ORDER — SODIUM CHLORIDE 9 MG/ML
500 INJECTION, SOLUTION INTRAVENOUS ONCE
Status: COMPLETED | OUTPATIENT
Start: 2017-06-26 | End: 2017-06-26

## 2017-06-26 RX ORDER — AMOXICILLIN 250 MG
2 CAPSULE ORAL 2 TIMES DAILY
Status: DISCONTINUED | OUTPATIENT
Start: 2017-06-26 | End: 2017-06-30 | Stop reason: HOSPADM

## 2017-06-26 RX ORDER — HALOPERIDOL 5 MG/ML
INJECTION INTRAMUSCULAR
Status: COMPLETED
Start: 2017-06-26 | End: 2017-06-26

## 2017-06-26 RX ORDER — HYDROMORPHONE HYDROCHLORIDE 4 MG/1
4 TABLET ORAL EVERY 4 HOURS PRN
Status: DISCONTINUED | OUTPATIENT
Start: 2017-06-26 | End: 2017-06-30 | Stop reason: HOSPADM

## 2017-06-26 RX ORDER — SODIUM CHLORIDE 9 MG/ML
30 INJECTION, SOLUTION INTRAVENOUS
Status: DISCONTINUED | OUTPATIENT
Start: 2017-06-26 | End: 2017-06-30 | Stop reason: HOSPADM

## 2017-06-26 RX ORDER — HALOPERIDOL 5 MG/ML
5 INJECTION INTRAMUSCULAR
Status: DISCONTINUED | OUTPATIENT
Start: 2017-06-26 | End: 2017-06-30 | Stop reason: HOSPADM

## 2017-06-26 RX ORDER — SODIUM CHLORIDE 9 MG/ML
1000 INJECTION, SOLUTION INTRAVENOUS ONCE
Status: COMPLETED | OUTPATIENT
Start: 2017-06-26 | End: 2017-06-26

## 2017-06-26 RX ORDER — BISACODYL 10 MG
10 SUPPOSITORY, RECTAL RECTAL
Status: DISCONTINUED | OUTPATIENT
Start: 2017-06-26 | End: 2017-06-30 | Stop reason: HOSPADM

## 2017-06-26 RX ORDER — SODIUM CHLORIDE 9 MG/ML
500 INJECTION, SOLUTION INTRAVENOUS
Status: DISCONTINUED | OUTPATIENT
Start: 2017-06-26 | End: 2017-06-30 | Stop reason: HOSPADM

## 2017-06-26 RX ORDER — ONDANSETRON 4 MG/1
4 TABLET, ORALLY DISINTEGRATING ORAL EVERY 4 HOURS PRN
Status: DISCONTINUED | OUTPATIENT
Start: 2017-06-26 | End: 2017-06-30 | Stop reason: HOSPADM

## 2017-06-26 RX ORDER — SODIUM CHLORIDE 9 MG/ML
INJECTION, SOLUTION INTRAVENOUS CONTINUOUS
Status: DISCONTINUED | OUTPATIENT
Start: 2017-06-26 | End: 2017-06-27

## 2017-06-26 RX ORDER — POLYETHYLENE GLYCOL 3350 17 G/17G
1 POWDER, FOR SOLUTION ORAL
Status: DISCONTINUED | OUTPATIENT
Start: 2017-06-26 | End: 2017-06-30 | Stop reason: HOSPADM

## 2017-06-26 RX ADMIN — HALOPERIDOL LACTATE 5 MG: 5 INJECTION, SOLUTION INTRAMUSCULAR at 20:27

## 2017-06-26 RX ADMIN — SODIUM CHLORIDE: 9 INJECTION, SOLUTION INTRAVENOUS at 20:31

## 2017-06-26 RX ADMIN — AZITHROMYCIN 500 MG: 250 TABLET, FILM COATED ORAL at 13:29

## 2017-06-26 RX ADMIN — ENOXAPARIN SODIUM 40 MG: 100 INJECTION SUBCUTANEOUS at 11:44

## 2017-06-26 RX ADMIN — CEFTRIAXONE SODIUM 2 G: 2 INJECTION, POWDER, FOR SOLUTION INTRAMUSCULAR; INTRAVENOUS at 12:32

## 2017-06-26 RX ADMIN — HYDROMORPHONE HYDROCHLORIDE 2 MG: 1 INJECTION, SOLUTION INTRAMUSCULAR; INTRAVENOUS; SUBCUTANEOUS at 20:26

## 2017-06-26 RX ADMIN — HALOPERIDOL LACTATE 5 MG: 5 INJECTION, SOLUTION INTRAMUSCULAR at 23:23

## 2017-06-26 RX ADMIN — HALOPERIDOL LACTATE 5 MG: 5 INJECTION, SOLUTION INTRAMUSCULAR at 22:35

## 2017-06-26 RX ADMIN — HALOPERIDOL LACTATE 5 MG: 5 INJECTION, SOLUTION INTRAMUSCULAR at 05:47

## 2017-06-26 RX ADMIN — ALPRAZOLAM 1 MG: 1 TABLET ORAL at 11:47

## 2017-06-26 RX ADMIN — PIPERACILLIN SODIUM AND TAZOBACTAM SODIUM 4.5 G: 4; .5 INJECTION, POWDER, FOR SOLUTION INTRAVENOUS at 06:15

## 2017-06-26 RX ADMIN — STANDARDIZED SENNA CONCENTRATE AND DOCUSATE SODIUM 2 TABLET: 8.6; 5 TABLET, FILM COATED ORAL at 11:43

## 2017-06-26 RX ADMIN — VANCOMYCIN HYDROCHLORIDE 2200 MG: 100 INJECTION, POWDER, LYOPHILIZED, FOR SOLUTION INTRAVENOUS at 07:45

## 2017-06-26 RX ADMIN — HYDROMORPHONE HYDROCHLORIDE 1 MG: 1 INJECTION, SOLUTION INTRAMUSCULAR; INTRAVENOUS; SUBCUTANEOUS at 16:03

## 2017-06-26 RX ADMIN — LORAZEPAM 1 MG: 2 INJECTION INTRAMUSCULAR; INTRAVENOUS at 07:06

## 2017-06-26 RX ADMIN — SODIUM CHLORIDE 500 ML: 9 INJECTION, SOLUTION INTRAVENOUS at 06:56

## 2017-06-26 RX ADMIN — SODIUM CHLORIDE: 9 INJECTION, SOLUTION INTRAVENOUS at 04:15

## 2017-06-26 RX ADMIN — HYDROMORPHONE HYDROCHLORIDE 4 MG: 4 TABLET ORAL at 13:41

## 2017-06-26 RX ADMIN — SODIUM CHLORIDE: 9 INJECTION, SOLUTION INTRAVENOUS at 07:48

## 2017-06-26 RX ADMIN — ALPRAZOLAM 1 MG: 1 TABLET ORAL at 23:23

## 2017-06-26 RX ADMIN — SODIUM CHLORIDE 1000 ML: 9 INJECTION, SOLUTION INTRAVENOUS at 03:06

## 2017-06-26 ASSESSMENT — COPD QUESTIONNAIRES
DURING THE PAST 4 WEEKS HOW MUCH DID YOU FEEL SHORT OF BREATH: SOME OF THE TIME
COPD SCREENING SCORE: 6
HAVE YOU SMOKED AT LEAST 100 CIGARETTES IN YOUR ENTIRE LIFE: YES
DO YOU EVER COUGH UP ANY MUCUS OR PHLEGM?: NO/ONLY WITH OCCASIONAL COLDS OR INFECTIONS

## 2017-06-26 ASSESSMENT — PAIN SCALES - GENERAL
PAINLEVEL_OUTOF10: 8
PAINLEVEL_OUTOF10: 6
PAINLEVEL_OUTOF10: 8
PAINLEVEL_OUTOF10: 8
PAINLEVEL_OUTOF10: 5
PAINLEVEL_OUTOF10: 8
PAINLEVEL_OUTOF10: 6
PAINLEVEL_OUTOF10: 8
PAINLEVEL_OUTOF10: 5

## 2017-06-26 ASSESSMENT — LIFESTYLE VARIABLES: EVER_SMOKED: YES

## 2017-06-26 NOTE — H&P
DATE OF SERVICE:  06/26/2017    REASON FOR EVALUATION:  Suicide attempt.    ADVANCED DIRECTIVES:  The patient is DNR.    PRIMARY CARE PHYSICIAN:  Sasha Isidro MD.    ONCOLOGIST:  Shawanda Bennett MD.    HISTORY OF PRESENT ILLNESS:  This is a 66-year-old male with history of   metastatic lung cancer to the bone, who was on hospice with [facility] who attempted   to end his life by ingesting unknown amount of Xanax, morphine, Dilaudid.    Patient was brought to the emergency room.  He was tachycardic, agitated.  The   patient was given Haldol for agitation.  Patient was placed in restraints.    X-ray showed a new infiltrate.  White cell count elevated.  Patient also to be   admitted for sepsis related to possible community-acquired pneumonia.    REVIEW OF SYSTEMS:  Unobtainable due to patient's agitation.    ALLERGIES:  None.    OUTPATIENT MEDICATIONS:  Dilaudid 2 mg every 4 hours, MSIR 15 mg every 4 hours   for pain.  He also has a pain pump, Xanax 1 mg t.i.d. for anxiety, Prilosec   20 mg daily.    PAST MEDICAL HISTORY:  Include metastatic lung cancer, osteoarthritis, GERD,   history of pulmonary embolism, history of anxiety.    FAMILY HISTORY:  Mother has stroke and colon cancer.  Brother had lung cancer.    Father had prostate cancer.    SOCIAL HISTORY:  He is single.  He used to smoke 1 pack per day for 14 years,   he quit in 1983.  Does not drink any alcohol, no illicit drugs.    PAST SURGICAL HISTORY:  Umbilical hernia repair, knee arthroscopy, shoulder   arthroscopy, lumbar laminectomy, bone biopsy, lung biopsy, pain pump   insertion.    PHYSICAL EXAMINATION:  GENERAL:  Very agitated gentleman here in restraints, , looks older   than his stated age.  VITAL SIGNS:  Blood pressure is 122/82, respiration 24, pulse is 116,   temperature 37.5.  HEENT:  Sclerae is anicteric.  Oral mucosa is dry.  NECK:  Supple.  No adenopathy appreciated in cervical or axillary area.  HEART:  S1, S2 tachycardic.  LUNGS:  He has  mild bilateral rhonchi.  No rales or wheezing.  ABDOMEN:  Soft, nontender, positive bowel sounds appreciated.  EXTREMITIES:  No edema, no cyanosis or clubbing.  NEUROLOGIC:  He is alert, nonfocal.    LABORATORY DATA:  White count of 13, hemoglobin 12, hematocrit 37, platelets   498.  Sodium 134, potassium 4.2.  Glucose is 143, BUN 9, creatinine 0.66.    Lactic acid level is pending.  X-ray of chest showing scattered bilateral   pulmonary nodules, increased.  There is a dense right pulmonary infiltrate.    IMPRESSION:  1.  Suicide attempt.  2.  Sepsis due to community-acquired pneumonia.  3.  Do not resuscitate.  4.  History of metastatic lung cancer.  5.  Anemia of chronic disease.  6.  Hyponatremia.  7.  Chronic pain.  8.  Anxiety.    PLAN:  At this time, patient will be admitted and monitored on telemetry.  I   have resumed patient's Xanax and Dilaudid as again patient is DNR and should   be on hospice.  He is on a legal hold and I have consulted [employer].    Patient was given a sepsis protocol, ___ sepsis boluses as per protocol.  The   patient was given DVT prophylaxis with Lovenox.  Antibiotics of Rocephin 2   grams q. 24 hours and Zithromax 500 IV q. 24 hours.  Follow up on all blood   cultures.  Anticipated 2-midnight stay for this patient.  Check procalcitonin   level every 48 hours ___ repeat labs in a.m. with CBC and BMP.  Check a sputum   culture.       ____________________________________     MD FRANKLIN BARNES / CELSO    DD:  06/26/2017 06:58:58  DT:  06/26/2017 07:22:05    D#:  [id number]  Job#:  [id number]

## 2017-06-26 NOTE — ED NOTES
Patient very agitated stating that he has to void. Bladder scan reveals over 500mls in bladder. Page out for admitting physician.

## 2017-06-26 NOTE — ED NOTES
Patient is increasingly combative with staff. Pt resisting care. This RN was able to start first set of cultures.

## 2017-06-26 NOTE — PROGRESS NOTES
Pt constantly ties to get out out of bed and restraints. Pain 6/10. Awaiting pain pump.  Bed in lowest locked position. 1:1 sitter w/pt.

## 2017-06-26 NOTE — IP AVS SNAPSHOT
M[employer]D ANTIQUES & CONSIGNMENT Access Code: [email or web address]  Expires: 7/27/2017  4:08 AM    M[employer]D ANTIQUES & CONSIGNMENT  A secure, online tool to manage your health information     [employer]’s M[employer]D ANTIQUES & CONSIGNMENT® is a secure, online tool that connects you to your personalized health information from the privacy of your home -- day or night - making it very easy for you to manage your healthcare. Once the activation process is completed, you can even access your medical information using the M&D ANTIQUES & CONSIGNMENT balaji, which is available for free in the Apple Balaji store or Google Play store.     M[employer]D ANTIQUES & CONSIGNMENT provides the following levels of access (as shown below):   My Chart Features   [facility] Primary Care Doctor [facility]  Specialists [facility]  Urgent  Care Non-[facility]  Primary Care  Doctor   Email your healthcare team securely and privately 24/7 X X X X   Manage appointments: schedule your next appointment; view details of past/upcoming appointments X      Request prescription refills. X      View recent personal medical records, including lab and immunizations X X X X   View health record, including health history, allergies, medications X X X X   Read reports about your outpatient visits, procedures, consult and ER notes X X X X   See your discharge summary, which is a recap of your hospital and/or ER visit that includes your diagnosis, lab results, and care plan. X X       How to register for M[employer]D ANTIQUES & CONSIGNMENT:  1. Go to  [email or web address].  2. Click on the Sign Up Now box, which takes you to the New Member Sign Up page. You will need to provide the following information:  a. Enter your M[employer]D ANTIQUES & CONSIGNMENT Access Code exactly as it appears at the top of this page. (You will not need to use this code after you’ve completed the sign-up process. If you do not sign up before the expiration date, you must request a new code.)   b. Enter your date of birth.   c. Enter your home email address.   d. Click Submit, and follow the next screen’s instructions.  3. Create a M[employer]D ANTIQUES & CONSIGNMENT ID. This will be your M[employer]D ANTIQUES & CONSIGNMENT  login ID and cannot be changed, so think of one that is secure and easy to remember.  4. Create a [employer] password. You can change your password at any time.  5. Enter your Password Reset Question and Answer. This can be used at a later time if you forget your password.   6. Enter your e-mail address. This allows you to receive e-mail notifications when new information is available in [employer].  7. Click Sign Up. You can now view your health information.    For assistance activating your [employer] account, call [phone]

## 2017-06-26 NOTE — ED NOTES
"Report from Julio C MILLS. Pt sitting up in [facility], mildly drowsy, oriented to self and place, reports taking unk amount of 2mg dilaudid and 15 mg morphine at unk time. Pt states, \"I was trying to kill myself.\" Pt on monitor, in direct view of nurses station.    "

## 2017-06-26 NOTE — ED NOTES
"Med rec updated and complete  Allergies reviewed  Pt states \"I don't know what is in my pain pump\".  Called Dr. Sparks office and received pain pump information.  Dr. Sparks's office shows he had last filled his RX on 1/2017 of DILAUDID 2MG, XANAX 1MG, and MORPHINE IR 15MG\".  Pt states \"I had RX of DILAUDID 2MG, XANAX 1MG, and MORPHINE IR 15MG\".  Pt did admit to this writer that he took his DILAUDID 2MG, MORPHINE IR 15MG, and XANAX 1MG sometime yesterday (6/25/2017), but not sure what time or how much\".  Pt states \"No antibiotics in the last 30 days\".  Pt states \"No vitamins or OTC'S\".           "

## 2017-06-26 NOTE — ED NOTES
Room 23    [facility] for sucide attept, pt took unknown amount of pain pills.  Pt is presvcriped 2mg tabs of dilaudid, 1mg tabs of xanax and 15 mg tabs morphine [facility] thinks the ingestion was about 1 hour ago at 0145.  Pt has Stage 4 bone cancer and doesn't want to live.  Currently still states wanting to die to this RN

## 2017-06-26 NOTE — ED PROVIDER NOTES
ED Provider Note    CHIEF COMPLAINT  Chief Complaint   Patient presents with   • Suicide Attempt   • Drug Overdose       HPI  Gabriele Torres is a 66 y.o. male who presents with history of reported bone cancer, depressive night because of his diagnosis, overdosed on Xanax, 2 mg morphine, 50 mg, Dilaudid 2 mg, denies other ingestions. No fever no chills no pain no nausea no vomiting no diarrhea no shortness of breath.  Dictation from previous admission,Denies other G.I., G.U.. endrocine, cardiovascular, respriatory or neurological problems.   Trans ID: [id number] Trans Status: Available Dictation Time: 4/2/2017  7:11 AM      Trans Time: 4/2/2017 8:05 AM Trans Doc Type: Discharge Summary       Expand All Collapse All    ADMISSION DIAGNOSES:  1.  Left hip pain, dislocation of left hip arthroplasty post fall.  2.  Metastatic lung cancer.  3.  Anxiety.  4.  History of pulmonary embolism.  5.  Gastroesophageal reflux disease.     DISCHARGE DIAGNOSES:  1.  Left hip dislocation of hip arthroplasty with recent findings of a    metastatic cancer to left acetabulum, pelvis.  Plan, supportive nonoperative    treatment, hospice.  2.  Metastatic lung cancer.        ASSESSMENT:  1.  Failed left total hip arthroplasty.  2.  Metastatic lung cancer with large periacetabular lesion.  3.  Chronic pain syndrome associated with malignancy and opioid dependence.     P   DD:  02/09/2017 10:38:21    REVIEW OF SYSTEMS  See HPI for further details. History of depression and arthritis lung cancer metastatic to bone GERD pressure is chronic low back pain    Denies other G.I., G.U.. endrocine, cardiovascular, respriatory or neurological problems.  All other systems are negative.     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  "  • Hiatus hernia syndrome    • Cancer (CMS-HCC) 5/2015     Lung cancer, chemo only, last dose Oct 27, 2016; 1/17 bone cancer   • Cancer, metastatic to bone (CMS-HCC)    • GERD (gastroesophageal reflux disease)    • Anxiety      on xanax   • Breath shortness 2016     \"Needed oxygen in the past, because of my lung ca, haven't used it for 6 monthe\"   • Pain      chronic back pain   • Heart burn    • Chronic low back pain    • Hip pain 2/16/2017       FAMILY HISTORY  Family History   Problem Relation Age of Onset   • Stroke Mother    • Cancer Mother      colon ca x2, thyroid ca, renal ca   • Cancer Brother      lung ca   • Cancer Father      prostate cancer       SOCIAL HISTORY  Social History     Social History   • Marital Status: Single     Spouse Name: N/A   • Number of Children: N/A   • Years of Education: N/A     Occupational History   • disabled      Social History Main Topics   • Smoking status: Former Smoker -- 1.00 packs/day for 14 years     Quit date: 01/01/1983   • Smokeless tobacco: Never Used   • Alcohol Use: 0.0 oz/week     0 Standard drinks or equivalent per week      Comment: one a week   • Drug Use: No   • Sexual Activity: No     Other Topics Concern   • Not on file     Social History Narrative       SURGICAL HISTORY  Past Surgical History   Procedure Laterality Date   • Umbilical hernia repair  2007     umbilical hernia   • Thoracoscopy Right 5/7/2015     Procedure: THORACOSCOPY, with lung biopsy;  Surgeon: Mikki Rivera M.D.;  Location: [facility];  Service:    • Pr inj dx/ther agnt paravert facet joint, luz maria* Left 12/8/2016     Procedure: INJ PARA FACET L/S 1 LVL W/IG - L4, 5, S1;  Surgeon: Cricket Sparks M.D.;  Location: [facility];  Service: Pain Management   • Pr inj dx/ther agnt paravert facet joint, luz maria*  12/8/2016     Procedure: INJ PARA FACET L/S 2D LVL W/IG;  Surgeon: Cricket Sparks M.D.;  Location: [facility];  Service: Pain Management   • " "Knee arthroscopy Bilateral 2001   • Shoulder arthroscopy Right 2010   • Hip arthroplasty total Left 2014    • Pr inj dx/ther agnt paravert facet joint, luz maria* Left 1/5/2017     Procedure: INJ PARA FACET L/S 1 LVL W/IG - L4, L5, S1;  Surgeon: Cricket Sparks M.D.;  Location: [facility];  Service: Pain Management   • Pr inj dx/ther agnt paravert facet joint, luz maria*  1/5/2017     Procedure: INJ PARA FACET L/S 2D LVL W/IG;  Surgeon: Cricket Sparks M.D.;  Location: [facility];  Service: Pain Management   • Block epidural steroid injection N/A 1/26/2017     Procedure: BLOCK EPIDURAL STEROID INJECTION - SINGLE SHOT INJECTION PAIN PUMP;  Surgeon: Cricket Sparks M.D.;  Location: [facility];  Service:    • Lumbar laminectomy diskectomy  2005     L3-L4   • Bone biopsy Left 1/17/17     left acetabulum   • Lung needle biopsy  Nov 2016   • Biopsy ortho Left 12/2015   • Pump insert/remove Right 2/17/2017     Procedure: PUMP INSERT/REMOVE - IMPLANT PERMANENT PAIN PUMP;  Surgeon: Cricket Sparks M.D.;  Location: [facility];  Service:        CURRENT MEDICATIONS  Home Medications     **Home medications have not yet been reviewed for this encounter**          ALLERGIES  No Known Allergies    PHYSICAL EXAM  VITAL SIGNS: BP 89/63 mmHg  Pulse 125  Resp 18  Ht 1.689 m (5' 6.5\")  Wt 86.183 kg (190 lb)  BMI 30.21 kg/m2  SpO2 95%  Constitutional: Well developed, Well nourished, No acute distress, Non-toxic appearance.   HENT: Normocephalic, Atraumatic, Bilateral external ears normal, Oropharynx moist, No oral exudates, Nose normal.   Eyes: PERRL, EOMI, Conjunctiva normal, No discharge.   Neck: Normal range of motion, No tenderness, Supple, No stridor.   Lymphatic: No lymphadenopathy noted.   Cardiovascular: Normal heart rate, Normal rhythm, No murmurs, No rubs, No gallops.   Thorax & Lungs: Normal breath sounds, No respiratory distress, No wheezing, No chest tenderness. "   Abdomen:  No tenderness, no guarding no rigidity and the abdomen is soft.  No masses, No pulsatile masses. There is a subcutaneous pump, suspected to be a pain pump, left lower quadrant  Skin: Warm, Dry, No erythema, No rash.   Back: No tenderness, No CVA tenderness.   Extremities: Intact distal pulses, No edema, No tenderness, No cyanosis, No clubbing.   Musculoskeletal: Good range of motion in all major joints. No tenderness to palpation or major deformities noted.   Neurologic: Alert & oriented x 3, Normal motor function, Normal sensory function, No focal deficits noted.   Psychiatric: Affect is one of depression and mood is one of sadness and suicidal ideation, no hallucinations.   EKG Interpretation    Interpreted by me    Rhythm: Sinus tachycardia  Rate: 120  Axis: normal  Ectopy: none  Conduction: normal  ST Segments: no acute change  T Waves: no acute change  Q Waves: none    Clinical Impression: I do have an old EKG to compare to and I do not see significant changes other than tachycardia    RADIOLOGY/PROCEDURES  DX-CHEST-PORTABLE (1 VIEW)   Final Result         1.  Scattered bilateral pulmonary nodular densities, increased since prior study, appearance corresponding with metastatic disease.   2.  Dense right pulmonary infiltrate, increased since prior, could represent tumoral and/or infectious infiltrate.          COURSE & MEDICAL DECISION MAKING  Pertinent Labs & Imaging studies reviewed. (See chart for details) urinalysis normal, urine positive for benzodiazepine and opiates oxycodone marijuana acetaminophen negative, alcohol 0.0 on electrolytes unremarkable renal function, white count elevated at 13.2 hematocrit 37 platelet count normal        He evidently has a history of lung cancer, metastatic to bone, pain, overdosed on several medications tonight. Awake alert. Given IV normal saline, not requiring Narcan at this time. Patient is uncooperative, she'll be given Haldol. Soft restraints. Chest x-ray  is consistent with possible pneumonia, increasing pulmonary nodules.    Blood cultures pending, given treatment for healthcare facility pneumonia.    Concern for pneumonia. Is given antibiotics, I have talked with the hospitalist about admission  FINAL IMPRESSION  1.  1. Depression, unspecified depression type    2. Suicidal ideation    3. Overdose, undetermined intent, sequela         2. Pneumonia  3.     Disposition  I have talked with the hospitalist about admission  Electronically signed by: Tony Workman, 6/26/2017 4:04 AM

## 2017-06-26 NOTE — ED NOTES
Pt removed monitoring equipment, pulled IV out and attempted to climb out of bed. Pt redirected back up in [facility].  ERP to BS. 18g PIV placed to RAC. IV fluids infusing per orders. Xray at BS.

## 2017-06-26 NOTE — PSYCHIATRY
"PSYCHIATRIC CONSULTATION  Reason for Admission: Sepsis  Reason for Consult: suicide attempt  Requesting Provider: Aleksey Buchanan M.D.  Psychiatric Supervising Attending: Paola Rodriguez MD  Legal Hold Status:   Chief Complaint   Patient presents with   • Suicide Attempt   • Drug Overdose         History of Present Illness  Patient is a 66 y.o. Male, with history of anxiety, metastatic lung cancer, on hospice with [facility], presents with suicide attempt via ingesting unknown quantity of xanax, morphine, dilaudid.  UDS +ve benzodiazepines, opiates, oxcodone, cannabinoids.  Patient was admitted for the suicide attempt and sepsis due to community-acquired pneumonia.  Unable to obtain history of present illness as patient is currently agitated and struggling to get out of bed/restraints.      Psychiatric Review of Current Symptoms  Patient agitated, struggling to get out of bed/restraints.  Unable to assess for depression, jammie, anxiety, PTSD, psychosis, cognition at this time      Medical Review of Symptoms (as reported by the patient). All systems reviewed. Those not listed below were found to be negative.   Neurological: unable to assess for TBI, loss of consciousness, seizure, stroke      Medical History as documented.  All the vitals, labs, notes, records, and the MAR.  Findings of interest to psychiatry include:  Allergy: Review of patient's allergies indicates no known allergies.  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 Authorizing Provider   Pain Pump (PATIENT SUPPLIED) XX ROSARIO 1 Each by Injection route Continuous. Infusion drug:  HYDROMORPHONE 5.0mg/ml  KETAMINE 83.3 mg/ml  Infusion mode simple continuous drug:  HYDROMORPHONE 1.5009 mg/day  KETAMINE 25.005 mg/day  Infusion PA drug:  HYDROMORPHONE 0.1000MG   1.5988mg/day  7% 0.0979 mg  KETAMINE 1.666mg   26.536mg/day  7% 1.631mg  PA Duration:  Lockout interval 01:00 h:m  Maximum Activation/Day 1  Last changed on 5/4/2017  Indications: Hydromophone 5.0 MG/ML  (Concentration)    Not In System Provider     Current Facility-Administered Medications   Medication Frequency   • NS infusion 2,586 mL Once PRN   • cefTRIAXone (ROCEPHIN) 2 g in  mL IVPB Q24HRS   • NS (BOLUS) infusion 500 mL Once PRN   • NS infusion Continuous   • senna-docusate (PERICOLACE or SENOKOT S) 8.6-50 MG per tablet 2 Tab BID    And   • polyethylene glycol/lytes (MIRALAX) PACKET 1 Packet QDAY PRN    And   • magnesium hydroxide (MILK OF MAGNESIA) suspension 30 mL QDAY PRN    And   • bisacodyl (DULCOLAX) suppository 10 mg QDAY PRN   • Respiratory Care per Protocol Continuous RT   • enoxaparin (LOVENOX) inj 40 mg DAILY   • ondansetron (ZOFRAN) syringe/vial injection 4 mg Q4HRS PRN   • ondansetron (ZOFRAN ODT) dispertab 4 mg Q4HRS PRN   • alprazolam (XANAX) tablet 1 mg TID PRN   • HYDROmorphone (DILAUDID) tablet 4 mg Q4HRS PRN   • Hydromorphone/Ketamine Pain Pump (Pt's Own) Continuous   • azithromycin (ZITHROMAX) tablet 500 mg DAILY   • HYDROmorphone (DILAUDID) injection 1-2 mg Q4HRS PRN     Past Surgical History   Procedure Laterality Date   • Umbilical hernia repair  2007     umbilical hernia   • Thoracoscopy Right 5/7/2015     Procedure: THORACOSCOPY, with lung biopsy;  Surgeon: Mikki" YEVGENIY Rivera M.D.;  Location: [facility];  Service:    • Pr inj dx/ther agnt paravert facet joint, luz maria* Left 12/8/2016     Procedure: INJ PARA FACET L/S 1 LVL W/IG - L4, 5, S1;  Surgeon: Cricket Sparks M.D.;  Location: [facility];  Service: Pain Management   • Pr inj dx/ther agnt paravert facet joint, luz maria*  12/8/2016     Procedure: INJ PARA FACET L/S 2D LVL W/IG;  Surgeon: Cricket Sparks M.D.;  Location: [facility];  Service: Pain Management   • Knee arthroscopy Bilateral 2001   • Shoulder arthroscopy Right 2010   • Hip arthroplasty total Left 2014    • Pr inj dx/ther agnt paravert facet joint, luz maria* Left 1/5/2017     Procedure: INJ PARA FACET L/S 1 LVL W/IG - L4, L5, S1;  Surgeon: Cricket Sparks M.D.;  Location: [facility];  Service: Pain Management   • Pr inj dx/ther agnt paravert facet joint, luz maria*  1/5/2017     Procedure: INJ PARA FACET L/S 2D LVL W/IG;  Surgeon: Cricket Sparks M.D.;  Location: [facility];  Service: Pain Management   • Block epidural steroid injection N/A 1/26/2017     Procedure: BLOCK EPIDURAL STEROID INJECTION - SINGLE SHOT INJECTION PAIN PUMP;  Surgeon: Cricket Sparks M.D.;  Location: [facility];  Service:    • Lumbar laminectomy diskectomy  2005     L3-L4   • Bone biopsy Left 1/17/17     left acetabulum   • Lung needle biopsy  Nov 2016   • Biopsy ortho Left 12/2015   • Pump insert/remove Right 2/17/2017     Procedure: PUMP INSERT/REMOVE - IMPLANT PERMANENT PAIN PUMP;  Surgeon: Cricket Sparks M.D.;  Location: [facility];  Service:      Laboratory:   Recent Labs      06/26/17   0300   SODIUM  134*   POTASSIUM  4.2   CHLORIDE  99   CO2  26   BUN  9   CREATININE  0.66   CALCIUM  9.5     Recent Labs      06/26/17   0300   ALTSGPT  11   ASTSGOT  24   ALKPHOSPHAT  99   TBILIRUBIN  0.5   LIPASE  11   GLUCOSE  143*     Recent Labs      06/26/17   0300   RBC  4.39*   HEMOGLOBIN  11.9*  "  HEMATOCRIT  37.5*   PLATELETCT  498*     Recent Labs      06/26/17   0300   WBC  13.2*   NEUTSPOLYS  87.70*   LYMPHOCYTES  4.20*   MONOCYTES  7.20   EOSINOPHILS  0.00   BASOPHILS  0.50   ASTSGOT  24   ALTSGPT  11   ALKPHOSPHAT  99   TBILIRUBIN  0.5     Imaging:   No head imaging on current admission  DX-CHEST-PORTABLE (1 VIEW)   Final Result         1.  Scattered bilateral pulmonary nodular densities, increased since prior study, appearance corresponding with metastatic disease.   2.  Dense right pulmonary infiltrate, increased since prior, could represent tumoral and/or infectious infiltrate.        EEG: none  EKG: QTc 436      Past Psychiatric History  Diagnosis: anxiety, depression  Psychotropic Medication: ativan, xanax, valium,       Family History  Family History   Problem Relation Age of Onset   • Stroke Mother    • Cancer Mother      colon ca x2, thyroid ca, renal ca   • Cancer Brother      lung ca   • Cancer Father      prostate cancer         Psychosocial History  Living Situation: hospice with [facility]  Relationship: single      Substance Use  History   Smoking status   • Former Smoker -- 1.00 packs/day for 14 years   • Quit date: 01/01/1983   Smokeless tobacco   • Never Used     History   Alcohol Use   • 0.0 oz/week   • 0 Standard drinks or equivalent per week     Comment: one a week     History   Drug Use No         Mental Status Examination  Vitals: Blood Pressure : 105/75 mmHg, NIBP: 107/81 mmHg, NIBP MAP (Calculated): 93, Heart Rate (Monitored): (!) 136, Pulse: (!) 129, Respiration: 20, Temperature: 36.7 °C (98.1 °F), Height: 168.9 cm (5' 6.5\"), Weight: 86.183 kg (190 lb), BMI (Calculated): 30.21, BSA (Calculated): 2, Pulse Oximetry: 92 %, O2 (LPM): 2, O2 Delivery: Silicone Nasal Cannula  Appearance / Behavior: male, large framed, appears older than stated age, in restraints, uncooperative, no eye contact  Musculoskeletal (abnormal movements, gait, etc): psychomotor agitation, trying to get out of " restraints  Language: fluent in English  Speech: brisk/emily  Mood: agitated  Affect: hostile  Thought Process: unable to assess at this time  Abnormal Thoughts / Psychosis: does not appear to be responding to internal stimuli, no delusions  Suicidal / Homicidal Ideation: suicide attempt via   Alertness / Attention: confused  Orientation: to person, place, time, and event  Memory: unable to assess for immediate, short, and long-term grossly intact at this time  Fund of Knowledge: not formally evaluated  Neurological Testing (MOCA, MMSE, clock): patient refusing MMSE  Insight into psychiatric symptoms: poor  Judgment: poor      Assessment  Psychiatric:  - delirium   Admitted for sepsis related to possible community-acquired pneumonia  - major depressive disorder vs adjustment disorder with depressed mood   Metastatic lung cancer to the bone, on hospice care  - history of anxiety disorder    Medical:  Active Problems:    Sepsis (CMS-HCC) POA: Unknown  Resolved Problems:    * No resolved hospital problems. *        Plan  - Legal Hold Status: extend legal hold under Justice Mckee MD  - Capacity: not formally evaluated  - Records reviewed: yes  - Medications:    Starting haldol 5 mg IV Q30 minutes, until severe agitation is controlled; Monitor EKG-QTc after cumulative dose >30 mg; hold for QTc >500  - Orders: none  - Collateral: obtained with permission  - Will follow; anticipated follow-up within 96 hours  - Thank you for consult    ______________________________________________________________________  Justice Mckee M.D., D.C.  PGY-2 Psychiatry Resident  [employer]

## 2017-06-26 NOTE — IP AVS SNAPSHOT
" <p align=\"LEFT\"><IMG SRC=\"//[email or web address]\" alt=\"Image\" WIDTH=\"50%\" HEIGHT=\"200\" BORDER=\"\"></p>                   Name:Gabriele Torres  Medical Record Number:[id number]  CSN: [id number]    YOB: 1950   Age: 66 y.o.  Sex: male  HT:1.689 m (5' 6.5\") WT: 65.7 kg (144 lb 13.5 oz)          Admit Date: 6/26/2017     Discharge Date:   Today's Date: 6/30/2017  Attending Doctor:  She Jerez D.O.                  Allergies:  Review of patient's allergies indicates no known allergies.          Your appointments     Jul 13, 2017  9:00 AM   Established Patient with Sasha Isidro M.D.   [address] [id number] [phone]           You will be receiving a confirmation call a few days before your appointment from our automated call confirmation system.            Jul 14, 2017 10:40 AM   Established Patient with Sasha Isidro M.D.   [address] [id number] [phone]           You will be receiving a confirmation call a few days before your appointment from our automated call confirmation system.              Follow-up Information     1. Follow up with Sasha Isidro M.D..    Specialty:  Family Medicine    Contact information    [address] [address]  [phone]           Medication List      Take these Medications        Instructions    alprazolam 1 MG Tabs   Commonly known as:  XANAX    Take 1 Tab by mouth 3 times a day as needed for Anxiety. Indications: Feeling Anxious   Dose:  1 mg       HYDROmorphone 2 MG Tabs   What changed:    - when to take this  - reasons to take this   Commonly known as:  DILAUDID    Take 2 Tabs by mouth every 8 hours.   Dose:  4 mg       Pain Pump Jessica   Commonly known as:  patient supplied    1 Each by Injection route Continuous. Infusion drug: HYDROMORPHONE 5.0mg/ml KETAMINE 83.3 mg/ml Infusion mode " simple continuous drug: HYDROMORPHONE 1.5009 mg/day KETAMINE 25.005 mg/day Infusion PA drug: HYDROMORPHONE 0.1000MG   1.5988mg/day  7% 0.0979 mg KETAMINE 1.666mg   26.536mg/day  7% 1.631mg PA Duration: Lockout interval 01:00 h:m Maximum Activation/Day 1 Last changed on 5/4/2017  Indications: Hydromophone 5.0 MG/ML  (Concentration)   Dose:  1 Each

## 2017-06-26 NOTE — PROGRESS NOTES
Observance of Patient  ([facility] 7th Floor)    Flem sample sent 1415 hours   As of 1600 hours patient is feeling an abundance of more sick feelings such as stomach pains however his shoulders, arms and legs bother him as some pain seems to travel throughout his upper torso to different places.    Patient is comfortable beginning to feel sleepy now RN nursed patient into relaxation. Gabriele like Classic Rock [employer] Roll as well.      Stool softener worked Bowel Movement 1633 hours on bed side toilet.

## 2017-06-26 NOTE — ED NOTES
Pt continues to attempt to climb out of bed, sitting on edge of gurney, unable to redirect pt back onto gurEdgeley at this time. Pt threatening to bite staff and is kicking his legs. ERP informed. Pt medicated with Haldol per ERP orders. Pt placed in soft wrist restraints for pt safety. ERP aware.

## 2017-06-26 NOTE — IP AVS SNAPSHOT
6/30/2017    Gabriele Torres  [facility] [place] [id number]    Dear Gabriele:    [facility] wants to ensure your discharge home is safe and you or your loved ones have had all of your questions answered regarding your care after you leave the hospital.    Below is a list of resources and contact information should you have any questions regarding your hospital stay, follow-up instructions, or active medical symptoms.    Questions or Concerns Regarding… Contact   Medical Questions Related to Your Discharge  (7 days a week, 8am-5pm) Contact a Nurse Care Coordinator   [phone]   Medical Questions Not Related to Your Discharge  (24 hours a day / 7 days a week)  Contact the Nurse Health Line   [phone]    Medications or Discharge Instructions Refer to your discharge packet   or contact your [facility] Primary Care Provider   [phone]   Follow-up Appointment(s) Schedule your appointment via [employer]   or contact Scheduling [phone]   Billing Review your statement via [employer]  or contact Billing [phone]   Medical Records Review your records via e[employer]   or contact Medical Records [phone]     You may receive a telephone call within two days of discharge. This call is to make certain you understand your discharge instructions and have the opportunity to have any questions answered. You can also easily access your medical information, test results and upcoming appointments via the eBoox free online health management tool. You can learn more and sign up at [email or web address]. For assistance setting up your eBoox account, please call [phone].    Once again, we want to ensure your discharge home is safe and that you have a clear understanding of any next steps in your care. If you have any questions or concerns, please do not hesitate to contact us, we are here for you. Thank you for choosing [facility] for your healthcare needs.    Sincerely,    Your [facility] Healthcare Team

## 2017-06-26 NOTE — PROGRESS NOTES
Observance of Patient  (Within the ER)    This CNA has been assigned for observing patient since 0630 hours within the Emergency Room to 1000 hours. During that time patient has been very restless and agitated however he is very nice without any violent physical action taking place and very little foul language.    Is confused repeating the need to urinate after letting Gabriele know several times he has a catheter and tries to pull it out .  Struggles a lot trying to get out of bed as well.  He is determined to get out of restraints.

## 2017-06-26 NOTE — PROGRESS NOTES
Received report over the phone. Pt came to the floor w/transport. No complains of pain or SOB. Bed alarm in place. 1:1 sitter w/pt.

## 2017-06-26 NOTE — IP AVS SNAPSHOT
" Home Care Instructions                                                                                                                  Name:Gabriele Torres  Medical Record Number:[id number]  CSN: [id number]    YOB: 1950   Age: 66 y.o.  Sex: male  HT:1.689 m (5' 6.5\") WT: 65.7 kg (144 lb 13.5 oz)          Admit Date: 6/26/2017     Discharge Date:   Today's Date: 6/30/2017  Attending Doctor:  She Jerez D.O.                  Allergies:  Review of patient's allergies indicates no known allergies.            Discharge Instructions       Discharge Instructions    Discharged to other by medical transportation with escort. Discharged via wheelchair, hospital escort: Yes.  Special equipment needed: Oxygen    Be sure to schedule a follow-up appointment with your primary care doctor or any specialists as instructed.     Discharge Plan:   Diet Plan: Discussed  Activity Level: Discussed  Confirmed Follow up Appointment: Patient to Call and Schedule Appointment  Influenza Vaccine Indication: Indicated: Not available from distributor/    I understand that a diet low in cholesterol, fat, and sodium is recommended for good health. Unless I have been given specific instructions below for another diet, I accept this instruction as my diet prescription.   Other diet: Diet as tolerated    Special Instructions: None    · Is patient discharged on Warfarin / Coumadin?   No     · Is patient Post Blood Transfusion?  No    Sepsis, Adult  Sepsis is a serious infection of your blood or tissues that affects your whole body. The infection that causes sepsis may be bacterial, viral, fungal, or parasitic. Sepsis may be life threatening. Sepsis can cause your blood pressure to drop. This may result in shock. Shock causes your central nervous system and your organs to stop working correctly.   RISK FACTORS  Sepsis can happen in anyone, but it is more likely to happen in people who have weakened immune systems.  SIGNS " AND SYMPTOMS   Symptoms of sepsis can include:  · Fever or low body temperature (hypothermia).  · Rapid breathing (hyperventilation).  · Chills.  · Rapid heartbeat (tachycardia).  · Confusion or light-headedness.  · Trouble breathing.  · Urinating much less than usual.  · Cool, clammy skin or red, flushed skin.  · Other problems with the heart, kidneys, or brain.  DIAGNOSIS   Your health care provider will likely do tests to look for an infection, to see if the infection has spread to your blood, and to see how serious your condition is. Tests can include:  · Blood tests, including cultures of your blood.  · Cultures of other fluids from your body, such as:  ¨ Urine.  ¨ Pus from wounds.  ¨ Mucus coughed up from your lungs.  · Urine tests other than cultures.  · X-ray exams or other imaging tests.  TREATMENT   Treatment will begin with elimination of the source of infection. If your sepsis is likely caused by a bacterial or fungal infection, you will be given antibiotic or antifungal medicines.  You may also receive:  · Oxygen.  · Fluids through an IV tube.  · Medicines to increase your blood pressure.  · A machine to clean your blood (dialysis) if your kidneys fail.  · A machine to help you breathe if your lungs fail.  SEEK IMMEDIATE MEDICAL CARE IF:  You get an infection or develop any of the signs and symptoms of sepsis after surgery or a hospitalization.     This information is not intended to replace advice given to you by your health care provider. Make sure you discuss any questions you have with your health care provider.     Document Released: 09/15/2004 Document Revised: 05/03/2016 Document Reviewed: 08/25/2014  [employer] Patient Education ©2016 [employer]    Depression / Suicide Risk    As you are discharged from this Critical access hospital facility, it is important to learn how to keep safe from harming yourself.    Recognize the warning signs:  · Abrupt changes in personality, positive or negative-  including increase in energy   · Giving away possessions  · Change in eating patterns- significant weight changes-  positive or negative  · Change in sleeping patterns- unable to sleep or sleeping all the time   · Unwillingness or inability to communicate  · Depression  · Unusual sadness, discouragement and loneliness  · Talk of wanting to die  · Neglect of personal appearance   · Rebelliousness- reckless behavior  · Withdrawal from people/activities they love  · Confusion- inability to concentrate     If you or a loved one observes any of these behaviors or has concerns about self-harm, here's what you can do:  · Talk about it- your feelings and reasons for harming yourself  · Remove any means that you might use to hurt yourself (examples: pills, rope, extension cords, firearm)  · Get professional help from the community (Mental Health, Substance Abuse, psychological counseling)  · Do not be alone:Call your Safe Contact- someone whom you trust who will be there for you.  · Call your local CRISIS HOTLINE 708-0636 or 378-596-6340  · Call your local Children's Mobile Crisis Response Team [place] (585) 090-6789 or wwwLamiecco  · Call the toll free National Suicide Prevention Hotlines   · National Suicide Prevention Lifeline 231-485-ITRJ (7283)  · National Hope Line Network 800-SUICIDE (200-0346)        Your appointments     Jul 13, 2017  9:00 AM   Established Patient with Sasha Isidro M.D.   [address] [id number] [phone]           You will be receiving a confirmation call a few days before your appointment from our automated call confirmation system.            Jul 14, 2017 10:40 AM   Established Patient with Sasha Isidro M.D.   [facility]    [address] [id number] [phone]           You will be receiving a confirmation call a few days before your appointment from our  automated call confirmation system.              Follow-up Information     1. Follow up with Sasha Isidro M.D..    Specialty:  Family Medicine    Contact information    [facility]  [place] [id number] [phone]           Discharge Medication Instructions:    Below are the medications your physician expects you to take upon discharge:    Review all your home medications and newly ordered medications with your doctor and/or pharmacist. Follow medication instructions as directed by your doctor and/or pharmacist.    Please keep your medication list with you and share with your physician.               Medication List      CHANGE how you take these medications        Instructions    Morning Afternoon Evening Bedtime    HYDROmorphone 2 MG Tabs   What changed:    - when to take this  - reasons to take this   Last time this was given:  4 mg on 6/30/2017  8:26 AM   Commonly known as:  DILAUDID        Take 2 Tabs by mouth every 8 hours.   Dose:  4 mg                          CONTINUE taking these medications        Instructions    Morning Afternoon Evening Bedtime    alprazolam 1 MG Tabs   Last time this was given:  1 mg on 6/30/2017  6:23 AM   Commonly known as:  XANAX        Take 1 Tab by mouth 3 times a day as needed for Anxiety. Indications: Feeling Anxious   Dose:  1 mg                        Pain Pump Jessica   Commonly known as:  patient supplied        1 Each by Injection route Continuous. Infusion drug: HYDROMORPHONE 5.0mg/ml KETAMINE 83.3 mg/ml Infusion mode simple continuous drug: HYDROMORPHONE 1.5009 mg/day KETAMINE 25.005 mg/day Infusion PA drug: HYDROMORPHONE 0.1000MG   1.5988mg/day  7% 0.0979 mg KETAMINE 1.666mg   26.536mg/day  7% 1.631mg PA Duration: Lockout interval 01:00 h:m Maximum Activation/Day 1 Last changed on 5/4/2017  Indications: Hydromophone 5.0 MG/ML  (Concentration)   Dose:  1 Each                             Where to Get Your Medications      Information about where to get  these medications is not yet available     ! Ask your nurse or doctor about these medications    - alprazolam 1 MG Tabs  - HYDROmorphone 2 MG Tabs            Orders for after discharge     REFERRAL TO HOSPICE    Complete by:  As directed              Instructions           Diet / Nutrition:    Follow any diet instructions given to you by your doctor or the dietician, including how much salt (sodium) you are allowed each day.    If you are overweight, talk to your doctor about a weight reduction plan.    Activity:    Remain physically active following your doctor's instructions about exercise and activity.    Rest often.     Any time you become even a little tired or short of breath, SIT DOWN and rest.    Worsening Symptoms:    Report any of the following signs and symptoms to the doctor's office immediately:    *Pain of jaw, arm, or neck  *Chest pain not relieved by medication                               *Dizziness or loss of consciousness  *Difficulty breathing even when at rest   *More tired than usual                                       *Bleeding drainage or swelling of surgical site  *Swelling of feet, ankles, legs or stomach                 *Fever (>100ºF)  *Pink or blood tinged sputum  *Weight gain (3lbs/day or 5lbs /week)           *Shock from internal defibrillator (if applicable)  *Palpitations or irregular heartbeats                *Cool and/or numb extremities    Stroke Awareness    Common Risk Factors for Stroke include:    Age  Atrial Fibrillation  Carotid Artery Stenosis  Diabetes Mellitus  Excessive alcohol consumption  High blood pressure  Overweight   Physical inactivity  Smoking    Warning signs and symptoms of a stroke include:    *Sudden numbness or weakness of the face, arm or leg (especially on one side of the body).  *Sudden confusion, trouble speaking or understanding.  *Sudden trouble seeing in one or both eyes.  *Sudden trouble walking, dizziness, loss of balance or coordination.Sudden  severe headache with no known cause.    It is very important to get treatment quickly when a stroke occurs. If you experience any of the above warning signs, call 911 immediately.                   Disclaimer         Quit Smoking / Tobacco Use:    I understand the use of any tobacco products increases my chance of suffering from future heart disease or stroke and could cause other illnesses which may shorten my life. Quitting the use of tobacco products is the single most important thing I can do to improve my health. For further information on smoking / tobacco cessation call a Toll Free Quit Line at 1-997.973.9235 (*National Cancer [place]) or 1-413.601.2615 (American Lung Association) or you can access the web based program at www.lungusa.org.    [place] Tobacco Users Help Line:  (191) 195-3644       Toll Free: 1-713.446.6997  Quit Tobacco Program [facility] (377)550-0645    Crisis Hotline:    [place] Crisis Hotline:  0-192-JBDDPEU or 1-421.869.2186    [place] Crisis Hotline:    1-557.267.6939 or 344-956-4526    Discharge Survey:   Thank you for choosing [facility]. We hope we did everything we could to make your hospital stay a pleasant one. You may be receiving a phone survey and we would appreciate your time and participation in answering the questions. Your input is very valuable to us in our efforts to improve our service to our patients and their families.        My signature on this form indicates that:    1. I have reviewed and understand the above information.  2. My questions regarding this information have been answered to my satisfaction.  3. I have formulated a plan with my discharge nurse to obtain my prescribed medications for home.                  Disclaimer         __________________________________                     __________       ________                       Patient Signature                                                 Date                    Time

## 2017-06-27 ENCOUNTER — TELEPHONE (OUTPATIENT)
Dept: MEDICAL GROUP | Facility: PHYSICIAN GROUP | Age: 67
End: 2017-06-27

## 2017-06-27 PROBLEM — J18.9 CAP (COMMUNITY ACQUIRED PNEUMONIA): Status: ACTIVE | Noted: 2017-06-27

## 2017-06-27 PROBLEM — T50.902A INTENTIONAL DRUG OVERDOSE (HCC): Status: ACTIVE | Noted: 2017-06-27

## 2017-06-27 PROBLEM — R41.0 ACUTE DELIRIUM: Status: ACTIVE | Noted: 2017-06-27

## 2017-06-27 LAB
ANION GAP SERPL CALC-SCNC: 7 MMOL/L (ref 0–11.9)
BUN SERPL-MCNC: 4 MG/DL (ref 8–22)
CALCIUM SERPL-MCNC: 9 MG/DL (ref 8.5–10.5)
CHLORIDE SERPL-SCNC: 105 MMOL/L (ref 96–112)
CO2 SERPL-SCNC: 22 MMOL/L (ref 20–33)
CREAT SERPL-MCNC: 0.48 MG/DL (ref 0.5–1.4)
EKG IMPRESSION: NORMAL
ERYTHROCYTE [DISTWIDTH] IN BLOOD BY AUTOMATED COUNT: 48.1 FL (ref 35.9–50)
GFR SERPL CREATININE-BSD FRML MDRD: >60 ML/MIN/1.73 M 2
GLUCOSE SERPL-MCNC: 119 MG/DL (ref 65–99)
HCT VFR BLD AUTO: 33.3 % (ref 42–52)
HGB BLD-MCNC: 10.6 G/DL (ref 14–18)
LACTATE BLD-SCNC: 1.3 MMOL/L (ref 0.5–2)
MCH RBC QN AUTO: 26.8 PG (ref 27–33)
MCHC RBC AUTO-ENTMCNC: 31.8 G/DL (ref 33.7–35.3)
MCV RBC AUTO: 84.1 FL (ref 81.4–97.8)
PLATELET # BLD AUTO: 451 K/UL (ref 164–446)
PMV BLD AUTO: 8.9 FL (ref 9–12.9)
POTASSIUM SERPL-SCNC: 3.6 MMOL/L (ref 3.6–5.5)
RBC # BLD AUTO: 3.96 M/UL (ref 4.7–6.1)
SODIUM SERPL-SCNC: 134 MMOL/L (ref 135–145)
WBC # BLD AUTO: 14.9 K/UL (ref 4.8–10.8)

## 2017-06-27 PROCEDURE — 700111 HCHG RX REV CODE 636 W/ 250 OVERRIDE (IP): Performed by: STUDENT IN AN ORGANIZED HEALTH CARE EDUCATION/TRAINING PROGRAM

## 2017-06-27 PROCEDURE — 85027 COMPLETE CBC AUTOMATED: CPT

## 2017-06-27 PROCEDURE — 700111 HCHG RX REV CODE 636 W/ 250 OVERRIDE (IP): Performed by: HOSPITALIST

## 2017-06-27 PROCEDURE — 83605 ASSAY OF LACTIC ACID: CPT

## 2017-06-27 PROCEDURE — 700102 HCHG RX REV CODE 250 W/ 637 OVERRIDE(OP): Performed by: HOSPITALIST

## 2017-06-27 PROCEDURE — 80048 BASIC METABOLIC PNL TOTAL CA: CPT

## 2017-06-27 PROCEDURE — 700105 HCHG RX REV CODE 258: Performed by: HOSPITALIST

## 2017-06-27 PROCEDURE — 99233 SBSQ HOSP IP/OBS HIGH 50: CPT | Mod: GW | Performed by: HOSPITALIST

## 2017-06-27 PROCEDURE — 36415 COLL VENOUS BLD VENIPUNCTURE: CPT

## 2017-06-27 PROCEDURE — 700102 HCHG RX REV CODE 250 W/ 637 OVERRIDE(OP)

## 2017-06-27 PROCEDURE — A9270 NON-COVERED ITEM OR SERVICE: HCPCS | Performed by: HOSPITALIST

## 2017-06-27 PROCEDURE — A9270 NON-COVERED ITEM OR SERVICE: HCPCS

## 2017-06-27 PROCEDURE — 770020 HCHG ROOM/CARE - TELE (206)

## 2017-06-27 RX ADMIN — SODIUM CHLORIDE: 9 INJECTION, SOLUTION INTRAVENOUS at 05:03

## 2017-06-27 RX ADMIN — HALOPERIDOL LACTATE 5 MG: 5 INJECTION, SOLUTION INTRAMUSCULAR at 04:57

## 2017-06-27 RX ADMIN — AZITHROMYCIN 500 MG: 250 TABLET, FILM COATED ORAL at 10:41

## 2017-06-27 RX ADMIN — HYDROMORPHONE HYDROCHLORIDE 2 MG: 1 INJECTION, SOLUTION INTRAMUSCULAR; INTRAVENOUS; SUBCUTANEOUS at 21:09

## 2017-06-27 RX ADMIN — HYDROMORPHONE HYDROCHLORIDE 4 MG: 4 TABLET ORAL at 08:07

## 2017-06-27 RX ADMIN — ENOXAPARIN SODIUM 40 MG: 100 INJECTION SUBCUTANEOUS at 10:41

## 2017-06-27 RX ADMIN — ALPRAZOLAM 1 MG: 1 TABLET ORAL at 04:55

## 2017-06-27 RX ADMIN — HYDROMORPHONE HYDROCHLORIDE 2 MG: 1 INJECTION, SOLUTION INTRAMUSCULAR; INTRAVENOUS; SUBCUTANEOUS at 00:59

## 2017-06-27 RX ADMIN — HYDROMORPHONE HYDROCHLORIDE 2 MG: 1 INJECTION, SOLUTION INTRAMUSCULAR; INTRAVENOUS; SUBCUTANEOUS at 04:58

## 2017-06-27 RX ADMIN — CEFTRIAXONE SODIUM 2 G: 2 INJECTION, POWDER, FOR SOLUTION INTRAMUSCULAR; INTRAVENOUS at 08:53

## 2017-06-27 ASSESSMENT — ENCOUNTER SYMPTOMS
ABDOMINAL PAIN: 0
WEIGHT LOSS: 0
FEVER: 0
HEADACHES: 0
SHORTNESS OF BREATH: 0
NAUSEA: 0
CHILLS: 0
BACK PAIN: 1
VOMITING: 0
WEAKNESS: 0
NERVOUS/ANXIOUS: 0
DEPRESSION: 0
BRUISES/BLEEDS EASILY: 0
NECK PAIN: 1
COUGH: 0
MYALGIAS: 1
DIZZINESS: 0

## 2017-06-27 ASSESSMENT — PAIN SCALES - GENERAL
PAINLEVEL_OUTOF10: 3
PAINLEVEL_OUTOF10: 5
PAINLEVEL_OUTOF10: 5
PAINLEVEL_OUTOF10: 3
PAINLEVEL_OUTOF10: 7
PAINLEVEL_OUTOF10: 4
PAINLEVEL_OUTOF10: 3
PAINLEVEL_OUTOF10: 5
PAINLEVEL_OUTOF10: 7
PAINLEVEL_OUTOF10: 6
PAINLEVEL_OUTOF10: 7
PAINLEVEL_OUTOF10: 8
PAINLEVEL_OUTOF10: 7

## 2017-06-27 NOTE — CONSULTS
Reason for PC Consult: Advance Care Planning    Consulted by: Dr. Buchanan, [facility]; currently Dr. Whaley    Assessment:  General: 65 y/o male with known metastatic lung cancer on service with [facility] at home; suicide attempt with drug overdose and currently DNR on a legal hold. PMHx of GERD, anxiety, chronic pain, hiatal hernia, and implanted pain pump    Dyspnea: Yes- with exertion; on O2 @ 2L  Last BM: 06/26/17-    Pain: Yes- controlled with current regime  Depression: Yes- appears situational r/t impending death    Spiritual:  Is Anabaptist or spirituality important for coping with this illness? No-    Has a  or spiritual provider visit been requested? No    Palliative Performance Scale: 40%    Advance Directive: None-    DPOA: No-    POLST: No-      Code Status: DNR    Outcome:  PC RN discussed case with BS team and per discussion, patient has opted to follow the plan set out by [facility] to reside at [facility]. MD currently working with psych to determine need for legal hold and better assist patient with an appropriate, safer discharge plan. PC available as needed.     Updated: to [place] with [facility]    Plan: Call [facility] for POLST or complete once hold lifted     Thank you for allowing Palliative Care to participate in this patient's care. Please feel free to call [phone] with any questions or concerns.

## 2017-06-27 NOTE — ASSESSMENT & PLAN NOTE
Due to circumstances of social situation  Patient states this was impetuous  He wants to go to an assisted living facility to live out his final days  Released from legal hold discussed with Dr. Rodriguez

## 2017-06-27 NOTE — CARE PLAN
Problem: Safety  Goal: Will remain free from injury  Pt mobility assessed at the beginning of the shift. Fall precautions in place, non-slip socks on, bed in lowest, locked position. 1:1 sitter.

## 2017-06-27 NOTE — DISCHARGE PLANNING
Medical Social Work    D/C plan is for pt to go to [facility] with [facility]. Dorinda with [facility] will need to be contacted as d/c plans are arranged. See previous SW note for number.

## 2017-06-27 NOTE — DISCHARGE PLANNING
"Medical Social Work    SW spoke with Dorinda Lewis, pt's hospice nurse through [facility] ([phone]). SW updated her that pt is admitted here and is not medically clear at this time. Dorinda updated this SW that pt has a room waiting for him at [facility], but pt had been hesitant to go there. Before admission, pt was living in  on son's property. Per Dorinda, there are \"odd\" family dynamics and their hospice SW has been heavily involved in pt's case. SW will update Dorinda when/if pt is ready to d/c hospital. If legal hold is discontinued, it may be best if pt goes to [place] following d/c to ensure adequate support and monitoring of pain pump usage.         "

## 2017-06-27 NOTE — RESPIRATORY CARE
COPD EDUCATION by COPD CLINICAL EDUCATOR  6/27/2017 at 7:18 AM by Maryam Gould     Patient reviewed by COPD education team. Patient does not qualify for COPD program.

## 2017-06-27 NOTE — ASSESSMENT & PLAN NOTE
Discussed with patient  Wants to continue hospice care  POLST on chart  Resume hospice orders completed discussed with

## 2017-06-27 NOTE — CARE PLAN
Problem: Communication  Goal: The ability to communicate needs accurately and effectively will improve  Intervention: Reorient patient to environment as needed  Patient is confused. Re-oriented to environment as needed. 1:1 sitter at bedside.       Problem: Pain Management  Goal: Pain level will decrease to patient’s comfort goal  Intervention: Educate and implement non-pharmacologic comfort measures. Examples: relaxation, distration, play therapy, activity therapy, massage, etc.  Pain medication given prn. Pain assessed q4h and prn. Patient with chronic pain.

## 2017-06-27 NOTE — PROGRESS NOTES
2 RN's skin check:  Bruising on arms bilateral - after IV's  Skin tear x 2 on left forearm  Pain pump - left lower abdomen, old, healed scar  Dry scabs on both knees  Red, blanching sacrum

## 2017-06-27 NOTE — DIETARY
Brief Nutrition Services Note: Patient is a 66 y.o. Male, with history of anxiety, metastatic lung cancer, on hospice with [facility], presents with suicide attempt via ingesting unknown quantity of xanax, morphine, dilaudid.  Diet= Regular. Per ADL, pt eating <25% of last two meals. Labs/Meds reviewed. No skin breakdown noted. Wt is 68.3 kg/ 150 lbs and BMI of 23.94.  Plan/Rec:  Nutrition Representative to see pt daily for snacks/meal preferences as appropriate with diet order. RD to follow per department policy.

## 2017-06-27 NOTE — TELEPHONE ENCOUNTER
Received a message from [facility].  They are about to drop Gabriele from hospice because he is not being compliant with them.  They have advised the patient not to drive and the son says he is still driving sometimes.  He is calling the nurse on the weekends at 1:30 in the morning.  He lives in a trailer and they offered Assisted Living to him.  He has declined that.  The patient is declining physically and mentally.  This is a FYI.  Thank you.

## 2017-06-27 NOTE — PALLIATIVE CARE
Palliative Care follow-up  Case discussed with PC MD and director and reviewed with bedside MD. Reviewed SW's discussion with hospice as well. PC attempted to meet with the patient to discuss his home situation and help determine if DC home is a possibility. Pt was currently in the bathroom with MITESH RN; PC offered to return later today which he was agreeable to.    Updated: BS RN, ERNESTINE    Plan: assist with DC plan    Thank you for allowing Palliative Care to participate in this patient's care. Please feel free to call [phone] with any questions or concerns.

## 2017-06-27 NOTE — PROGRESS NOTES
Bedside shift report taken from GENE Phipps. Patient found in bed. 1:1 sitter at bedside. Soft wrist restraints in place. White board updated. Bed is locked and in lowest position. Side rails up x2. Will continue to monitor.

## 2017-06-27 NOTE — PROGRESS NOTES
"Bedside report completed with noc RN. Pt restless, trying to get out of bed, c/o back pain 7/10. Sitter at bedside, per noc RN pt pulled off securement device for molina several times and pulled at molina, pulled out an IV lastnight as well. Pt is asking to \"move the tubes out from under me\" RN checks and there are no tubes or anything under him, just bed sheet. Pt was not able to verbalize why restraints are on, explained to pt why, checked skin and performed range of motion of wrists. Repositioned him and pt appears more comfortable, much less restless.   "

## 2017-06-28 LAB
BACTERIA SPEC RESP CULT: NORMAL
GRAM STN SPEC: NORMAL
PROCALCITONIN SERPL-MCNC: 0.29 NG/ML
SIGNIFICANT IND 70042: NORMAL
SITE SITE: NORMAL
SOURCE SOURCE: NORMAL

## 2017-06-28 PROCEDURE — 700111 HCHG RX REV CODE 636 W/ 250 OVERRIDE (IP): Performed by: HOSPITALIST

## 2017-06-28 PROCEDURE — A9270 NON-COVERED ITEM OR SERVICE: HCPCS | Performed by: HOSPITALIST

## 2017-06-28 PROCEDURE — 99233 SBSQ HOSP IP/OBS HIGH 50: CPT | Mod: GW | Performed by: HOSPITALIST

## 2017-06-28 PROCEDURE — 700102 HCHG RX REV CODE 250 W/ 637 OVERRIDE(OP): Performed by: HOSPITALIST

## 2017-06-28 PROCEDURE — 36415 COLL VENOUS BLD VENIPUNCTURE: CPT

## 2017-06-28 PROCEDURE — 770006 HCHG ROOM/CARE - MED/SURG/GYN SEMI*

## 2017-06-28 PROCEDURE — 84145 PROCALCITONIN (PCT): CPT

## 2017-06-28 RX ORDER — OMEPRAZOLE 20 MG/1
20 CAPSULE, DELAYED RELEASE ORAL DAILY
Status: DISCONTINUED | OUTPATIENT
Start: 2017-06-28 | End: 2017-06-30 | Stop reason: HOSPADM

## 2017-06-28 RX ADMIN — OMEPRAZOLE 20 MG: 20 CAPSULE, DELAYED RELEASE ORAL at 12:02

## 2017-06-28 RX ADMIN — ALPRAZOLAM 1 MG: 1 TABLET ORAL at 05:10

## 2017-06-28 RX ADMIN — STANDARDIZED SENNA CONCENTRATE AND DOCUSATE SODIUM 2 TABLET: 8.6; 5 TABLET, FILM COATED ORAL at 19:47

## 2017-06-28 RX ADMIN — ALPRAZOLAM 1 MG: 1 TABLET ORAL at 08:23

## 2017-06-28 RX ADMIN — HYDROMORPHONE HYDROCHLORIDE 1 MG: 1 INJECTION, SOLUTION INTRAMUSCULAR; INTRAVENOUS; SUBCUTANEOUS at 22:45

## 2017-06-28 RX ADMIN — LIDOCAINE HYDROCHLORIDE 30 ML: 20 SOLUTION OROPHARYNGEAL at 17:29

## 2017-06-28 RX ADMIN — HYDROMORPHONE HYDROCHLORIDE 2 MG: 1 INJECTION, SOLUTION INTRAMUSCULAR; INTRAVENOUS; SUBCUTANEOUS at 12:02

## 2017-06-28 RX ADMIN — HYDROMORPHONE HYDROCHLORIDE 4 MG: 4 TABLET ORAL at 08:23

## 2017-06-28 RX ADMIN — HYDROMORPHONE HYDROCHLORIDE 2 MG: 1 INJECTION, SOLUTION INTRAMUSCULAR; INTRAVENOUS; SUBCUTANEOUS at 04:13

## 2017-06-28 RX ADMIN — HYDROMORPHONE HYDROCHLORIDE 4 MG: 4 TABLET ORAL at 18:29

## 2017-06-28 RX ADMIN — ENOXAPARIN SODIUM 40 MG: 100 INJECTION SUBCUTANEOUS at 08:23

## 2017-06-28 RX ADMIN — STANDARDIZED SENNA CONCENTRATE AND DOCUSATE SODIUM 2 TABLET: 8.6; 5 TABLET, FILM COATED ORAL at 08:23

## 2017-06-28 ASSESSMENT — PAIN SCALES - GENERAL
PAINLEVEL_OUTOF10: 7
PAINLEVEL_OUTOF10: 6
PAINLEVEL_OUTOF10: 7
PAINLEVEL_OUTOF10: 6
PAINLEVEL_OUTOF10: 6
PAINLEVEL_OUTOF10: 7

## 2017-06-28 ASSESSMENT — ENCOUNTER SYMPTOMS
SHORTNESS OF BREATH: 0
CHILLS: 0
DIZZINESS: 0
NERVOUS/ANXIOUS: 0
FEVER: 0
NAUSEA: 0
BRUISES/BLEEDS EASILY: 0
BACK PAIN: 1
ABDOMINAL PAIN: 0
MYALGIAS: 1
WEAKNESS: 0
COUGH: 0
WEIGHT LOSS: 0
HEADACHES: 0
VOMITING: 0
NECK PAIN: 1
DEPRESSION: 0

## 2017-06-28 ASSESSMENT — LIFESTYLE VARIABLES: DO YOU DRINK ALCOHOL: NO

## 2017-06-28 NOTE — PROGRESS NOTES
Bedside shift report taken from GENE Hall. Patient is sitting on the chair. On close observation with1:1 sitter. Patient requesting to go back to bed. Transferred from chair to bed with x2 RN max assist. C/o pain in right knee 6/10 but denies need for pain intervention at this time. Bed alarm and strip alarm turned on. Bed is locked and in lowest position. Side rails up x2. Will continue to monitor.

## 2017-06-28 NOTE — PROGRESS NOTES
Bedside report completed with noc RN. Pt resting, alert. C/o left hip pain 7/10, 6/10 right knee pain. Bed locked in low position, call light within reach. Bed alarm on, double and triple checked. Reviewed plan of care with noc RN and pt. Patient has no questions at this time.

## 2017-06-28 NOTE — CARE PLAN
Problem: Safety  Goal: Will remain free from falls  Intervention: Implement fall precautions  Bed alarm on, bed locked in low position, treaded socks on, call light and items within reach. Hourly rounding in place, near nurses station. Assessed mobility and communicated with CNA and patient regarding risk of fall.           Problem: Respiratory:  Goal: Respiratory status will improve  Intervention: Educate and encourage incentive spirometry usage  Encouraged use of IS, coughing and deep breathing. Assessed for s/s of infection. Encouraged hand washing and use of alcohol on hands

## 2017-06-28 NOTE — PROGRESS NOTES
Patient requesting to be taken off frequent vital signs check. Would like to go to sleep. Okay with q4 vital signs.

## 2017-06-28 NOTE — DISCHARGE PLANNING
Medical Social Work    SW received call from Maryam from [facility] [phone]. Maryam states she has spoke with pt's son and both the son and pt have agreed to have pt move into [facility]. She is working on paperwork and says she will have a room ready for pt tomorrow 6/29. A nurse will have to come out and assess pt first tomorrow morning. If pt is approved, pt will be able to D/C tomorrow.      ERNESTINE relayed information to both pt and his son at bedside. Both are agreeable to plan. ERNESTINE informed pt he was D/C from [facility] upon admission and they would need a new referral sent to them. Pt states he was satisfied with their service and would like a new referral to be sent.     ERNESTINE requested Hospice order from attending MD.

## 2017-06-28 NOTE — PROGRESS NOTES
[facility]    Date of Service: 2017    Chief Complaint  66 y.o. male admitted 2017 with suicide attempt; history of lung cancer on hospice.    Interval Problem Update  Pain controlled  Happy to be going to assisted living   Discussed with psychiatry and , palliative care    Consultants/Specialty  Psychiatry, Dr. Rodriguez    Disposition  To assisted living on hospice care through [facility].        Review of Systems   Constitutional: Positive for malaise/fatigue. Negative for fever, chills and weight loss.   Respiratory: Negative for cough and shortness of breath.    Cardiovascular: Negative for chest pain and leg swelling.   Gastrointestinal: Negative for nausea, vomiting and abdominal pain.   Genitourinary: Negative.  Negative for dysuria.   Musculoskeletal: Positive for myalgias, back pain, joint pain and neck pain.   Skin: Negative for rash.   Neurological: Negative for dizziness, weakness and headaches.   Endo/Heme/Allergies: Negative.  Does not bruise/bleed easily.   Psychiatric/Behavioral: Negative for depression. The patient is not nervous/anxious.    All other systems reviewed and are negative.     Physical Exam  Laboratory/Imaging   Hemodynamics  Temp (24hrs), Av.7 °C (98.1 °F), Min:36.6 °C (97.8 °F), Max:37 °C (98.6 °F)   Temperature: 36.6 °C (97.9 °F)  Pulse  Av.1  Min: 90  Max: 145   Blood Pressure : 105/66 mmHg      Respiratory      Respiration: 17, Pulse Oximetry: 94 %     Work Of Breathing / Effort: Mild  RUL Breath Sounds: Clear, RML Breath Sounds: Diminished, RLL Breath Sounds: Diminished, LIN Breath Sounds: Clear, LLL Breath Sounds: Diminished    Fluids    Intake/Output Summary (Last 24 hours) at 17 1317  Last data filed at 17 1154   Gross per 24 hour   Intake    940 ml   Output   1500 ml   Net   -560 ml       Nutrition  Orders Placed This Encounter   Procedures   • Diet Order     Standing Status: Standing      Number of  Occurrences: 1      Standing Expiration Date:      Order Specific Question:  Diet:     Answer:  Regular [1]     Physical Exam   Constitutional: He appears well-developed. He appears cachectic. No distress.   HENT:   Nose: Nose normal.   Mouth/Throat: Oropharynx is clear and moist. No oropharyngeal exudate.   Eyes: Conjunctivae are normal. Right eye exhibits no discharge. Left eye exhibits no discharge. No scleral icterus.   Neck: No JVD present. No tracheal deviation present.   Cardiovascular: Normal rate, regular rhythm and normal heart sounds.    Pulmonary/Chest: Effort normal and breath sounds normal. No stridor. No respiratory distress. He has no wheezes. He has no rales. He exhibits no tenderness.   Abdominal: Soft. Bowel sounds are normal. He exhibits no distension. There is no tenderness.   Musculoskeletal: He exhibits no edema or tenderness.   Neurological: He is alert. No cranial nerve deficit. He exhibits normal muscle tone.   Skin: Skin is warm and dry. He is not diaphoretic. No pallor.   Psychiatric: He has a normal mood and affect. His behavior is normal.   Nursing note and vitals reviewed.      Recent Labs      06/26/17   0300  06/27/17   0257   WBC  13.2*  14.9*   RBC  4.39*  3.96*   HEMOGLOBIN  11.9*  10.6*   HEMATOCRIT  37.5*  33.3*   MCV  85.4  84.1   MCH  27.1  26.8*   MCHC  31.7*  31.8*   RDW  48.0  48.1   PLATELETCT  498*  451*   MPV  9.0  8.9*     Recent Labs      06/26/17   0300  06/27/17   0257   SODIUM  134*  134*   POTASSIUM  4.2  3.6   CHLORIDE  99  105   CO2  26  22   GLUCOSE  143*  119*   BUN  9  4*   CREATININE  0.66  0.48*   CALCIUM  9.5  9.0                      Assessment/Plan     Intractable pain (present on admission)  Assessment & Plan  Implanted pain pump  Pain is fairly well controlled at this time per pt    Lung cancer metastatic to bone (CMS-HCC) (present on admission)  Assessment & Plan  Discussed with patient  Wants to continue hospice care  POLST on chart  Resume hospice  orders completed discussed with     Sepsis (CMS-HCC) (present on admission)  Assessment & Plan  Treated on admission  Patient declines further treatment wants to continue hospice care    CAP (community acquired pneumonia) (present on admission)  Assessment & Plan  Patient does not desire antibiotic treatment  He wants to continue on hospice care    Intentional drug overdose (present on admission)  Assessment & Plan  Due to circumstances of social situation  Patient states this was impetuous  He wants to go to an assisted living facility to live out his final days  Released from legal hold discussed with Dr. Rodriguez    Acute delirium (present on admission)  Assessment & Plan  Caused by medication overdose, now resolved, no restraints.      Radiology images reviewed, EKG reviewed, Labs reviewed and Medications reviewed  Yañez catheter: Hospice / Comfort Care      DVT Prophylaxis: Enoxaparin (Lovenox)

## 2017-06-28 NOTE — CARE PLAN
Problem: Safety  Goal: Will remain free from injury  Intervention: Provide assistance with mobility  Max assist of 2 at all times with transfer. Patient is on close observation with 1:1 sitter.

## 2017-06-28 NOTE — DISCHARGE PLANNING
Medical Social Work    Received call from Dorinda. Dorinda stating pt was D/C from their hospice service when he was admitted to the hospital and will need a new order/referral if pt would like to continue on hospice with them.

## 2017-06-28 NOTE — CARE PLAN
Problem: Infection  Goal: Will remain free from infection  Intervention: Assess signs and symptoms of infection  Assessing closely for s/s of worsening infection. Vitals q4, and additionally as needed. Encouraged pt on using alcohol hand wipes before eating meals, and after using urinal.   Used scrupulous hand hygiene, and sterile technique when administering IV medications.           Problem: Venous Thromboembolism (VTW)/Deep Vein Thrombosis (DVT) Prevention:  Goal: Patient will participate in Venous Thrombosis (VTE)/Deep Vein Thrombosis (DVT)Prevention Measures  Intervention: Encourage patient to perform ankle flex, foot rotation, and knee flex exercises in addition to other prophylatic measures every hour while awake  Performed ROM and administered lovenox to prevent blood clots

## 2017-06-28 NOTE — PSYCHIATRY
"PSYCHIATRIC FOLLOW UP:    Reason for Admission: overdose  Legal hold status:     Psychiatric Supervising Attending:         HPI:       Mr Torres is a 67 y/o man with metastatic cancer on hospice, here after overdose of multiple medications and sepsis. He is coherent today and doing much better. No agitation. He reports he is not suicidal. He is no longer confused. He is pleasant and reports he isn't exactly sure what happened. He denies any thoughts of harming himself at this time.     Psychiatric Examination: observed phenomenon:  Vitals:  Filed Vitals:    06/27/17 1625 06/27/17 2000 06/28/17 0000 06/28/17 0400   BP: 121/69 96/60 107/63 113/67   Pulse: 111 100 97 90   Temp: 37 °C (98.6 °F) 36.8 °C (98.2 °F) 36.7 °C (98 °F) 36.6 °C (97.8 °F)   Resp: 16 16 16 16   Height:       Weight:  65.7 kg (144 lb 13.5 oz)     SpO2: 93% 93% 93% 91%       Musculoskeletal(abnormal movements, gait, etc): mild psychomotor retardation   Appearance:wnl   Thoughts:Logical and sequential, goal-directed   No a/vh  Speech: Nl tone, rate, and volume. Not pressured. Understandable.     Mood:    \"okay\"  Affect:    Full and congruent  SI/HI:   denies  Attention/Alertness:   Awake, alert  Memory:    Grossly intact.     Orientation:    Grossly intact.     Fund of Knowledge:    Grossly intact.     Insight/Judgement into symptoms Grossly intact.     Neurological Testing:( ie clock, cube drawing, MMSE, MOCA,etc.)      Lab results/tests:   Recent Results (from the past 48 hour(s))   LACTIC ACID    Collection Time: 06/26/17 11:06 AM   Result Value Ref Range    Lactic Acid 1.9 0.5 - 2.0 mmol/L   URINALYSIS    Collection Time: 06/26/17 12:30 PM   Result Value Ref Range    Micro Urine Req Microscopic     Color Colorless     Character Sl Cloudy (A)     Specific Gravity 1.005 <1.035    Ph 7.0 5.0-8.0    Glucose Negative Negative mg/dL    Ketones Negative Negative mg/dL    Protein Negative Negative mg/dL    Bilirubin Negative Negative    Nitrite " Negative Negative    Leukocyte Esterase Large (A) Negative    Occult Blood Small (A) Negative   URINE MICROSCOPIC (W/UA)    Collection Time: 06/26/17 12:30 PM   Result Value Ref Range    -150 (A) /hpf    RBC 5-10 (A) /hpf    Bacteria Few (A) None /hpf    Mucous Threads Few /hpf   Culture Respiratory W/ GRM STN    Collection Time: 06/26/17  2:15 PM   Result Value Ref Range    Significant Indicator NEG     Source RESP     Site SPUTUM     Respiratory Culture No growth at 24 hours.     Gram Stain Result       Rare WBCs.  Rare Gram positive rods.  Specimen Quality Score: 1+     GRAM STAIN    Collection Time: 06/26/17  2:15 PM   Result Value Ref Range    Significant Indicator .     Source RESP     Site SPUTUM     Gram Stain Result       Rare WBCs.  Rare Gram positive rods.  Specimen Quality Score: 1+     LACTIC ACID    Collection Time: 06/26/17  2:44 PM   Result Value Ref Range    Lactic Acid 1.8 0.5 - 2.0 mmol/L   LACTIC ACID    Collection Time: 06/26/17  6:40 PM   Result Value Ref Range    Lactic Acid 1.2 0.5 - 2.0 mmol/L   LACTIC ACID    Collection Time: 06/26/17 10:53 PM   Result Value Ref Range    Lactic Acid 1.7 0.5 - 2.0 mmol/L   LACTIC ACID    Collection Time: 06/27/17  2:57 AM   Result Value Ref Range    Lactic Acid 1.3 0.5 - 2.0 mmol/L   CBC WITHOUT DIFFERENTIAL    Collection Time: 06/27/17  2:57 AM   Result Value Ref Range    WBC 14.9 (H) 4.8 - 10.8 K/uL    RBC 3.96 (L) 4.70 - 6.10 M/uL    Hemoglobin 10.6 (L) 14.0 - 18.0 g/dL    Hematocrit 33.3 (L) 42.0 - 52.0 %    MCV 84.1 81.4 - 97.8 fL    MCH 26.8 (L) 27.0 - 33.0 pg    MCHC 31.8 (L) 33.7 - 35.3 g/dL    RDW 48.1 35.9 - 50.0 fL    Platelet Count 451 (H) 164 - 446 K/uL    MPV 8.9 (L) 9.0 - 12.9 fL   BASIC METABOLIC PANEL    Collection Time: 06/27/17  2:57 AM   Result Value Ref Range    Sodium 134 (L) 135 - 145 mmol/L    Potassium 3.6 3.6 - 5.5 mmol/L    Chloride 105 96 - 112 mmol/L    Co2 22 20 - 33 mmol/L    Glucose 119 (H) 65 - 99 mg/dL    Bun 4 (L) 8  - 22 mg/dL    Creatinine 0.48 (L) 0.50 - 1.40 mg/dL    Calcium 9.0 8.5 - 10.5 mg/dL    Anion Gap 7.0 0.0 - 11.9   ESTIMATED GFR    Collection Time: 06/27/17  2:57 AM   Result Value Ref Range    GFR If African American >60 >60 mL/min/1.73 m 2    GFR If Non African American >60 >60 mL/min/1.73 m 2          Assessment:(acuity level)        Delirium, resolved  Adjustment disorder     Plan:  legal hold: discontinuing     /Signing off

## 2017-06-28 NOTE — CARE PLAN
Problem: Pain Management  Goal: Pain level will decrease to patient’s comfort goal  Intervention: Educate and implement non-pharmacologic comfort measures. Examples: relaxation, distration, play therapy, activity therapy, massage, etc.  Pharmacological implementation as needed; ice pack placed on right knee for pain prn

## 2017-06-28 NOTE — DISCHARGE PLANNING
Medical Social Work    ERNESTINE called [facility] and spoke with Dorinda regarding pt being D/C today. Dorinda stated the [facility] is not able to take pt back on service if pt is D/C back to his motor home. Dorinda stated pt is forgetful with his medications and sometimes confused, therefore they feel pt is a risk if he goes back home. [facility] willing to reconsider accepting pt back if he is agreeable to going to [facility].     ERNESTINE met with pt to discuss where he would D/C to. Pt states he would prefer to go home and feel safe to do so. He states his son lives near by and is consistently at his home. When SW expressed concerns regarding pt's safety at home, pt expressed understanding and states he would be willing to D/C to assisted living.     ERNESTINE called Dorinda back to inform of pt's decision. Dorinda stated she would have to speak with her manager regarding accepting pt back and will call SW back. Dorinda however requested SW call [facility] since Dorinda stated ERNESTINE would have to coordinate with [facility] to facilitate D/C there. ERNESTINE called [facility] on [address] [phone] and was informed pt had been accepted, but pt never completed paperwork and didn't come out to see the facility, therefore, a room wasn't saved for him. They will not have a room until mid-July. Staff member stated they will look to see if other facilities had rooms available for pt to stay in until then.     Awaiting call back.

## 2017-06-28 NOTE — PROGRESS NOTES
"Pt had a fall at 1600 while getting up to bathroom with unit clerk. Per unit clerk, she was breaking sitter, pt asked to get up to use restroom, and he started to get out of bed so CNA had to attend to him before able to call for assistance. Per unit clerk, she was standing next to him, with her hands on him, and he attempted to lean on wall, pushed his hands out to reach for wall, but wall wasn't as close as he thought so he fell forward, CNA tried to hold onto him but she couldn't hold him. Bed alarm did not sound when the patient stood up.   Pt says he \"feels okay.\" denies any pain other than knee (5/10).    Pt hit his right knee and left elbow, did not hit head. Small 1cm x 0.1cm cut on left elbow, no bruise or open skin on knee.  Post-vall vitals were stable, and no changes found in assessment.   Son was notified of fall, MD Zonia Whaley notified, no orders received. Manager and charge RN both notified, pharmacy notified.  "

## 2017-06-29 PROCEDURE — A9270 NON-COVERED ITEM OR SERVICE: HCPCS | Performed by: HOSPITALIST

## 2017-06-29 PROCEDURE — 36415 COLL VENOUS BLD VENIPUNCTURE: CPT

## 2017-06-29 PROCEDURE — 90471 IMMUNIZATION ADMIN: CPT

## 2017-06-29 PROCEDURE — 86480 TB TEST CELL IMMUN MEASURE: CPT

## 2017-06-29 PROCEDURE — 700102 HCHG RX REV CODE 250 W/ 637 OVERRIDE(OP): Performed by: HOSPITALIST

## 2017-06-29 PROCEDURE — 3E0234Z INTRODUCTION OF SERUM, TOXOID AND VACCINE INTO MUSCLE, PERCUTANEOUS APPROACH: ICD-10-PCS | Performed by: HOSPITALIST

## 2017-06-29 PROCEDURE — 99231 SBSQ HOSP IP/OBS SF/LOW 25: CPT | Mod: GW | Performed by: INTERNAL MEDICINE

## 2017-06-29 PROCEDURE — 90670 PCV13 VACCINE IM: CPT | Performed by: HOSPITALIST

## 2017-06-29 PROCEDURE — 700111 HCHG RX REV CODE 636 W/ 250 OVERRIDE (IP): Performed by: HOSPITALIST

## 2017-06-29 PROCEDURE — 770006 HCHG ROOM/CARE - MED/SURG/GYN SEMI*

## 2017-06-29 RX ORDER — GUAIFENESIN/DEXTROMETHORPHAN 100-10MG/5
5 SYRUP ORAL EVERY 6 HOURS PRN
Status: DISCONTINUED | OUTPATIENT
Start: 2017-06-29 | End: 2017-06-30 | Stop reason: HOSPADM

## 2017-06-29 RX ADMIN — HYDROMORPHONE HYDROCHLORIDE 4 MG: 4 TABLET ORAL at 09:34

## 2017-06-29 RX ADMIN — HYDROMORPHONE HYDROCHLORIDE 4 MG: 4 TABLET ORAL at 14:27

## 2017-06-29 RX ADMIN — STANDARDIZED SENNA CONCENTRATE AND DOCUSATE SODIUM 2 TABLET: 8.6; 5 TABLET, FILM COATED ORAL at 08:26

## 2017-06-29 RX ADMIN — ALPRAZOLAM 1 MG: 1 TABLET ORAL at 00:13

## 2017-06-29 RX ADMIN — OMEPRAZOLE 20 MG: 20 CAPSULE, DELAYED RELEASE ORAL at 05:42

## 2017-06-29 RX ADMIN — HYDROMORPHONE HYDROCHLORIDE 4 MG: 4 TABLET ORAL at 18:27

## 2017-06-29 RX ADMIN — ENOXAPARIN SODIUM 40 MG: 100 INJECTION SUBCUTANEOUS at 08:25

## 2017-06-29 RX ADMIN — ALPRAZOLAM 1 MG: 1 TABLET ORAL at 20:42

## 2017-06-29 RX ADMIN — HYDROMORPHONE HYDROCHLORIDE 1 MG: 1 INJECTION, SOLUTION INTRAMUSCULAR; INTRAVENOUS; SUBCUTANEOUS at 03:24

## 2017-06-29 RX ADMIN — STANDARDIZED SENNA CONCENTRATE AND DOCUSATE SODIUM 2 TABLET: 8.6; 5 TABLET, FILM COATED ORAL at 20:05

## 2017-06-29 ASSESSMENT — PAIN SCALES - GENERAL
PAINLEVEL_OUTOF10: 7
PAINLEVEL_OUTOF10: 4
PAINLEVEL_OUTOF10: 4
PAINLEVEL_OUTOF10: 6
PAINLEVEL_OUTOF10: 5
PAINLEVEL_OUTOF10: 8

## 2017-06-29 NOTE — CARE PLAN
Problem: Safety  Goal: Will remain free from injury  Treaded socks in place, bed in the lowest position, bed alarm on, call light and belongings within reach, pt call for assistance appropriately    Problem: Venous Thromboembolism (VTW)/Deep Vein Thrombosis (DVT) Prevention:  Goal: Patient will participate in Venous Thrombosis (VTE)/Deep Vein Thrombosis (DVT)Prevention Measures  Receiving lovenox per MAR    Problem: Pain Management  Goal: Pain level will decrease to patient’s comfort goal  Outcome: PROGRESSING AS EXPECTED  Medicated with dilaudid tab 4 mg with adequate pain control, hourly rounding in progress    Problem: Skin Integrity  Goal: Risk for impaired skin integrity will decrease   polo risk assessment, pt turns self from side to side

## 2017-06-29 NOTE — DISCHARGE PLANNING
Medical Social Work  PC from [facility], can take patient will need to contact [facility] first but we are looking at having patient d/c around 10:00.  Patient's son's have stated they would transfer, will get back on both.

## 2017-06-29 NOTE — CARE PLAN
Problem: Bowel/Gastric:  Goal: Normal bowel function is maintained or improved  Outcome: PROGRESSING AS EXPECTED  Per pt reported, pt has a soft small BM today. Pt took stool softener per MAR. Encourage pt increase fluid and fiber taking    Problem: Pain Management  Goal: Pain level will decrease to patient’s comfort goal  Outcome: PROGRESSING AS EXPECTED  Assessed pain using 0-10 scale, pt c/o pain at hip 6/10, pt agreed to wait for next pain schedule. Emotional support, extra blanket and pillows provided. Offering heat pack, pt refused

## 2017-06-29 NOTE — PROGRESS NOTES
Report received. Assumed care. Pt in chair. A/O x4. VSS. Responds appropriately. Denies pain, SOB. Assessment complete. Abdominal implanted pain pump noted. Abrasion to left arm, lea. Discussed POC, pain control, safety, DC planning , pt verbalizes understanding. Explained importance of calling before getting OOB. Call light and belongings within reach. Bed alarm on. Bed in the lowest position. Treaded socks in place. Hourly rounding in progress. Will continue to monitor .

## 2017-06-29 NOTE — DISCHARGE PLANNING
Medical Social Work  Patient was assessed by [facility], stated they could take the patient. But the Physician/Healthcare Provider form that was previously faxed to them can not be accepted.  I was told that the director declined it as the medication listed showed injection medications, it was crossed off and this is unacceptable.  Told I will need to complete a new packet and fax it to them at [phone].    Packet completed and faxed to [facility].

## 2017-06-29 NOTE — DISCHARGE INSTRUCTIONS
Discharge Instructions    Discharged to other by medical transportation with escort. Discharged via wheelchair, hospital escort: Yes.  Special equipment needed: Oxygen    Be sure to schedule a follow-up appointment with your primary care doctor or any specialists as instructed.     Discharge Plan:   Diet Plan: Discussed  Activity Level: Discussed  Confirmed Follow up Appointment: Patient to Call and Schedule Appointment  Influenza Vaccine Indication: Indicated: Not available from distributor/    I understand that a diet low in cholesterol, fat, and sodium is recommended for good health. Unless I have been given specific instructions below for another diet, I accept this instruction as my diet prescription.   Other diet: Diet as tolerated    Special Instructions: None    · Is patient discharged on Warfarin / Coumadin?   No     · Is patient Post Blood Transfusion?  No    Sepsis, Adult  Sepsis is a serious infection of your blood or tissues that affects your whole body. The infection that causes sepsis may be bacterial, viral, fungal, or parasitic. Sepsis may be life threatening. Sepsis can cause your blood pressure to drop. This may result in shock. Shock causes your central nervous system and your organs to stop working correctly.   RISK FACTORS  Sepsis can happen in anyone, but it is more likely to happen in people who have weakened immune systems.  SIGNS AND SYMPTOMS   Symptoms of sepsis can include:  · Fever or low body temperature (hypothermia).  · Rapid breathing (hyperventilation).  · Chills.  · Rapid heartbeat (tachycardia).  · Confusion or light-headedness.  · Trouble breathing.  · Urinating much less than usual.  · Cool, clammy skin or red, flushed skin.  · Other problems with the heart, kidneys, or brain.  DIAGNOSIS   Your health care provider will likely do tests to look for an infection, to see if the infection has spread to your blood, and to see how serious your condition is. Tests can  include:  · Blood tests, including cultures of your blood.  · Cultures of other fluids from your body, such as:  ¨ Urine.  ¨ Pus from wounds.  ¨ Mucus coughed up from your lungs.  · Urine tests other than cultures.  · X-ray exams or other imaging tests.  TREATMENT   Treatment will begin with elimination of the source of infection. If your sepsis is likely caused by a bacterial or fungal infection, you will be given antibiotic or antifungal medicines.  You may also receive:  · Oxygen.  · Fluids through an IV tube.  · Medicines to increase your blood pressure.  · A machine to clean your blood (dialysis) if your kidneys fail.  · A machine to help you breathe if your lungs fail.  SEEK IMMEDIATE MEDICAL CARE IF:  You get an infection or develop any of the signs and symptoms of sepsis after surgery or a hospitalization.     This information is not intended to replace advice given to you by your health care provider. Make sure you discuss any questions you have with your health care provider.     Document Released: 09/15/2004 Document Revised: 05/03/2016 Document Reviewed: 08/25/2014  [employer] Patient Education ©2016 [employer]    Depression / Suicide Risk    As you are discharged from this [facility] facility, it is important to learn how to keep safe from harming yourself.    Recognize the warning signs:  · Abrupt changes in personality, positive or negative- including increase in energy   · Giving away possessions  · Change in eating patterns- significant weight changes-  positive or negative  · Change in sleeping patterns- unable to sleep or sleeping all the time   · Unwillingness or inability to communicate  · Depression  · Unusual sadness, discouragement and loneliness  · Talk of wanting to die  · Neglect of personal appearance   · Rebelliousness- reckless behavior  · Withdrawal from people/activities they love  · Confusion- inability to concentrate     If you or a loved one observes any of these  behaviors or has concerns about self-harm, here's what you can do:  · Talk about it- your feelings and reasons for harming yourself  · Remove any means that you might use to hurt yourself (examples: pills, rope, extension cords, firearm)  · Get professional help from the community (Mental Health, Substance Abuse, psychological counseling)  · Do not be alone:Call your Safe Contact- someone whom you trust who will be there for you.  · Call your local CRISIS HOTLINE 798-0596 or 857-355-7750  · Call your local Children's Mobile Crisis Response Team [place] (684) 220-9469 or www.[employer]  · Call the toll free National Suicide Prevention Hotlines   · National Suicide Prevention Lifeline 244-532-UNTT (1127)  · National Hope Line Network 800-SUICIDE (551-8745)

## 2017-06-29 NOTE — PROGRESS NOTES
Pt transferred to [place] [id number], with family at bedside, all belongings with patient. Vitals stable.

## 2017-06-29 NOTE — PROGRESS NOTES
Assumed care of pt at shift change, discussed POC. Pt A&Ox4.  IV in place. Pt pleasant and cooperative. Bed alarm in place, 2 assist with walker, treaded socks on, call light within reach, bed in low position.

## 2017-06-30 VITALS
SYSTOLIC BLOOD PRESSURE: 109 MMHG | WEIGHT: 144.84 LBS | TEMPERATURE: 98.5 F | OXYGEN SATURATION: 96 % | BODY MASS INDEX: 23.28 KG/M2 | HEART RATE: 91 BPM | RESPIRATION RATE: 18 BRPM | HEIGHT: 66 IN | DIASTOLIC BLOOD PRESSURE: 64 MMHG

## 2017-06-30 LAB
M TB TUBERC IFN-G BLD QL: NEGATIVE
M TB TUBERC IFN-G/MITOGEN IGNF BLD: 0
M TB TUBERC IGNF/MITOGEN IGNF CONTROL: 47.84 [IU]/ML
MITOGEN IGNF BCKGRD COR BLD-ACNC: 0.05 [IU]/ML

## 2017-06-30 PROCEDURE — 700102 HCHG RX REV CODE 250 W/ 637 OVERRIDE(OP): Performed by: HOSPITALIST

## 2017-06-30 PROCEDURE — 700111 HCHG RX REV CODE 636 W/ 250 OVERRIDE (IP): Performed by: HOSPITALIST

## 2017-06-30 PROCEDURE — A9270 NON-COVERED ITEM OR SERVICE: HCPCS | Performed by: HOSPITALIST

## 2017-06-30 PROCEDURE — 99238 HOSP IP/OBS DSCHRG MGMT 30/<: CPT | Mod: GW | Performed by: INTERNAL MEDICINE

## 2017-06-30 RX ORDER — HYDROMORPHONE HYDROCHLORIDE 2 MG/1
4 TABLET ORAL EVERY 8 HOURS
Qty: 30 TAB | Refills: 0 | Status: SHIPPED | OUTPATIENT
Start: 2017-06-30

## 2017-06-30 RX ORDER — ALPRAZOLAM 1 MG/1
1 TABLET ORAL 3 TIMES DAILY PRN
Qty: 30 TAB | Refills: 0 | Status: SHIPPED | OUTPATIENT
Start: 2017-06-30

## 2017-06-30 RX ADMIN — STANDARDIZED SENNA CONCENTRATE AND DOCUSATE SODIUM 2 TABLET: 8.6; 5 TABLET, FILM COATED ORAL at 08:26

## 2017-06-30 RX ADMIN — HYDROMORPHONE HYDROCHLORIDE 4 MG: 4 TABLET ORAL at 08:26

## 2017-06-30 RX ADMIN — OMEPRAZOLE 20 MG: 20 CAPSULE, DELAYED RELEASE ORAL at 06:23

## 2017-06-30 RX ADMIN — ALPRAZOLAM 1 MG: 1 TABLET ORAL at 06:23

## 2017-06-30 RX ADMIN — HYDROMORPHONE HYDROCHLORIDE 4 MG: 4 TABLET ORAL at 03:47

## 2017-06-30 RX ADMIN — ENOXAPARIN SODIUM 40 MG: 100 INJECTION SUBCUTANEOUS at 08:26

## 2017-06-30 ASSESSMENT — PAIN SCALES - GENERAL
PAINLEVEL_OUTOF10: 2
PAINLEVEL_OUTOF10: 2

## 2017-06-30 NOTE — PROGRESS NOTES
Pt up to edge of bed to urinate   Bed alarm on  Pt states that his legs wont hold him up   Rings call light appropriately   Harsh cough at times, o2 per nc   Pt is a/o  Resting comfortably at present   Will monitor

## 2017-07-01 ENCOUNTER — PATIENT OUTREACH (OUTPATIENT)
Dept: HEALTH INFORMATION MANAGEMENT | Facility: OTHER | Age: 67
End: 2017-07-01

## 2017-07-01 LAB
BACTERIA BLD CULT: NORMAL
BACTERIA BLD CULT: NORMAL
SIGNIFICANT IND 70042: NORMAL
SIGNIFICANT IND 70042: NORMAL
SITE SITE: NORMAL
SITE SITE: NORMAL
SOURCE SOURCE: NORMAL
SOURCE SOURCE: NORMAL

## 2017-07-28 ASSESSMENT — ENCOUNTER SYMPTOMS
SHORTNESS OF BREATH: 0
NERVOUS/ANXIOUS: 0
WEAKNESS: 0
ABDOMINAL PAIN: 0
NAUSEA: 0
BACK PAIN: 1
DEPRESSION: 0
NECK PAIN: 1
HEADACHES: 0
DIZZINESS: 0
MYALGIAS: 1
BRUISES/BLEEDS EASILY: 0
VOMITING: 0
COUGH: 0
WEIGHT LOSS: 0
CHILLS: 0
FEVER: 0

## 2017-07-28 NOTE — PROGRESS NOTES
[facility]    Date of Service: 2017    Chief Complaint  66 y.o. male admitted 2017 with suicide attempt; history of lung cancer on hospice.    Interval Problem Update  Pain controlled, glad to go to assisted living. Had no suicidal or homicidal ideation.    Consultants/Specialty  Psychiatry, Dr. Rodriguez    Disposition  To assisted living on hospice care through [facility].        Review of Systems   Constitutional: Positive for malaise/fatigue. Negative for fever, chills and weight loss.   Respiratory: Negative for cough and shortness of breath.    Cardiovascular: Negative for chest pain and leg swelling.   Gastrointestinal: Negative for nausea, vomiting and abdominal pain.   Genitourinary: Negative.  Negative for dysuria.   Musculoskeletal: Positive for myalgias, back pain and neck pain. Negative for joint pain.   Skin: Negative for rash.   Neurological: Negative for dizziness, weakness and headaches.   Endo/Heme/Allergies: Negative.  Does not bruise/bleed easily.   Psychiatric/Behavioral: Negative for depression and suicidal ideas. The patient is not nervous/anxious.    All other systems reviewed and are negative.     Physical Exam  Laboratory/Imaging   Hemodynamics  No data recorded.      Pulse  Av.9  Min: 84  Max: 145          Respiratory                    Fluids  No intake or output data in the 24 hours ending 17 0025    Nutrition  No orders of the defined types were placed in this encounter.     Physical Exam   Constitutional: He appears well-developed. He appears cachectic. No distress.   HENT:   Nose: Nose normal.   Mouth/Throat: Oropharynx is clear and moist. No oropharyngeal exudate.   Eyes: Conjunctivae are normal. Right eye exhibits no discharge. Left eye exhibits no discharge. No scleral icterus.   Neck: No JVD present. No tracheal deviation present.   Cardiovascular: Normal rate, regular rhythm and normal heart sounds.    Pulmonary/Chest: Effort normal  and breath sounds normal. No stridor. No respiratory distress. He has no wheezes. He has no rales. He exhibits no tenderness.   Abdominal: Soft. Bowel sounds are normal. He exhibits no distension. There is no tenderness.   Musculoskeletal: He exhibits no edema or tenderness.   Neurological: He is alert. No cranial nerve deficit. He exhibits normal muscle tone.   Skin: Skin is warm and dry. He is not diaphoretic. No pallor.   Psychiatric: He has a normal mood and affect. His behavior is normal.   Nursing note and vitals reviewed.                               Assessment/Plan     Intractable pain (present on admission)  Assessment & Plan  Implanted pain pump  Pain is fairly well controlled at this time per pt    Lung cancer metastatic to bone (CMS-HCC) (present on admission)  Assessment & Plan  Discussed with patient  Wants to continue hospice care  POLST on chart  Resume hospice orders completed discussed with     Sepsis (CMS-HCC) (present on admission)  Assessment & Plan  Treated on admission  Patient declines further treatment wants to continue hospice care    CAP (community acquired pneumonia) (present on admission)  Assessment & Plan  Patient does not desire antibiotic treatment  He wants to continue on hospice care    Intentional drug overdose (present on admission)  Assessment & Plan  Due to circumstances of social situation  Patient states this was impetuous  He wants to go to an assisted living facility to live out his final days  Released from legal hold discussed with Dr. Rodriguez    Acute delirium (present on admission)  Assessment & Plan  Caused by medication overdose, now resolved, no restraints.      Radiology images reviewed, EKG reviewed, Labs reviewed and Medications reviewed  Yañez catheter: Hospice / Comfort Care      DVT Prophylaxis: Enoxaparin (Lovenox)

## 2017-08-15 ENCOUNTER — TELEPHONE (OUTPATIENT)
Dept: MEDICAL GROUP | Facility: PHYSICIAN GROUP | Age: 67
End: 2017-08-15

## 2017-08-15 NOTE — TELEPHONE ENCOUNTER
Jess from [facility] contacted us and wanted to know who Gabriele received the pain pump from.  I was unable to find this in the patients chart.  I contacted [facility] @[phone].  She stated that they did not know either.  I also contacted Dr. Sparks's office and left a message with the medical assistant to find out if he had ordered this.  Awaiting for a response.

## 2020-01-12 NOTE — DISCHARGE SUMMARY
"CHIEF COMPLAINT ON ADMISSION  Chief Complaint   Patient presents with   • Suicide Attempt   • Drug Overdose       CODE STATUS  DNR    HPI & HOSPITAL COURSE  This is a 66-year-old male with history of metastatic lung cancer to the bone, who was on hospice with [facility] who attempted   to end his life by ingesting unknown amount of Xanax, morphine, Dilaudid.   Patient was initially tachycardic and agitated. He was also diagnosed with sepsis secondary to pneumonia. He was treated with IVF and antibiotics.  He was initially on legal hold and was evaluated by psychiatrist and released from legal hold by Dr. Rodriguez.   arranged for assisted living and [place] [facility] to follow patient.  On the day of discharge, patient is stable with stable vitals. His pain is managed with PO dilaudid. He has no other complaints. He denies any suicidal or homicidal ideations. He agrees to hospice care and states \" he doesn't want to give all his money for medical care.\"  Discussed with case mgt. He will go to [facility] with [facility]. Transfer will be set up.     Therefore, he is discharged in good and stable condition with close outpatient follow-up with hospice.     SPECIFIC OUTPATIENT FOLLOW-UP  [facility] [facility] will follow patient.     DISCHARGE PROBLEM LIST  Active Problems:    Intractable pain POA: Yes    Lung cancer metastatic to bone (CMS-HCC) POA: Yes    Sepsis (CMS-HCC) POA: Yes    CAP (community acquired pneumonia) POA: Yes    Intentional drug overdose POA: Yes    Acute delirium POA: Yes  Resolved Problems:    * No resolved hospital problems. *      FOLLOW UP  Future Appointments  Date Time Provider Department Center   7/13/2017 9:00 AM [facility]   7/14/2017 10:40 AM [facility]     Sasha Isidro M.D.  [address] [facility]  [address] [place] [id number]  [phone]            MEDICATIONS ON DISCHARGE   Gabriele Torres   Home Medication Instructions SUSANNE:[id number]    Printed " on:06/29/17 1314   Medication Information                      Pain Pump (PATIENT SUPPLIED) XX ROSARIO  1 Each by Injection route Continuous. Infusion drug:  HYDROMORPHONE 5.0mg/ml  KETAMINE 83.3 mg/ml  Infusion mode simple continuous drug:  HYDROMORPHONE 1.5009 mg/day  KETAMINE 25.005 mg/day  Infusion PA drug:  HYDROMORPHONE 0.1000MG   1.5988mg/day  7% 0.0979 mg  KETAMINE 1.666mg   26.536mg/day  7% 1.631mg  PA Duration:  Lockout interval 01:00 h:m  Maximum Activation/Day 1  Last changed on 5/4/2017  Indications: Hydromophone 5.0 MG/ML  (Concentration)             Dilaudid 4mg, 2 tablets by mouth every 8 hours for pain #30# no refills.  Xanax 1mg, 1 tab by mouth every 8 hours for anxiety #30# no refills.   Written script for the above medications was given to the patient.    DIET  Orders Placed This Encounter   Procedures   • Diet Order     Standing Status: Standing      Number of Occurrences: 1      Standing Expiration Date:      Order Specific Question:  Diet:     Answer:  Regular [1]       ACTIVITY  As tolerated.  Weight bearing as tolerated      CONSULTATIONS  Dr. Buchanan with hospice  Soraya Barkley RN with palliative care    PROCEDURES  None.     LABORATORY  Lab Results   Component Value Date/Time    SODIUM 134* 06/27/2017 02:57 AM    POTASSIUM 3.6 06/27/2017 02:57 AM    CHLORIDE 105 06/27/2017 02:57 AM    CO2 22 06/27/2017 02:57 AM    GLUCOSE 119* 06/27/2017 02:57 AM    BUN 4* 06/27/2017 02:57 AM    CREATININE 0.48* 06/27/2017 02:57 AM        Lab Results   Component Value Date/Time    WBC 14.9* 06/27/2017 02:57 AM    HEMOGLOBIN 10.6* 06/27/2017 02:57 AM    HEMATOCRIT 33.3* 06/27/2017 02:57 AM    PLATELET COUNT 451* 06/27/2017 02:57 AM        Total time of the discharge process exceeds 31 minutes.    Above was discussed with patient  in detail. At this time patient is alert and oriented x4. He has an understanding of his diagnosis and plan of care. He agrees with the plan. He is able to repeat the instructions back  to me.  Future follow up plan was also discussed with him in detail.     Patient seen and examined today 6/30/17. This am, patent has no complaints. I spoke to the daughter and patient at bedside. Again, all the above was discussed   Patient was safely Discharged to group home with home hospice and daughter will transport patient.    Communicable/Infectious (Pediatric)

## 2021-03-11 NOTE — PROGRESS NOTES
[facility] Hospitalist Progress Note    Date of Service: 2017    Chief Complaint  66 y.o. male admitted 2017 with suicide attempt; history of lung cancer on hospice.    Interval Problem Update  Pain is 5-6/10  Patient states his reasoning for the suicide attempt was frustration with his social situation, thinking he would be able to avoid the situation with his son, grandson and great grandson  He wants to continue on hospice care, doesn't want treatment for pneumonia.   Consultants/Specialty  Psychiatry, Dr. Rodriguez    Disposition  To assisted living on hospice care through [facility].        Review of Systems   Constitutional: Positive for malaise/fatigue. Negative for fever, chills and weight loss.   Respiratory: Negative for cough and shortness of breath.    Cardiovascular: Negative for chest pain and leg swelling.   Gastrointestinal: Negative for nausea, vomiting and abdominal pain.   Genitourinary: Negative.  Negative for dysuria.   Musculoskeletal: Positive for myalgias, back pain, joint pain and neck pain.   Skin: Negative for rash.   Neurological: Negative for dizziness, weakness and headaches.   Endo/Heme/Allergies: Negative.  Does not bruise/bleed easily.   Psychiatric/Behavioral: Negative for depression. The patient is not nervous/anxious.    All other systems reviewed and are negative.     Physical Exam  Laboratory/Imaging   Hemodynamics  Temp (24hrs), Av.1 °C (98.8 °F), Min:36.7 °C (98.1 °F), Max:37.4 °C (99.4 °F)   Temperature: 37.1 °C (98.7 °F)  Pulse  Av.8  Min: 102  Max: 145   Blood Pressure : 126/59 mmHg      Respiratory      Respiration: 19, Pulse Oximetry: 92 %     Work Of Breathing / Effort: Mild  RUL Breath Sounds: Diminished, RML Breath Sounds: Diminished, RLL Breath Sounds: Diminished, LIN Breath Sounds: Diminished, LLL Breath Sounds: Diminished    Fluids    Intake/Output Summary (Last 24 hours) at 17 1318  Last data filed at 17 0841   Gross per 24 hour    Intake   3200 ml   Output   7300 ml   Net  -4100 ml       Nutrition  Orders Placed This Encounter   Procedures   • Diet Order     Standing Status: Standing      Number of Occurrences: 1      Standing Expiration Date:      Order Specific Question:  Diet:     Answer:  Regular [1]     Physical Exam   Constitutional: He appears well-developed. He appears cachectic. No distress.   HENT:   Nose: Nose normal.   Mouth/Throat: Oropharynx is clear and moist. No oropharyngeal exudate.   Eyes: Conjunctivae are normal. Right eye exhibits no discharge. Left eye exhibits no discharge. No scleral icterus.   Neck: No JVD present. No tracheal deviation present.   Cardiovascular: Normal rate, regular rhythm and normal heart sounds.    Pulmonary/Chest: Effort normal and breath sounds normal. No stridor. No respiratory distress. He has no wheezes. He has no rales. He exhibits no tenderness.   Abdominal: Soft. Bowel sounds are normal. He exhibits no distension. There is no tenderness.   Musculoskeletal: He exhibits no edema or tenderness.   Neurological: He is alert. No cranial nerve deficit. He exhibits normal muscle tone.   Skin: Skin is warm and dry. He is not diaphoretic. No pallor.   Psychiatric: He has a normal mood and affect. His behavior is normal.   Nursing note and vitals reviewed.      Recent Labs      06/26/17   0300  06/27/17   0257   WBC  13.2*  14.9*   RBC  4.39*  3.96*   HEMOGLOBIN  11.9*  10.6*   HEMATOCRIT  37.5*  33.3*   MCV  85.4  84.1   MCH  27.1  26.8*   MCHC  31.7*  31.8*   RDW  48.0  48.1   PLATELETCT  498*  451*   MPV  9.0  8.9*     Recent Labs      06/26/17   0300  06/27/17   0257   SODIUM  134*  134*   POTASSIUM  4.2  3.6   CHLORIDE  99  105   CO2  26  22   GLUCOSE  143*  119*   BUN  9  4*   CREATININE  0.66  0.48*   CALCIUM  9.5  9.0                      Assessment/Plan     Intractable pain (present on admission)  Assessment & Plan  Implanted pain pump  Pain is fairly well controlled at this time per  pt    Lung cancer metastatic to bone (CMS-HCC) (present on admission)  Assessment & Plan  Discussed with patient  Wants to continue hospice care  Palliative care team consulted discussed with Cherelle Triana RN.    Sepsis (CMS-HCC) (present on admission)  Assessment & Plan  Treated on admission  Patient declines further treatment wants to continue hospice care    CAP (community acquired pneumonia) (present on admission)  Assessment & Plan  Patient does not desire antibiotic treatment  He wants to continue on hospice care    Intentional drug overdose (present on admission)  Assessment & Plan  Due to circumstances of social situation  Patient states this was impetuous  He wants to go to an assisted living facility to live out his final days  Discussing with Dr. Rodriguez who will review the case  Continue legal hold at this time pending further psychiatric evaluation    Acute delirium (present on admission)  Assessment & Plan  Caused by medication overdose, now resolved, no restraints.    Radiology images reviewed, EKG reviewed, Labs reviewed and Medications reviewed  Yañez catheter: Hospice / Comfort Care      DVT Prophylaxis: Enoxaparin (Lovenox)                   No

## 2023-08-15 NOTE — PROGRESS NOTES
Bladder scan results = 538 ml.   Bexarotene Pregnancy And Lactation Text: This medication is Pregnancy Category X and should not be given to women who are pregnant or may become pregnant. This medication should not be used if you are breast feeding.

## 2024-08-30 NOTE — CARE PLAN
H&P reviewed.  The patient was examined and there are no changes to the H&P risk and benefits discussed with patient and allergies reviewed Problem: Pain Management  Goal: Pain level will decrease to patient’s comfort goal  Outcome: PROGRESSING AS EXPECTED  Assess pain using 0-10 scale  Medicate per MAR

## (undated) DEVICE — SYRINGE 10 ML CONTROL LL (25EA/BX 4BX/CA)

## (undated) DEVICE — DRAPE IOBAN II INCISE 23X17 - (10EA/BX 4BX/CA)

## (undated) DEVICE — SODIUM CHL IRRIGATION 0.9% 1000ML (12EA/CA)

## (undated) DEVICE — ELECTRODE 850 FOAM ADHESIVE - HYDROGEL RADIOTRNSPRNT (50/PK)

## (undated) DEVICE — SHEET TRANSVERSE LAP - (12EA/CA)

## (undated) DEVICE — CATH PASSER

## (undated) DEVICE — DRAPE STRLE REG TOWEL 18X24 - (10/BX 4BX/CA)"

## (undated) DEVICE — GLOVE BIOGEL SZ 7.5 SURGICAL PF LTX - (50PR/BX 4BX/CA)

## (undated) DEVICE — GLOVE BIOGEL SZ 7 SURGICAL PF LTX - (50PR/BX 4BX/CA)

## (undated) DEVICE — SENSOR SPO2 NEO LNCS ADHESIVE (20/BX) SEE USER NOTES

## (undated) DEVICE — LACTATED RINGERS INJ 1000 ML - (14EA/CA 60CA/PF)

## (undated) DEVICE — WATER IRRIGATION STERILE 1000ML (12EA/CA)

## (undated) DEVICE — SPONGE GAUZESTER 4 X 4 4PLY - (128PK/CA)

## (undated) DEVICE — MEDICINE CUP STERILE 2 OZ - (100/CA)

## (undated) DEVICE — SUTURE 0 ETHIBOND MO6 C/R - (12/BX) 8-18 INCH ETHICON

## (undated) DEVICE — PACK MINOR BASIN - (2EA/CA)

## (undated) DEVICE — HUMID-VENT HEAT AND MOISTURE EXCHANGE- (50/BX)

## (undated) DEVICE — MASK ANESTHESIA ADULT  - (100/CA)

## (undated) DEVICE — BANDAID SHEER STRIP 3/4 IN (100EA/BX 12BX/CA)

## (undated) DEVICE — DRESSING XEROFORM 1X8 - (50/BX 4BX/CA)

## (undated) DEVICE — ELECTRODE DUAL RETURN W/ CORD - (50/PK)

## (undated) DEVICE — DRESSING TRANSPARENT FILM TEGADERM 4 X 4.75" (50EA/BX)"

## (undated) DEVICE — KIT ANESTHESIA W/CIRCUIT & 3/LT BAG W/FILTER (20EA/CA)

## (undated) DEVICE — SPONGE GAUZE STER 4X4 8-PL - (2/PK 50PK/BX 12BX/CS)

## (undated) DEVICE — ANTENNA

## (undated) DEVICE — KIT ROOM DECONTAMINATION

## (undated) DEVICE — BINDER ABDOMINAL 45-62 INCH - 4 PANEL 12 IN (1/EA)

## (undated) DEVICE — SUCTION INSTRUMENT YANKAUER BULBOUS TIP W/O VENT (50EA/CA)

## (undated) DEVICE — GLOVE SZ 7.5 LF PROTEXIS (50PR/BX)

## (undated) DEVICE — PROTECTOR ULNA NERVE - (36PR/CA)

## (undated) DEVICE — STAPLER SKIN DISP - (6/BX 10BX/CA) VISISTAT

## (undated) DEVICE — DRAPE C-ARM LARGE 41IN X 74 IN - (10/BX 2BX/CA)

## (undated) DEVICE — GOWN WARMING STANDARD FLEX - (30/CA)

## (undated) DEVICE — TUBE CONNECTING SUCTION - CLEAR PLASTIC STERILE 72 IN (50EA/CA)

## (undated) DEVICE — BAG, SPONGE COUNT 50600

## (undated) DEVICE — SUTURE 2-0 ETHILON FS - (36/BX) 18 INCH

## (undated) DEVICE — SUTURE 3-0 VICRYL PLUS SH - 8X 18 INCH (12/BX)

## (undated) DEVICE — CHLORAPREP 26 ML APPLICATOR - ORANGE TINT(25/CA)

## (undated) DEVICE — HEAD HOLDER JUNIOR/ADULT

## (undated) DEVICE — SUTURE GENERAL

## (undated) DEVICE — CANISTER SUCTION RIGID RED 1500CC (40EA/CA)

## (undated) DEVICE — DRAPE LARGE 3 QUARTER - (20/CA)

## (undated) DEVICE — BLADE SURGICAL #15 - (50/BX 3BX/CA)

## (undated) DEVICE — GLOVE, LITE (PAIR)

## (undated) DEVICE — BOVIE NEEDLE TIP INSULATD NON-SAFETY 2CM (50/PK)